# Patient Record
Sex: MALE | Race: WHITE | NOT HISPANIC OR LATINO | Employment: UNEMPLOYED | URBAN - METROPOLITAN AREA
[De-identification: names, ages, dates, MRNs, and addresses within clinical notes are randomized per-mention and may not be internally consistent; named-entity substitution may affect disease eponyms.]

---

## 2022-02-21 ENCOUNTER — APPOINTMENT (OUTPATIENT)
Dept: MRI IMAGING | Facility: HOSPITAL | Age: 47
End: 2022-02-21

## 2022-02-21 ENCOUNTER — HOSPITAL ENCOUNTER (INPATIENT)
Facility: HOSPITAL | Age: 47
LOS: 2 days | Discharge: HOME | End: 2022-02-23
Attending: EMERGENCY MEDICINE | Admitting: INTERNAL MEDICINE

## 2022-02-21 ENCOUNTER — APPOINTMENT (OUTPATIENT)
Dept: CT IMAGING | Facility: HOSPITAL | Age: 47
End: 2022-02-21

## 2022-02-21 DIAGNOSIS — R29.898 LEFT LEG WEAKNESS: Primary | ICD-10-CM

## 2022-02-21 DIAGNOSIS — N18.9 CHRONIC RENAL FAILURE, UNSPECIFIED CKD STAGE: ICD-10-CM

## 2022-02-21 DIAGNOSIS — I10 POORLY-CONTROLLED HYPERTENSION: ICD-10-CM

## 2022-02-21 DIAGNOSIS — R93.0 ABNORMAL HEAD CT: ICD-10-CM

## 2022-02-21 PROBLEM — N17.9 AKI (ACUTE KIDNEY INJURY): Status: ACTIVE | Noted: 2022-02-21

## 2022-02-21 LAB
ALBUMIN SERPL-MCNC: 4 G/DL (ref 3.5–5.2)
ALBUMIN/GLOB SERPL: 1.1 G/DL
ALP SERPL-CCNC: 118 U/L (ref 39–117)
ALT SERPL W P-5'-P-CCNC: 21 U/L (ref 1–41)
ANION GAP SERPL CALCULATED.3IONS-SCNC: 11.9 MMOL/L (ref 5–15)
AST SERPL-CCNC: 13 U/L (ref 1–40)
BASOPHILS # BLD AUTO: 0.02 10*3/MM3 (ref 0–0.2)
BASOPHILS NFR BLD AUTO: 0.3 % (ref 0–1.5)
BILIRUB SERPL-MCNC: 0.3 MG/DL (ref 0–1.2)
BUN SERPL-MCNC: 14 MG/DL (ref 6–20)
BUN/CREAT SERPL: 8 (ref 7–25)
CALCIUM SPEC-SCNC: 9.7 MG/DL (ref 8.6–10.5)
CHLORIDE SERPL-SCNC: 96 MMOL/L (ref 98–107)
CO2 SERPL-SCNC: 26.1 MMOL/L (ref 22–29)
CREAT SERPL-MCNC: 1.74 MG/DL (ref 0.76–1.27)
DEPRECATED RDW RBC AUTO: 42.1 FL (ref 37–54)
EOSINOPHIL # BLD AUTO: 0.2 10*3/MM3 (ref 0–0.4)
EOSINOPHIL NFR BLD AUTO: 3 % (ref 0.3–6.2)
ERYTHROCYTE [DISTWIDTH] IN BLOOD BY AUTOMATED COUNT: 12.4 % (ref 12.3–15.4)
GFR SERPL CREATININE-BSD FRML MDRD: 42 ML/MIN/1.73
GFR SERPL CREATININE-BSD FRML MDRD: 51 ML/MIN/1.73
GLOBULIN UR ELPH-MCNC: 3.6 GM/DL
GLUCOSE SERPL-MCNC: 100 MG/DL (ref 65–99)
HCT VFR BLD AUTO: 38.7 % (ref 37.5–51)
HGB BLD-MCNC: 12.9 G/DL (ref 13–17.7)
IMM GRANULOCYTES # BLD AUTO: 0.01 10*3/MM3 (ref 0–0.05)
IMM GRANULOCYTES NFR BLD AUTO: 0.1 % (ref 0–0.5)
INR PPP: 1.01 (ref 0.9–1.1)
LYMPHOCYTES # BLD AUTO: 0.92 10*3/MM3 (ref 0.7–3.1)
LYMPHOCYTES NFR BLD AUTO: 13.6 % (ref 19.6–45.3)
MCH RBC QN AUTO: 31.2 PG (ref 26.6–33)
MCHC RBC AUTO-ENTMCNC: 33.3 G/DL (ref 31.5–35.7)
MCV RBC AUTO: 93.5 FL (ref 79–97)
MONOCYTES # BLD AUTO: 0.34 10*3/MM3 (ref 0.1–0.9)
MONOCYTES NFR BLD AUTO: 5 % (ref 5–12)
NEUTROPHILS NFR BLD AUTO: 5.25 10*3/MM3 (ref 1.7–7)
NEUTROPHILS NFR BLD AUTO: 78 % (ref 42.7–76)
NRBC BLD AUTO-RTO: 0 /100 WBC (ref 0–0.2)
PLATELET # BLD AUTO: 284 10*3/MM3 (ref 140–450)
PMV BLD AUTO: 10.3 FL (ref 6–12)
POTASSIUM SERPL-SCNC: 4.5 MMOL/L (ref 3.5–5.2)
PROT SERPL-MCNC: 7.6 G/DL (ref 6–8.5)
PROTHROMBIN TIME: 13.2 SECONDS (ref 11.7–14.2)
RBC # BLD AUTO: 4.14 10*6/MM3 (ref 4.14–5.8)
SODIUM SERPL-SCNC: 134 MMOL/L (ref 136–145)
TROPONIN T SERPL-MCNC: 0.01 NG/ML (ref 0–0.03)
WBC NRBC COR # BLD: 6.74 10*3/MM3 (ref 3.4–10.8)

## 2022-02-21 PROCEDURE — 85025 COMPLETE CBC W/AUTO DIFF WBC: CPT | Performed by: EMERGENCY MEDICINE

## 2022-02-21 PROCEDURE — 85610 PROTHROMBIN TIME: CPT | Performed by: EMERGENCY MEDICINE

## 2022-02-21 PROCEDURE — U0004 COV-19 TEST NON-CDC HGH THRU: HCPCS | Performed by: EMERGENCY MEDICINE

## 2022-02-21 PROCEDURE — 70551 MRI BRAIN STEM W/O DYE: CPT

## 2022-02-21 PROCEDURE — 99285 EMERGENCY DEPT VISIT HI MDM: CPT

## 2022-02-21 PROCEDURE — 84484 ASSAY OF TROPONIN QUANT: CPT | Performed by: EMERGENCY MEDICINE

## 2022-02-21 PROCEDURE — 80053 COMPREHEN METABOLIC PANEL: CPT | Performed by: EMERGENCY MEDICINE

## 2022-02-21 PROCEDURE — 70450 CT HEAD/BRAIN W/O DYE: CPT

## 2022-02-21 RX ORDER — LISINOPRIL 10 MG/1
10 TABLET ORAL DAILY
COMMUNITY
End: 2022-02-23 | Stop reason: HOSPADM

## 2022-02-21 RX ORDER — HYDRALAZINE HYDROCHLORIDE 50 MG/1
50 TABLET, FILM COATED ORAL 3 TIMES DAILY
COMMUNITY
End: 2022-02-23 | Stop reason: HOSPADM

## 2022-02-21 RX ORDER — SODIUM CHLORIDE 9 MG/ML
75 INJECTION, SOLUTION INTRAVENOUS CONTINUOUS
Status: DISCONTINUED | OUTPATIENT
Start: 2022-02-21 | End: 2022-02-22

## 2022-02-21 RX ORDER — NICOTINE 21 MG/24HR
1 PATCH, TRANSDERMAL 24 HOURS TRANSDERMAL EVERY 24 HOURS
Status: DISCONTINUED | OUTPATIENT
Start: 2022-02-22 | End: 2022-02-23 | Stop reason: HOSPADM

## 2022-02-21 RX ORDER — CLOPIDOGREL BISULFATE 75 MG/1
75 TABLET ORAL ONCE
Status: COMPLETED | OUTPATIENT
Start: 2022-02-21 | End: 2022-02-21

## 2022-02-21 RX ORDER — ASPIRIN 81 MG/1
81 TABLET, CHEWABLE ORAL DAILY
Status: DISCONTINUED | OUTPATIENT
Start: 2022-02-21 | End: 2022-02-22

## 2022-02-21 RX ORDER — LISINOPRIL 10 MG/1
10 TABLET ORAL DAILY
Status: DISCONTINUED | OUTPATIENT
Start: 2022-02-22 | End: 2022-02-22

## 2022-02-21 RX ORDER — NICOTINE 21 MG/24HR
1 PATCH, TRANSDERMAL 24 HOURS TRANSDERMAL EVERY 24 HOURS
Status: ON HOLD | COMMUNITY
End: 2022-02-23 | Stop reason: SDUPTHER

## 2022-02-21 RX ORDER — CARVEDILOL 25 MG/1
25 TABLET ORAL 2 TIMES DAILY WITH MEALS
COMMUNITY
End: 2022-04-28 | Stop reason: SDUPTHER

## 2022-02-21 RX ORDER — ASPIRIN 300 MG/1
300 SUPPOSITORY RECTAL DAILY
Status: DISCONTINUED | OUTPATIENT
Start: 2022-02-21 | End: 2022-02-22

## 2022-02-21 RX ORDER — ATORVASTATIN CALCIUM 80 MG/1
80 TABLET, FILM COATED ORAL NIGHTLY
Status: DISCONTINUED | OUTPATIENT
Start: 2022-02-21 | End: 2022-02-23 | Stop reason: HOSPADM

## 2022-02-21 RX ORDER — HYDRALAZINE HYDROCHLORIDE 50 MG/1
50 TABLET, FILM COATED ORAL 3 TIMES DAILY
Status: DISCONTINUED | OUTPATIENT
Start: 2022-02-21 | End: 2022-02-22

## 2022-02-21 RX ORDER — SODIUM CHLORIDE 0.9 % (FLUSH) 0.9 %
10 SYRINGE (ML) INJECTION EVERY 12 HOURS SCHEDULED
Status: DISCONTINUED | OUTPATIENT
Start: 2022-02-21 | End: 2022-02-23 | Stop reason: HOSPADM

## 2022-02-21 RX ORDER — ONDANSETRON 2 MG/ML
4 INJECTION INTRAMUSCULAR; INTRAVENOUS EVERY 6 HOURS PRN
Status: DISCONTINUED | OUTPATIENT
Start: 2022-02-21 | End: 2022-02-23 | Stop reason: HOSPADM

## 2022-02-21 RX ORDER — SODIUM CHLORIDE 0.9 % (FLUSH) 0.9 %
10 SYRINGE (ML) INJECTION AS NEEDED
Status: DISCONTINUED | OUTPATIENT
Start: 2022-02-21 | End: 2022-02-23 | Stop reason: HOSPADM

## 2022-02-21 RX ORDER — CARVEDILOL 25 MG/1
25 TABLET ORAL 2 TIMES DAILY WITH MEALS
Status: DISCONTINUED | OUTPATIENT
Start: 2022-02-21 | End: 2022-02-23 | Stop reason: HOSPADM

## 2022-02-21 RX ADMIN — CLOPIDOGREL 75 MG: 75 TABLET, FILM COATED ORAL at 18:13

## 2022-02-21 RX ADMIN — HYDRALAZINE HYDROCHLORIDE 50 MG: 50 TABLET, FILM COATED ORAL at 22:07

## 2022-02-21 RX ADMIN — SODIUM CHLORIDE 500 ML: 9 INJECTION, SOLUTION INTRAVENOUS at 18:13

## 2022-02-21 RX ADMIN — ATORVASTATIN CALCIUM 80 MG: 80 TABLET, FILM COATED ORAL at 22:06

## 2022-02-21 RX ADMIN — SODIUM CHLORIDE 75 ML/HR: 9 INJECTION, SOLUTION INTRAVENOUS at 22:12

## 2022-02-21 RX ADMIN — ASPIRIN 81 MG: 81 TABLET, CHEWABLE ORAL at 22:06

## 2022-02-21 RX ADMIN — NICOTINE 1 PATCH: 21 PATCH, EXTENDED RELEASE TRANSDERMAL at 22:06

## 2022-02-21 RX ADMIN — CARVEDILOL 25 MG: 25 TABLET, FILM COATED ORAL at 22:07

## 2022-02-22 ENCOUNTER — APPOINTMENT (OUTPATIENT)
Dept: MRI IMAGING | Facility: HOSPITAL | Age: 47
End: 2022-02-22

## 2022-02-22 ENCOUNTER — APPOINTMENT (OUTPATIENT)
Dept: CARDIOLOGY | Facility: HOSPITAL | Age: 47
End: 2022-02-22

## 2022-02-22 PROBLEM — I61.2: Status: ACTIVE | Noted: 2022-02-22

## 2022-02-22 PROBLEM — I63.9 ACUTE ISCHEMIC STROKE: Status: ACTIVE | Noted: 2022-02-22

## 2022-02-22 PROBLEM — N18.31 STAGE 3A CHRONIC KIDNEY DISEASE: Status: ACTIVE | Noted: 2022-02-22

## 2022-02-22 PROBLEM — Z72.0 TOBACCO USE: Status: ACTIVE | Noted: 2022-02-22

## 2022-02-22 LAB
ALBUMIN SERPL-MCNC: 3.4 G/DL (ref 3.5–5.2)
ALBUMIN/GLOB SERPL: 1 G/DL
ALP SERPL-CCNC: 103 U/L (ref 39–117)
ALT SERPL W P-5'-P-CCNC: 19 U/L (ref 1–41)
AMPHET+METHAMPHET UR QL: NEGATIVE
ANION GAP SERPL CALCULATED.3IONS-SCNC: 9.7 MMOL/L (ref 5–15)
AORTIC ARCH: 3.4 CM
AORTIC DIMENSIONLESS INDEX: 0.8 (DI)
AST SERPL-CCNC: 12 U/L (ref 1–40)
BARBITURATES UR QL SCN: NEGATIVE
BENZODIAZ UR QL SCN: NEGATIVE
BH CV ECHO LEFT VENTRICLE GLOBAL LONGITUDINAL STRAIN: -12.2 %
BH CV ECHO MEAS - ACS: 2.48 CM
BH CV ECHO MEAS - AO MAX PG: 8.8 MMHG
BH CV ECHO MEAS - AO MEAN PG: 4.5 MMHG
BH CV ECHO MEAS - AO ROOT DIAM: 3.6 CM
BH CV ECHO MEAS - AO V2 MAX: 148 CM/SEC
BH CV ECHO MEAS - AO V2 VTI: 26.1 CM
BH CV ECHO MEAS - AVA(I,D): 3.3 CM2
BH CV ECHO MEAS - EDV(CUBED): 134.6 ML
BH CV ECHO MEAS - EDV(MOD-SP2): 125 ML
BH CV ECHO MEAS - EDV(MOD-SP4): 133 ML
BH CV ECHO MEAS - EF(MOD-BP): 59.5 %
BH CV ECHO MEAS - EF(MOD-SP2): 57.6 %
BH CV ECHO MEAS - EF(MOD-SP4): 61.7 %
BH CV ECHO MEAS - ESV(CUBED): 49.9 ML
BH CV ECHO MEAS - ESV(MOD-SP2): 53 ML
BH CV ECHO MEAS - ESV(MOD-SP4): 51 ML
BH CV ECHO MEAS - FS: 28.1 %
BH CV ECHO MEAS - IVS/LVPW: 0.86 CM
BH CV ECHO MEAS - IVSD: 1.72 CM
BH CV ECHO MEAS - LAT PEAK E' VEL: 8.9 CM/SEC
BH CV ECHO MEAS - LV DIASTOLIC VOL/BSA (35-75): 68.1 CM2
BH CV ECHO MEAS - LV MASS(C)D: 467.6 GRAMS
BH CV ECHO MEAS - LV MAX PG: 5.2 MMHG
BH CV ECHO MEAS - LV MEAN PG: 2.5 MMHG
BH CV ECHO MEAS - LV SYSTOLIC VOL/BSA (12-30): 26.1 CM2
BH CV ECHO MEAS - LV V1 MAX: 114.4 CM/SEC
BH CV ECHO MEAS - LV V1 VTI: 20.2 CM
BH CV ECHO MEAS - LVIDD: 5.1 CM
BH CV ECHO MEAS - LVIDS: 3.7 CM
BH CV ECHO MEAS - LVOT AREA: 4.2 CM2
BH CV ECHO MEAS - LVOT DIAM: 2.32 CM
BH CV ECHO MEAS - LVPWD: 2.01 CM
BH CV ECHO MEAS - MED PEAK E' VEL: 6.2 CM/SEC
BH CV ECHO MEAS - MV A DUR: 0.13 SEC
BH CV ECHO MEAS - MV A MAX VEL: 65.6 CM/SEC
BH CV ECHO MEAS - MV DEC SLOPE: 401.5 CM/SEC2
BH CV ECHO MEAS - MV DEC TIME: 0.23 MSEC
BH CV ECHO MEAS - MV E MAX VEL: 60.8 CM/SEC
BH CV ECHO MEAS - MV E/A: 0.93
BH CV ECHO MEAS - MV MAX PG: 3.3 MMHG
BH CV ECHO MEAS - MV MEAN PG: 1.25 MMHG
BH CV ECHO MEAS - MV V2 VTI: 25.2 CM
BH CV ECHO MEAS - MVA(VTI): 3.4 CM2
BH CV ECHO MEAS - PA ACC TIME: 0.12 SEC
BH CV ECHO MEAS - PA PR(ACCEL): 26.7 MMHG
BH CV ECHO MEAS - PA V2 MAX: 109.9 CM/SEC
BH CV ECHO MEAS - PI END-D VEL: 92.3 CM/SEC
BH CV ECHO MEAS - PULM A REVS DUR: 0.13 SEC
BH CV ECHO MEAS - PULM A REVS VEL: 31.5 CM/SEC
BH CV ECHO MEAS - PULM DIAS VEL: 34.6 CM/SEC
BH CV ECHO MEAS - PULM SYS VEL: 58.5 CM/SEC
BH CV ECHO MEAS - RV MAX PG: 3.6 MMHG
BH CV ECHO MEAS - RV V1 MAX: 95.1 CM/SEC
BH CV ECHO MEAS - RV V1 VTI: 21.2 CM
BH CV ECHO MEAS - RVOT DIAM: 1.48 CM
BH CV ECHO MEAS - SI(MOD-SP2): 36.9 ML/M2
BH CV ECHO MEAS - SI(MOD-SP4): 42 ML/M2
BH CV ECHO MEAS - SV(LVOT): 85.6 ML
BH CV ECHO MEAS - SV(MOD-SP2): 72 ML
BH CV ECHO MEAS - SV(MOD-SP4): 82 ML
BH CV ECHO MEAS - SV(RVOT): 36.5 ML
BH CV ECHO MEAS - TAPSE (>1.6): 2.7 CM
BH CV ECHO MEASUREMENTS AVERAGE E/E' RATIO: 8.05
BH CV XLRA - RV BASE: 3.3 CM
BH CV XLRA - RV LENGTH: 7.1 CM
BH CV XLRA - RV MID: 2.11 CM
BH CV XLRA - TDI S': 12.3 CM/SEC
BILIRUB SERPL-MCNC: 0.2 MG/DL (ref 0–1.2)
BUN SERPL-MCNC: 13 MG/DL (ref 6–20)
BUN/CREAT SERPL: 8.3 (ref 7–25)
CALCIUM SPEC-SCNC: 9.3 MG/DL (ref 8.6–10.5)
CANNABINOIDS SERPL QL: NEGATIVE
CHLORIDE SERPL-SCNC: 105 MMOL/L (ref 98–107)
CHOLEST SERPL-MCNC: 114 MG/DL (ref 0–200)
CO2 SERPL-SCNC: 23.3 MMOL/L (ref 22–29)
COCAINE UR QL: NEGATIVE
CREAT SERPL-MCNC: 1.56 MG/DL (ref 0.76–1.27)
DEPRECATED RDW RBC AUTO: 43 FL (ref 37–54)
ERYTHROCYTE [DISTWIDTH] IN BLOOD BY AUTOMATED COUNT: 12.6 % (ref 12.3–15.4)
GFR SERPL CREATININE-BSD FRML MDRD: 48 ML/MIN/1.73
GLOBULIN UR ELPH-MCNC: 3.3 GM/DL
GLUCOSE BLDC GLUCOMTR-MCNC: 132 MG/DL (ref 70–130)
GLUCOSE SERPL-MCNC: 108 MG/DL (ref 65–99)
HBA1C MFR BLD: 5 % (ref 4.8–5.6)
HCT VFR BLD AUTO: 34.4 % (ref 37.5–51)
HDLC SERPL-MCNC: 19 MG/DL (ref 40–60)
HGB BLD-MCNC: 11.9 G/DL (ref 13–17.7)
LDLC SERPL CALC-MCNC: 80 MG/DL (ref 0–100)
LDLC/HDLC SERPL: 4.25 {RATIO}
LEFT ATRIUM VOLUME INDEX: 23 ML/M2
MAXIMAL PREDICTED HEART RATE: 174 BPM
MCH RBC QN AUTO: 32.3 PG (ref 26.6–33)
MCHC RBC AUTO-ENTMCNC: 34.6 G/DL (ref 31.5–35.7)
MCV RBC AUTO: 93.5 FL (ref 79–97)
METHADONE UR QL SCN: NEGATIVE
OPIATES UR QL: NEGATIVE
OXYCODONE UR QL SCN: NEGATIVE
PLATELET # BLD AUTO: 234 10*3/MM3 (ref 140–450)
PMV BLD AUTO: 10.2 FL (ref 6–12)
POTASSIUM SERPL-SCNC: 4.3 MMOL/L (ref 3.5–5.2)
PROT SERPL-MCNC: 6.7 G/DL (ref 6–8.5)
RBC # BLD AUTO: 3.68 10*6/MM3 (ref 4.14–5.8)
SARS-COV-2 ORF1AB RESP QL NAA+PROBE: NOT DETECTED
SINUS: 3.6 CM
SODIUM SERPL-SCNC: 138 MMOL/L (ref 136–145)
STJ: 3.3 CM
STRESS TARGET HR: 148 BPM
TRIGL SERPL-MCNC: 71 MG/DL (ref 0–150)
VLDLC SERPL-MCNC: 15 MG/DL (ref 5–40)
WBC NRBC COR # BLD: 6.47 10*3/MM3 (ref 3.4–10.8)

## 2022-02-22 PROCEDURE — 97535 SELF CARE MNGMENT TRAINING: CPT

## 2022-02-22 PROCEDURE — 70547 MR ANGIOGRAPHY NECK W/O DYE: CPT

## 2022-02-22 PROCEDURE — 82962 GLUCOSE BLOOD TEST: CPT

## 2022-02-22 PROCEDURE — 80307 DRUG TEST PRSMV CHEM ANLYZR: CPT | Performed by: INTERNAL MEDICINE

## 2022-02-22 PROCEDURE — 83036 HEMOGLOBIN GLYCOSYLATED A1C: CPT | Performed by: INTERNAL MEDICINE

## 2022-02-22 PROCEDURE — 93356 MYOCRD STRAIN IMG SPCKL TRCK: CPT | Performed by: INTERNAL MEDICINE

## 2022-02-22 PROCEDURE — 85027 COMPLETE CBC AUTOMATED: CPT | Performed by: INTERNAL MEDICINE

## 2022-02-22 PROCEDURE — 70544 MR ANGIOGRAPHY HEAD W/O DYE: CPT

## 2022-02-22 PROCEDURE — 93306 TTE W/DOPPLER COMPLETE: CPT | Performed by: INTERNAL MEDICINE

## 2022-02-22 PROCEDURE — 97161 PT EVAL LOW COMPLEX 20 MIN: CPT

## 2022-02-22 PROCEDURE — 99223 1ST HOSP IP/OBS HIGH 75: CPT | Performed by: PSYCHIATRY & NEUROLOGY

## 2022-02-22 PROCEDURE — 80053 COMPREHEN METABOLIC PANEL: CPT | Performed by: INTERNAL MEDICINE

## 2022-02-22 PROCEDURE — 97165 OT EVAL LOW COMPLEX 30 MIN: CPT

## 2022-02-22 PROCEDURE — 93306 TTE W/DOPPLER COMPLETE: CPT

## 2022-02-22 PROCEDURE — 80061 LIPID PANEL: CPT | Performed by: INTERNAL MEDICINE

## 2022-02-22 PROCEDURE — 93356 MYOCRD STRAIN IMG SPCKL TRCK: CPT

## 2022-02-22 RX ORDER — LABETALOL HYDROCHLORIDE 5 MG/ML
10 INJECTION, SOLUTION INTRAVENOUS
Status: DISCONTINUED | OUTPATIENT
Start: 2022-02-22 | End: 2022-02-23 | Stop reason: HOSPADM

## 2022-02-22 RX ORDER — DOCUSATE SODIUM 100 MG/1
100 CAPSULE, LIQUID FILLED ORAL 2 TIMES DAILY PRN
Status: DISCONTINUED | OUTPATIENT
Start: 2022-02-22 | End: 2022-02-23 | Stop reason: HOSPADM

## 2022-02-22 RX ORDER — ASPIRIN 325 MG
325 TABLET, DELAYED RELEASE (ENTERIC COATED) ORAL DAILY
Status: DISCONTINUED | OUTPATIENT
Start: 2022-02-23 | End: 2022-02-23 | Stop reason: HOSPADM

## 2022-02-22 RX ORDER — CLOPIDOGREL BISULFATE 75 MG/1
75 TABLET ORAL DAILY
Status: DISCONTINUED | OUTPATIENT
Start: 2022-02-22 | End: 2022-02-23 | Stop reason: HOSPADM

## 2022-02-22 RX ORDER — LISINOPRIL 10 MG/1
10 TABLET ORAL
Status: DISCONTINUED | OUTPATIENT
Start: 2022-02-23 | End: 2022-02-22

## 2022-02-22 RX ORDER — LISINOPRIL 20 MG/1
20 TABLET ORAL
Status: DISCONTINUED | OUTPATIENT
Start: 2022-02-22 | End: 2022-02-22

## 2022-02-22 RX ORDER — LISINOPRIL 10 MG/1
10 TABLET ORAL
Status: DISCONTINUED | OUTPATIENT
Start: 2022-02-22 | End: 2022-02-23

## 2022-02-22 RX ORDER — ACETAMINOPHEN 325 MG/1
650 TABLET ORAL EVERY 6 HOURS PRN
Status: DISCONTINUED | OUTPATIENT
Start: 2022-02-22 | End: 2022-02-23 | Stop reason: HOSPADM

## 2022-02-22 RX ORDER — AMLODIPINE BESYLATE 5 MG/1
5 TABLET ORAL
Status: DISCONTINUED | OUTPATIENT
Start: 2022-02-22 | End: 2022-02-23 | Stop reason: HOSPADM

## 2022-02-22 RX ORDER — CHOLECALCIFEROL (VITAMIN D3) 125 MCG
5 CAPSULE ORAL NIGHTLY PRN
Status: DISCONTINUED | OUTPATIENT
Start: 2022-02-22 | End: 2022-02-23 | Stop reason: HOSPADM

## 2022-02-22 RX ADMIN — LISINOPRIL 10 MG: 10 TABLET ORAL at 16:09

## 2022-02-22 RX ADMIN — ATORVASTATIN CALCIUM 80 MG: 80 TABLET, FILM COATED ORAL at 20:19

## 2022-02-22 RX ADMIN — ASPIRIN 81 MG: 81 TABLET, CHEWABLE ORAL at 09:06

## 2022-02-22 RX ADMIN — Medication 10 ML: at 20:20

## 2022-02-22 RX ADMIN — CARVEDILOL 25 MG: 25 TABLET, FILM COATED ORAL at 09:06

## 2022-02-22 RX ADMIN — AMLODIPINE BESYLATE 5 MG: 5 TABLET ORAL at 09:06

## 2022-02-22 RX ADMIN — CARVEDILOL 25 MG: 25 TABLET, FILM COATED ORAL at 20:35

## 2022-02-22 RX ADMIN — CLOPIDOGREL 75 MG: 75 TABLET, FILM COATED ORAL at 14:30

## 2022-02-22 RX ADMIN — NICOTINE 1 PATCH: 21 PATCH, EXTENDED RELEASE TRANSDERMAL at 20:19

## 2022-02-23 VITALS
WEIGHT: 163 LBS | OXYGEN SATURATION: 94 % | TEMPERATURE: 98.5 F | BODY MASS INDEX: 22.08 KG/M2 | RESPIRATION RATE: 20 BRPM | HEART RATE: 60 BPM | DIASTOLIC BLOOD PRESSURE: 87 MMHG | HEIGHT: 72 IN | SYSTOLIC BLOOD PRESSURE: 156 MMHG

## 2022-02-23 LAB
ANION GAP SERPL CALCULATED.3IONS-SCNC: 8 MMOL/L (ref 5–15)
BUN SERPL-MCNC: 13 MG/DL (ref 6–20)
BUN/CREAT SERPL: 10.2 (ref 7–25)
CALCIUM SPEC-SCNC: 9.2 MG/DL (ref 8.6–10.5)
CHLORIDE SERPL-SCNC: 103 MMOL/L (ref 98–107)
CO2 SERPL-SCNC: 25 MMOL/L (ref 22–29)
CREAT SERPL-MCNC: 1.28 MG/DL (ref 0.76–1.27)
DEPRECATED RDW RBC AUTO: 40.7 FL (ref 37–54)
ERYTHROCYTE [DISTWIDTH] IN BLOOD BY AUTOMATED COUNT: 12.2 % (ref 12.3–15.4)
F5 GENE MUT ANL BLD/T: NORMAL
GFR SERPL CREATININE-BSD FRML MDRD: 61 ML/MIN/1.73
GLUCOSE SERPL-MCNC: 95 MG/DL (ref 65–99)
HCT VFR BLD AUTO: 35.5 % (ref 37.5–51)
HCYS SERPL-MCNC: 20.1 UMOL/L (ref 0–15)
HGB BLD-MCNC: 12.1 G/DL (ref 13–17.7)
MCH RBC QN AUTO: 31.4 PG (ref 26.6–33)
MCHC RBC AUTO-ENTMCNC: 34.1 G/DL (ref 31.5–35.7)
MCV RBC AUTO: 92.2 FL (ref 79–97)
PLATELET # BLD AUTO: 251 10*3/MM3 (ref 140–450)
PMV BLD AUTO: 10.3 FL (ref 6–12)
POTASSIUM SERPL-SCNC: 4.3 MMOL/L (ref 3.5–5.2)
RBC # BLD AUTO: 3.85 10*6/MM3 (ref 4.14–5.8)
SODIUM SERPL-SCNC: 136 MMOL/L (ref 136–145)
WBC NRBC COR # BLD: 6.09 10*3/MM3 (ref 3.4–10.8)

## 2022-02-23 PROCEDURE — 80048 BASIC METABOLIC PNL TOTAL CA: CPT | Performed by: INTERNAL MEDICINE

## 2022-02-23 PROCEDURE — 85670 THROMBIN TIME PLASMA: CPT | Performed by: NURSE PRACTITIONER

## 2022-02-23 PROCEDURE — 81241 F5 GENE: CPT | Performed by: NURSE PRACTITIONER

## 2022-02-23 PROCEDURE — 85598 HEXAGNAL PHOSPH PLTLT NEUTRL: CPT | Performed by: NURSE PRACTITIONER

## 2022-02-23 PROCEDURE — 85732 THROMBOPLASTIN TIME PARTIAL: CPT | Performed by: NURSE PRACTITIONER

## 2022-02-23 PROCEDURE — 85610 PROTHROMBIN TIME: CPT | Performed by: NURSE PRACTITIONER

## 2022-02-23 PROCEDURE — 85613 RUSSELL VIPER VENOM DILUTED: CPT | Performed by: NURSE PRACTITIONER

## 2022-02-23 PROCEDURE — 85810 BLOOD VISCOSITY EXAMINATION: CPT | Performed by: NURSE PRACTITIONER

## 2022-02-23 PROCEDURE — 85303 CLOT INHIBIT PROT C ACTIVITY: CPT | Performed by: NURSE PRACTITIONER

## 2022-02-23 PROCEDURE — 83090 ASSAY OF HOMOCYSTEINE: CPT | Performed by: NURSE PRACTITIONER

## 2022-02-23 PROCEDURE — 86146 BETA-2 GLYCOPROTEIN ANTIBODY: CPT | Performed by: NURSE PRACTITIONER

## 2022-02-23 PROCEDURE — 85300 ANTITHROMBIN III ACTIVITY: CPT | Performed by: NURSE PRACTITIONER

## 2022-02-23 PROCEDURE — 99233 SBSQ HOSP IP/OBS HIGH 50: CPT | Performed by: NURSE PRACTITIONER

## 2022-02-23 PROCEDURE — 85210 CLOT FACTOR II PROTHROM SPEC: CPT | Performed by: NURSE PRACTITIONER

## 2022-02-23 PROCEDURE — 86147 CARDIOLIPIN ANTIBODY EA IG: CPT | Performed by: NURSE PRACTITIONER

## 2022-02-23 PROCEDURE — 85027 COMPLETE CBC AUTOMATED: CPT | Performed by: INTERNAL MEDICINE

## 2022-02-23 PROCEDURE — 85730 THROMBOPLASTIN TIME PARTIAL: CPT | Performed by: NURSE PRACTITIONER

## 2022-02-23 PROCEDURE — 85306 CLOT INHIBIT PROT S FREE: CPT | Performed by: NURSE PRACTITIONER

## 2022-02-23 RX ORDER — LOSARTAN POTASSIUM 50 MG/1
50 TABLET ORAL
Status: DISCONTINUED | OUTPATIENT
Start: 2022-02-23 | End: 2022-02-23 | Stop reason: HOSPADM

## 2022-02-23 RX ORDER — AMLODIPINE BESYLATE 5 MG/1
5 TABLET ORAL
Qty: 30 TABLET | Refills: 0 | Status: SHIPPED | OUTPATIENT
Start: 2022-02-24 | End: 2022-04-28 | Stop reason: SDUPTHER

## 2022-02-23 RX ORDER — ACETAMINOPHEN 325 MG/1
650 TABLET ORAL EVERY 6 HOURS PRN
Start: 2022-02-23

## 2022-02-23 RX ORDER — CLOPIDOGREL BISULFATE 75 MG/1
75 TABLET ORAL DAILY
Qty: 30 TABLET | Refills: 0 | Status: SHIPPED | OUTPATIENT
Start: 2022-02-24 | End: 2022-02-28 | Stop reason: SDUPTHER

## 2022-02-23 RX ORDER — ATORVASTATIN CALCIUM 80 MG/1
80 TABLET, FILM COATED ORAL NIGHTLY
Qty: 30 TABLET | Refills: 0 | Status: SHIPPED | OUTPATIENT
Start: 2022-02-23

## 2022-02-23 RX ORDER — NICOTINE 21 MG/24HR
1 PATCH, TRANSDERMAL 24 HOURS TRANSDERMAL EVERY 24 HOURS
Qty: 28 PATCH | Refills: 0 | Status: SHIPPED | OUTPATIENT
Start: 2022-02-23 | End: 2022-03-25

## 2022-02-23 RX ORDER — ASPIRIN 325 MG
325 TABLET, DELAYED RELEASE (ENTERIC COATED) ORAL DAILY
Qty: 90 TABLET | Refills: 0
Start: 2022-02-24

## 2022-02-23 RX ORDER — LOSARTAN POTASSIUM 50 MG/1
50 TABLET ORAL
Qty: 30 TABLET | Refills: 0 | Status: SHIPPED | OUTPATIENT
Start: 2022-02-23

## 2022-02-23 RX ADMIN — ASPIRIN 325 MG: 325 TABLET, COATED ORAL at 08:48

## 2022-02-23 RX ADMIN — LOSARTAN POTASSIUM 50 MG: 50 TABLET, FILM COATED ORAL at 11:37

## 2022-02-23 RX ADMIN — AMLODIPINE BESYLATE 5 MG: 5 TABLET ORAL at 08:48

## 2022-02-23 RX ADMIN — CARVEDILOL 25 MG: 25 TABLET, FILM COATED ORAL at 08:48

## 2022-02-23 RX ADMIN — CLOPIDOGREL 75 MG: 75 TABLET, FILM COATED ORAL at 08:48

## 2022-02-24 ENCOUNTER — READMISSION MANAGEMENT (OUTPATIENT)
Dept: CALL CENTER | Facility: HOSPITAL | Age: 47
End: 2022-02-24

## 2022-02-24 LAB — PROTHROM ACT/NOR PPP: 113 % (ref 50–154)

## 2022-02-24 NOTE — OUTREACH NOTE
Prep Survey      Responses   [facility] patient discharged from? [place]   Is LACE score < 7 ? No   Emergency Room discharge w/ pulse ox? No   Eligibility Readm Mgmt   Discharge diagnosis Acute ischemic strokes    Does the patient have one of the following disease processes/diagnoses(primary or secondary)? Stroke (TIA)   Does the patient have Home health ordered? No   Is there a DME ordered? No   Medication alerts for this patient ASA, Plavix    Prep survey completed? Yes          Alanna Wild RN

## 2022-02-25 LAB
AT III PPP CHRO-ACNC: 100 % (ref 90–134)
PROT C ACT/NOR PPP: 110 % (ref 86–163)
PROT S FREE PPP-ACNC: 109 % (ref 49–138)

## 2022-02-28 ENCOUNTER — TELEPHONE (OUTPATIENT)
Dept: NEUROLOGY | Facility: CLINIC | Age: 47
End: 2022-02-28

## 2022-02-28 ENCOUNTER — READMISSION MANAGEMENT (OUTPATIENT)
Dept: CALL CENTER | Facility: HOSPITAL | Age: 47
End: 2022-02-28

## 2022-02-28 DIAGNOSIS — I10 HYPERTENSION, UNSPECIFIED TYPE: ICD-10-CM

## 2022-02-28 DIAGNOSIS — I63.9 ACUTE ISCHEMIC STROKE: Primary | ICD-10-CM

## 2022-02-28 LAB — VISC SER: 1.7 REL.SALINE (ref 1.4–2)

## 2022-02-28 RX ORDER — CLOPIDOGREL BISULFATE 75 MG/1
75 TABLET ORAL DAILY
Qty: 30 TABLET | Refills: 1 | Status: SHIPPED | OUTPATIENT
Start: 2022-02-28

## 2022-02-28 NOTE — TELEPHONE ENCOUNTER
Stroke Phone Call  Spoke with the patient's wifeRadha  · Admission Date: 2/21/2022  · Discharge Date:  2/23/2022  · Discharge Destination: Home  · Meds reviewed with patient/caregiver?    [x]Yes [] No   o Antiplatelet:  Plavix x 90 days, ASA 325mg daily  - Understands purpose     [x]  Yes     []  No     - Understands how to take      [x]  Yes     []  No    o Cholesterol Reducing: Lipitor   - Understands purpose     [x]  Yes     []  No    - Understands how to take      [x]  Yes     []  No   · Is the patient taking all medication as directed?   [x]  Yes  []  No  · Discussed personal risk factors   [x]  Yes []  No    o Physical Inactivity  - Engaged in physical activity [x]  Yes []  No   o Smoking or other tobacco use  - Has attempted to quit smoking [x]  Yes     []  No   - Has cut back from 2 PPD to about 8 cigarettes per day per wife by using Nicotine patches.  o High blood pressure   - Reviewed medications ordered for high blood pressure  - Has been monitoring BP [x]  Yes     []  No  - BP goal <130/80  - BP still elevated, appt w/ Cardiology on Thursday.   o High cholesterol   - Review desired LDL goal <70  o Atherosclerosis  - Plaque inside the arteries, “hardening of the arteries”  • Discussed signs and symptoms of stroke and when patient to call 911?   [x]  Yes []  No  o Sudden weakness or numbness of the face, arm, or leg especially on one side of the body  o Sudden confusion, trouble speaking or understanding  o Sudden trouble seeing in one or both eyes   o Sudden trouble walking, dizziness, loss of balance or coordination  o Sudden severe headaches with no known cause    Notified Patient that if any of these symptoms occur to call 911  · Does the patient have any new signs or symptoms of a stroke?   []  Yes     [x]  No  • Does the patient have increasing stiffness in arms, hands, or legs?    []  Yes     [x]  No   Is this interfering with activities of daily living?   []  Yes     [x]  No  · Does the patient  have an appointment with PCP?  [x]  Yes     []  No  · Does the patient have 3 month Stroke Clinic appointment?  [x]  Yes     []  No  · Is the patient currently in therapy, outpatient, or home health?  []  Yes     [x]  No     Needs a referral?       []  Yes     [x]  No    Patient Satisfaction   · How would you rate your satisfaction with the instructions provided about your specific risk factors for stroke?   []Poor  [] Fair    [] Good [] Very Good  [x] Excellent   · How would you rate your satisfaction with the instructions provided on the warnings signs and symptoms of stroke?   []Poor  [] Fair   [] Good [] Very Good  [x] Excellent   · How well did we explain the importance of calling 911 to activate the emergency medical system for new signs and symptoms of stroke?    []Poor  [] Fair   [] Good [] Very Good  [x] Excellent   · Would you recommend the stroke center to your friends and family?   []Definitely Would Not  [] Probably Would Not  [] Neutral   []  Probably Would [x] Definitely Would  • Did you understand who all of your providers were and what their roles were?      [x]  Yes     []  No

## 2022-02-28 NOTE — OUTREACH NOTE
Stroke Week 1 Survey      Responses   [facility] patient discharged from? [place]   Does the patient have one of the following disease processes/diagnoses(primary or secondary)? Stroke (TIA)   Week 1 attempt successful? No   Unsuccessful attempts Attempt 1          Mary Jane De León RN

## 2022-02-28 NOTE — TELEPHONE ENCOUNTER
Caller: Radha Garcia    Relationship to patient: Emergency Contact; SPOUSE    Best call back number: [phone]    New or established patient?  [x] New  [] Established    Date of admission: 2/21/22    Date of discharge: 2/23/22    Length of stay (If applicable): 2 DAYS    Facility discharged from: [facility]    Diagnosis/Symptoms: ACUTE ISCHEMIC STROKE    Suggested follow-up timeframe: Follow-up in [facility] neurolog[facility] clinic as instructed for the results of hypercoagulable labs and for stroke follow-up.  Call the clinic with questions    Specialty Only: Did you see a [facility] health provider?    [x] Yes  [] No    If so, who? DR. GORMAN & CHUN HAINES    DISCHARGE STATES TO F/U WITH NEURO IN 3 MONTHS BUT PT'S WIFE IS REQUESTING TO BE SEEN AS SOON AS POSSIBLE TO DISCUSS NON-URGENT CLINICAL QUESTIONS.    PLEASE REVIEW AND ADVISE.        
Nani,    I spoke with this patient's wife.  She wanted to let you know that the patient had a few episode of intermittent weakness of his left leg since discharge.  It happened 2-3 times yesterday when patient was fatigued after walking through the grocery store.      Discussed post stroke fatigue.  Reviewed stroke signs and symptoms, when to call 911 and seek immediate medical attention.    BP still elevated, this morning BP was 173/105 before taking his medications.  They are checking BP multiple times daily and they report compliance with all medications.  Patient is scheduled to see a cardiologist in [place] on 3/3/2022, but she would like for the patient to have all his providers within [facility].  She is requesting a referral, is this okay to place?  
Statement Selected

## 2022-03-02 ENCOUNTER — READMISSION MANAGEMENT (OUTPATIENT)
Dept: CALL CENTER | Facility: HOSPITAL | Age: 47
End: 2022-03-02

## 2022-03-02 NOTE — OUTREACH NOTE
Stroke Week 1 Survey      Responses   [facility] patient discharged from? [place]   Does the patient have one of the following disease processes/diagnoses(primary or secondary)? Stroke (TIA)   Week 1 attempt successful? No   Unsuccessful attempts Attempt 2          Beatriz Law RN

## 2022-03-04 ENCOUNTER — READMISSION MANAGEMENT (OUTPATIENT)
Dept: CALL CENTER | Facility: HOSPITAL | Age: 47
End: 2022-03-04

## 2022-03-04 NOTE — OUTREACH NOTE
Stroke Week 1 Survey      Responses   [facility] patient discharged from? [place]   Does the patient have one of the following disease processes/diagnoses(primary or secondary)? Stroke (TIA)   Week 1 attempt successful? No   Unsuccessful attempts Attempt 3   Wrap up additional comments UTR x3          Pippa Washington, RN

## 2022-03-15 ENCOUNTER — READMISSION MANAGEMENT (OUTPATIENT)
Dept: CALL CENTER | Facility: HOSPITAL | Age: 47
End: 2022-03-15

## 2022-03-15 NOTE — OUTREACH NOTE
Stroke Week 2 Survey    Flowsheet Row Responses   [facility] facility patient discharged from? [place]   Does the patient have one of the following disease processes/diagnoses(primary or secondary)? Stroke (TIA)   Week 2 attempt successful? No   Revoke Phone number issues  [Phone # for patient/spouse gives recording of not a working number. ]          DAMARIS REYES - Registered Nurse

## 2022-03-21 LAB
APTT HEX PL PPP: 0 SEC
APTT IMM NP PPP: NORMAL S
APTT PPP 1:1 SALINE: NORMAL S
APTT PPP: 25.4 SEC
B2 GLYCOPROT1 IGA SER-ACNC: <10 SAU
B2 GLYCOPROT1 IGG SER-ACNC: <10 SGU
B2 GLYCOPROT1 IGM SER-ACNC: <10 SMU
CARDIOLIPIN IGG SER IA-ACNC: <10 GPL
CARDIOLIPIN IGM SER IA-ACNC: 10 MPL
CONFIRM DRVVT: NORMAL S
DRVVT SCREEN TO CONFIRM RATIO: NORMAL {RATIO}
INR PPP: 1 RATIO
LABORATORY COMMENT REPORT: NORMAL
PROTHROMBIN TIME: 10.8 SEC
SCREEN DRVVT: 39.2 SEC
THROMBIN TIME: 15.3 SEC

## 2022-04-06 ENCOUNTER — OFFICE VISIT (OUTPATIENT)
Dept: CARDIOLOGY | Facility: CLINIC | Age: 47
End: 2022-04-06

## 2022-04-06 VITALS
BODY MASS INDEX: 23.46 KG/M2 | HEIGHT: 72 IN | SYSTOLIC BLOOD PRESSURE: 162 MMHG | WEIGHT: 173.2 LBS | DIASTOLIC BLOOD PRESSURE: 118 MMHG

## 2022-04-06 DIAGNOSIS — I63.9 ACUTE ISCHEMIC STROKE: ICD-10-CM

## 2022-04-06 DIAGNOSIS — Z72.0 TOBACCO USE: ICD-10-CM

## 2022-04-06 DIAGNOSIS — I10 PRIMARY HYPERTENSION: Primary | ICD-10-CM

## 2022-04-06 PROCEDURE — 93000 ELECTROCARDIOGRAM COMPLETE: CPT | Performed by: INTERNAL MEDICINE

## 2022-04-06 PROCEDURE — 99204 OFFICE O/P NEW MOD 45 MIN: CPT | Performed by: INTERNAL MEDICINE

## 2022-04-06 RX ORDER — CLONIDINE HYDROCHLORIDE 0.2 MG/1
0.2 TABLET ORAL AS NEEDED
COMMUNITY

## 2022-04-06 RX ORDER — GABAPENTIN 300 MG/1
300 CAPSULE ORAL DAILY
COMMUNITY

## 2022-04-06 RX ORDER — AMLODIPINE BESYLATE 10 MG/1
5 TABLET ORAL DAILY
COMMUNITY
End: 2022-10-21

## 2022-04-06 RX ORDER — CARVEDILOL 12.5 MG/1
12.5 TABLET ORAL 2 TIMES DAILY WITH MEALS
COMMUNITY

## 2022-04-06 RX ORDER — METHOCARBAMOL 500 MG/1
500 TABLET, FILM COATED ORAL 4 TIMES DAILY
COMMUNITY

## 2022-04-06 RX ORDER — LOSARTAN POTASSIUM AND HYDROCHLOROTHIAZIDE 12.5; 1 MG/1; MG/1
1 TABLET ORAL DAILY
COMMUNITY

## 2022-04-06 NOTE — PROGRESS NOTES
Date of Office Visit: 22  Encounter Provider: Brooks Veloz MD  Place of Service: [facility]  Patient Name: Nelson Garcia  :1975  [id number]    Chief Complaint   Patient presents with   • Stroke   :     HPI: Nelson Garcia is a 46 y.o. male is a history of significant hypertension and renal insufficiency and he had several lacunar strokes in 2022.  MRIs were negative for vascular disease.  He had a transthoracic echo which showed severe LVH.  Reviewing his scans his stroke is due to hypertension.  He has never had cardiac problems before but does have severe LVH on his echo.  He had about a 5 or 6-year period when of time when his blood pressure just was not treated all he has a little bit of mild renal insufficiency.  Since his stroke he has been better his wife gave me a list of medicines between and his systolic runs between 140s to 150s.  He has cut back his tobacco use to about 5 cigarettes a day.  He does not drink alcohol he does not eat a lot of salt and he does not snore.    History reviewed. No pertinent past medical history.    History reviewed. No pertinent surgical history.    Social History     Socioeconomic History   • Marital status:    Tobacco Use   • Smoking status: Current Every Day Smoker     Packs/day: 0.50     Years: 35.00     Pack years: 17.50     Types: Cigarettes   • Smokeless tobacco: Current User   • Tobacco comment: Quit NOW [place] info provided    Substance and Sexual Activity   • Alcohol use: Not Currently       History reviewed. No pertinent family history.    Review of Systems   Constitutional: Negative for decreased appetite, fever, malaise/fatigue and weight loss.   HENT: Negative for nosebleeds.    Eyes: Negative for double vision.   Cardiovascular: Negative for chest pain, claudication, cyanosis, dyspnea on exertion, irregular heartbeat, leg swelling, near-syncope, orthopnea, palpitations, paroxysmal nocturnal  "dyspnea and syncope.   Respiratory: Negative for cough, hemoptysis and shortness of breath.    Hematologic/Lymphatic: Negative for bleeding problem.   Skin: Negative for rash.   Musculoskeletal: Negative for falls and myalgias.   Gastrointestinal: Negative for hematochezia, jaundice, melena, nausea and vomiting.   Genitourinary: Negative for hematuria.   Neurological: Negative for dizziness and seizures.   Psychiatric/Behavioral: Negative for altered mental status and memory loss.       No Known Allergies      Current Outpatient Medications:   •  acetaminophen (TYLENOL) 325 MG tablet, Take 2 tablets by mouth Every 6 (Six) Hours As Needed for Mild Pain ., Disp: , Rfl:   •  amLODIPine (NORVASC) 5 MG tablet, Take 1 tablet by mouth Daily., Disp: 30 tablet, Rfl: 0  •  aspirin  MG tablet, Take 1 tablet by mouth Daily., Disp: 90 tablet, Rfl: 0  •  atorvastatin (LIPITOR) 80 MG tablet, Take 1 tablet by mouth Every Night., Disp: 30 tablet, Rfl: 0  •  carvedilol (COREG) 25 MG tablet, Take 25 mg by mouth 2 (Two) Times a Day With Meals., Disp: , Rfl:   •  clopidogrel (PLAVIX) 75 MG tablet, Take 1 tablet by mouth Daily., Disp: 30 tablet, Rfl: 1  •  clopidogrel (PLAVIX) 75 MG tablet, Take 1 tablet by mouth Daily., Disp: 90 tablet, Rfl: 0  •  losartan (COZAAR) 50 MG tablet, Take 1 tablet by mouth Daily., Disp: 30 tablet, Rfl: 0      Objective:     Vitals:    04/06/22 1425   Weight: 78.6 kg (173 lb 3.2 oz)   Height: 182.9 cm (72\")     Body mass index is 23.49 kg/m².    Constitutional:       Appearance: Well-developed.   Eyes:      General: No scleral icterus.  HENT:      Head: Normocephalic.   Neck:      Thyroid: No thyromegaly.      Vascular: No JVD.      Lymphadenopathy: No cervical adenopathy.   Pulmonary:      Effort: Pulmonary effort is normal.      Breath sounds: Normal breath sounds. No wheezing. No rales.   Cardiovascular:      Normal rate. Regular rhythm.      No gallop.   Edema:     Peripheral edema absent. "   Abdominal:      Palpations: Abdomen is soft.      Tenderness: There is no abdominal tenderness.   Musculoskeletal: Normal range of motion. Skin:     General: Skin is warm and dry.      Findings: No rash.   Neurological:      Mental Status: Alert and oriented to person, place, and time.           ECG 12 Lead    Date/Time: 4/6/2022 3:12 PM  Performed by: Brooks Veloz MD  Authorized by: Brooks Veloz MD   Comparison: compared with previous ECG   Similar to previous ECG  Rhythm: sinus rhythm  Other findings: left ventricular hypertrophy with strain    Clinical impression: abnormal EKG             Assessment:       Diagnosis Plan   1. Primary hypertension     2. Acute ischemic strokes (HCC)     3. Tobacco use            Plan:       He has a hypertensive cardiomyopathy.  His blood pressures are better I think they need to be a little bit lower than where they are now I recommended may be changing him off the hydrochlorothiazide and put him on chlorthalidone which I think is a little bit better blood pressure medicine.  But he and his wife are little frustrated they think that there are too many people change in the medicines and they want the primary care doctor to do that which is fine with me he is not sick otherwise from his heart and just needs to have his blood pressure managed.  I recommended that and gave it to a note on a note to give the regular doctor if they need help I will just have him come back and see us in a year sooner if he has trouble    No follow-ups on file.     As always, it has been a pleasure to participate in your patient's care.      Sincerely,       Brooks Veloz MD

## 2022-04-28 ENCOUNTER — OFFICE VISIT (OUTPATIENT)
Dept: NEUROLOGY | Facility: CLINIC | Age: 47
End: 2022-04-28

## 2022-04-28 VITALS
BODY MASS INDEX: 23.84 KG/M2 | DIASTOLIC BLOOD PRESSURE: 76 MMHG | WEIGHT: 176 LBS | OXYGEN SATURATION: 98 % | SYSTOLIC BLOOD PRESSURE: 140 MMHG | HEIGHT: 72 IN | HEART RATE: 82 BPM

## 2022-04-28 DIAGNOSIS — N18.9 CHRONIC KIDNEY DISEASE, UNSPECIFIED CKD STAGE: ICD-10-CM

## 2022-04-28 DIAGNOSIS — Z86.73 HISTORY OF STROKE: Primary | ICD-10-CM

## 2022-04-28 DIAGNOSIS — Z72.0 TOBACCO ABUSE: ICD-10-CM

## 2022-04-28 DIAGNOSIS — Z71.6 TOBACCO ABUSE COUNSELING: ICD-10-CM

## 2022-04-28 DIAGNOSIS — I95.9 HYPOTENSION, UNSPECIFIED HYPOTENSION TYPE: ICD-10-CM

## 2022-04-28 DIAGNOSIS — Z91.89 AT RISK FOR SLEEP APNEA: ICD-10-CM

## 2022-04-28 DIAGNOSIS — R45.89 DEPRESSED MOOD: ICD-10-CM

## 2022-04-28 PROCEDURE — 99214 OFFICE O/P EST MOD 30 MIN: CPT | Performed by: NURSE PRACTITIONER

## 2022-04-28 RX ORDER — ESCITALOPRAM OXALATE 10 MG/1
TABLET ORAL
Qty: 30 TABLET | Refills: 2 | Status: SHIPPED | OUTPATIENT
Start: 2022-04-28

## 2022-04-28 NOTE — PROGRESS NOTES
DOS: 2022  NAME: Nelson Garcia   : 1975  PCP: Chelita Scott APRN          Neurological Problem and Interval History:  47 y.o. right-handed male with pretension, CKD, heavy tobacco abuse who I am seeing today in follow-up for right anterior cerebral artery stroke; etiology felt to be secondary to uncontrolled hypertension and heavy tobacco use/abuse.    Patient originally presented to [facility]  due to intermittent left leg weakness/dragging of left leg for which our service was consulted.  Patient had no left upper extremity and left-sided facial symptoms.  Ischial CT of the head was negative for acute findings.  MRI of the brain showed acute to subacute right LETICIA stroke with several chronic lacunar infarcts along with subcortical microhemorrhages noted.  Vessel imaging was somewhat hindered by motion although concern for right LETICIA occlusion.  There is no evidence of dissection or aneurysm seen.  TTE showed EF 59.5%.  LV/LA normal.  No mention of PFO/negative saline test.  Left atrial volume index 23.  Patient was discharged to home on dual oral antiplatelets aspirin 325 mg daily/Plavix 75 mg daily and atorvastatin 80 mg daily.  Labs LDL 80, A1c 5.40.  Patient was recommended to decrease/quit smoking.  Patient reports that he remains on dual oral antiplatelets and high-dose statin; will plan to discontinue Plavix after 3 months.  Also he has decreased tobacco use smoking a half a pack to 1 pack/day-previously smoking 2 packs/day.    Since discharge, she reports recurrence of initial stroke symptoms in setting of hypotension.  Patient's blood pressure has been dropping to 84/65.  Patient and his wife report that symptoms seem to be most prevalent when systolic blood pressure is less than 120.  Advised closer monitoring of blood pressure; follow-up with nephrology and PCP to further discuss blood pressure parameters and goals.  Of note patient on  amlodipine/Coreg/Hyzaar/Cozaar and on clonidine as needed.  Hypercoagulable work-up was initiated in the hospital; essentially unremarkable except mildly elevated homocystine at 20.1.  Patient continues to report decreased stamina/fatigue and depressed mood.  Discussed SSRI which patient is agreeable to will begin Lexapro 5 mg daily x2 weeks and increase to 10 mg daily thereafter.  Wife reports that approximately 1 month ago patient had COVID and later developed sinus infection and was treated with Z-Ben x5 days which appears to have not resolved.  Since that time patient has had several hypotensive episodes discussed above.  Would recommend further follow-up with PCP.  Although wife states CP just decreased amlodipine from 10 mg daily to 5 mg daily.  Due to patient's chronic tobacco use will recommend outpatient FREDRICK evaluation which patient is agreeable to.  I will plan to see patient back in 6 months time; sooner if indicated.       Review of Systems:        Review of Systems   Constitutional: Positive for activity change and fatigue. Negative for appetite change and unexpected weight change.   HENT: Positive for sinus pressure. Negative for ear pain, facial swelling, hearing loss, nosebleeds, tinnitus and trouble swallowing.    Eyes: Positive for visual disturbance (blurred vision). Negative for photophobia and pain.   Respiratory: Negative for choking, chest tightness, shortness of breath, wheezing and stridor.    Cardiovascular: Negative for chest pain, palpitations and leg swelling.   Gastrointestinal: Negative for abdominal pain, constipation, diarrhea, nausea and vomiting.   Endocrine: Positive for cold intolerance. Negative for heat intolerance.   Genitourinary: Negative for decreased urine volume, difficulty urinating, dysuria, frequency and urgency.   Musculoskeletal: Negative for gait problem, joint swelling, neck pain and neck stiffness.   Skin: Negative for color change, pallor, rash and wound.  "  Allergic/Immunologic: Negative for food allergies.   Neurological: Positive for weakness (left leg when bp drops) and light-headedness. Negative for dizziness, facial asymmetry, speech difficulty, numbness and headaches.   Hematological: Does not bruise/bleed easily.   Psychiatric/Behavioral: Negative for confusion, decreased concentration and sleep disturbance. The patient is not nervous/anxious.          Current Outpatient Medications:   •  amLODIPine (NORVASC) 10 MG tablet, Take 5 mg by mouth Daily., Disp: , Rfl:   •  aspirin  MG tablet, Take 1 tablet by mouth Daily., Disp: 90 tablet, Rfl: 0  •  atorvastatin (LIPITOR) 80 MG tablet, Take 1 tablet by mouth Every Night., Disp: 30 tablet, Rfl: 0  •  carvedilol (COREG) 12.5 MG tablet, Take 12.5 mg by mouth 2 (Two) Times a Day With Meals., Disp: , Rfl:   •  cloNIDine (CATAPRES) 0.2 MG tablet, Take 0.2 mg by mouth As Needed for High Blood Pressure., Disp: , Rfl:   •  clopidogrel (PLAVIX) 75 MG tablet, Take 1 tablet by mouth Daily., Disp: 30 tablet, Rfl: 1  •  gabapentin (NEURONTIN) 300 MG capsule, Take 300 mg by mouth Daily., Disp: , Rfl:   •  losartan-hydrochlorothiazide (HYZAAR) 100-12.5 MG per tablet, Take 1 tablet by mouth Daily., Disp: , Rfl:   •  methocarbamol (ROBAXIN) 500 MG tablet, Take 500 mg by mouth 4 (Four) Times a Day., Disp: , Rfl:   •  acetaminophen (TYLENOL) 325 MG tablet, Take 2 tablets by mouth Every 6 (Six) Hours As Needed for Mild Pain ., Disp: , Rfl:   •  escitalopram (Lexapro) 10 MG tablet, 5 mg daily x 2 weeks then increase to 10 mg daily thereafter, Disp: 30 tablet, Rfl: 2  •  losartan (COZAAR) 50 MG tablet, Take 1 tablet by mouth Daily., Disp: 30 tablet, Rfl: 0    \"The following portions of the patient's history were reviewed and updated as appropriate: allergies, current medications, past family history, past medical history, past social history, past surgical history and problem list.\"  Review and Interpretation of Imaging:  Imaging " mentioned above reviewed    laboratory Results:             Lab Results   Component Value Date    HGBA1C 5.00 02/22/2022         Lab Results   Component Value Date    CHOL 114 02/22/2022         Lab Results   Component Value Date    HDL 19 (L) 02/22/2022         Lab Results   Component Value Date    LDL 80 02/22/2022         Lab Results   Component Value Date    TRIG 71 02/22/2022     No results found for: RPR  No results found for: TSH  No results found for: ALHJBNOC54    Physical Examination: NIHSS: 0 mRS: 0; pt. Has not returned to work yet d/t fatigue/hypotension   General Appearance:   Well developed, well nourished, well groomed, alert, and cooperative.  HEENT: Normocephalic.    Neck and Spine: Normal range of motion.  Normal alignment. No mass or tenderness. No bruits.  Cardiac: Regular rate and rhythm. No murmurs.  Peripheral Vasculature: Radial and pedal pulses are equal and symmetric.    Extremities:    No edema or deformities. Normal joint ROM.  Skin:    No rashes or birth marks.    Neurological examination:  Higher Integrative  Function: Oriented to time, place and person. Normal registration, recall, attention span and concentration. Normal language including comprehension, spontaneous speech, repetition, reading, writing, naming and vocabulary. No neglect with normal visual-spatial function and construction. Normal fund of knowledge and higher integrative function.  CN II: Pupils are equal, round, and reactive to light. Normal visual acuity and visual fields.    CN III IV VI: Extraocular movements are full without nystagmus.   CN V: Normal facial sensation and strength of muscles of mastication.  CN VII: Facial movements are symmetric. No weakness.  CN VIII:   Auditory acuity is normal.  CN IX & X:   Symmetric palatal movement.  CN XI: Sternocleidomastoid and trapezius are normal.  No weakness.  CN XII:   The tongue is midline.  No atrophy or fasciculations.  Motor: Normal muscle strength, bulk and  tone in upper and lower extremities.  No fasciculations, rigidity, spasticity, or abnormal movements.  Reflexes: Plantar responses are flexor.  Sensation: Normal to light touch in arms and legs.   Station and Gait: Normal gait and station.    Coordination:  Finger to nose test shows no dysmetria.      Diagnoses:    1.  History of right anterior cerebral artery stroke; etiology unclear although felt to be secondary to uncontrolled hypertension and chronic tobacco abuse/use  2.  Uncontrolled hypertension; most recently hypotensive on 4-5 blood pressure lowering medications-we will refer back to nephrology  3.  Dyslipidemia  4.  Tobacco abuse  5.  CKD  6.  At risk for obstructive sleep apnea  7.  Depressed mood since #1; began on Lexapro  8.  Recent COVID infection resulting in sinus infection    Plan:   Continue ASA 325mg daily/ Plavix x 3 months then discontinue an atorvastatin 80 mg daily    Lexapro 5 mg daily x 2 weeks then 10 mg daily thereafter    Blood pressure control; avoid hypotension - pt. On amlodipine/Coreg/Hyzaar/Coxaar and PRN clonidine    Recommend follow-up w/ PCP to further evaluate/treat presumed sinus infection    Nephrology referral    Sleep medicine referral    Goal LDL <70-recommend high dose statins-    Serum glucose < 140   Call 911 for stroke any stroke symptoms   Follow-up w/ me in 6 months     Call placed to PCP; left on hold for prolonged amount of time had to hang up we will ask nursing to call office back and leave my cell phone number for PCP to give me a call back regarding treatment plan/recommendations.  Diagnoses and all orders for this visit:    1. History of stroke (Primary)    2. Chronic kidney disease, unspecified CKD stage  -     Ambulatory Referral to Nephrology    3. At risk for sleep apnea  -     Ambulatory Referral to Sleep Medicine    4. Depressed mood  -     escitalopram (Lexapro) 10 MG tablet; 5 mg daily x 2 weeks then increase to 10 mg daily thereafter  Dispense: 30  tablet; Refill: 2    5. Tobacco abuse    6. Tobacco abuse counseling    7. Hypotension, unspecified hypotension type

## 2022-06-17 ENCOUNTER — TELEPHONE (OUTPATIENT)
Dept: NEUROLOGY | Facility: CLINIC | Age: 47
End: 2022-06-17

## 2022-06-17 NOTE — TELEPHONE ENCOUNTER
PATIENT WIFE TELEPHONED RE: STATUS OF STATEMENT.    PLEASE FAX STATEMENT TO:    [phone]    THIS IS [facility] [employer] [facility] [place], [place].    THANK YOU.

## 2022-06-17 NOTE — TELEPHONE ENCOUNTER
Provider: MABEL  Caller: TAMEKA PALOMO  Relationship to Patient: WIFE  Pharmacy: N/A  Phone Number: [phone]  Reason for Call: PATIENT WIFE TELEPHONED; SAID WILL NEED STATEMENT THAT PATIENT IS NOT ABLE TO WORK DUE TO STROKE.    WILL NEED QUICKLY AS THEY ARE TRYING TO GET ASSISTANCE (ELECT[employer] IS GETTING READY TO BE SHUT OFF).    PLEASE CALL & ADVISE.    THANK YOU.

## 2022-06-17 NOTE — TELEPHONE ENCOUNTER
Called wife and informed Nani is out of the office and will not return until Thursday. Wife asked if another provider would draft a letter. As I was explaining that the pt hasn't seen another provider in our office the phone call was abruptly terminated by the wife.

## 2022-06-30 ENCOUNTER — OFFICE VISIT (OUTPATIENT)
Dept: SLEEP MEDICINE | Facility: HOSPITAL | Age: 47
End: 2022-06-30

## 2022-06-30 VITALS
OXYGEN SATURATION: 98 % | HEIGHT: 72 IN | BODY MASS INDEX: 23.98 KG/M2 | SYSTOLIC BLOOD PRESSURE: 160 MMHG | HEART RATE: 59 BPM | DIASTOLIC BLOOD PRESSURE: 92 MMHG | WEIGHT: 177 LBS

## 2022-06-30 DIAGNOSIS — G47.8 NON-RESTORATIVE SLEEP: ICD-10-CM

## 2022-06-30 DIAGNOSIS — Z91.89 AT RISK FOR SLEEP APNEA: ICD-10-CM

## 2022-06-30 DIAGNOSIS — I10 PRIMARY HYPERTENSION: ICD-10-CM

## 2022-06-30 DIAGNOSIS — R06.83 SNORING: ICD-10-CM

## 2022-06-30 DIAGNOSIS — G47.30 OBSERVED SLEEP APNEA: Primary | ICD-10-CM

## 2022-06-30 PROCEDURE — 99244 OFF/OP CNSLTJ NEW/EST MOD 40: CPT | Performed by: INTERNAL MEDICINE

## 2022-06-30 PROCEDURE — G0463 HOSPITAL OUTPT CLINIC VISIT: HCPCS

## 2022-06-30 NOTE — PROGRESS NOTES
[facility]  [address]  [place] [address]  Phone   Fax       Nelson Garcia  [id number]   1975  47 y.o.  male      Referring physician/provider CHUN Bermudez neurology  PCP Chelita Scott APRN    Type of service: Initial Sleep Medicine Consult.  Date of service: 6/30/2022      Chief Complaint   Patient presents with   • Witnessed Apnea   • Snoring   • Non-restorative Sleep   • Fatigue   • Dry Mouth       History of present illness;  Thank you for asking to see Nelson Garcia, 47 y.o.. The patient was seen today on 6/30/2022 at [facility].  The patient presents today with symptoms of snoring, non-restorative sleep and witnessed apneas. The symptoms are present for 10 years and they are persistent in nature.  The snoring is present in all positions and it is loud.  Has no history of prior sleep evaluation and sleep studies. Patient has no prior surgery namely tonsillectomy, nasal surgery and UPPP.     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  However he has recovered.  Is related to uncontrolled hypertension.  Now he is on medications and is much better and also is trying to quit smoking.  His wife is accompanying him says that he snores.    Patient gives the following sleep history.  Sleep schedule:  Bedtime: Between 9 and 11 PM  Wake time: 6 and 8 AM  Normally takes about 10-15 minutes to fall asleep  Average hours of sleep 9  Number of naps per day none  Symptoms   In addition to snoring, nonrestorative sleep and witnessed apneas patient gives the following associated symptoms.  Have you ever awakened gasping for breath, coughing, choking: Yes   Change in weight,  No   Morning headaches  No   Awaken with a sore throat or dry mouth  Yes   Leg jerking at night:  No   Crawly feeling/urge sensation to move in the legs: No   Teeth grinding:Yes   Have you ever awakened at night  with a sour taste or burning sensation in your chest:  No   Do you have muscle weakness with laughing or anger or sleep paralysis:  No   Have you ever felt paralyzed while going to sleep or waking up:  No   Sleepwalking, nightmares, No   Nocturia (urination at night): 2 times per night  Memory Problem:No     MEDICAL CONDITIONS (PMH)  1. Hypertension  2. Stroke  3. Hyperlipidemia    Social history:  Do you drive a commercial vehicle:  No   Shift work:  No   Tobacco use:  Yes   Alcohol use: 0 per week  Caffeinated drinks: 2  Occupation:  and a     Family Hx (your parents and siblings)  1. No history of sleep apnea  2. Diabetes mellitus  3. Hypertension    Medications: reviewed    Review of systems:  Sleep: Positve for snoring,witnessed apnea and daytime excessive sleepiness with Randolph Sleepiness Scale of Total score: 1   Kidney/ Bladder  Difficulty In Urination: negative  Bed Wetting: negative  Frequent Urination: negative  HEENT  Recurrent Nose Bleeds: negative  Ear pain: negative  Sores In Mouth: negative  Persistent Hoarseness: negative  Nasal Congestion: negative  Post Nasal Drip: negative  Musculoskeletal:  Neck Pain: negative  Temporomandibular Joint Pain: negative  General:  Fever: negative  Fatigue: positive  Cardiovascular:  Irregular Heartbeat: negative  Swollen Ankles Or Legs: negative  Respiratory:  Shortness Of Breath: negative  Wheezing: negative  Neuro/Paych:  Fainting Spells: negative  Dizziness: negative  Anxiety: negative  Depression: negative  Gastrointestinal:  Problem Swallowing: negative  Frequent Heartburn: negative  Abdominal Bloating: negative  Skin:  Rash: negative  Change In Nails: negative  Endocrine:  Excessive Thirst: negative  Always Too Cold: negative  Always Too Warm: negative  Hem/Lymphatic:  Swollen Glands: negative  Burses/ Bleeds Easily: negative    Physical exam:  CONSTITUTINONAL:  Vitals:    06/30/22 0900   BP: 160/92   Pulse: 59   SpO2: 98%   Weight: 80.3 kg (177  "lb)   Height: 182.9 cm (72\")    Body mass index is 24.01 kg/m².   HEAD: atraumatic, normocephalic   EYES:pupils are round, equal and reacting to light and accommodation, conjunctiva normal  NOSE:no nasal septal defects, nasal passages are clear, no nasal polyps,   THROAT: tonsils are nonenlarged, tongue normal size, oral airway Mallampati class 3  NECK:Neck Circumference: 17 inches, trachea is in the midline, thyroid not enlarged  RESPIRATORY SYSTEM: Breath sounds are normal, there are no wheezes  CARDIOVASULAR SYSTEM: Heart sounds are regular rhythm and normal rate, there are no murmurs or thrills, no edema  GASTROINTESTINAL: Soft and non-tender,liver not enlarged, no evidence of ascites  MUSCULOSKELETAL SYSTEM: Full range of motion's in all the 4 extremities, neck movement not restricted, temporomandibular joint movement normal and no tenderness  SKIN: Warm and moist, no cyanosis, no clubbing,  NEUROLOGICAL SYSTEM: Oriented x 3, no gross neurological defects, No speech defect, gait is normal  PSYCHIATRIC SYSTEM: Mood is normal, thought content is normal    Office notes from care team reviewed. Office note dated April 28, 2022,reviewed  Labs reviewed.  TSH Results:     Most Recent A1C    HGBA1C Most Recent 2/22/22   Hemoglobin A1C 5.00              Assessment and plan:  · Witnessed apneas,(R06.81) patient's symptoms and examination is consistent with sleep apnea (G47.30). I have talked to the patient about the signs and symptoms of sleep apnea. In addition, I have also discussed pathophysiology of sleep apnea.  I also discussed the complications of untreated sleep apnea including effects on hypertension, diabetes mellitus and nonrestorative sleep with hypersomnia which can increase risk for motor vehicle accidents.  Untreated sleep apnea is also a risk factor for development of atrial fibrillation, pulmonary hypertension and stroke.  Discussed in detail of various testing methods including home-based and lab based " sleep studies.  Based on history and physical examination and other comorbidities the most appropriate study is home sleep test.  The order for the sleep study is placed in [facility].  The test will be scheduled after approval from insurance. Treatment and management will be discussed after the test is completed.  Patient was given opportunity to ask questions and all the questions were answered.   · Snoring (R06.83), snoring is the sound created by turbulent airflow vibrating upper airway soft tissue due to limitation of inspiratory airflow. I have also discussed factors affecting snoring including sleep deprivation, sleeping on the back and alcohol ingestion. To minimize snoring, patient is advised to have adequate sleep, sleep on the side and avoid alcohol and sedative medications before bedtime  · Hypertension,   Essential hypertension is highly correlated with sleep apnea. Treating sleep apnea will assist in good blood pressure control.  · Recent history of stroke    I have also discussed with the patient the following  • Sleep hygiene: Maintaining a regular bedtime and wake time, not to watch television or work in bed, limit caffeine-containing beverages before bed time and avoid naps during the day  • Adequate amount of sleep.  Generally most people needs about 7 to 8 hours of sleep.    Return for 31 to 90 days after PAP setup with down load..  Patient's questions were answered      I once again thank you for asking me to see this patient in consultation and I have forwarded my opinion and treatment plan.  Please do not hesitate to call me if you have any questions.     Kenji Izaguirre MD  Sleep Medicine  Medical Director, [facility]  6/30/2022 ,

## 2022-08-25 ENCOUNTER — APPOINTMENT (OUTPATIENT)
Dept: SLEEP MEDICINE | Facility: HOSPITAL | Age: 47
End: 2022-08-25

## 2022-09-30 ENCOUNTER — TELEPHONE (OUTPATIENT)
Dept: NEUROLOGY | Facility: CLINIC | Age: 47
End: 2022-09-30

## 2022-10-21 ENCOUNTER — OFFICE VISIT (OUTPATIENT)
Dept: CARDIOLOGY | Facility: CLINIC | Age: 47
End: 2022-10-21

## 2022-10-21 VITALS
BODY MASS INDEX: 24.68 KG/M2 | SYSTOLIC BLOOD PRESSURE: 132 MMHG | HEART RATE: 56 BPM | HEIGHT: 72 IN | DIASTOLIC BLOOD PRESSURE: 98 MMHG | WEIGHT: 182.2 LBS

## 2022-10-21 DIAGNOSIS — Z72.0 TOBACCO USE: ICD-10-CM

## 2022-10-21 DIAGNOSIS — I10 PRIMARY HYPERTENSION: Primary | ICD-10-CM

## 2022-10-21 DIAGNOSIS — I63.9 ACUTE ISCHEMIC STROKE: ICD-10-CM

## 2022-10-21 PROBLEM — N52.9 ERECTILE DYSFUNCTION: Status: ACTIVE | Noted: 2022-10-21

## 2022-10-21 PROCEDURE — 93000 ELECTROCARDIOGRAM COMPLETE: CPT | Performed by: INTERNAL MEDICINE

## 2022-10-21 PROCEDURE — 99214 OFFICE O/P EST MOD 30 MIN: CPT | Performed by: INTERNAL MEDICINE

## 2022-10-21 RX ORDER — VALSARTAN 160 MG/1
320 TABLET ORAL DAILY
COMMUNITY

## 2022-10-21 RX ORDER — NIFEDIPINE 30 MG/1
TABLET, EXTENDED RELEASE ORAL
COMMUNITY
Start: 2022-10-19 | End: 2022-10-21

## 2022-10-21 RX ORDER — NIFEDIPINE 30 MG/1
30 TABLET, EXTENDED RELEASE ORAL DAILY
COMMUNITY
End: 2022-10-21

## 2022-10-21 RX ORDER — SILDENAFIL 50 MG/1
50 TABLET, FILM COATED ORAL DAILY PRN
Qty: 30 TABLET | Refills: 3 | Status: SHIPPED | OUTPATIENT
Start: 2022-10-21

## 2022-10-21 RX ORDER — GABAPENTIN 600 MG/1
600 TABLET ORAL 4 TIMES DAILY
COMMUNITY

## 2022-10-21 NOTE — PROGRESS NOTES
Date of Office Visit: 10/21/22  Encounter Provider: Brooks Veloz MD  Place of Service: [facility] CARDIOLOGY  Patient Name: Nelson Garcia  :1975  [id number]    Chief Complaint   Patient presents with   • Hypertension   • Stroke   :     HPI: Nelson Garcia is a 47 y.o. male is a history of significant hypertension and renal insufficiency and he had several lacunar strokes in 2022.  MRIs were negative for vascular disease.  He had a transthoracic echo which showed severe LVH.  Reviewing his scans his stroke is due to hypertension.  He has never had cardiac problems before but does have severe LVH on his echo.  He is here for follow-up and specifically is having questions about erectile dysfunction.  He cannot get any kind of an erection is got a young wife he is a young man this is obviously concerning he is still smoking but he said he never had a problem before until he started taking all these medicines for his high blood pressure in addition he has picked up a little bit of weight he is not complaining of any other symptoms    Past Medical History:   Diagnosis Date   • Hypertension    • Kidney disease    • Stroke (cerebrum) (HCC)        Past Surgical History:   Procedure Laterality Date   • BACK SURGERY     • KNEE SURGERY     • TEETH EXTRACTION      all       Social History     Socioeconomic History   • Marital status:    Tobacco Use   • Smoking status: Every Day     Packs/day: 0.50     Years: 35.00     Pack years: 17.50     Types: Cigarettes   • Smokeless tobacco: Current   • Tobacco comments:     Quit NOW [place] info provided    Substance and Sexual Activity   • Alcohol use: Not Currently       Family History   Problem Relation Age of Onset   • Diabetes Mother    • Stroke Mother    • Diabetes Sister    • Heart failure Sister        Review of Systems   Constitutional: Negative for decreased appetite, fever, malaise/fatigue and weight loss.   HENT: Negative  for nosebleeds.    Eyes: Negative for double vision.   Cardiovascular: Negative for chest pain, claudication, cyanosis, dyspnea on exertion, irregular heartbeat, leg swelling, near-syncope, orthopnea, palpitations, paroxysmal nocturnal dyspnea and syncope.   Respiratory: Negative for cough, hemoptysis and shortness of breath.    Hematologic/Lymphatic: Negative for bleeding problem.   Skin: Negative for rash.   Musculoskeletal: Negative for falls and myalgias.   Gastrointestinal: Negative for hematochezia, jaundice, melena, nausea and vomiting.   Genitourinary: Negative for hematuria.   Neurological: Negative for dizziness and seizures.   Psychiatric/Behavioral: Negative for altered mental status and memory loss.       No Known Allergies      Current Outpatient Medications:   •  aspirin  MG tablet, Take 1 tablet by mouth Daily., Disp: 90 tablet, Rfl: 0  •  atorvastatin (LIPITOR) 80 MG tablet, Take 1 tablet by mouth Every Night., Disp: 30 tablet, Rfl: 0  •  carvedilol (COREG) 12.5 MG tablet, Take 12.5 mg by mouth 2 (Two) Times a Day With Meals., Disp: , Rfl:   •  cloNIDine (CATAPRES) 0.2 MG tablet, Take 0.2 mg by mouth As Needed for High Blood Pressure., Disp: , Rfl:   •  escitalopram (Lexapro) 10 MG tablet, 5 mg daily x 2 weeks then increase to 10 mg daily thereafter (Patient taking differently: 2 tablets. 5 mg daily x 2 weeks then increase to 10 mg daily thereafter), Disp: 30 tablet, Rfl: 2  •  gabapentin (NEURONTIN) 600 MG tablet, Take 1 tablet by mouth 4 (Four) Times a Day., Disp: , Rfl:   •  valsartan (DIOVAN) 160 MG tablet, Take 2 tablets by mouth Daily., Disp: , Rfl:   •  acetaminophen (TYLENOL) 325 MG tablet, Take 2 tablets by mouth Every 6 (Six) Hours As Needed for Mild Pain ., Disp: , Rfl:   •  clopidogrel (PLAVIX) 75 MG tablet, Take 1 tablet by mouth Daily., Disp: 30 tablet, Rfl: 1  •  gabapentin (NEURONTIN) 300 MG capsule, Take 300 mg by mouth Daily., Disp: , Rfl:   •  losartan (COZAAR) 50 MG tablet,  "Take 1 tablet by mouth Daily., Disp: 30 tablet, Rfl: 0  •  losartan-hydrochlorothiazide (HYZAAR) 100-12.5 MG per tablet, Take 1 tablet by mouth Daily., Disp: , Rfl:   •  methocarbamol (ROBAXIN) 500 MG tablet, Take 500 mg by mouth 4 (Four) Times a Day., Disp: , Rfl:       Objective:     Vitals:    10/21/22 1426   BP: 132/98   Pulse: 56   Weight: 82.6 kg (182 lb 3.2 oz)   Height: 182.9 cm (72\")     Body mass index is 24.71 kg/m².    Constitutional:       Appearance: Well-developed.   Eyes:      General: No scleral icterus.  HENT:      Head: Normocephalic.   Neck:      Thyroid: No thyromegaly.      Vascular: No JVD.      Lymphadenopathy: No cervical adenopathy.   Pulmonary:      Effort: Pulmonary effort is normal.      Breath sounds: Normal breath sounds. No wheezing. No rales.   Cardiovascular:      Normal rate. Regular rhythm.      No gallop.   Edema:     Peripheral edema absent.   Abdominal:      Palpations: Abdomen is soft.      Tenderness: There is no abdominal tenderness.   Musculoskeletal: Normal range of motion. Skin:     General: Skin is warm and dry.      Findings: No rash.   Neurological:      Mental Status: Alert and oriented to person, place, and time.           ECG 12 Lead    Date/Time: 10/21/2022 2:48 PM  Performed by: Brooks Veloz MD  Authorized by: Brooks Veloz MD   Comparison: compared with previous ECG   Similar to previous ECG  Rhythm: sinus rhythm  Other findings: left ventricular hypertrophy with strain    Clinical impression: abnormal EKG             Assessment:       Diagnosis Plan   1. Primary hypertension        2. Acute ischemic strokes (HCC)        3. Tobacco use               Plan:       His primary care physician manages his high blood pressure but she wanted us evidently to comment on his ED options I do not he is not on any nitrates or an alpha-blocker he should be fine to try Viagra and I will can send a prescription in for that and see if that helps we will start at 50 mg a day " but we can go up to 100 if we needed to his wife ask about injections into his penis I said if we needed to get to that stage we would have to involve a urologist.  I strongly encouraged him again to stop smoking that also contributes to ED and a high blood pressure I think if his pressure stays high I would switch him to chlorthalidone as a first step and increase his Procardia.  I will have him come back and see me in a year    Return in about 1 year (around 10/21/2023).     As always, it has been a pleasure to participate in your patient's care.      Sincerely,       Brooks Veloz MD

## 2023-11-10 RX ORDER — SILDENAFIL 50 MG/1
50 TABLET, FILM COATED ORAL DAILY PRN
Qty: 30 TABLET | Refills: 3 | Status: SHIPPED | OUTPATIENT
Start: 2023-11-10

## 2024-01-09 RX ORDER — SODIUM PICOSULFATE, MAGNESIUM OXIDE, AND ANHYDROUS CITRIC ACID 12; 3.5; 1 G/175ML; G/175ML; MG/175ML
350 LIQUID ORAL ONCE
Qty: 350 ML | Refills: 0 | Status: SHIPPED | OUTPATIENT
Start: 2024-01-09 | End: 2024-01-09

## 2024-01-18 ENCOUNTER — TELEPHONE (OUTPATIENT)
Dept: CARDIOLOGY | Facility: CLINIC | Age: 49
End: 2024-01-18
Payer: MEDICAID

## 2024-01-18 NOTE — TELEPHONE ENCOUNTER
01/18/2024: LVM //Patient on waitlist// Calling to see if they would like to reschedule to 01/19/2024 @ 11AM

## 2024-06-03 ENCOUNTER — HOSPITAL ENCOUNTER (OUTPATIENT)
Facility: HOSPITAL | Age: 49
Discharge: HOME | End: 2024-06-05
Attending: EMERGENCY MEDICINE | Admitting: INTERNAL MEDICINE
Payer: MEDICAID

## 2024-06-03 ENCOUNTER — PREP FOR SURGERY (OUTPATIENT)
Dept: OTHER | Facility: HOSPITAL | Age: 49
End: 2024-06-03
Payer: MEDICAID

## 2024-06-03 DIAGNOSIS — R13.10 DYSPHAGIA, UNSPECIFIED TYPE: Primary | ICD-10-CM

## 2024-06-03 DIAGNOSIS — R63.4 UNINTENTIONAL WEIGHT LOSS: ICD-10-CM

## 2024-06-03 DIAGNOSIS — K22.89 ESOPHAGEAL MASS: ICD-10-CM

## 2024-06-03 LAB
ALBUMIN SERPL-MCNC: 4.2 G/DL (ref 3.5–5.2)
ALBUMIN/GLOB SERPL: 1.6 G/DL
ALP SERPL-CCNC: 58 U/L (ref 39–117)
ALT SERPL W P-5'-P-CCNC: 10 U/L (ref 1–41)
ANION GAP SERPL CALCULATED.3IONS-SCNC: 5 MMOL/L (ref 5–15)
AST SERPL-CCNC: 11 U/L (ref 1–40)
BASOPHILS # BLD AUTO: 0.03 10*3/MM3 (ref 0–0.2)
BASOPHILS NFR BLD AUTO: 0.5 % (ref 0–1.5)
BILIRUB SERPL-MCNC: 0.2 MG/DL (ref 0–1.2)
BILIRUB UR QL STRIP: NEGATIVE
BUN SERPL-MCNC: 14 MG/DL (ref 6–20)
BUN/CREAT SERPL: 10.7 (ref 7–25)
CALCIUM SPEC-SCNC: 9.2 MG/DL (ref 8.6–10.5)
CHLORIDE SERPL-SCNC: 104 MMOL/L (ref 98–107)
CLARITY UR: CLEAR
CO2 SERPL-SCNC: 29 MMOL/L (ref 22–29)
COLOR UR: YELLOW
CREAT SERPL-MCNC: 1.31 MG/DL (ref 0.76–1.27)
DEPRECATED RDW RBC AUTO: 40.4 FL (ref 37–54)
EGFRCR SERPLBLD CKD-EPI 2021: 66.7 ML/MIN/1.73
EOSINOPHIL # BLD AUTO: 0.18 10*3/MM3 (ref 0–0.4)
EOSINOPHIL NFR BLD AUTO: 2.7 % (ref 0.3–6.2)
ERYTHROCYTE [DISTWIDTH] IN BLOOD BY AUTOMATED COUNT: 13 % (ref 12.3–15.4)
GLOBULIN UR ELPH-MCNC: 2.6 GM/DL
GLUCOSE SERPL-MCNC: 98 MG/DL (ref 65–99)
GLUCOSE UR STRIP-MCNC: NEGATIVE MG/DL
HCT VFR BLD AUTO: 32.1 % (ref 37.5–51)
HGB BLD-MCNC: 9.8 G/DL (ref 13–17.7)
HGB UR QL STRIP.AUTO: NEGATIVE
HOLD SPECIMEN: NORMAL
HOLD SPECIMEN: NORMAL
IMM GRANULOCYTES # BLD AUTO: 0.02 10*3/MM3 (ref 0–0.05)
IMM GRANULOCYTES NFR BLD AUTO: 0.3 % (ref 0–0.5)
KETONES UR QL STRIP: NEGATIVE
LEUKOCYTE ESTERASE UR QL STRIP.AUTO: NEGATIVE
LIPASE SERPL-CCNC: 38 U/L (ref 13–60)
LYMPHOCYTES # BLD AUTO: 1.31 10*3/MM3 (ref 0.7–3.1)
LYMPHOCYTES NFR BLD AUTO: 20 % (ref 19.6–45.3)
MCH RBC QN AUTO: 26.1 PG (ref 26.6–33)
MCHC RBC AUTO-ENTMCNC: 30.5 G/DL (ref 31.5–35.7)
MCV RBC AUTO: 85.4 FL (ref 79–97)
MONOCYTES # BLD AUTO: 0.33 10*3/MM3 (ref 0.1–0.9)
MONOCYTES NFR BLD AUTO: 5 % (ref 5–12)
NEUTROPHILS NFR BLD AUTO: 4.69 10*3/MM3 (ref 1.7–7)
NEUTROPHILS NFR BLD AUTO: 71.5 % (ref 42.7–76)
NITRITE UR QL STRIP: NEGATIVE
NRBC BLD AUTO-RTO: 0 /100 WBC (ref 0–0.2)
PH UR STRIP.AUTO: 6.5 [PH] (ref 5–8)
PLATELET # BLD AUTO: 223 10*3/MM3 (ref 140–450)
PMV BLD AUTO: 10.7 FL (ref 6–12)
POTASSIUM SERPL-SCNC: 4.2 MMOL/L (ref 3.5–5.2)
PROT SERPL-MCNC: 6.8 G/DL (ref 6–8.5)
PROT UR QL STRIP: NEGATIVE
RBC # BLD AUTO: 3.76 10*6/MM3 (ref 4.14–5.8)
SODIUM SERPL-SCNC: 138 MMOL/L (ref 136–145)
SP GR UR STRIP: 1.01 (ref 1–1.03)
UROBILINOGEN UR QL STRIP: NORMAL
WBC NRBC COR # BLD AUTO: 6.56 10*3/MM3 (ref 3.4–10.8)
WHOLE BLOOD HOLD COAG: NORMAL
WHOLE BLOOD HOLD SPECIMEN: NORMAL

## 2024-06-03 PROCEDURE — 82746 ASSAY OF FOLIC ACID SERUM: CPT | Performed by: INTERNAL MEDICINE

## 2024-06-03 PROCEDURE — 99204 OFFICE O/P NEW MOD 45 MIN: CPT | Performed by: INTERNAL MEDICINE

## 2024-06-03 PROCEDURE — 81003 URINALYSIS AUTO W/O SCOPE: CPT | Performed by: EMERGENCY MEDICINE

## 2024-06-03 PROCEDURE — G0378 HOSPITAL OBSERVATION PER HR: HCPCS

## 2024-06-03 PROCEDURE — 25810000003 LACTATED RINGERS SOLUTION: Performed by: EMERGENCY MEDICINE

## 2024-06-03 PROCEDURE — 25810000003 LACTATED RINGERS PER 1000 ML

## 2024-06-03 PROCEDURE — 96374 THER/PROPH/DIAG INJ IV PUSH: CPT

## 2024-06-03 PROCEDURE — 25010000002 KETOROLAC TROMETHAMINE PER 15 MG: Performed by: EMERGENCY MEDICINE

## 2024-06-03 PROCEDURE — 36415 COLL VENOUS BLD VENIPUNCTURE: CPT

## 2024-06-03 PROCEDURE — 99285 EMERGENCY DEPT VISIT HI MDM: CPT

## 2024-06-03 PROCEDURE — 82607 VITAMIN B-12: CPT | Performed by: INTERNAL MEDICINE

## 2024-06-03 PROCEDURE — 83690 ASSAY OF LIPASE: CPT

## 2024-06-03 PROCEDURE — 85025 COMPLETE CBC W/AUTO DIFF WBC: CPT

## 2024-06-03 PROCEDURE — 80053 COMPREHEN METABOLIC PANEL: CPT

## 2024-06-03 RX ORDER — GABAPENTIN 400 MG/1
800 CAPSULE ORAL EVERY 8 HOURS SCHEDULED
Status: DISCONTINUED | OUTPATIENT
Start: 2024-06-03 | End: 2024-06-05 | Stop reason: HOSPADM

## 2024-06-03 RX ORDER — SODIUM CHLORIDE 0.9 % (FLUSH) 0.9 %
10 SYRINGE (ML) INJECTION AS NEEDED
Status: DISCONTINUED | OUTPATIENT
Start: 2024-06-03 | End: 2024-06-05 | Stop reason: HOSPADM

## 2024-06-03 RX ORDER — ASPIRIN 325 MG
325 TABLET, DELAYED RELEASE (ENTERIC COATED) ORAL DAILY
Status: DISCONTINUED | OUTPATIENT
Start: 2024-06-04 | End: 2024-06-05 | Stop reason: HOSPADM

## 2024-06-03 RX ORDER — CLONIDINE HYDROCHLORIDE 0.1 MG/1
0.2 TABLET ORAL AS NEEDED
Status: DISCONTINUED | OUTPATIENT
Start: 2024-06-03 | End: 2024-06-05 | Stop reason: HOSPADM

## 2024-06-03 RX ORDER — ONDANSETRON 4 MG/1
4 TABLET, ORALLY DISINTEGRATING ORAL EVERY 6 HOURS PRN
Status: DISCONTINUED | OUTPATIENT
Start: 2024-06-03 | End: 2024-06-05 | Stop reason: HOSPADM

## 2024-06-03 RX ORDER — SODIUM CHLORIDE 9 MG/ML
40 INJECTION, SOLUTION INTRAVENOUS AS NEEDED
Status: DISCONTINUED | OUTPATIENT
Start: 2024-06-03 | End: 2024-06-05 | Stop reason: HOSPADM

## 2024-06-03 RX ORDER — KETOROLAC TROMETHAMINE 15 MG/ML
10 INJECTION, SOLUTION INTRAMUSCULAR; INTRAVENOUS ONCE
Status: COMPLETED | OUTPATIENT
Start: 2024-06-03 | End: 2024-06-03

## 2024-06-03 RX ORDER — SODIUM CHLORIDE 0.9 % (FLUSH) 0.9 %
10 SYRINGE (ML) INJECTION EVERY 12 HOURS SCHEDULED
Status: DISCONTINUED | OUTPATIENT
Start: 2024-06-03 | End: 2024-06-05 | Stop reason: HOSPADM

## 2024-06-03 RX ORDER — VALSARTAN 320 MG/1
320 TABLET ORAL DAILY
Status: DISCONTINUED | OUTPATIENT
Start: 2024-06-04 | End: 2024-06-05 | Stop reason: HOSPADM

## 2024-06-03 RX ORDER — SODIUM CHLORIDE, SODIUM LACTATE, POTASSIUM CHLORIDE, CALCIUM CHLORIDE 600; 310; 30; 20 MG/100ML; MG/100ML; MG/100ML; MG/100ML
100 INJECTION, SOLUTION INTRAVENOUS CONTINUOUS
Status: DISCONTINUED | OUTPATIENT
Start: 2024-06-03 | End: 2024-06-05 | Stop reason: HOSPADM

## 2024-06-03 RX ORDER — ATORVASTATIN CALCIUM 80 MG/1
80 TABLET, FILM COATED ORAL NIGHTLY
Status: DISCONTINUED | OUTPATIENT
Start: 2024-06-03 | End: 2024-06-05 | Stop reason: HOSPADM

## 2024-06-03 RX ORDER — ONDANSETRON 2 MG/ML
4 INJECTION INTRAMUSCULAR; INTRAVENOUS EVERY 6 HOURS PRN
Status: DISCONTINUED | OUTPATIENT
Start: 2024-06-03 | End: 2024-06-05 | Stop reason: HOSPADM

## 2024-06-03 RX ORDER — CARVEDILOL 12.5 MG/1
12.5 TABLET ORAL 2 TIMES DAILY WITH MEALS
Status: DISCONTINUED | OUTPATIENT
Start: 2024-06-03 | End: 2024-06-05 | Stop reason: HOSPADM

## 2024-06-03 RX ADMIN — SODIUM CHLORIDE, POTASSIUM CHLORIDE, SODIUM LACTATE AND CALCIUM CHLORIDE 1000 ML: 600; 310; 30; 20 INJECTION, SOLUTION INTRAVENOUS at 19:17

## 2024-06-03 RX ADMIN — KETOROLAC TROMETHAMINE 10 MG: 15 INJECTION, SOLUTION INTRAMUSCULAR; INTRAVENOUS at 21:52

## 2024-06-03 RX ADMIN — Medication 10 ML: at 20:58

## 2024-06-03 RX ADMIN — SODIUM CHLORIDE, POTASSIUM CHLORIDE, SODIUM LACTATE AND CALCIUM CHLORIDE 100 ML/HR: 600; 310; 30; 20 INJECTION, SOLUTION INTRAVENOUS at 20:57

## 2024-06-03 RX ADMIN — CARVEDILOL 12.5 MG: 12.5 TABLET, FILM COATED ORAL at 22:00

## 2024-06-03 RX ADMIN — GABAPENTIN 800 MG: 400 CAPSULE ORAL at 22:00

## 2024-06-03 RX ADMIN — ATORVASTATIN CALCIUM 80 MG: 80 TABLET, FILM COATED ORAL at 22:00

## 2024-06-03 NOTE — ED NOTES
Pt has been unable to eat solid food x 2-3 months.  He vomits when he eats.  He has scans scheduled June 27.

## 2024-06-03 NOTE — ED PROVIDER NOTES
EMERGENCY DEPARTMENT ENCOUNTER    Room Number:  28/28  PCP: Chelita Scott APRN  Historian: Patient, spouse    I initially evaluated the patient at 1853    HPI:  Chief Complaint: Trouble swallowing  A complete HPI/ROS/PMH/PSH/SH/FH are unobtainable due to: Nothing  Context: Nelson Garcia is a 49 y.o. male with a medical history of CVA and hypertension who presents to the ED c/o acute trouble swallowing.  Patient has been unable to eat solid foods for the past 3 months.  He feels like the food gets stuck.  When he tries to eat, he regurgitates his food.  He has been able to drink protein shakes.  Over the past few weeks, he started to have more trouble swallowing liquids.  He is scheduled for outpatient bidirectional endoscopy on June 27.  Denies history of swallowing problems, esophageal stricture, or prior endoscopy.  He complains of increased weakness recently.  States he has lost about 20 pounds in the past 3 months.  His PCP had him try taking a PPI but this did not help.  Patient complains of feeling weak.  Denies fever, chills, sore throat, chest pain, shortness of breath, or abdominal pain.  He had a stroke in 2022 but does not have any residual deficits.  He is on Plavix.            PAST MEDICAL HISTORY  Active Ambulatory Problems     Diagnosis Date Noted    Left leg weakness 02/21/2022    HTN (hypertension) 02/21/2022    Cerebral microhemorrhages 02/22/2022    Acute ischemic stroke 02/22/2022    Tobacco use 02/22/2022    Stage 3a chronic kidney disease 02/22/2022    Observed sleep apnea 06/30/2022    Snoring 06/30/2022    Non-restorative sleep 06/30/2022    Erectile dysfunction 10/21/2022     Resolved Ambulatory Problems     Diagnosis Date Noted    No Resolved Ambulatory Problems     Past Medical History:   Diagnosis Date    Hypertension     Kidney disease     Stroke (cerebrum)          PAST SURGICAL HISTORY  Past Surgical History:   Procedure Laterality Date    BACK SURGERY      KNEE SURGERY       TEETH EXTRACTION      all         FAMILY HISTORY  Family History   Problem Relation Age of Onset    Diabetes Mother     Stroke Mother     Diabetes Sister     Heart failure Sister          SOCIAL HISTORY  Social History     Socioeconomic History    Marital status:    Tobacco Use    Smoking status: Every Day     Current packs/day: 0.50     Average packs/day: 0.5 packs/day for 35.0 years (17.5 ttl pk-yrs)     Types: Cigarettes    Smokeless tobacco: Current    Tobacco comments:     Quit NOW [place] info provided    Substance and Sexual Activity    Alcohol use: Not Currently         ALLERGIES  Patient has no known allergies.    REVIEW OF SYSTEMS  Review of Systems  Included in HPI  All systems reviewed and negative except for those discussed in HPI.      PHYSICAL EXAM  ED Triage Vitals   Temp Heart Rate Resp BP SpO2   06/03/24 1710 06/03/24 1710 06/03/24 1710 06/03/24 1725 06/03/24 1710   99.3 °F (37.4 °C) 72 16 130/83 97 %      Temp src Heart Rate Source Patient Position BP Location FiO2 (%)   06/03/24 1710 06/03/24 1710 -- -- --   Tympanic Monitor          Physical Exam      GENERAL: Awake, alert, oriented x 3.  Well-developed, well-nourished male.  Resting comfortably in no acute distress  HENT: NCAT, nares patent, mildly dry mucous membranes, swallowing secretions without difficulty, no erythema of the posterior oropharynx  EYES: no scleral icterus  CV: regular rhythm, normal rate  RESPIRATORY: normal effort, clear to auscultation bilaterally  ABDOMEN: soft, nondistended, nontender  MUSCULOSKELETAL: Extremities are nontender with full range of motion  NEURO: Speech is normal.  No facial droop.  Follows commands  PSYCH:  calm, cooperative  SKIN: warm, dry    Vital signs and nursing notes reviewed.          LAB RESULTS  Recent Results (from the past 24 hour(s))   Comprehensive Metabolic Panel    Collection Time: 06/03/24  5:38 PM    Specimen: Arm, Left; Blood   Result Value Ref Range    Glucose 98 65 - 99 mg/dL     BUN 14 6 - 20 mg/dL    Creatinine 1.31 (H) 0.76 - 1.27 mg/dL    Sodium 138 136 - 145 mmol/L    Potassium 4.2 3.5 - 5.2 mmol/L    Chloride 104 98 - 107 mmol/L    CO2 29.0 22.0 - 29.0 mmol/L    Calcium 9.2 8.6 - 10.5 mg/dL    Total Protein 6.8 6.0 - 8.5 g/dL    Albumin 4.2 3.5 - 5.2 g/dL    ALT (SGPT) 10 1 - 41 U/L    AST (SGOT) 11 1 - 40 U/L    Alkaline Phosphatase 58 39 - 117 U/L    Total Bilirubin 0.2 0.0 - 1.2 mg/dL    Globulin 2.6 gm/dL    A/G Ratio 1.6 g/dL    BUN/Creatinine Ratio 10.7 7.0 - 25.0    Anion Gap 5.0 5.0 - 15.0 mmol/L    eGFR 66.7 >60.0 mL/min/1.73   Lipase    Collection Time: 06/03/24  5:38 PM    Specimen: Arm, Left; Blood   Result Value Ref Range    Lipase 38 13 - 60 U/L   Green Top (Gel)    Collection Time: 06/03/24  5:38 PM   Result Value Ref Range    Extra Tube Hold for add-ons.    Lavender Top    Collection Time: 06/03/24  5:38 PM   Result Value Ref Range    Extra Tube hold for add-on    Gold Top - SST    Collection Time: 06/03/24  5:38 PM   Result Value Ref Range    Extra Tube Hold for add-ons.    Light Blue Top    Collection Time: 06/03/24  5:38 PM   Result Value Ref Range    Extra Tube Hold for add-ons.    CBC Auto Differential    Collection Time: 06/03/24  5:38 PM    Specimen: Arm, Left; Blood   Result Value Ref Range    WBC 6.56 3.40 - 10.80 10*3/mm3    RBC 3.76 (L) 4.14 - 5.80 10*6/mm3    Hemoglobin 9.8 (L) 13.0 - 17.7 g/dL    Hematocrit 32.1 (L) 37.5 - 51.0 %    MCV 85.4 79.0 - 97.0 fL    MCH 26.1 (L) 26.6 - 33.0 pg    MCHC 30.5 (L) 31.5 - 35.7 g/dL    RDW 13.0 12.3 - 15.4 %    RDW-SD 40.4 37.0 - 54.0 fl    MPV 10.7 6.0 - 12.0 fL    Platelets 223 140 - 450 10*3/mm3    Neutrophil % 71.5 42.7 - 76.0 %    Lymphocyte % 20.0 19.6 - 45.3 %    Monocyte % 5.0 5.0 - 12.0 %    Eosinophil % 2.7 0.3 - 6.2 %    Basophil % 0.5 0.0 - 1.5 %    Immature Grans % 0.3 0.0 - 0.5 %    Neutrophils, Absolute 4.69 1.70 - 7.00 10*3/mm3    Lymphocytes, Absolute 1.31 0.70 - 3.10 10*3/mm3    Monocytes, Absolute  0.33 0.10 - 0.90 10*3/mm3    Eosinophils, Absolute 0.18 0.00 - 0.40 10*3/mm3    Basophils, Absolute 0.03 0.00 - 0.20 10*3/mm3    Immature Grans, Absolute 0.02 0.00 - 0.05 10*3/mm3    nRBC 0.0 0.0 - 0.2 /100 WBC       Ordered the above labs and reviewed the results.        RADIOLOGY  No Radiology Exams Resulted Within Past 24 Hours    Ordered the above noted radiological studies. Reviewed by me in PACS.            PROCEDURES  Procedures        OUTPATIENT MEDICATION MANAGEMENT:  Current Facility-Administered Medications Ordered in Epic   Medication Dose Route Frequency Provider Last Rate Last Admin    Calcium Replacement - Follow Nurse / BPA Driven Protocol   Does not apply PRN Yandel, Sirena S, APRN        lactated ringers infusion  100 mL/hr Intravenous Continuous Yandel, Sirena S, APRN        Magnesium Standard Dose Replacement - Follow Nurse / BPA Driven Protocol   Does not apply PRN Yandel, Sirena S, APRN        ondansetron ODT (ZOFRAN-ODT) disintegrating tablet 4 mg  4 mg Oral Q6H PRN Yandel Sirena S, APRN        Or    ondansetron (ZOFRAN) injection 4 mg  4 mg Intravenous Q6H PRN Yandel, Sirena S, APRN        Phosphorus Replacement - Follow Nurse / BPA Driven Protocol   Does not apply PRN Yandel, Sirena S, APRN        Potassium Replacement - Follow Nurse / BPA Driven Protocol   Does not apply PRN Yandel, Sirena S, APRN        sodium chloride 0.9 % flush 10 mL  10 mL Intravenous PRN Brooks Cabral MD        sodium chloride 0.9 % flush 10 mL  10 mL Intravenous PRN Brooks Cabral MD        sodium chloride 0.9 % flush 10 mL  10 mL Intravenous Q12H Yandel, Sirena S, APRN        sodium chloride 0.9 % flush 10 mL  10 mL Intravenous PRN Yandel Sirena S, APRN        sodium chloride 0.9 % infusion 40 mL  40 mL Intravenous PRN Yandel Sirena S, APRN         Current Outpatient Medications Ordered in Epic   Medication Sig Dispense Refill    acetaminophen (TYLENOL) 325 MG tablet Take 2 tablets by mouth Every 6 (Six) Hours As Needed  for Mild Pain .      aspirin  MG tablet Take 1 tablet by mouth Daily. 90 tablet 0    atorvastatin (LIPITOR) 80 MG tablet Take 1 tablet by mouth Every Night. 30 tablet 0    carvedilol (COREG) 12.5 MG tablet Take 12.5 mg by mouth 2 (Two) Times a Day With Meals.      cloNIDine (CATAPRES) 0.2 MG tablet Take 0.2 mg by mouth As Needed for High Blood Pressure.      clopidogrel (PLAVIX) 75 MG tablet Take 1 tablet by mouth Daily. 30 tablet 1    escitalopram (Lexapro) 10 MG tablet 5 mg daily x 2 weeks then increase to 10 mg daily thereafter (Patient taking differently: 2 tablets. 5 mg daily x 2 weeks then increase to 10 mg daily thereafter) 30 tablet 2    gabapentin (NEURONTIN) 300 MG capsule Take 300 mg by mouth Daily.      gabapentin (NEURONTIN) 600 MG tablet Take 1 tablet by mouth 4 (Four) Times a Day.      losartan (COZAAR) 50 MG tablet Take 1 tablet by mouth Daily. 30 tablet 0    losartan-hydrochlorothiazide (HYZAAR) 100-12.5 MG per tablet Take 1 tablet by mouth Daily.      methocarbamol (ROBAXIN) 500 MG tablet Take 500 mg by mouth 4 (Four) Times a Day.      sildenafil (VIAGRA) 50 MG tablet TAKE ONE TABLET BY MOUTH DAILY AS NEEDED FOR ERECTILE DYSFUNCTION 30 tablet 3    valsartan (DIOVAN) 160 MG tablet Take 2 tablets by mouth Daily.             MEDICATIONS GIVEN IN ER  Medications   sodium chloride 0.9 % flush 10 mL (has no administration in time range)   sodium chloride 0.9 % flush 10 mL (has no administration in time range)   sodium chloride 0.9 % flush 10 mL (has no administration in time range)   sodium chloride 0.9 % flush 10 mL (has no administration in time range)   sodium chloride 0.9 % infusion 40 mL (has no administration in time range)   ondansetron ODT (ZOFRAN-ODT) disintegrating tablet 4 mg (has no administration in time range)     Or   ondansetron (ZOFRAN) injection 4 mg (has no administration in time range)   Potassium Replacement - Follow Nurse / BPA Driven Protocol (has no administration in time  range)   Magnesium Standard Dose Replacement - Follow Nurse / BPA Driven Protocol (has no administration in time range)   Phosphorus Replacement - Follow Nurse / BPA Driven Protocol (has no administration in time range)   Calcium Replacement - Follow Nurse / BPA Driven Protocol (has no administration in time range)   lactated ringers infusion (has no administration in time range)   lactated ringers bolus 1,000 mL (1,000 mL Intravenous New Bag 6/3/24 1917)                   MEDICAL DECISION MAKING, PROGRESS, and CONSULTS    All labs have been independently reviewed by me.  All radiology studies have been reviewed by me and I have also reviewed the radiology report.   EKG's independently viewed and interpreted by me.  Discussion below represents my analysis of pertinent findings related to patient's condition, differential diagnosis, treatment plan and final disposition.      Additional sources:    - Discussed/ obtained information from independent historians: Spouse at bedside    - External (non-ED) record review: Patient was seen at the GI clinic at [facility] on 5/6/2024 for dysphagia and unintended weight loss.  He was scheduled for endoscopy.  He was last admitted here in February 2022 for an acute ischemic stroke.    -Prescription drug monitoring program review:     N/A    - Chronic or social conditions impacting patient care (Social Determinants of Health): None          Orders placed during this visit:  Orders Placed This Encounter   Procedures    [place] Draw    Comprehensive Metabolic Panel    Lipase    Urinalysis With Microscopic If Indicated (No Culture) - Urine, Clean Catch    CBC Auto Differential    CBC (No Diff)    Basic Metabolic Panel    NPO Diet NPO Type: Strict NPO    Undress & Gown    Intake & Output    Weigh Patient    Oral Care    Saline Lock & Maintain IV Access    Place Sequential Compression Device    Maintain Sequential Compression Device    Vital Signs    Continuous Pulse Oximetry     Activity - Ad Allyson    Code Status and Medical Interventions:    Inpatient Gastroenterology Consult    Insert Peripheral IV    Insert Peripheral IV    Insert Peripheral IV    Initiate ED Observation Status    CBC & Differential    Green Top (Gel)    Lavender Top    Gold Top - SST    Light Blue Top         Additional orders considered but not ordered:          Differential diagnosis includes, but is not limited to:    Esophageal stricture, esophagitis, GERD, dehydration, malnutrition      Independent interpretation of labs, radiology studies, and discussions with consultants:  ED Course as of 06/03/24 2037 Mon Jun 03, 2024 1856 Lipase: 38 [WH]   1856 WBC: 6.56 [WH]   1856 Hemoglobin(!): 9.8  12.1 two years ago [WH]   1856 Glucose: 98 [WH]   1856 BUN: 14 [WH]   1856 Creatinine(!): 1.31  Stable [WH]   1856 Albumin: 4.2 [WH]   1856 ALT (SGPT): 10 [WH]   1856 AST (SGOT): 11 [WH]   1856 Alkaline Phosphatase: 58 [WH]   1856 Total Bilirubin: 0.2 [WH]   1857 Total Protein: 6.8 []   1955 Case discussed with JUAN Pack, and she agrees to admit the patient to Dr. Mcdaniel.  Pertinent history, exam findings, test results, ED course, and diagnoses were discussed with her. [WH]   2011 MDM: Patient presented to ED complaining of progressive dysphagia for the past 3 months.  He has not been able to swallow solids but more recently is having trouble swallowing liquids.  He is scheduled for outpatient endoscopy in 3 weeks.  He has lost about 20 pounds.  He is mildly anemic.  Creatinine is mildly elevated but stable.  He was given IV fluids.  Symptoms may be due to esophageal stricture.  Patient will be admitted for further evaluation. [WH]   2034 Dr. Ramírez, GI, has now at bedside. [WH]      ED Course User Index  [WH] Brooks Cabral MD         COMPLEXITY OF CARE  The patient requires admission.      DIAGNOSIS  Final diagnoses:   Dysphagia, unspecified type   Unintentional weight loss         DISPOSITION  ADMISSION    Discussed  treatment plan and reason for admission with pt/family and admitting physician.  Pt/family voiced understanding of the plan for admission for further testing/treatment as needed.               Latest Documented Vital Signs:  AS OF 20:37 EDT VITALS:    BP - 159/100  HR - 50  TEMP - 99.3 °F (37.4 °C) (Tympanic)  O2 SATS - 92%            --    Please note that portions of this were completed with a voice recognition program.       Note Disclaimer: At [facility], we believe that sharing information builds trust and better relationships. You are receiving this note because you are receiving care at [facility] or recently visited. It is possible you will see health information before a provider has talked with you about it. This kind of information can be easy to misunderstand. To help you fully understand what it means for your health, we urge you to discuss this note with your provider.             Brooks Cabral MD  06/03/24 2030

## 2024-06-03 NOTE — H&P
[facility]   HISTORY AND PHYSICAL    Patient Name: Nelson Garcia  : 1975  MRN: [id number]  Primary Care Physician:  Chelita Scott APRN  Date of admission: 6/3/2024    Subjective   Subjective     Chief Complaint:   Chief Complaint   Patient presents with    Vomiting    Nausea         HPI:    Nelson Garcia is a 49 y.o. male, with a past medical history including, but not limited to, chronic kidney disease, CVA, hypertension, and FREDRICK, presented to the emergency department with 3-month history of difficulty with swallowing and vomiting.  He states that he has lost approximately 30 pounds in the last few months due to his inability to eat and drink.  He states that he is able to drink protein shakes, and water without any difficulty and without any vomiting but he is unable to eat any solid food.  At the time of my interview patient is eating [employer] peanut butter cups and cool Ranch Doritos as he states these are the only 2 foods he can eat without vomiting.  He states he has seen his primary care physician who has set up an appointment at [facility] but that appointment is later in the month.  He states that today he has felt weak due to his inability to eat.  GIs been consulted and they have seen the patient.  Plans for EGD in the a.m.      Review of Systems   All systems were reviewed and negative except for: What was mentioned above in the HPI.    Personal History     Past Medical History:   Diagnosis Date    Hypertension     Kidney disease     Stroke (cerebrum)        Past Surgical History:   Procedure Laterality Date    BACK SURGERY      KNEE SURGERY      TEETH EXTRACTION      all       Family History: family history includes Diabetes in his mother and sister; Heart failure in his sister; Stroke in his mother. Otherwise pertinent FHx was reviewed and not pertinent to current issue.    Social History:  reports that he has been smoking cigarettes. He has a 17.5 pack-year smoking  history. He uses smokeless tobacco. He reports that he does not currently use alcohol.    Home Medications:  acetaminophen, aspirin, atorvastatin, carvedilol, cloNIDine, clopidogrel, escitalopram, gabapentin, losartan, losartan-hydrochlorothiazide, methocarbamol, sildenafil, and valsartan    Allergies:  No Known Allergies    Objective   Objective     Vitals:   Temp:  [99.3 °F (37.4 °C)] 99.3 °F (37.4 °C)  Heart Rate:  [47-72] 47  Resp:  [16] 16  BP: (130-152)/(83-94) 145/84  Physical Exam    Constitutional: Awake, alert   Eyes: PERRLA   HENT: NCAT, mucous membranes moist   Neck: Supple,   Respiratory: Clear to auscultation bilaterally, nonlabored respirations    Cardiovascular: Regular rhythm, palpable pedal pulses bilaterally   Gastrointestinal: Positive bowel sounds, soft, nontender, nondistended   Musculoskeletal: No bilateral ankle edema   Psychiatric: Appropriate affect, cooperative   Neurologic: Oriented x 3, speech clear   Skin: No rashes     Result Review    Result Review:  I have personally reviewed the results from the time of this admission to 6/3/2024 19:57 EDT and agree with these findings:  [x]  Laboratory list / accordion  []  Microbiology  []  Radiology  []  EKG/Telemetry   []  Cardiology/Vascular   []  Pathology  [x]  Old records  []  Other:    Initial lab workup in the emergency department shows a creatinine 1.31, RBCs 3.76, hemoglobin 9.8, hematocrit 32.1.  Urinalysis shows no signs of infection.      Assessment & Plan   Assessment / Plan     Brief Patient Summary:  Nelson Garcia is a 49 y.o. male who was admitted to the observation unit for further evaluation and treatment of his difficulty with swallowing and vomiting.    Active Hospital Problems:  Active Hospital Problems    Diagnosis     **Dysphagia      Plan:   Dysphagia/vomiting  -Cardiac monitoring  -Continuous pulse ox  -Vital signs every 4 hours  -Soft diet  -GI consult  -N.p.o. after midnight    Chronic kidney disease  -Creatinine  1.31, last creatinine on file was 1.28 on 2/23/2022  -Avoid nephrotoxic agents    Hypertension  -Monitor blood pressure  -Continue home blood pressure agents      DVT prophylaxis:  Mechanical DVT prophylaxis orders are present.        CODE STATUS:    Code Status (Patient has no pulse and is not breathing): CPR (Attempt to Resuscitate)  Medical Interventions (Patient has pulse or is breathing): Full Support    Admission Status:  I believe this patient meets observation status.    78 minutes have been spent by [facility] providers in the care of this patient while under observation status.      Appropriate PPE worn during patient encounter.  Hand hygeine performed before and after seeing the patient.      Electronically signed by CHUN Howard, 06/03/24, 7:57 PM EDT.

## 2024-06-03 NOTE — ED NOTES
Pt to ED with concern of vomiting immediately after oral intake. Pt stated he is able to hold down water, fluids and boost nutrition drinks but not able to keep solid foods down. Pt stated if he eats, the food will come right back up in its solid form, pt denies stomach acid of stomach bile in the vomit. Pt stated he still has the desire to eat.     Pt is scheduled to get a scope in June but due to increase in weight loss and fatigue.

## 2024-06-03 NOTE — ED NOTES
Nursing report ED to floor  Nelson Garcia  49 y.o.  male    HPI :  HPI (Adult)  Stated Reason for Visit: unable to eat  History Obtained From: patient    Chief Complaint  Chief Complaint   Patient presents with    Vomiting    Nausea       Admitting doctor:   Kobe Mcdaniel MD    Admitting diagnosis:   The primary encounter diagnosis was Dysphagia, unspecified type. A diagnosis of Unintentional weight loss was also pertinent to this visit.    Code status:   Current Code Status       Date Active Code Status Order ID Comments User Context       6/3/2024 1957 CPR (Attempt to Resuscitate) [id number]  Sirena Humphries APRN ED        Question Answer    Code Status (Patient has no pulse and is not breathing) CPR (Attempt to Resuscitate)    Medical Interventions (Patient has pulse or is breathing) Full Support                    Allergies:   Patient has no known allergies.    Isolation:   No active isolations    Intake and Output  No intake or output data in the 24 hours ending 06/03/24 1959    Weight:   There were no vitals filed for this visit.    Most recent vitals:   Vitals:    06/03/24 1856 06/03/24 1901 06/03/24 1931 06/03/24 1954   BP: 141/87 152/92 145/84    Pulse:   (!) 47 51   Resp:       Temp:       TempSrc:       SpO2:   97% 98%       Active LDAs/IV Access:   Lines, Drains & Airways       Active LDAs       Name Placement date Placement time Site Days    Peripheral IV 06/03/24 1914 Anterior;Distal;Right;Upper Arm 06/03/24 1914  Arm  less than 1                    Labs (abnormal labs have a star):   Labs Reviewed   COMPREHENSIVE METABOLIC PANEL - Abnormal; Notable for the following components:       Result Value    Creatinine 1.31 (*)     All other components within normal limits    Narrative:     GFR Normal >60  Chronic Kidney Disease <60  Kidney Failure <15     CBC WITH AUTO DIFFERENTIAL - Abnormal; Notable for the following components:    RBC 3.76 (*)     Hemoglobin 9.8 (*)     Hematocrit 32.1 (*)     MCH 26.1  (*)     MCHC 30.5 (*)     All other components within normal limits   LIPASE - Normal   RAINBOW DRAW    Narrative:     The following orders were created for panel order [place] Draw.  Procedure                               Abnormality         Status                     ---------                               -----------         ------                     Green Top (Gel)[[id number]]                                  Final result               Lavender Top[[id number]]                                     Final result               Gold Top - SST[[id number]]                                   Final result               Light Blue Top[[id number]]                                   Final result                 Please view results for these tests on the individual orders.   URINALYSIS W/ MICROSCOPIC IF INDICATED (NO CULTURE)   CBC AND DIFFERENTIAL    Narrative:     The following orders were created for panel order CBC & Differential.  Procedure                               Abnormality         Status                     ---------                               -----------         ------                     CBC Auto Differential[[id number]]        Abnormal            Final result                 Please view results for these tests on the individual orders.   GREEN TOP   LAVENDER TOP   GOLD TOP - SST   LIGHT BLUE TOP       EKG:   No orders to display       Meds given in ED:   Medications   sodium chloride 0.9 % flush 10 mL (has no administration in time range)   sodium chloride 0.9 % flush 10 mL (has no administration in time range)   sodium chloride 0.9 % flush 10 mL (has no administration in time range)   sodium chloride 0.9 % flush 10 mL (has no administration in time range)   sodium chloride 0.9 % infusion 40 mL (has no administration in time range)   ondansetron ODT (ZOFRAN-ODT) disintegrating tablet 4 mg (has no administration in time range)     Or   ondansetron (ZOFRAN) injection 4 mg (has no administration in time range)    Potassium Replacement - Follow Nurse / BPA Driven Protocol (has no administration in time range)   Magnesium Standard Dose Replacement - Follow Nurse / BPA Driven Protocol (has no administration in time range)   Phosphorus Replacement - Follow Nurse / BPA Driven Protocol (has no administration in time range)   Calcium Replacement - Follow Nurse / BPA Driven Protocol (has no administration in time range)   lactated ringers infusion (has no administration in time range)   lactated ringers bolus 1,000 mL (1,000 mL Intravenous New Bag 6/3/24 1917)       Imaging results:  No radiology results for the last day    Ambulatory status:   - as tolerated    Social issues:   Social History     Socioeconomic History    Marital status:    Tobacco Use    Smoking status: Every Day     Current packs/day: 0.50     Average packs/day: 0.5 packs/day for 35.0 years (17.5 ttl pk-yrs)     Types: Cigarettes    Smokeless tobacco: Current    Tobacco comments:     Quit NOW [place] info provided    Substance and Sexual Activity    Alcohol use: Not Currently       Peripheral Neurovascular  Peripheral Neurovascular (Adult)  Peripheral Neurovascular WDL: WDL    Neuro Cognitive  Neuro Cognitive (Adult)  Cognitive/Neuro/Behavioral WDL: WDL, arousability, level of consciousness, motor response, all, speech, orientation  Level of Consciousness: Alert  Arousal Level: opens eyes spontaneously  Orientation: person, place, time, situation, oriented x 4  Speech: logical, spontaneous, clear  Mood/Behavior: calm, cooperative, behavior appropriate to situation  Motor Response  Motor Response: general motor response  General Motor Response: purposeful motor response    Learning  Learning Assessment (Adult)  Learning Readiness and Ability: no barriers identified  Education Provided  Person Taught: patient, spouse  Teaching Method: verbal instruction  Teaching Focus: symptom/problem overview, diagnostic test    Respiratory  Respiratory (Adult)  Airway WDL:  WDL  Respiratory WDL  Respiratory WDL: WDL, all  Rhythm/Pattern, Respiratory: pattern regular, depth regular, unlabored  Expansion/Accessory Muscles/Retractions: no retractions, no use of accessory muscles, expansion symmetric    Abdominal Pain       Pain Assessments  Pain (Adult)  (0-10) Pain Rating: Rest: 0    NIH Stroke Scale       Alicia Bennett RN  06/03/24 19:59 EDT

## 2024-06-04 ENCOUNTER — ANESTHESIA (OUTPATIENT)
Dept: GASTROENTEROLOGY | Facility: HOSPITAL | Age: 49
End: 2024-06-04
Payer: MEDICAID

## 2024-06-04 ENCOUNTER — APPOINTMENT (OUTPATIENT)
Dept: CT IMAGING | Facility: HOSPITAL | Age: 49
End: 2024-06-04
Payer: MEDICAID

## 2024-06-04 ENCOUNTER — ANESTHESIA EVENT (OUTPATIENT)
Dept: GASTROENTEROLOGY | Facility: HOSPITAL | Age: 49
End: 2024-06-04
Payer: MEDICAID

## 2024-06-04 LAB
ANION GAP SERPL CALCULATED.3IONS-SCNC: 5 MMOL/L (ref 5–15)
BUN SERPL-MCNC: 13 MG/DL (ref 6–20)
BUN/CREAT SERPL: 10.6 (ref 7–25)
CALCIUM SPEC-SCNC: 8.4 MG/DL (ref 8.6–10.5)
CHLORIDE SERPL-SCNC: 108 MMOL/L (ref 98–107)
CO2 SERPL-SCNC: 27 MMOL/L (ref 22–29)
CREAT SERPL-MCNC: 1.23 MG/DL (ref 0.76–1.27)
DEPRECATED RDW RBC AUTO: 38.6 FL (ref 37–54)
EGFRCR SERPLBLD CKD-EPI 2021: 72 ML/MIN/1.73
ERYTHROCYTE [DISTWIDTH] IN BLOOD BY AUTOMATED COUNT: 12.9 % (ref 12.3–15.4)
GLUCOSE SERPL-MCNC: 110 MG/DL (ref 65–99)
HCT VFR BLD AUTO: 31.2 % (ref 37.5–51)
HGB BLD-MCNC: 9.8 G/DL (ref 13–17.7)
MCH RBC QN AUTO: 26 PG (ref 26.6–33)
MCHC RBC AUTO-ENTMCNC: 31.4 G/DL (ref 31.5–35.7)
MCV RBC AUTO: 82.8 FL (ref 79–97)
PLATELET # BLD AUTO: 212 10*3/MM3 (ref 140–450)
PMV BLD AUTO: 11.4 FL (ref 6–12)
POTASSIUM SERPL-SCNC: 3.4 MMOL/L (ref 3.5–5.2)
POTASSIUM SERPL-SCNC: 4.2 MMOL/L (ref 3.5–5.2)
RBC # BLD AUTO: 3.77 10*6/MM3 (ref 4.14–5.8)
SODIUM SERPL-SCNC: 140 MMOL/L (ref 136–145)
WBC NRBC COR # BLD AUTO: 5.85 10*3/MM3 (ref 3.4–10.8)

## 2024-06-04 PROCEDURE — G0378 HOSPITAL OBSERVATION PER HR: HCPCS

## 2024-06-04 PROCEDURE — 99417 PROLNG OP E/M EACH 15 MIN: CPT

## 2024-06-04 PROCEDURE — 88305 TISSUE EXAM BY PATHOLOGIST: CPT | Performed by: INTERNAL MEDICINE

## 2024-06-04 PROCEDURE — 43239 EGD BIOPSY SINGLE/MULTIPLE: CPT | Performed by: INTERNAL MEDICINE

## 2024-06-04 PROCEDURE — 84132 ASSAY OF SERUM POTASSIUM: CPT | Performed by: INTERNAL MEDICINE

## 2024-06-04 PROCEDURE — 25810000003 LACTATED RINGERS PER 1000 ML

## 2024-06-04 PROCEDURE — 85027 COMPLETE CBC AUTOMATED: CPT

## 2024-06-04 PROCEDURE — 25010000002 MORPHINE PER 10 MG: Performed by: EMERGENCY MEDICINE

## 2024-06-04 PROCEDURE — 74176 CT ABD & PELVIS W/O CONTRAST: CPT

## 2024-06-04 PROCEDURE — 99215 OFFICE O/P EST HI 40 MIN: CPT

## 2024-06-04 PROCEDURE — 25810000003 SODIUM CHLORIDE 0.9 % SOLUTION: Performed by: INTERNAL MEDICINE

## 2024-06-04 PROCEDURE — 71250 CT THORAX DX C-: CPT

## 2024-06-04 PROCEDURE — 25010000002 PROPOFOL 10 MG/ML EMULSION: Performed by: NURSE ANESTHETIST, CERTIFIED REGISTERED

## 2024-06-04 PROCEDURE — 88360 TUMOR IMMUNOHISTOCHEM/MANUAL: CPT | Performed by: INTERNAL MEDICINE

## 2024-06-04 PROCEDURE — 80048 BASIC METABOLIC PNL TOTAL CA: CPT

## 2024-06-04 RX ORDER — POTASSIUM CHLORIDE 750 MG/1
40 TABLET, FILM COATED, EXTENDED RELEASE ORAL EVERY 4 HOURS
Status: COMPLETED | OUTPATIENT
Start: 2024-06-04 | End: 2024-06-04

## 2024-06-04 RX ORDER — FAMOTIDINE 10 MG/ML
20 INJECTION, SOLUTION INTRAVENOUS ONCE
Status: COMPLETED | OUTPATIENT
Start: 2024-06-04 | End: 2024-06-04

## 2024-06-04 RX ORDER — MORPHINE SULFATE 2 MG/ML
2 INJECTION, SOLUTION INTRAMUSCULAR; INTRAVENOUS EVERY 4 HOURS PRN
Status: DISCONTINUED | OUTPATIENT
Start: 2024-06-04 | End: 2024-06-05 | Stop reason: HOSPADM

## 2024-06-04 RX ORDER — NIFEDIPINE 30 MG/1
30 TABLET, EXTENDED RELEASE ORAL DAILY
Status: DISCONTINUED | OUTPATIENT
Start: 2024-06-04 | End: 2024-06-05 | Stop reason: HOSPADM

## 2024-06-04 RX ORDER — LIDOCAINE HYDROCHLORIDE 20 MG/ML
INJECTION, SOLUTION INFILTRATION; PERINEURAL AS NEEDED
Status: DISCONTINUED | OUTPATIENT
Start: 2024-06-04 | End: 2024-06-04 | Stop reason: SURG

## 2024-06-04 RX ORDER — SODIUM CHLORIDE 9 MG/ML
1000 INJECTION, SOLUTION INTRAVENOUS CONTINUOUS
Status: DISCONTINUED | OUTPATIENT
Start: 2024-06-04 | End: 2024-06-05 | Stop reason: HOSPADM

## 2024-06-04 RX ORDER — PROPOFOL 10 MG/ML
VIAL (ML) INTRAVENOUS CONTINUOUS PRN
Status: DISCONTINUED | OUTPATIENT
Start: 2024-06-04 | End: 2024-06-04 | Stop reason: SURG

## 2024-06-04 RX ORDER — NIFEDIPINE 30 MG/1
30 TABLET, EXTENDED RELEASE ORAL DAILY
Status: ON HOLD | COMMUNITY
End: 2024-06-14

## 2024-06-04 RX ADMIN — PROPOFOL 80 MG: 10 INJECTION, EMULSION INTRAVENOUS at 11:35

## 2024-06-04 RX ADMIN — LIDOCAINE HYDROCHLORIDE 80 MG: 20 INJECTION, SOLUTION INFILTRATION; PERINEURAL at 11:35

## 2024-06-04 RX ADMIN — Medication 10 ML: at 15:56

## 2024-06-04 RX ADMIN — SODIUM CHLORIDE, POTASSIUM CHLORIDE, SODIUM LACTATE AND CALCIUM CHLORIDE 100 ML/HR: 600; 310; 30; 20 INJECTION, SOLUTION INTRAVENOUS at 05:46

## 2024-06-04 RX ADMIN — GABAPENTIN 800 MG: 400 CAPSULE ORAL at 05:43

## 2024-06-04 RX ADMIN — MORPHINE SULFATE 2 MG: 2 INJECTION, SOLUTION INTRAMUSCULAR; INTRAVENOUS at 21:17

## 2024-06-04 RX ADMIN — SODIUM CHLORIDE 1000 ML: 9 INJECTION, SOLUTION INTRAVENOUS at 10:29

## 2024-06-04 RX ADMIN — POTASSIUM CHLORIDE 40 MEQ: 750 TABLET, EXTENDED RELEASE ORAL at 12:59

## 2024-06-04 RX ADMIN — POTASSIUM CHLORIDE 40 MEQ: 750 TABLET, EXTENDED RELEASE ORAL at 05:43

## 2024-06-04 RX ADMIN — VALSARTAN 320 MG: 320 TABLET, FILM COATED ORAL at 13:00

## 2024-06-04 RX ADMIN — CARVEDILOL 12.5 MG: 12.5 TABLET, FILM COATED ORAL at 18:46

## 2024-06-04 RX ADMIN — NIFEDIPINE 30 MG: 30 TABLET, FILM COATED, EXTENDED RELEASE ORAL at 13:00

## 2024-06-04 RX ADMIN — ASPIRIN 325 MG: 325 TABLET, COATED ORAL at 12:59

## 2024-06-04 RX ADMIN — FAMOTIDINE 20 MG: 10 INJECTION INTRAVENOUS at 15:53

## 2024-06-04 RX ADMIN — GABAPENTIN 800 MG: 400 CAPSULE ORAL at 13:00

## 2024-06-04 RX ADMIN — Medication 10 ML: at 17:01

## 2024-06-04 RX ADMIN — ATORVASTATIN CALCIUM 80 MG: 80 TABLET, FILM COATED ORAL at 21:14

## 2024-06-04 RX ADMIN — CARVEDILOL 12.5 MG: 12.5 TABLET, FILM COATED ORAL at 12:59

## 2024-06-04 RX ADMIN — GABAPENTIN 800 MG: 400 CAPSULE ORAL at 21:14

## 2024-06-04 RX ADMIN — MORPHINE SULFATE 2 MG: 2 INJECTION, SOLUTION INTRAMUSCULAR; INTRAVENOUS at 16:57

## 2024-06-04 RX ADMIN — Medication 10 ML: at 21:17

## 2024-06-04 RX ADMIN — PROPOFOL 20 MG: 10 INJECTION, EMULSION INTRAVENOUS at 11:43

## 2024-06-04 RX ADMIN — PROPOFOL 180 MCG/KG/MIN: 10 INJECTION, EMULSION INTRAVENOUS at 11:34

## 2024-06-04 NOTE — CONSULTS
"Nutrition Services    Patient Name:  Nelson Garcia  YOB: 1975  MRN: [id number]  Admit Date:  6/3/2024    Assessment Date:  06/04/24    Summary: Consult for RN admit screen  Pt is a 48 y/o male who presented with trouble swallowing . Pt has a hx of HTN, CKD IIIa, hx of stroke.   Pt laying in bed with wife at bedside who helped provide a hx. Wife reported pt has not been able to eat anything without throwing up it back up for about 3 months. Wife reported the only pt has been able to eat has been peanut butter. Wife reported pt has been drinking two boost per day and that has been his main source of nutrition for the past 3 months. Pt stated his normal weight was 185 lbs but he is now down to 165 lbs. RD unable to confirm weight loss due to limited weights in EMR. Pt denied any issues chewing/swallowing. 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 Thoracic surgery consulted. Pt currently on clear liquid diet. RD brought pt a boost breeze and will initiate Boost Breeze TID.    Pt likely meets criteria for acute malnutrition but RD but unable to confirm without documented evidence of weight loss.     RECS:  Boost Breeze TID    CLINICAL NUTRITION ASSESSMENT      Reason for Assessment Nurse Admission Screen, Physician Consult     Diagnosis/Problem   trouble swallowing . Pt has a hx of HTN, CKD IIIa, hx of stroke.    Medical/Surgical History Past Medical History:   Diagnosis Date    Hypertension     Kidney disease     Stroke (cerebrum)        Past Surgical History:   Procedure Laterality Date    BACK SURGERY      KNEE SURGERY      TEETH EXTRACTION      all        Anthropometrics        Current Height  Current Weight  BMI kg/m2 Height: 182.8 cm (71.97\")  Weight: 75.3 kg (166 lb) (06/03/24 2100)  Body mass index is 22.53 kg/m².   Adjusted BMI (if applicable)    BMI Category Normal/Healthy (18.4 - 24.9)   Ideal Body Weight (IBW) 170 lbs   Usual Body Weight (UBW) 185 lbs "   Weight Trend Loss   Weight History Wt Readings from Last 30 Encounters:   06/03/24 2100 75.3 kg (166 lb)   06/03/24 1956 75.3 kg (166 lb)   10/21/22 1426 82.6 kg (182 lb 3.2 oz)   06/30/22 0900 80.3 kg (177 lb)   04/28/22 1200 79.8 kg (176 lb)   04/06/22 1425 78.6 kg (173 lb 3.2 oz)   02/22/22 1338 73.9 kg (163 lb)   02/21/22 1509 73.9 kg (163 lb)        Estimated/Assessed Needs        Energy Requirements    Weight for Calculation 75 kg   Method for Estimation  25 kcal/kg   EST Needs (kcal/day) 1875       Protein Requirements    Weight for Calculation 75 kg   EST Protein Needs (g/kg) 1.2 - 1.5 gm/kg   EST Daily Needs (g/day)        Fluid Requirements     Method for Estimation 1 mL/kcal    Estimated Needs (mL/day)        Fluid Deficit    Current Na Level (mEq/L)    Desired Na Level (mEq/L)    Estimated Fluid Deficit (L)       Labs       Pertinent Labs    Results from last 7 days   Lab Units 06/04/24  0343 06/03/24  1738   SODIUM mmol/L 140 138   POTASSIUM mmol/L 3.4* 4.2   CHLORIDE mmol/L 108* 104   CO2 mmol/L 27.0 29.0   BUN mg/dL 13 14   CREATININE mg/dL 1.23 1.31*   CALCIUM mg/dL 8.4* 9.2   BILIRUBIN mg/dL  --  0.2   ALK PHOS U/L  --  58   ALT (SGPT) U/L  --  10   AST (SGOT) U/L  --  11   GLUCOSE mg/dL 110* 98     Results from last 7 days   Lab Units 06/04/24  0343 06/03/24  1738   HEMOGLOBIN g/dL 9.8* 9.8*   HEMATOCRIT % 31.2* 32.1*   WBC 10*3/mm3 5.85 6.56   ALBUMIN g/dL  --  4.2     Results from last 7 days   Lab Units 06/04/24  0343 06/03/24  1738   PLATELETS 10*3/mm3 212 223     COVID19   Date Value Ref Range Status   02/21/2022 Not Detected Not Detected - Ref. Range Final     Lab Results   Component Value Date    HGBA1C 5.00 02/22/2022          Medications           Scheduled Medications aspirin, 325 mg, Oral, Daily  atorvastatin, 80 mg, Oral, Nightly  carvedilol, 12.5 mg, Oral, BID With Meals  gabapentin, 800 mg, Oral, Q8H  NIFEdipine XL, 30 mg, Oral, Daily  sodium chloride, 10 mL, Intravenous,  Q12H  valsartan, 320 mg, Oral, Daily       Infusions lactated ringers, 100 mL/hr, Last Rate: 100 mL/hr (06/04/24 0546)  sodium chloride, 1,000 mL, Last Rate: Stopped (06/04/24 1148)       PRN Medications   Calcium Replacement - Follow Nurse / BPA Driven Protocol    cloNIDine    Magnesium Standard Dose Replacement - Follow Nurse / BPA Driven Protocol    ondansetron ODT **OR** ondansetron    Phosphorus Replacement - Follow Nurse / BPA Driven Protocol    Potassium Replacement - Follow Nurse / BPA Driven Protocol    sodium chloride    [COMPLETED] Insert Peripheral IV **AND** sodium chloride    sodium chloride    sodium chloride     Physical Findings          General Findings alert, oriented, room air   Oral/Mouth Cavity tooth or teeth missing   Edema  not assessed   Gastrointestinal vomiting, last bowel movement: 6/1   Skin  skin intact   Tubes/Drains/Lines none   NFPE No clinical signs of muscle wasting or fat loss   --  Current Nutrition Orders & Evaluation of Intake       Oral Nutrition     Food Allergies NKFA   Current PO Diet Diet: Liquid; Clear Liquid; Fluid Consistency: Thin (IDDSI 0)   Supplement n/a   PO Evaluation     % PO Intake Less than 1 meal per day x 3 months     Factors Affecting Intake: altered GI function, nausea, vomiting     PES STATEMENT / NUTRITION DIAGNOSIS      Nutrition Dx Problem  Problem: Unintentional Weight Loss, Inadequate Oral Intake, and Swallowing Difficulty  Etiology: Medical Diagnosis - trouble swallowing . Pt has a hx of HTN, CKD IIIa, hx of stroke.     Signs/Symptoms: Report of Minimal PO Intake, Unintended Weight Change, and Report/Observation   --  NUTRITION INTERVENTION / PLAN OF CARE      Intervention Goal(s) Maintain nutrition status, Establish goals of care, Reduce/improve symptoms, Meet estimated needs, Tolerate PO , Increase intake, Accepts oral nutrition supplement, Maintain weight, No significant weight loss, and PO intake goal %: 75%         RD Intervention/Action  Encourage intake, Continue to monitor, Care plan reviewed, and Recommend/order: Boost Breeze TID         Prescription/Orders:       PO Diet       Supplements Boost Breeze TID      Enteral Nutrition       Parenteral Nutrition    New Prescription Ordered? Yes   --      Monitor/Evaluation Per protocol, PO intake, Supplement intake, Weight, Skin status, GI status, Symptoms, POC/GOC, Swallow function   Discharge Plan/Needs No discharge needs identified at this time   --    RD to follow per protocol.      Electronically signed by:  Celso Blake  06/04/24 14:42 EDT

## 2024-06-04 NOTE — ANESTHESIA POSTPROCEDURE EVALUATION
Patient: Nelson Garcia    Procedure Summary       Date: 06/04/24 Room / Location:  [facility] /  [facility]    Anesthesia Start: 1129 Anesthesia Stop: 1149    Procedure: ESOPHAGOGASTRODUODENOSCOPY with biopsies (Esophagus) Diagnosis:       Dysphagia, unspecified type      (Dysphagia, unspecified type [R13.10])    Surgeons: Taras Hernandez MD Provider: Kyra Polanco MD    Anesthesia Type: MAC ASA Status: 3            Anesthesia Type: MAC    Vitals  Vitals Value Taken Time   /97 06/04/24 1212   Temp     Pulse 72 06/04/24 1215   Resp 16 06/04/24 1211   SpO2 95 % 06/04/24 1215   Vitals shown include unfiled device data.        Post Anesthesia Care and Evaluation    Anesthetic complications: No anesthetic complications

## 2024-06-04 NOTE — CONSULTS
Inpatient Thoracic Surgery Consult  Consult performed by: Shahram Figueroa APRN  Consult ordered by: Taras Hernandez MD          Patient Care Team:  Chelita Scott APRN as PCP - General (Family Medicine)    Chief Complaint   Patient presents with    Vomiting    Nausea       Subjective     History of Present Illness  Mr. Garcia is a 49-year-old male who presented to [facility] on 6/3/2024 with complaints of aggressive weight loss along with dysphagia and vomiting for the past 2 to 3 months.  Patient reports approximately 20 to 35 pound weight loss during this span.  He has been having difficulty tolerating solid foods and at most is only tolerated peanut butter.  Wife at bedside who also provided additional medical history.  Patient was evaluated by gastroenterology and had upper GI endoscopy performed today which revealed a large fungating mass in the lower third of the esophagus.  We have been consulted given this finding.    Patient is endorsing some chest tenderness.  He denies any dyspnea, fevers, chills.  No nausea or vomiting on clear liquid diet.    Review of Systems   Constitutional:  Positive for appetite change and unexpected weight change. Negative for activity change, chills, fatigue and fever.   HENT:  Negative for congestion and sore throat.    Respiratory:  Negative for shortness of breath and wheezing.    Cardiovascular:  Negative for chest pain.   Gastrointestinal:  Negative for abdominal pain, nausea and vomiting.        Esophageal dysphagia with solid foods.   Genitourinary:  Negative for difficulty urinating.   Musculoskeletal:  Negative for back pain.   Skin:  Negative for color change.   Neurological:  Negative for seizures and numbness.   Psychiatric/Behavioral:  Negative for agitation.         Past Medical History:   Diagnosis Date    Hypertension     Kidney disease     Stroke (cerebrum)      Past Surgical History:   Procedure Laterality Date    BACK SURGERY       KNEE SURGERY      TEETH EXTRACTION      all     Family History   Problem Relation Age of Onset    Diabetes Mother     Stroke Mother     Diabetes Sister     Heart failure Sister      Social History     Socioeconomic History    Marital status:    Tobacco Use    Smoking status: Former     Current packs/day: 0.50     Average packs/day: 0.5 packs/day for 35.0 years (17.5 ttl pk-yrs)     Types: Cigarettes    Smokeless tobacco: Former    Tobacco comments:     Quit smoking 3 years ago   Vaping Use    Vaping status: Never Used   Substance and Sexual Activity    Alcohol use: Not Currently    Drug use: Never    Sexual activity: Defer     Medications Prior to Admission   Medication Sig Dispense Refill Last Dose    aspirin  MG tablet Take 1 tablet by mouth Daily. 90 tablet 0 6/2/2024    atorvastatin (LIPITOR) 80 MG tablet Take 1 tablet by mouth Every Night. 30 tablet 0 6/2/2024    carvedilol (COREG) 12.5 MG tablet Take 1 tablet by mouth 2 (Two) Times a Day With Meals.   6/3/2024    gabapentin (NEURONTIN) 600 MG tablet Take 800 mg by mouth 4 (Four) Times a Day.   Patient Taking Differently    valsartan (DIOVAN) 160 MG tablet Take 2 tablets by mouth Daily.   6/3/2024    acetaminophen (TYLENOL) 325 MG tablet Take 2 tablets by mouth Every 6 (Six) Hours As Needed for Mild Pain .       cloNIDine (CATAPRES) 0.2 MG tablet Take 0.2 mg by mouth As Needed for High Blood Pressure.       clopidogrel (PLAVIX) 75 MG tablet Take 1 tablet by mouth Daily. 30 tablet 1     escitalopram (Lexapro) 10 MG tablet 5 mg daily x 2 weeks then increase to 10 mg daily thereafter (Patient taking differently: 2 tablets. 5 mg daily x 2 weeks then increase to 10 mg daily thereafter) 30 tablet 2     gabapentin (NEURONTIN) 300 MG capsule Take 1 capsule by mouth Daily.       losartan (COZAAR) 50 MG tablet Take 1 tablet by mouth Daily. 30 tablet 0     losartan-hydrochlorothiazide (HYZAAR) 100-12.5 MG per tablet Take 1 tablet by mouth Daily.        methocarbamol (ROBAXIN) 500 MG tablet Take 500 mg by mouth 4 (Four) Times a Day.       NIFEdipine XL (PROCARDIA XL) 30 MG 24 hr tablet Take 1 tablet by mouth Daily.       sildenafil (VIAGRA) 50 MG tablet TAKE ONE TABLET BY MOUTH DAILY AS NEEDED FOR ERECTILE DYSFUNCTION 30 tablet 3      No Known Allergies    Objective      Vital Signs  Temp:  [96.3 °F (35.7 °C)-99.3 °F (37.4 °C)] 97.7 °F (36.5 °C)  Heart Rate:  [47-82] 75  Resp:  [16-18] 18  BP: (115-179)/() 155/95    Intake & Output (last day)         06/03 0701  06/04 0700 06/04 0701  06/05 0700    P.O.  240    I.V. (mL/kg)  300 (4)    Total Intake(mL/kg)  540 (7.2)    Urine (mL/kg/hr) 400     Total Output 400     Net -400 +540                  Physical Exam  Constitutional:       General: He is not in acute distress.     Appearance: He is not ill-appearing or toxic-appearing.   HENT:      Head: Normocephalic.      Mouth/Throat:      Mouth: Mucous membranes are moist.      Pharynx: Oropharynx is clear.   Eyes:      Pupils: Pupils are equal, round, and reactive to light.   Cardiovascular:      Rate and Rhythm: Normal rate and regular rhythm.      Heart sounds: No murmur heard.  Pulmonary:      Effort: No respiratory distress.   Chest:      Chest wall: No tenderness.   Abdominal:      General: Abdomen is flat. There is no distension.      Palpations: Abdomen is soft.   Musculoskeletal:         General: No swelling or tenderness.      Cervical back: Normal range of motion. No tenderness.   Skin:     General: Skin is warm and dry.      Capillary Refill: Capillary refill takes less than 2 seconds.   Neurological:      General: No focal deficit present.      Mental Status: He is alert and oriented to person, place, and time.   Psychiatric:         Mood and Affect: Mood normal.         Results Review:    I reviewed the patient's new clinical results.  I reviewed the patient's new imaging results and agree with the interpretation.  Discussed with patient, nurse, and  spouse.    Imaging Results (Last 24 Hours)       Procedure Component Value Units Date/Time    CT Abdomen Pelvis Without Contrast [[id number]] Resulted: 06/04/24 1626     Updated: 06/04/24 1634    CT Chest Without Contrast Diagnostic [[id number]] Resulted: 06/04/24 1626     Updated: 06/04/24 1634            Lab Results:  Lab Results (last 24 hours)       Procedure Component Value Units Date/Time    Tissue Pathology Exam [[id number]] Collected: 06/04/24 1141    Specimen: Tissue from Gastric, Body; Tissue from Esophagus, Distal Updated: 06/04/24 1242    Basic Metabolic Panel [[id number]]  (Abnormal) Collected: 06/04/24 0343    Specimen: Blood from Arm, Left Updated: 06/04/24 0430     Glucose 110 mg/dL      BUN 13 mg/dL      Creatinine 1.23 mg/dL      Sodium 140 mmol/L      Potassium 3.4 mmol/L      Chloride 108 mmol/L      CO2 27.0 mmol/L      Calcium 8.4 mg/dL      BUN/Creatinine Ratio 10.6     Anion Gap 5.0 mmol/L      eGFR 72.0 mL/min/1.73     Narrative:      GFR Normal >60  Chronic Kidney Disease <60  Kidney Failure <15      CBC (No Diff) [[id number]]  (Abnormal) Collected: 06/04/24 0343    Specimen: Blood from Arm, Left Updated: 06/04/24 0403     WBC 5.85 10*3/mm3      RBC 3.77 10*6/mm3      Hemoglobin 9.8 g/dL      Hematocrit 31.2 %      MCV 82.8 fL      MCH 26.0 pg      MCHC 31.4 g/dL      RDW 12.9 %      RDW-SD 38.6 fl      MPV 11.4 fL      Platelets 212 10*3/mm3     Urinalysis With Microscopic If Indicated (No Culture) - Urine, Clean Catch [[id number]]  (Normal) Collected: 06/03/24 2048    Specimen: Urine, Clean Catch Updated: 06/03/24 2109     Color, UA Yellow     Appearance, UA Clear     pH, UA 6.5     Specific Gravity, UA 1.007     Glucose, UA Negative     Ketones, UA Negative     Bilirubin, UA Negative     Blood, UA Negative     Protein, UA Negative     Leuk Esterase, UA Negative     Nitrite, UA Negative     Urobilinogen, UA 1.0 E.U./dL    Narrative:      Urine microscopic not indicated.    Comprehensive  Metabolic Panel [[id number]]  (Abnormal) Collected: 06/03/24 1738    Specimen: Blood from Arm, Left Updated: 06/03/24 1807     Glucose 98 mg/dL      BUN 14 mg/dL      Creatinine 1.31 mg/dL      Sodium 138 mmol/L      Potassium 4.2 mmol/L      Chloride 104 mmol/L      CO2 29.0 mmol/L      Calcium 9.2 mg/dL      Total Protein 6.8 g/dL      Albumin 4.2 g/dL      ALT (SGPT) 10 U/L      AST (SGOT) 11 U/L      Alkaline Phosphatase 58 U/L      Total Bilirubin 0.2 mg/dL      Globulin 2.6 gm/dL      A/G Ratio 1.6 g/dL      BUN/Creatinine Ratio 10.7     Anion Gap 5.0 mmol/L      eGFR 66.7 mL/min/1.73     Narrative:      GFR Normal >60  Chronic Kidney Disease <60  Kidney Failure <15      Lipase [[id number]]  (Normal) Collected: 06/03/24 1738    Specimen: Blood from Arm, Left Updated: 06/03/24 1807     Lipase 38 U/L     CBC & Differential [[id number]]  (Abnormal) Collected: 06/03/24 1738    Specimen: Blood from Arm, Left Updated: 06/03/24 1753    Narrative:      The following orders were created for panel order CBC & Differential.  Procedure                               Abnormality         Status                     ---------                               -----------         ------                     CBC Auto Differential[[id number]]        Abnormal            Final result                 Please view results for these tests on the individual orders.    CBC Auto Differential [[id number]]  (Abnormal) Collected: 06/03/24 1738    Specimen: Blood from Arm, Left Updated: 06/03/24 1753     WBC 6.56 10*3/mm3      RBC 3.76 10*6/mm3      Hemoglobin 9.8 g/dL      Hematocrit 32.1 %      MCV 85.4 fL      MCH 26.1 pg      MCHC 30.5 g/dL      RDW 13.0 %      RDW-SD 40.4 fl      MPV 10.7 fL      Platelets 223 10*3/mm3      Neutrophil % 71.5 %      Lymphocyte % 20.0 %      Monocyte % 5.0 %      Eosinophil % 2.7 %      Basophil % 0.5 %      Immature Grans % 0.3 %      Neutrophils, Absolute 4.69 10*3/mm3      Lymphocytes, Absolute 1.31 10*3/mm3       Monocytes, Absolute 0.33 10*3/mm3      Eosinophils, Absolute 0.18 10*3/mm3      Basophils, Absolute 0.03 10*3/mm3      Immature Grans, Absolute 0.02 10*3/mm3      nRBC 0.0 /100 WBC     Byron Draw [[id number]] Collected: 06/03/24 1738    Specimen: Blood from Arm, Left Updated: 06/03/24 1745    Narrative:      The following orders were created for panel order Byron Draw.  Procedure                               Abnormality         Status                     ---------                               -----------         ------                     Green Top (Gel)[[id number]]                                  Final result               Lavender Top[[id number]]                                     Final result               Gold Top - SST[[id number]]                                   Final result               Light Blue Top[[id number]]                                   Final result                 Please view results for these tests on the individual orders.    Green Top (Gel) [[id number]] Collected: 06/03/24 1738    Specimen: Blood from Arm, Left Updated: 06/03/24 1745     Extra Tube Hold for add-ons.     Comment: Auto resulted.       Lavender Top [[id number]] Collected: 06/03/24 1738    Specimen: Blood from Arm, Left Updated: 06/03/24 1745     Extra Tube hold for add-on     Comment: Auto resulted       Gold Top - SST [[id number]] Collected: 06/03/24 1738    Specimen: Blood from Arm, Left Updated: 06/03/24 1745     Extra Tube Hold for add-ons.     Comment: Auto resulted.       Light Blue Top [[id number]] Collected: 06/03/24 1738    Specimen: Blood from Arm, Left Updated: 06/03/24 1745     Extra Tube Hold for add-ons.     Comment: Auto resulted                   Assessment & Plan       Dysphagia      Assessment & Plan  EGD results reviewed.  Large fungating mass was found in the lower 30 esophagus, 42 cm 346 cm from the incisors.  The mass and at the GE junction.  Biopsies obtained, await results.  Presentation certainly  concerning for esophageal malignancy.  Await biopsy results.  Patient will ultimately require outpatient PET/CT imaging and outpatient EUS to for further evaluation.     Will obtain CT chest, abdomen and pelvis to further characterize mass and evaluate for any distal lesions. Patient has been started on a clear liquid diet.  Monitor tolerance especially as he had difficulty tolerating small amounts of liquids yesterday per documentation.  If p.o. route unreliable may need alternate route for nutrition.  Aspiration precautions, head of bed at 90 degrees during and after meals.  Will discuss with surgeon, further recommendations to follow.    I discussed the patients findings and our recommendations with patient, family, and nursing staff    Thank you for this consult and allowing us to participate in the care of your patient.  We will follow along with you during this hospitalization.     CHUN Barillas  [facility]  06/04/24  16:58 EDT    I have spent greater than 75 minutes reviewing the patient's chart including medical history, notes, radiographic images, labs, and performing assessment and development of a plan and discussion with the patient/family at bedside.

## 2024-06-04 NOTE — ANESTHESIA PREPROCEDURE EVALUATION
Anesthesia Evaluation     no history of anesthetic complications:                Airway   TM distance: >3 FB  Neck ROM: full  Dental    (+) edentulous    Pulmonary    (+) a smoker,sleep apnea  Cardiovascular     (+) hypertension  (-) dysrhythmias, angina      Neuro/Psych  (+) TIA, CVA (no residual)  (-) dizziness/light headedness, syncope  GI/Hepatic/Renal/Endo    (+) renal disease- CRI    Musculoskeletal     Abdominal    Substance History      OB/GYN          Other                    Anesthesia Plan    ASA 3     MAC     intravenous induction     Anesthetic plan, risks, benefits, and alternatives have been provided, discussed and informed consent has been obtained with: patient.    CODE STATUS:    Code Status (Patient has no pulse and is not breathing): CPR (Attempt to Resuscitate)  Medical Interventions (Patient has pulse or is breathing): Full Support

## 2024-06-04 NOTE — PLAN OF CARE
Goal Outcome Evaluation:      Patient came in for dysphagia. On arrival, patients BP was elevated-provider made aware and medicated patient per order. Patient is alert and oriented x4, up ad alexa. Maintained on NPO after midnight with ongoing IV LR at 100ml/hr. GI following. Await EGD in the morning. Pending AM labs. Plan of care ongoing.

## 2024-06-04 NOTE — CONSULTS
Gastroenterology   Initial Inpatient Consult Note  Thank you for asking opinion on this patient.  Referring Provider: Kobe Mcdaniel MD    Reason for Consultation: Weight loss dysphagia    Subjective     History of present illness:    49 y.o. male that we are asked see for weight loss and dysphagia.  Patient states he is lost about 30 pounds over the last 3 months.  He has also been noted to be anemic but denies to me any overt bleeding.  Over the same timeframe he has had progressive dysphagia first to solids but now worsening dysphagia to liquids as well.  He was seen by a GI physician at the [facility] and was planned for an EGD and colonoscopy later this month.  He was a 2 pack/day smoker for about 30 years but quit about 5 years ago at the time of his stroke.  He has been on Plavix but is not on Plavix currently.  He does take aspirin.  No prior colonoscopy.  No family history of GI malignancies.    Past Medical History:  Past Medical History:   Diagnosis Date    Hypertension     Kidney disease     Stroke (cerebrum)      Past Surgical History:  Past Surgical History:   Procedure Laterality Date    BACK SURGERY      KNEE SURGERY      TEETH EXTRACTION      all      Social History:   Social History     Tobacco Use    Smoking status: Former     Current packs/day: 0.50     Average packs/day: 0.5 packs/day for 35.0 years (17.5 ttl pk-yrs)     Types: Cigarettes    Smokeless tobacco: Former    Tobacco comments:     Quit smoking 3 years ago   Substance Use Topics    Alcohol use: Not Currently      Family History:  Family History   Problem Relation Age of Onset    Diabetes Mother     Stroke Mother     Diabetes Sister     Heart failure Sister        Home Meds:  Medications Prior to Admission   Medication Sig Dispense Refill Last Dose    aspirin  MG tablet Take 1 tablet by mouth Daily. 90 tablet 0 6/2/2024    atorvastatin (LIPITOR) 80 MG tablet Take 1 tablet by mouth Every Night. 30 tablet 0 6/2/2024     carvedilol (COREG) 12.5 MG tablet Take 1 tablet by mouth 2 (Two) Times a Day With Meals.   6/3/2024    gabapentin (NEURONTIN) 600 MG tablet Take 800 mg by mouth 4 (Four) Times a Day.   Patient Taking Differently    valsartan (DIOVAN) 160 MG tablet Take 2 tablets by mouth Daily.   6/3/2024    acetaminophen (TYLENOL) 325 MG tablet Take 2 tablets by mouth Every 6 (Six) Hours As Needed for Mild Pain .       cloNIDine (CATAPRES) 0.2 MG tablet Take 0.2 mg by mouth As Needed for High Blood Pressure.       clopidogrel (PLAVIX) 75 MG tablet Take 1 tablet by mouth Daily. 30 tablet 1     escitalopram (Lexapro) 10 MG tablet 5 mg daily x 2 weeks then increase to 10 mg daily thereafter (Patient taking differently: 2 tablets. 5 mg daily x 2 weeks then increase to 10 mg daily thereafter) 30 tablet 2     gabapentin (NEURONTIN) 300 MG capsule Take 1 capsule by mouth Daily.       losartan (COZAAR) 50 MG tablet Take 1 tablet by mouth Daily. 30 tablet 0     losartan-hydrochlorothiazide (HYZAAR) 100-12.5 MG per tablet Take 1 tablet by mouth Daily.       methocarbamol (ROBAXIN) 500 MG tablet Take 500 mg by mouth 4 (Four) Times a Day.       sildenafil (VIAGRA) 50 MG tablet TAKE ONE TABLET BY MOUTH DAILY AS NEEDED FOR ERECTILE DYSFUNCTION 30 tablet 3      Current Meds:   [START ON 6/4/2024] aspirin, 325 mg, Oral, Daily  atorvastatin, 80 mg, Oral, Nightly  carvedilol, 12.5 mg, Oral, BID With Meals  gabapentin, 800 mg, Oral, Q8H  sodium chloride, 10 mL, Intravenous, Q12H  [START ON 6/4/2024] valsartan, 320 mg, Oral, Daily      Allergies:  No Known Allergies  Review of Systems  Pertinent items are noted in HPI, all other systems reviewed and negative    Objective     Vital Signs  Temp:  [98.2 °F (36.8 °C)-99.3 °F (37.4 °C)] 98.2 °F (36.8 °C)  Heart Rate:  [47-72] 69  Resp:  [16-17] 17  BP: (130-179)/() 155/77    Physical Exam:  CONSTITUTIONAL:  today's vital signs reviewed  EARS NOSE THROAT: trachea midline and no deformity of the  nares  EYES: no scleral icterus  GASTROINTESTINAL: abdomen is soft, nontender, nondistended with normal active bowel sounds, no masses are appreciated  PSYCHIATRIC: appropriate mood and affect  RESPIRATORY: normal inspiratory effort with no increased work of breathing  NEUROLOGIC: patient is awake and alert  DERMATOLOGIC: skin is warm with no cyanosis  LYMPHATIC: no periumbilical lymphadenopathy     Results Review:   I reviewed the patient's new clinical results.    Results from last 7 days   Lab Units 06/03/24  1738   WBC 10*3/mm3 6.56   HEMOGLOBIN g/dL 9.8*   HEMATOCRIT % 32.1*   PLATELETS 10*3/mm3 223     Results from last 7 days   Lab Units 06/03/24  1738   SODIUM mmol/L 138   POTASSIUM mmol/L 4.2   CHLORIDE mmol/L 104   CO2 mmol/L 29.0   BUN mg/dL 14   CREATININE mg/dL 1.31*   CALCIUM mg/dL 9.2   BILIRUBIN mg/dL 0.2   ALK PHOS U/L 58   ALT (SGPT) U/L 10   AST (SGOT) U/L 11   GLUCOSE mg/dL 98         Lab Results   Lab Value Date/Time    LIPASE 38 06/03/2024 1738       Radiology:  No orders to display       Assessment & Plan   Active Hospital Problems    Diagnosis     **Dysphagia        Assessment:  Dysphagia.  This is been progressive for solids being involved in liquids.  This has been associated with weight loss and smoking history which is all concerning for esophageal malignancy.  Anemia.  No overt bleeding reported.  Weight loss 30 pounds  2 pack-a-day smoker for about 30 years  Status post CVA  Hypertension  Sleep apnea  Chronic kidney disease    Plan:  We will plan EGD tomorrow to evaluate his progressive dysphagia and weight loss and anemia.  I would hold off on trying to prep him for colonoscopy right now due to the fact that he cannot hold down even small amounts of liquids.  Monitor H&H  N.p.o. after midnight  Empiric PPI to decrease acid secretion      I discussed the patients findings and my recommendations with patient, family, and nursing staff.    MD Jey Collins  KATYA Ramírez.  [facility] [place] [address]  Office: [phone]

## 2024-06-04 NOTE — PROGRESS NOTES
MD ATTESTATION NOTE    The JUAN and I have discussed this patient's history, physical exam, and treatment plan.  I have reviewed the documentation and personally had a face to face interaction with the patient. I affirm the documentation and agree with the treatment and plan.  The attached note describes my personal findings.       I personally performed the physical exam  and below are my findings.    During my history and exam both the patient and spouse was present.  Brief HPI: This is a 49-year-old male who was admitted to the observation unit for further evaluation of dysphagia.  He has had issues for several months at least 3 months where he had problems swallowing.  Initially the problem started with problems eating solids.  It then is gradually progressed and now was even come to liquids.  He is able to eat peanut butter.  He does eat [employer] peanut butter cups.  He is also had about a 30 pound weight loss over the past few months.  Denies any pain or fevers or chills during this episode.  He used to smoke heavily but has not smoked in the Plast 7 years.  Denies any fevers or chills.  Denies vomiting or diarrhea.  I am seeing him after this gentleman returned from EGD in the observation unit.  Since he returned from EGD he reports some just discomfort in the lower aspect of his chest.  This was where the EGD and biopsies were performed.    General : This is a gentleman that is thin looks older than his stated age.  He is in no acute cardiovascular respiratory distress.  Patient is awake alert and oriented.  Vitals have been reviewed and unremarkable.  HEENT: NCAT.  Oropharyngeal exam.  No trismus.  He is handling his oral secretions without any problems.  Does not have dry oral mucosa.  CV: Heart is regular rate and rhythm  Respiratory: CTA bilaterally  Abd: Soft and nontender  Ext: No acute abnormalities.  No edema.  Intact distal pulses.  Skin: No rash  Neuro: Cranial nerves II through XII grossly intact as  tested.  No acute lateralizing deficits.  Psych: Normal mood and affect    I have reviewed the patient's vital signs, lab work, EKG and imaging.    Plan:  1.  Dysphagia: Patient shows some anemia on his lab work, hemoglobin 9.8.  He does not have any acute GI bleed he has no melena or blood loss.  He had an EGD and was evaluated by gastroenterology.  His EGD showed a large fungating mass in the lower third of esophagus.  Biopsies were obtained.  We have placed a consult out to thoracic surgery to see what their further recommendations are.  Depending upon the recommendations patient will either need to be admitted or they will need to follow-up with further testing as an outpatient.  I am waiting for their consultation.  Discussed this plan with the patient as well as the spouse.  All questions answered.        Note Disclaimer: At [facility], we believe that sharing information builds trust and better relationships. You are receiving this note because you recently visited [facility]. It is possible you will see health information before a provider has talked with you about it. This kind of information can be easy to misunderstand. To help you fully understand what it means for your health, we urge you to discuss this note with your provider.

## 2024-06-04 NOTE — PROGRESS NOTES
ED OBSERVATION PROGRESS/DISCHARGE SUMMARY    Date of Admission: 6/3/2024   LOS: 0 days   PCP: Chelita Scott APRN    Final Diagnosis:       Subjective still endorses epigastric pain and difficulty swallowing.    Hospital Outcome:     49-year-old male with history of chronic kidney disease and stroke who presented to the emergency room for 3 months of difficulty with swallowing. He states he has lost approximately 30 pounds during that time due to his inability to eat and drink. The patient was seen in the emergency room where his labs showed a mild chronic kidney disease and anemia with a hemoglobin of 9.8. We were asked to admit him to the observation unit for GI consultation and possible EGD in the morning.   6/4/2024  EGD revealed a large fungating mass with no bleeding and stigmata of recent bleeding found in the lower third of the esophagus, the mass was partially obstructing and circumferential.  They have recommended thoracic surgery consultation.  Thoracic surgery has ordered imaging of the chest and pelvis.  Results are pending at this time.    ROS:  General: no fevers, chills  Respiratory: no cough, dyspnea  Cardiovascular: no chest pain, palpitations  Abdomen: No abdominal pain, nausea, vomiting, or diarrhea  Neurologic: No focal weakness    Objective   Physical Exam:  I have reviewed the vital signs.  Temp:  [96.3 °F (35.7 °C)-98.2 °F (36.8 °C)] 97.7 °F (36.5 °C)  Heart Rate:  [47-82] 75  Resp:  [16-18] 18  BP: (115-179)/() 155/95  General Appearance:    Alert, cooperative, no distress  Head:    Normocephalic, atraumatic  Eyes:    Sclerae anicteric  Neck:   Supple, no mass  Lungs: Clear to auscultation bilaterally, respirations unlabored  Heart: Regular rate and rhythm, S1 and S2 normal, no murmur, rub or gallop  Abdomen:  Soft, non-tender, bowel sounds active, nondistended  Extremities: No clubbing, cyanosis, or edema to lower extremities  Pulses:  2+ and symmetric in distal lower  extremities  Skin: No rashes   Neurologic: Oriented x3, Normal strength to extremities    Results Review:    I have reviewed the labs, radiology results and diagnostic studies.    Results from last 7 days   Lab Units 06/04/24  0343   WBC 10*3/mm3 5.85   HEMOGLOBIN g/dL 9.8*   HEMATOCRIT % 31.2*   PLATELETS 10*3/mm3 212     Results from last 7 days   Lab Units 06/04/24  1713 06/04/24  0343 06/03/24  1738   SODIUM mmol/L  --  140 138   POTASSIUM mmol/L 4.2 3.4* 4.2   CHLORIDE mmol/L  --  108* 104   CO2 mmol/L  --  27.0 29.0   BUN mg/dL  --  13 14   CREATININE mg/dL  --  1.23 1.31*   CALCIUM mg/dL  --  8.4* 9.2   BILIRUBIN mg/dL  --   --  0.2   ALK PHOS U/L  --   --  58   ALT (SGPT) U/L  --   --  10   AST (SGOT) U/L  --   --  11   GLUCOSE mg/dL  --  110* 98     Imaging Results (Last 24 Hours)       Procedure Component Value Units Date/Time    CT Abdomen Pelvis Without Contrast [[id number]] Collected: 06/04/24 1728     Updated: 06/04/24 1728    Narrative:      CT CHEST, ABDOMEN, AND PELVIS WITHOUT CONTRAST.     HISTORY: 49-year-old male with nausea and vomiting. Esophageal mass.     TECHNIQUE: Radiation dose reduction techniques were utilized, including  automated exposure control and exposure modulation based on body size.   3 mm images were obtained through the chest, abdomen, and pelvis without  the administration of IV contrast. No previous CTs for comparison.     FINDINGS:  CHEST: Coronary artery calcifications are noted.  1. Very difficult to characterize in the absence of IV contrast, but  there is circumferential thickening of an approximately 6 cm segment of  distal esophagus with moderate dilatation of the esophagus proximally.  There is fluid within the proximal esophagus. There are ill-defined  nodes adjacently which are suspicious. There are also shotty mediastinal  nodes which are indeterminate.     There are airspace opacities at the dependent aspect of both lower lobes  which may represent atelectasis,  but pneumonia can't be excluded. There  is a minimal right pleural effusion.     There is a sub-6 mm right middle lobe pulmonary nodule, series 3 image 3  and there is a sub-6 mm nodular density along the right minor fissure.  Conservative surveillance is recommended with a follow-up chest CT in 3  months.     ABDOMEN/PELVIS:     1. There are multiple enlarged nodes at the gastrohepatic ligament which  are suspicious for lavell metastases. Multiple shotty retroperitoneal  nodes are noted as well. Largest gastrohepatic ligament node measures  2.0 x 1.1 cm. Noncontrasted liver appears unremarkable. There is a  low-attenuation 1.6 cm left adrenal nodule with internal density  measurements of less than 10 Hounsfield units likely representing a  benign adrenal adenoma.     2. Noncontrasted liver, right adrenal, spleen, and kidneys appear  unremarkable. Urinary bladder wall appears diffusely thickened which may  be related to the bladder being incompletely distended, but please  correlate clinically for acute cystitis.     3. There is no acute bowel abnormality. Appendix appears within normal  limits. There is a trace amount of free fluid at the dependent aspect of  the pelvis.     4. There are moderately extensive abdominal aortic atherosclerotic  changes without aneurysmal dilatation.             CT Chest Without Contrast Diagnostic [[id number]] Collected: 06/04/24 1728     Updated: 06/04/24 1728    Narrative:      CT CHEST, ABDOMEN, AND PELVIS WITHOUT CONTRAST.     HISTORY: 49-year-old male with nausea and vomiting. Esophageal mass.     TECHNIQUE: Radiation dose reduction techniques were utilized, including  automated exposure control and exposure modulation based on body size.   3 mm images were obtained through the chest, abdomen, and pelvis without  the administration of IV contrast. No previous CTs for comparison.     FINDINGS:  CHEST: Coronary artery calcifications are noted.  1. Very difficult to characterize in  the absence of IV contrast, but  there is circumferential thickening of an approximately 6 cm segment of  distal esophagus with moderate dilatation of the esophagus proximally.  There is fluid within the proximal esophagus. There are ill-defined  nodes adjacently which are suspicious. There are also shotty mediastinal  nodes which are indeterminate.     There are airspace opacities at the dependent aspect of both lower lobes  which may represent atelectasis, but pneumonia can't be excluded. There  is a minimal right pleural effusion.     There is a sub-6 mm right middle lobe pulmonary nodule, series 3 image 3  and there is a sub-6 mm nodular density along the right minor fissure.  Conservative surveillance is recommended with a follow-up chest CT in 3  months.     ABDOMEN/PELVIS:     1. There are multiple enlarged nodes at the gastrohepatic ligament which  are suspicious for lavell metastases. Multiple shotty retroperitoneal  nodes are noted as well. Largest gastrohepatic ligament node measures  2.0 x 1.1 cm. Noncontrasted liver appears unremarkable. There is a  low-attenuation 1.6 cm left adrenal nodule with internal density  measurements of less than 10 Hounsfield units likely representing a  benign adrenal adenoma.     2. Noncontrasted liver, right adrenal, spleen, and kidneys appear  unremarkable. Urinary bladder wall appears diffusely thickened which may  be related to the bladder being incompletely distended, but please  correlate clinically for acute cystitis.     3. There is no acute bowel abnormality. Appendix appears within normal  limits. There is a trace amount of free fluid at the dependent aspect of  the pelvis.     4. There are moderately extensive abdominal aortic atherosclerotic  changes without aneurysmal dilatation.                     I have reviewed the medications.  ---------------------------------------------------------------------------------------------  Assessment & Plan    Assessment/Problem List    Dysphagia    Plan:    Dysphagia/vomiting  -Cardiac monitoring  -Continuous pulse ox  -Vital signs every 4 hours  -Soft diet  -GI consult  -EGD revealed a large fungating mass  -Thoracic surgery consulted  -CT imaging of chest, abdomen and pelvis pending at this time  -Clear liquid diet     Chronic kidney disease  -Creatinine improved to 1.23, this appears to be close to his baseline  -Avoid nephrotoxic agents  -BMP in the morning     Hypertension  -Monitor blood pressure  -Continue home blood pressure agents     DVT prophylaxis:  Mechanical DVT prophylaxis orders are present.    Disposition: Will remain in the observation unit for an additional night.    Follow-up after Discharge: Gastroenterology, thoracic surgery    This note will serve as a discharge summary    Taylor Linder, APRN 06/04/24 18:59 EDT    I have worn appropriate PPE during this patient encounter, sanitized my hands both with entering and exiting patient's room.    52 minutes has been spent by [facility] providers in the care of this patient while under observation status

## 2024-06-05 ENCOUNTER — APPOINTMENT (OUTPATIENT)
Dept: CT IMAGING | Facility: HOSPITAL | Age: 49
End: 2024-06-05
Payer: MEDICAID

## 2024-06-05 VITALS
SYSTOLIC BLOOD PRESSURE: 152 MMHG | BODY MASS INDEX: 22.48 KG/M2 | OXYGEN SATURATION: 97 % | HEART RATE: 60 BPM | RESPIRATION RATE: 16 BRPM | WEIGHT: 166 LBS | HEIGHT: 72 IN | DIASTOLIC BLOOD PRESSURE: 90 MMHG | TEMPERATURE: 98.6 F

## 2024-06-05 DIAGNOSIS — C15.9 MALIGNANT NEOPLASM OF ESOPHAGUS, UNSPECIFIED LOCATION: Primary | ICD-10-CM

## 2024-06-05 LAB
ANION GAP SERPL CALCULATED.3IONS-SCNC: 6 MMOL/L (ref 5–15)
BUN SERPL-MCNC: 10 MG/DL (ref 6–20)
BUN/CREAT SERPL: 8.9 (ref 7–25)
CALCIUM SPEC-SCNC: 8.7 MG/DL (ref 8.6–10.5)
CHLORIDE SERPL-SCNC: 111 MMOL/L (ref 98–107)
CO2 SERPL-SCNC: 26 MMOL/L (ref 22–29)
CREAT SERPL-MCNC: 1.12 MG/DL (ref 0.76–1.27)
DEPRECATED RDW RBC AUTO: 40.1 FL (ref 37–54)
EGFRCR SERPLBLD CKD-EPI 2021: 80.5 ML/MIN/1.73
ERYTHROCYTE [DISTWIDTH] IN BLOOD BY AUTOMATED COUNT: 13.2 % (ref 12.3–15.4)
FERRITIN SERPL-MCNC: 10.1 NG/ML (ref 30–400)
FOLATE SERPL-MCNC: 5.46 NG/ML (ref 4.78–24.2)
GLUCOSE SERPL-MCNC: 94 MG/DL (ref 65–99)
HCT VFR BLD AUTO: 31.8 % (ref 37.5–51)
HGB BLD-MCNC: 9.8 G/DL (ref 13–17.7)
HGB RETIC QN AUTO: 26.4 PG (ref 29.8–36.1)
IMM RETICS NFR: 20.7 % (ref 3–15.8)
IRON 24H UR-MRATE: 16 MCG/DL (ref 59–158)
IRON SATN MFR SERPL: 5 % (ref 20–50)
MCH RBC QN AUTO: 25.8 PG (ref 26.6–33)
MCHC RBC AUTO-ENTMCNC: 30.8 G/DL (ref 31.5–35.7)
MCV RBC AUTO: 83.7 FL (ref 79–97)
PLATELET # BLD AUTO: 201 10*3/MM3 (ref 140–450)
PMV BLD AUTO: 11.4 FL (ref 6–12)
POTASSIUM SERPL-SCNC: 4.1 MMOL/L (ref 3.5–5.2)
RBC # BLD AUTO: 3.8 10*6/MM3 (ref 4.14–5.8)
RETICS # AUTO: 0.04 10*6/MM3 (ref 0.02–0.13)
RETICS/RBC NFR AUTO: 1.06 % (ref 0.7–1.9)
SODIUM SERPL-SCNC: 143 MMOL/L (ref 136–145)
TIBC SERPL-MCNC: 349 MCG/DL (ref 298–536)
TRANSFERRIN SERPL-MCNC: 234 MG/DL (ref 200–360)
VIT B12 BLD-MCNC: 670 PG/ML (ref 211–946)
WBC NRBC COR # BLD AUTO: 5.89 10*3/MM3 (ref 3.4–10.8)

## 2024-06-05 PROCEDURE — 80048 BASIC METABOLIC PNL TOTAL CA: CPT | Performed by: NURSE PRACTITIONER

## 2024-06-05 PROCEDURE — 99214 OFFICE O/P EST MOD 30 MIN: CPT

## 2024-06-05 PROCEDURE — 25510000001 IOPAMIDOL 61 % SOLUTION: Performed by: EMERGENCY MEDICINE

## 2024-06-05 PROCEDURE — 99213 OFFICE O/P EST LOW 20 MIN: CPT | Performed by: INTERNAL MEDICINE

## 2024-06-05 PROCEDURE — 74177 CT ABD & PELVIS W/CONTRAST: CPT

## 2024-06-05 PROCEDURE — 85027 COMPLETE CBC AUTOMATED: CPT | Performed by: NURSE PRACTITIONER

## 2024-06-05 PROCEDURE — 82728 ASSAY OF FERRITIN: CPT | Performed by: INTERNAL MEDICINE

## 2024-06-05 PROCEDURE — 99203 OFFICE O/P NEW LOW 30 MIN: CPT | Performed by: INTERNAL MEDICINE

## 2024-06-05 PROCEDURE — 25010000002 NA FERRIC GLUC CPLX PER 12.5 MG: Performed by: INTERNAL MEDICINE

## 2024-06-05 PROCEDURE — 84466 ASSAY OF TRANSFERRIN: CPT | Performed by: INTERNAL MEDICINE

## 2024-06-05 PROCEDURE — G0378 HOSPITAL OBSERVATION PER HR: HCPCS

## 2024-06-05 PROCEDURE — 71260 CT THORAX DX C+: CPT

## 2024-06-05 PROCEDURE — 83540 ASSAY OF IRON: CPT | Performed by: INTERNAL MEDICINE

## 2024-06-05 PROCEDURE — 63710000001 DIPHENHYDRAMINE PER 50 MG: Performed by: INTERNAL MEDICINE

## 2024-06-05 PROCEDURE — 25810000003 SODIUM CHLORIDE 0.9 % SOLUTION: Performed by: INTERNAL MEDICINE

## 2024-06-05 PROCEDURE — 85046 RETICYTE/HGB CONCENTRATE: CPT | Performed by: INTERNAL MEDICINE

## 2024-06-05 RX ORDER — ACETAMINOPHEN 325 MG/1
325 TABLET ORAL DAILY
Status: DISCONTINUED | OUTPATIENT
Start: 2024-06-05 | End: 2024-06-05 | Stop reason: HOSPADM

## 2024-06-05 RX ORDER — DIPHENHYDRAMINE HCL 25 MG
25 CAPSULE ORAL DAILY
Status: DISCONTINUED | OUTPATIENT
Start: 2024-06-05 | End: 2024-06-05 | Stop reason: HOSPADM

## 2024-06-05 RX ADMIN — GABAPENTIN 800 MG: 400 CAPSULE ORAL at 06:08

## 2024-06-05 RX ADMIN — DIPHENHYDRAMINE HYDROCHLORIDE 25 MG: 25 CAPSULE ORAL at 13:32

## 2024-06-05 RX ADMIN — VALSARTAN 320 MG: 320 TABLET, FILM COATED ORAL at 08:31

## 2024-06-05 RX ADMIN — SODIUM CHLORIDE 250 MG: 9 INJECTION, SOLUTION INTRAVENOUS at 14:43

## 2024-06-05 RX ADMIN — ACETAMINOPHEN 325MG 325 MG: 325 TABLET ORAL at 13:32

## 2024-06-05 RX ADMIN — GABAPENTIN 800 MG: 400 CAPSULE ORAL at 13:28

## 2024-06-05 RX ADMIN — Medication 10 ML: at 08:31

## 2024-06-05 RX ADMIN — ASPIRIN 325 MG: 325 TABLET, COATED ORAL at 08:31

## 2024-06-05 RX ADMIN — IOPAMIDOL 100 ML: 612 INJECTION, SOLUTION INTRAVENOUS at 15:30

## 2024-06-05 NOTE — PLAN OF CARE
Goal Outcome Evaluation:  Plan of Care Reviewed With: patient, spouse        Progress: improving  Outcome Evaluation: continue outpt work up and follow up with Hem/Onco and Thoracic Surgery. DC home with a full liquid diet         Problem: Adult Inpatient Plan of Care  Goal: Plan of Care Review  Outcome: Met  Flowsheets (Taken 6/5/2024 1820)  Progress: improving  Plan of Care Reviewed With:   patient   spouse  Outcome Evaluation: continue outpt work up and follow up with Hem/Onco and Thoracic Surgery. DC home with a full liquid diet  Goal: Patient-Specific Goal (Individualized)  Outcome: Met  Goal: Absence of Hospital-Acquired Illness or Injury  Outcome: Met  Intervention: Identify and Manage Fall Risk  Recent Flowsheet Documentation  Taken 6/5/2024 1600 by Bob Mcwilliams RN  Safety Promotion/Fall Prevention:   safety round/check completed   room organization consistent   lighting adjusted   nonskid shoes/slippers when out of bed   activity supervised   clutter free environment maintained  Taken 6/5/2024 1447 by Bob Mcwilliams, RN  Safety Promotion/Fall Prevention:   safety round/check completed   room organization consistent   lighting adjusted   nonskid shoes/slippers when out of bed   activity supervised   clutter free environment maintained  Taken 6/5/2024 1219 by Bob Mcwilliams, RN  Safety Promotion/Fall Prevention:   safety round/check completed   room organization consistent   lighting adjusted   nonskid shoes/slippers when out of bed   activity supervised   clutter free environment maintained  Taken 6/5/2024 1000 by Bob Mcwilliams, RN  Safety Promotion/Fall Prevention:   safety round/check completed   room organization consistent   lighting adjusted   nonskid shoes/slippers when out of bed   activity supervised   clutter free environment maintained  Taken 6/5/2024 0806 by Bob Mcwilliams, RN  Safety Promotion/Fall Prevention:   safety round/check completed   room organization consistent   lighting adjusted   nonskid  shoes/slippers when out of bed   activity supervised   clutter free environment maintained  Intervention: Prevent Skin Injury  Recent Flowsheet Documentation  Taken 6/5/2024 1600 by Bob Mcwilliams RN  Body Position: position changed independently  Taken 6/5/2024 1447 by Bob Mcwilliams RN  Body Position: position changed independently  Taken 6/5/2024 1219 by Bbo Mcwilliams RN  Body Position: position changed independently  Taken 6/5/2024 1000 by Bob Mcwilliams RN  Body Position: position changed independently  Taken 6/5/2024 0806 by Bob Mcwilliams RN  Body Position: position changed independently  Intervention: Prevent and Manage VTE (Venous Thromboembolism) Risk  Recent Flowsheet Documentation  Taken 6/5/2024 1600 by Bob Mcwilliams RN  Activity Management:   up ad alexa   activity encouraged  Taken 6/5/2024 1447 by Bob Mcwilliams RN  Activity Management:   up ad alexa   activity encouraged  Taken 6/5/2024 1219 by Bob Mcwilliams RN  Activity Management:   up ad alexa   activity encouraged  Taken 6/5/2024 1000 by Bob Mcwilliams RN  Activity Management:   up ad alexa   activity encouraged  Taken 6/5/2024 0806 by Bob Mcwilliams RN  Activity Management:   up ad alexa   activity encouraged  Intervention: Prevent Infection  Recent Flowsheet Documentation  Taken 6/5/2024 1600 by Bob Mcwilliams RN  Infection Prevention:   rest/sleep promoted   single patient room provided  Taken 6/5/2024 1447 by Bob Mcwilliams RN  Infection Prevention:   rest/sleep promoted   single patient room provided  Taken 6/5/2024 1219 by Bob Mcwilliams RN  Infection Prevention:   rest/sleep promoted   single patient room provided  Taken 6/5/2024 1000 by Bob Mcwilliams RN  Infection Prevention:   rest/sleep promoted   single patient room provided  Taken 6/5/2024 0806 by Bob Mcwilliams RN  Infection Prevention:   rest/sleep promoted   single patient room provided  Goal: Optimal Comfort and Wellbeing  Outcome: Met  Intervention: Provide Person-Centered Care  Recent Flowsheet  Documentation  Taken 6/5/2024 0806 by Bob Mcwilliams RN  Trust Relationship/Rapport:   care explained   empathic listening provided   questions answered   thoughts/feelings acknowledged  Goal: Readiness for Transition of Care  Outcome: Met     Problem: Hypertension Comorbidity  Goal: Blood Pressure in Desired Range  Outcome: Met  Intervention: Maintain Blood Pressure Management  Recent Flowsheet Documentation  Taken 6/5/2024 1600 by Bob Mcwilliams RN  Medication Review/Management: medications reviewed  Taken 6/5/2024 1447 by Bob Mcwilliams RN  Medication Review/Management: medications reviewed  Taken 6/5/2024 1219 by Bob Mcwilliams RN  Medication Review/Management: medications reviewed  Taken 6/5/2024 1000 by Bob Mcwilliams RN  Medication Review/Management: medications reviewed  Taken 6/5/2024 0806 by Bob Mcwilliams RN  Medication Review/Management: medications reviewed     Problem: Obstructive Sleep Apnea Risk or Actual Comorbidity Management  Goal: Unobstructed Breathing During Sleep  Outcome: Met     Problem: Pain Acute  Goal: Acceptable Pain Control and Functional Ability  Outcome: Met  Intervention: Prevent or Manage Pain  Recent Flowsheet Documentation  Taken 6/5/2024 1600 by Bob Mcwilliams RN  Medication Review/Management: medications reviewed  Taken 6/5/2024 1447 by Bob Mcwilliams RN  Medication Review/Management: medications reviewed  Taken 6/5/2024 1219 by Bob Mcwilliams RN  Medication Review/Management: medications reviewed  Taken 6/5/2024 1000 by Bob Mcwilliams RN  Medication Review/Management: medications reviewed  Taken 6/5/2024 0806 by Bob Mcwilliams RN  Medication Review/Management: medications reviewed  Intervention: Optimize Psychosocial Wellbeing  Recent Flowsheet Documentation  Taken 6/5/2024 0806 by Bob Mcwilliams RN  Supportive Measures: active listening utilized

## 2024-06-05 NOTE — CONSULTS
.     REASON FOR CONSULTATION:       Large fungating mass in lower esophagus; CT chest/A/p concerning for metastatic disease     Provide an opinion on any further workup or treatment                             REQUESTING PHYSICIAN: No ref. provider found  RECORDS OBTAINED:  Records of the patient's history including those from the electronic medical record were reviewed and summarized in detail.    HISTORY OF PRESENT ILLNESS:  The patient is a 49 y.o. year old male  who is here for follow-up with the above-mentioned history.    Came to ER 6/30/2024 because could not eat any solid foods for 3 months.  Feels like food gets stuck.  When he tries to eat, he regurgitates his food.  He has been taking on protein shakes.  However, over the past few weeks was having trouble swallowing liquids as well.  He was scheduled for outpatient scopes on June 27.  Lost 20 pounds over the past 3 months.  He was seen by GI at  and planned EGD and colonoscopy later this month.  EGD 6/4/ 24: Large fungating mass lower third of esophagus.  Partially obstructing and circumferential, involving 100% of the lumen circumference.  Dr. Hernandez had trouble getting past the mass with the scope.  Partially but nearly obstructing esophageal mass.  Biopsy pending.  Thoracic surgery evaluated on 6/4/ 24.  20 to 35 pound weight loss over the past 2 to 3 months.      He lives in [place] and works in [employer].    He tells me he can still swallow liquids.  He traditionally has only eating 1 meal per day so he is only tried to drink protein shakes once per day.  States he cannot drink more than 1 shake at a time.  I recommended he drink protein shakes multiple times per day to try to improve his nutrition.    Denies neuropathy.    Past Medical History:   Diagnosis Date    Hypertension     Kidney disease     Stroke (cerebrum)      Past Surgical History:   Procedure Laterality Date    BACK SURGERY      ENDOSCOPY N/A 6/4/2024    Procedure:  ESOPHAGOGASTRODUODENOSCOPY with biopsies;  Surgeon: Taras Hernandez MD;  Location: [facility] ENDOSCOPY;  Service: Gastroenterology;  Laterality: N/A;  pre- nausea/vomiting   post- nearly obstructing distal esophageal mass, gastritis    KNEE SURGERY      TEETH EXTRACTION      all       MEDICATIONS    Current Facility-Administered Medications:     aspirin EC tablet 325 mg, 325 mg, Oral, Daily, Taras Hernandez MD, 325 mg at 06/05/24 0831    atorvastatin (LIPITOR) tablet 80 mg, 80 mg, Oral, Nightly, Taras Hernandez MD, 80 mg at 06/04/24 2114    Calcium Replacement - Follow Nurse / BPA Driven Protocol, , Does not apply, PRN, Taras Hernandez MD    carvedilol (COREG) tablet 12.5 mg, 12.5 mg, Oral, BID With Meals, Taras Hernandez MD, 12.5 mg at 06/04/24 1846    cloNIDine (CATAPRES) tablet 0.2 mg, 0.2 mg, Oral, PRN, Taras Hernandez MD    gabapentin (NEURONTIN) capsule 800 mg, 800 mg, Oral, Q8H, Taras Hernandez MD, 800 mg at 06/05/24 0608    lactated ringers infusion, 100 mL/hr, Intravenous, Continuous, Taras Hernandez MD, Last Rate: 100 mL/hr at 06/04/24 0546, 100 mL/hr at 06/04/24 0546    Magnesium Standard Dose Replacement - Follow Nurse / BPA Driven Protocol, , Does not apply, Mary ROMAN Kevin, MD    morphine injection 2 mg, 2 mg, Intravenous, Q4H PRN, Bennie Stout MD, 2 mg at 06/04/24 2117    NIFEdipine XL (PROCARDIA XL) 24 hr tablet 30 mg, 30 mg, Oral, Daily, Taras Hernandez MD, 30 mg at 06/04/24 1300    ondansetron ODT (ZOFRAN-ODT) disintegrating tablet 4 mg, 4 mg, Oral, Q6H PRN **OR** ondansetron (ZOFRAN) injection 4 mg, 4 mg, Intravenous, Q6H PRN, Taras Hernandez MD    Phosphorus Replacement - Follow Nurse / BPA Driven Protocol, , Does not apply, Mary ROMAN Kevin, MD    Potassium Replacement - Follow Nurse / BPA Driven Protocol, , Does not apply, Mary ROMAN Kevin, MD    sodium chloride 0.9 % flush 10 mL, 10 mL, Intravenous, Mary ROMAN Kevin, MD    [COMPLETED] Insert Peripheral IV, , , Once **AND** sodium chloride 0.9 % flush 10 mL,  10 mL, Intravenous, PRN, Taras Hernandez MD    sodium chloride 0.9 % flush 10 mL, 10 mL, Intravenous, Q12H, Taras Hernandez MD, 10 mL at 06/05/24 0831    sodium chloride 0.9 % flush 10 mL, 10 mL, Intravenous, PRN, Taras Hernandez MD, 10 mL at 06/04/24 1701    sodium chloride 0.9 % infusion 1,000 mL, 1,000 mL, Intravenous, Continuous, Taras Hernandez MD, Stopped at 06/04/24 1148    sodium chloride 0.9 % infusion 40 mL, 40 mL, Intravenous, PRN, Taras Hernandez MD    valsartan (DIOVAN) tablet 320 mg, 320 mg, Oral, Daily, Taras Hernandez MD, 320 mg at 06/05/24 0831    ALLERGIES:   No Known Allergies    SOCIAL HISTORY:       Social History     Socioeconomic History    Marital status:    Tobacco Use    Smoking status: Former     Current packs/day: 0.50     Average packs/day: 0.5 packs/day for 35.0 years (17.5 ttl pk-yrs)     Types: Cigarettes    Smokeless tobacco: Former    Tobacco comments:     Quit smoking 3 years ago   Vaping Use    Vaping status: Never Used   Substance and Sexual Activity    Alcohol use: Not Currently    Drug use: Never    Sexual activity: Defer         FAMILY HISTORY:  Family History   Problem Relation Age of Onset    Diabetes Mother     Stroke Mother     Diabetes Sister     Heart failure Sister        REVIEW OF SYSTEMS:  Review of Systems   Constitutional:  Negative for activity change.   HENT:  Positive for trouble swallowing. Negative for nosebleeds.    Respiratory:  Negative for shortness of breath and wheezing.    Cardiovascular:  Negative for chest pain and palpitations.   Gastrointestinal:  Negative for constipation, diarrhea and nausea.   Genitourinary:  Negative for dysuria and hematuria.   Musculoskeletal:  Negative for arthralgias and myalgias.   Neurological:  Negative for seizures and syncope.   Hematological:  Negative for adenopathy. Does not bruise/bleed easily.   Psychiatric/Behavioral:  Negative for confusion.               Vitals:    06/04/24 2348 06/05/24 0433 06/05/24 0700 06/05/24  0831   BP: 136/89 127/69 116/72    BP Location: Right arm Left arm Left arm    Patient Position: Lying Lying Lying    Pulse: 68 54 (!) 49  Comment: notified RN 52   Resp: 16 17 17    Temp: 97.7 °F (36.5 °C) 98.1 °F (36.7 °C) 98.2 °F (36.8 °C)    TempSrc: Oral Oral Oral    SpO2: 98% 97%     Weight:       Height:              No data to display               PHYSICAL EXAM:    CONSTITUTIONAL:  Vital signs reviewed.  No distress, looks comfortable.  EYES:  Conjunctivae and lids unremarkable.  PERRLA  EARS, NOSE, MOUTH, THROAT:  Ears and nose appear unremarkable.  Lips, teeth, gums appear unremarkable.  RESPIRATORY:  Normal respiratory effort.  Lungs clear to auscultation bilaterally.  CARDIOVASCULAR:  Normal S1, S2.  No murmurs, rubs or gallops.  No significant lower extremity edema.  GASTROINTESTINAL: Abdomen appears unremarkable.  Nontender.  No hepatomegaly.  No splenomegaly.  LYMPHATIC:  No cervical, supraclavicular, axillary lymphadenopathy.  NEURO: Cranial nerves 2-12 grossly intact.  No focal deficits.  Appears to have equal strength all 4 extremities.  MUSCULOSKELETAL:  Unremarkable digits/nails.  No cyanosis or clubbing.  No apparent joint deformities.  SKIN:  Warm.  No rashes.  PSYCHIATRIC:  Normal judgment and insight.  Normal mood and affect.     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  EGD 6/4/ 24: Large fungating mass lower third of esophagus.  Partially obstructing and circumferential, involving 100% of the lumen circumference.  Dr. Hernandez had trouble getting past the mass with the scope.  Partially but nearly obstructing esophageal mass.  Biopsy pending.    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  Multiple shotty retroperitoneal nodes.  Largest gastrohepatic ligament node 2 x 1.1 cm.  Unremarkable noncontrast liver.  6/5/2024: Initial inpatient consult with me.  No clear evidence of distant disease seen on CT without IV contrast.  I will order a CT with IV contrast.  Dr. Mills has already arranged a PET scan as an outpatient.  If the scans show no evidence of distant disease, the goal is cure.  Dr. Mills is setting up an EUS at [facility].  The tentative plan will be chemotherapy concurrent with radiation followed by surgery with a goal of cure.  Of course, this can change if distant disease is found.  He lives in [place] which he states he is just in between [place] and [place].  However, he works in [place].  Since medical oncology and radiation oncology will require multiple visits he prefers to establish care in [place] (he wants this through [facility]).  However, he would like to keep Dr. Mills as his surgeon removal and he would like the EUS done at [facility].    *Iron deficiency anemia  6/5/2024: Ferritin 10, 5% saturation.  B12 and serum folate unremarkable.  Ferrlecit 250 mg IV daily x 3 days.  Please note I am ordering the IV iron for 3 days in case he is here for 3 days.  If he goes home before all the IV iron is completed, he cannot receive more IV iron as an outpatient (for insurance coverage reasons) until he tries oral iron and either does not tolerate oral or does  not respond to it.  Therefore, he can follow-up with his medical oncologist in [place] to determine the need for further IV iron if he is not here for all 3 days of IV iron as an inpatient.    Plan  Ferrlecit 250 mg IV daily x 3 days (understand he may go home before he receives all 3 days).  PET scan at [facility] and EUS at [facility] as an outpatient (both being arranged through Dr. Mills, [facility] thoracic surgery).  I requested biopsy will be sent for MMR/MSI status  I asked our office to set up an appointment with [facility] medical oncology and radiation oncology  (No followup in our office)  He will also follow-up with Dr. Mills [facility] thoracic surgery    75 minutes.  Total time.  Same day.  More time was spent than what is reflected in chart time.  Also reviewed guidelines and discussed with Dr. Mills

## 2024-06-05 NOTE — DISCHARGE SUMMARY
ED OBSERVATION PROGRESS/DISCHARGE SUMMARY    Date of Admission: 6/3/2024   LOS: 0 days   PCP: Chelita Sctot APRN    Final Diagnosis esophageal mass      Subjective     Hospital Outcome:     49-year-old male with history of chronic kidney disease and stroke who presented to the emergency room for 3 months of difficulty with swallowing. He states he has lost approximately 30 pounds during that time due to his inability to eat and drink. The patient was seen in the emergency room where his labs showed a mild chronic kidney disease and anemia with a hemoglobin of 9.8. We were asked to admit him to the observation unit for GI consultation and possible EGD in the morning.     6/4/2024: EGD revealed a large fungating mass with no bleeding and stigmata of recent bleeding found in the lower third of the esophagus, the mass was partially obstructing and circumferential.  They have recommended thoracic surgery consultation.  Thoracic surgery has ordered imaging of the chest and pelvis.  Results are pending at this time.    6/5/2024: CT abdomen pelvis and chest with out contrast showed multiple enlarged nodes at the gastrohepatic ligament which are suspicious for lavell metastasis, multiple shotty retroperitoneal nodes are noted as well, largest gastrohepatic ligament node measures 2.0 x 1.1 cm, noncontrasted liver appears unremarkable, there is a low-attenuation 1.6 cm left adrenal nodule with internal density measurements of less than 10 Hounsfield units likely representing a benign adrenal adenoma, urinary bladder wall appears diffusely thickened which may be related to the bladder being incompletely distended, there is no acute bowel abnormality, there are moderately extensive abdominal aortic Amanuel sclerotic changes within aneurysmal dilation.  Thoracic surgery saw and evaluated the patient and set up outpatient follow-up with them for repeat imaging as well as further management.  GI saw the patient and  recommends further outpatient follow-up for this mass noted during EGD.  Both thoracic and GI have recommended that the patient continue full liquid diet after discharge.  Oncology was consulted and they have established outpatient follow-up with oncology closer to [place] for the patient which is closer further home, and a referral outpatient oncology nurse coordinator has been set up as well.  Oncology did order iron studies for his chronic anemia and ordered a infusion of iron inpatient which has been completed without any complications.  CT abdomen pelvis with contrast and CT chest with contrast were also ordered by oncology which showed a masslike appearance to the distal esophagus consistent with patient's known esophageal malignancy, some restrain fluid seen in the esophagus, prominent subcarinal and left paraesophageal periaortic lymph nodes that are indeterminate for lavell metastatic diseases, mildly enlarged superior gastrohepatic ligament lymph nodes also indeterminate, small left adrenal lavell is stable, couple small pulmonary nodules in the right middle lobe that can be followed; outpatient PET scan recommended.     All labs and imaging findings have been discussed with patient as well as specialist recommendations and patient is agreeable for discharge home    ROS:  General: no fevers, chills  Respiratory: no cough, dyspnea  Cardiovascular: no chest pain, palpitations  Abdomen: No abdominal pain, nausea, vomiting, or diarrhea  Neurologic: No focal weakness    Objective   Physical Exam:  I have reviewed the vital signs.  Temp:  [96.3 °F (35.7 °C)-98.1 °F (36.7 °C)] 98.1 °F (36.7 °C)  Heart Rate:  [53-75] 54  Resp:  [16-18] 17  BP: (125-174)/(69-98) 127/69  General Appearance:    Alert, cooperative, no distress, chronically ill-appearing male, frail  Head:    Normocephalic, atraumatic, normal hearing, poor dentition  Eyes:    Sclerae anicteric, EOMI  Neck:   Supple, nontender  Lungs: Clear to  auscultation bilaterally, respirations unlabored  Heart: Regular rate and rhythm, S1 and S2 normal, no murmur  Abdomen:  Soft, non-tender, bowel sounds active, nondistended  Extremities: No clubbing, cyanosis, or edema to lower extremities  Pulses:  2+ and symmetric in distal lower extremities  Skin: No rashes   Neurologic: Oriented x3, Normal strength to extremities    Results Review:    I have reviewed the labs, radiology results and diagnostic studies.    Results from last 7 days   Lab Units 06/05/24  0432   WBC 10*3/mm3 5.89   HEMOGLOBIN g/dL 9.8*   HEMATOCRIT % 31.8*   PLATELETS 10*3/mm3 201     Results from last 7 days   Lab Units 06/05/24  0432 06/04/24  1713 06/04/24  0343 06/03/24  1738   SODIUM mmol/L 143  --  140 138   POTASSIUM mmol/L 4.1 4.2 3.4* 4.2   CHLORIDE mmol/L 111*  --  108* 104   CO2 mmol/L 26.0  --  27.0 29.0   BUN mg/dL 10  --  13 14   CREATININE mg/dL 1.12  --  1.23 1.31*   CALCIUM mg/dL 8.7  --  8.4* 9.2   BILIRUBIN mg/dL  --   --   --  0.2   ALK PHOS U/L  --   --   --  58   ALT (SGPT) U/L  --   --   --  10   AST (SGOT) U/L  --   --   --  11   GLUCOSE mg/dL 94  --  110* 98     Imaging Results (Last 24 Hours)       Procedure Component Value Units Date/Time    CT Abdomen Pelvis Without Contrast [[id number]] Collected: 06/04/24 1728     Updated: 06/04/24 2106    Narrative:      CT CHEST, ABDOMEN, AND PELVIS WITHOUT CONTRAST.     HISTORY: 49-year-old male with nausea and vomiting. Esophageal mass.     TECHNIQUE: Radiation dose reduction techniques were utilized, including  automated exposure control and exposure modulation based on body size.   3 mm images were obtained through the chest, abdomen, and pelvis without  the administration of IV contrast. No previous CTs for comparison.     FINDINGS:  CHEST: Coronary artery calcifications are noted.  1. Very difficult to characterize in the absence of IV contrast, but  there is circumferential thickening of an approximately 6 cm segment of  distal  esophagus with moderate dilatation of the esophagus proximally.  There is fluid within the proximal esophagus. There are ill-defined  nodes adjacently which are suspicious. There are also shotty mediastinal  nodes which are indeterminate.     There are airspace opacities at the dependent aspect of both lower lobes  which may represent atelectasis, but pneumonia can't be excluded. There  is a minimal right pleural effusion.     There is a sub-6 mm right middle lobe pulmonary nodule, series 3 image 3  and there is a sub-6 mm nodular density along the right minor fissure.  Conservative surveillance is recommended with a follow-up chest CT in 3  months.     ABDOMEN/PELVIS:     1. There are multiple enlarged nodes at the gastrohepatic ligament which  are suspicious for lavell metastases. Multiple shotty retroperitoneal  nodes are noted as well. Largest gastrohepatic ligament node measures  2.0 x 1.1 cm. Noncontrasted liver appears unremarkable. There is a  low-attenuation 1.6 cm left adrenal nodule with internal density  measurements of less than 10 Hounsfield units likely representing a  benign adrenal adenoma.     2. Noncontrasted liver, right adrenal, spleen, and kidneys appear  unremarkable. Urinary bladder wall appears diffusely thickened which may  be related to the bladder being incompletely distended, but please  correlate clinically for acute cystitis.     3. There is no acute bowel abnormality. Appendix appears within normal  limits. There is a trace amount of free fluid at the dependent aspect of  the pelvis.     4. There are moderately extensive abdominal aortic atherosclerotic  changes without aneurysmal dilatation.           This report was finalized on 6/4/2024 9:03 PM by Dr. Anne Sharp M.D on  Workstation: [id number]       CT Chest Without Contrast Diagnostic [[id number]] Collected: 06/04/24 1728     Updated: 06/04/24 2106    Narrative:      CT CHEST, ABDOMEN, AND PELVIS WITHOUT CONTRAST.     HISTORY:  49-year-old male with nausea and vomiting. Esophageal mass.     TECHNIQUE: Radiation dose reduction techniques were utilized, including  automated exposure control and exposure modulation based on body size.   3 mm images were obtained through the chest, abdomen, and pelvis without  the administration of IV contrast. No previous CTs for comparison.     FINDINGS:  CHEST: Coronary artery calcifications are noted.  1. Very difficult to characterize in the absence of IV contrast, but  there is circumferential thickening of an approximately 6 cm segment of  distal esophagus with moderate dilatation of the esophagus proximally.  There is fluid within the proximal esophagus. There are ill-defined  nodes adjacently which are suspicious. There are also shotty mediastinal  nodes which are indeterminate.     There are airspace opacities at the dependent aspect of both lower lobes  which may represent atelectasis, but pneumonia can't be excluded. There  is a minimal right pleural effusion.     There is a sub-6 mm right middle lobe pulmonary nodule, series 3 image 3  and there is a sub-6 mm nodular density along the right minor fissure.  Conservative surveillance is recommended with a follow-up chest CT in 3  months.     ABDOMEN/PELVIS:     1. There are multiple enlarged nodes at the gastrohepatic ligament which  are suspicious for lavell metastases. Multiple shotty retroperitoneal  nodes are noted as well. Largest gastrohepatic ligament node measures  2.0 x 1.1 cm. Noncontrasted liver appears unremarkable. There is a  low-attenuation 1.6 cm left adrenal nodule with internal density  measurements of less than 10 Hounsfield units likely representing a  benign adrenal adenoma.     2. Noncontrasted liver, right adrenal, spleen, and kidneys appear  unremarkable. Urinary bladder wall appears diffusely thickened which may  be related to the bladder being incompletely distended, but please  correlate clinically for acute cystitis.      3. There is no acute bowel abnormality. Appendix appears within normal  limits. There is a trace amount of free fluid at the dependent aspect of  the pelvis.     4. There are moderately extensive abdominal aortic atherosclerotic  changes without aneurysmal dilatation.           This report was finalized on 6/4/2024 9:03 PM by Dr. Anne Sharp M.D on  Workstation: [id number]               I have reviewed the medications.  ---------------------------------------------------------------------------------------------  Assessment & Plan   Assessment/Problem List    Dysphagia      Plan:    Dysphagia/vomiting  -GI consult and performed EGD which revealed a large fungating mass  -CT abdomen pelvis and chest with out contrast showed multiple enlarged nodes at the gastrohepatic ligament which are suspicious for lavell metastasis, multiple shotty retroperitoneal nodes are noted as well, largest gastrohepatic ligament node measures 2.0 x 1.1 cm, noncontrasted liver appears unremarkable, there is a low-attenuation 1.6 cm left adrenal nodule with internal density measurements of less than 10 Hounsfield units likely representing a benign adrenal adenoma, urinary bladder wall appears diffusely thickened which may be related to the bladder being incompletely distended, there is no acute bowel abnormality, there are moderately extensive abdominal aortic Amanuel sclerotic changes within aneurysmal dilation.    -Thoracic surgery saw and evaluated the patient and set up outpatient follow-up with them for repeat imaging as well as further management.   - GI saw the patient and recommends further outpatient follow-up for this mass noted during EGD.   - Both thoracic and GI have recommended that the patient continue full liquid diet after discharge.   - Oncology was consulted and they have established outpatient follow-up with oncology closer to [place] for the patient which is closer further home, and a referral outpatient oncology  nurse coordinator has been set up as well.   - Oncology did order iron studies for his chronic anemia and ordered a infusion of iron inpatient which has been completed without any complications.   - CT abdomen pelvis with contrast and CT chest with contrast were also ordered by oncology which showed a masslike appearance to the distal esophagus consistent with patient's known esophageal malignancy, some restrain fluid seen in the esophagus, prominent subcarinal and left paraesophageal periaortic lymph nodes that are indeterminate for lavell metastatic diseases, mildly enlarged superior gastrohepatic ligament lymph nodes also indeterminate, small left adrenal lavell is stable, couple small pulmonary nodules in the right middle lobe that can be followed; outpatient PET scan recommended.   -Outpatient PET scan has been ordered per thoracic surgery and repeat imaging will be followed by them     Chronic kidney disease  -Creatinine improved to 1.23, this appears to be close to his baseline  -Avoid nephrotoxic agents  -BMP in the morning     Hypertension  -Monitor blood pressure  -Continue home blood pressure agents     DVT prophylaxis:  Mechanical DVT prophylaxis orders are present.    Disposition: Discharged to home    Follow-up after Discharge: PCP in 1 to 2 weeks, thoracic surgery as indicated, oncology in 2 to 3 weeks    This note will serve as a discharge summary    Mari Paz PA-C 06/05/24 06:08 EDT    I have worn appropriate PPE during this patient encounter, sanitized my hands both with entering and exiting patient's room.      39 minutes has been spent by [facility] providers in the care of this patient while under observation status

## 2024-06-05 NOTE — PROGRESS NOTES
MD Attestation Note    I supervised care provided by the midlevel provider.    The JUAN and I have discussed this patient's history, physical exam, and treatment plan.   I provided a substantive portion of the care of this patient.  I have reviewed the documentation and personally had a face to face interaction with the patient  I affirm the documentation and agree with the treatment and plan.     SHARED VISIT: This visit was performed by BOTH a physician and an APC. The substantive portion of the medical decision making was performed by this attesting physician who made or approved the management plan and takes responsibility for patient management. All studies in the APC note (if performed) were independently interpreted by me.     My personal findings are documented in a separate note.     I have reevaluated this patient today.  Overall this patient's symptoms have remained the same.  He is able to tolerate liquids and boost shakes.  He would like his diet advanced.  He would like to try some solids.  GI has seen him and per the family he they have recommended that he can be advanced to a full liquid diet from clear liquid diet.  He denies any pain or shortness of breath.  Overall his symptoms remain unchanged.  His vital signs remained stable.  His lab work has remained unchanged.  I reviewed with him and his spouse the results of the CT of the chest abdomen and pelvis.  He has multiple enlarged nodes at the gastrohepatic ligament which is suspicious for metastatic spread.  There is also multiple shotty retroperitoneal nodes noted as well.  Noncontrasted CT scan of the liver appears unremarkable noncontrasted CT scan of the liver, right adrenal, spleen and kidneys also appear unremarkable.  Patient has moderately extensive abdominal aortic atherosclerotic changes without aneurysm.  In reviewing the CT surgeons note they mention this patient is getting need an outpatient PET scan and EUS.  We will consult oncology  and try to figure out how that is going to be scheduled or set up as an outpatient.  Biopsy has been obtained yesterday by GI and that result is pending as well.  He is tolerating liquid diet.  Discussed the results again with the patient and spouse.  All questions answered

## 2024-06-05 NOTE — PROGRESS NOTES
"    Chief Complaint: Esophageal mass     Subjective:  Symptoms:  Improved.  No shortness of breath, cough, chest pain or weakness.    Diet:  Adequate intake (Tolerating clear liquid diet).  No nausea or vomiting.    Activity level: Normal.    Pain:  He reports no pain.    No complaints. Tolerating clear liquids. Eager to discharge.     Vital Signs:  Temp:  [97.7 °F (36.5 °C)-98.2 °F (36.8 °C)] 98.2 °F (36.8 °C)  Heart Rate:  [49-75] 59  Resp:  [16-18] 17  BP: (116-172)/() 152/101    Intake & Output (last day)         06/04 0701  06/05 0700 06/05 0701  06/06 0700    P.O. 480     I.V. (mL/kg) 300 (4)     Total Intake(mL/kg) 780 (10.4)     Urine (mL/kg/hr) 1925 (1.1) 900 (2)    Total Output 1925 900    Net -1143 -131                  Objective:  General Appearance:  Comfortable, in no acute distress and well-appearing.    Vital signs: (most recent): Blood pressure (!) 152/101, pulse 59, temperature 98.2 °F (36.8 °C), temperature source Oral, resp. rate 17, height 182.8 cm (71.97\"), weight 75.3 kg (166 lb), SpO2 97%.    Lungs:  Normal effort and normal respiratory rate.  He is not in respiratory distress.    Heart: Normal rate.  Regular rhythm.    Chest: Symmetric chest wall expansion. No chest wall tenderness.    Abdomen: Abdomen is soft and non-distended.    Neurological: Patient is alert and oriented to person, place and time.    Skin:  Warm and dry.              Results Review:     I reviewed the patient's new clinical results.  I reviewed the patient's new imaging results and agree with the interpretation.  Discussed with Patient, Nurse, & Dr. Mills     Imaging Results (Last 24 Hours)       Procedure Component Value Units Date/Time    CT Abdomen Pelvis Without Contrast [[id number]] Collected: 06/04/24 1728     Updated: 06/04/24 2106    Narrative:      CT CHEST, ABDOMEN, AND PELVIS WITHOUT CONTRAST.     HISTORY: 49-year-old male with nausea and vomiting. Esophageal mass.     TECHNIQUE: Radiation dose reduction " techniques were utilized, including  automated exposure control and exposure modulation based on body size.   3 mm images were obtained through the chest, abdomen, and pelvis without  the administration of IV contrast. No previous CTs for comparison.     FINDINGS:  CHEST: Coronary artery calcifications are noted.  1. Very difficult to characterize in the absence of IV contrast, but  there is circumferential thickening of an approximately 6 cm segment of  distal esophagus with moderate dilatation of the esophagus proximally.  There is fluid within the proximal esophagus. There are ill-defined  nodes adjacently which are suspicious. There are also shotty mediastinal  nodes which are indeterminate.     There are airspace opacities at the dependent aspect of both lower lobes  which may represent atelectasis, but pneumonia can't be excluded. There  is a minimal right pleural effusion.     There is a sub-6 mm right middle lobe pulmonary nodule, series 3 image 3  and there is a sub-6 mm nodular density along the right minor fissure.  Conservative surveillance is recommended with a follow-up chest CT in 3  months.     ABDOMEN/PELVIS:     1. There are multiple enlarged nodes at the gastrohepatic ligament which  are suspicious for lavell metastases. Multiple shotty retroperitoneal  nodes are noted as well. Largest gastrohepatic ligament node measures  2.0 x 1.1 cm. Noncontrasted liver appears unremarkable. There is a  low-attenuation 1.6 cm left adrenal nodule with internal density  measurements of less than 10 Hounsfield units likely representing a  benign adrenal adenoma.     2. Noncontrasted liver, right adrenal, spleen, and kidneys appear  unremarkable. Urinary bladder wall appears diffusely thickened which may  be related to the bladder being incompletely distended, but please  correlate clinically for acute cystitis.     3. There is no acute bowel abnormality. Appendix appears within normal  limits. There is a trace  amount of free fluid at the dependent aspect of  the pelvis.     4. There are moderately extensive abdominal aortic atherosclerotic  changes without aneurysmal dilatation.           This report was finalized on 6/4/2024 9:03 PM by Dr. Anne Sharp M.D on  Workstation: [id number]       CT Chest Without Contrast Diagnostic [[id number]] Collected: 06/04/24 1728     Updated: 06/04/24 2106    Narrative:      CT CHEST, ABDOMEN, AND PELVIS WITHOUT CONTRAST.     HISTORY: 49-year-old male with nausea and vomiting. Esophageal mass.     TECHNIQUE: Radiation dose reduction techniques were utilized, including  automated exposure control and exposure modulation based on body size.   3 mm images were obtained through the chest, abdomen, and pelvis without  the administration of IV contrast. No previous CTs for comparison.     FINDINGS:  CHEST: Coronary artery calcifications are noted.  1. Very difficult to characterize in the absence of IV contrast, but  there is circumferential thickening of an approximately 6 cm segment of  distal esophagus with moderate dilatation of the esophagus proximally.  There is fluid within the proximal esophagus. There are ill-defined  nodes adjacently which are suspicious. There are also shotty mediastinal  nodes which are indeterminate.     There are airspace opacities at the dependent aspect of both lower lobes  which may represent atelectasis, but pneumonia can't be excluded. There  is a minimal right pleural effusion.     There is a sub-6 mm right middle lobe pulmonary nodule, series 3 image 3  and there is a sub-6 mm nodular density along the right minor fissure.  Conservative surveillance is recommended with a follow-up chest CT in 3  months.     ABDOMEN/PELVIS:     1. There are multiple enlarged nodes at the gastrohepatic ligament which  are suspicious for lavell metastases. Multiple shotty retroperitoneal  nodes are noted as well. Largest gastrohepatic ligament node measures  2.0 x 1.1 cm.  Noncontrasted liver appears unremarkable. There is a  low-attenuation 1.6 cm left adrenal nodule with internal density  measurements of less than 10 Hounsfield units likely representing a  benign adrenal adenoma.     2. Noncontrasted liver, right adrenal, spleen, and kidneys appear  unremarkable. Urinary bladder wall appears diffusely thickened which may  be related to the bladder being incompletely distended, but please  correlate clinically for acute cystitis.     3. There is no acute bowel abnormality. Appendix appears within normal  limits. There is a trace amount of free fluid at the dependent aspect of  the pelvis.     4. There are moderately extensive abdominal aortic atherosclerotic  changes without aneurysmal dilatation.           This report was finalized on 6/4/2024 9:03 PM by Dr. Anne Sharp M.D on  Workstation: [id number]               Lab Results:     Lab Results (last 24 hours)       Procedure Component Value Units Date/Time    Folate [[id number]]  (Normal) Collected: 06/03/24 1738    Specimen: Blood from Arm, Left Updated: 06/05/24 1126     Folate 5.46 ng/mL     Narrative:      Results may be falsely increased if patient taking Biotin.      Vitamin B12 [[id number]]  (Normal) Collected: 06/03/24 1738    Specimen: Blood from Arm, Left Updated: 06/05/24 1126     Vitamin B-12 670 pg/mL     Narrative:      Results may be falsely increased if patient taking Biotin.      Ferritin [[id number]]  (Abnormal) Collected: 06/05/24 0432    Specimen: Blood Updated: 06/05/24 1106     Ferritin 10.10 ng/mL     Narrative:      Results may be falsely decreased if patient taking Biotin.      Iron Profile [[id number]]  (Abnormal) Collected: 06/05/24 0432    Specimen: Blood Updated: 06/05/24 1103     Iron 16 mcg/dL      Iron Saturation (TSAT) 5 %      Transferrin 234 mg/dL      TIBC 349 mcg/dL     Retic With IRF & RET-He [[id number]]  (Abnormal) Collected: 06/05/24 0432    Specimen: Blood Updated: 06/05/24 1045     Immature  Reticulocyte Fraction 20.7 %      Reticulocyte % 1.06 %      Reticulocyte Absolute 0.0410 10*6/mm3      Reticulocyte Hgb 26.4 pg     Basic Metabolic Panel [[id number]]  (Abnormal) Collected: 06/05/24 0432    Specimen: Blood Updated: 06/05/24 0503     Glucose 94 mg/dL      BUN 10 mg/dL      Creatinine 1.12 mg/dL      Sodium 143 mmol/L      Potassium 4.1 mmol/L      Chloride 111 mmol/L      CO2 26.0 mmol/L      Calcium 8.7 mg/dL      BUN/Creatinine Ratio 8.9     Anion Gap 6.0 mmol/L      eGFR 80.5 mL/min/1.73     Narrative:      GFR Normal >60  Chronic Kidney Disease <60  Kidney Failure <15      CBC (No Diff) [[id number]]  (Abnormal) Collected: 06/05/24 0432    Specimen: Blood Updated: 06/05/24 0445     WBC 5.89 10*3/mm3      RBC 3.80 10*6/mm3      Hemoglobin 9.8 g/dL      Hematocrit 31.8 %      MCV 83.7 fL      MCH 25.8 pg      MCHC 30.8 g/dL      RDW 13.2 %      RDW-SD 40.1 fl      MPV 11.4 fL      Platelets 201 10*3/mm3     Potassium [[id number]]  (Normal) Collected: 06/04/24 1713    Specimen: Blood from Arm, Left Updated: 06/04/24 1736     Potassium 4.2 mmol/L              Assessment & Plan       Dysphagia     Assessment & Plan  No new complaints. He is tolerating clear liquid diet, denies dysphagia.  I have independently reviewed the CT CAP which demonstrates multiple right sub-6 mm pulmonary nodules and multiple and large nodes of the gastrohepatic ligament which are suspicious for lavell metastasis.  Noted plans to repeat CT CAP with IV contrast to better characterize.    Will advance him to full liquid diet today.  Hopefully he can tolerate full liquid diet to maintain p.o. nutrition after discharge.  Recommend outpatient PET/CT imaging (scheduled for 6/10/2024) along with subsequent outpatient EUS to be performed at [facility], to be arranged.  He has been scheduled to see us in multidisciplinary clinic on 6/13/2024.      Patient resides in [facility] and requesting further follow-up with medical  oncologist in [place]. No objections for discharge from thoracic surgery standpoint once okay with all.     CHUN Barillas  [facility]  06/05/24  12:59 EDT

## 2024-06-05 NOTE — PLAN OF CARE
Goal Outcome Evaluation:            Patient admitted to ED observation unit for work up for difficulty in swallowing. Patient underwent EGD and mass was found, biopsy was taken, please see CT report for details. Patient reports he is frustrated with timing of medications and explain to RN how he is taking medications at home and dosages he is taking at home. Information relayed to SKYE Sharma.  Patient is otherwise agreeable to current plant of care.  VSS, lungs are clear, diet is advanced to CLD and patient is tolerating. Plan is for discharge on 06/05 with follow up outpatient for further scans.

## 2024-06-05 NOTE — PROGRESS NOTES
[facility]  Inpatient Progress Note    Reason for Follow Up: Dysphagia, weight loss, esophageal mass    Subjective     Interval History:   Thoracic surgery saw patient yesterday, workup in progress.  Biopsy results are pending.  Discussed with patient, discussed with RN.  Reference made to possible EUS which would have to be done at another facility.  CT scan shows multiple enlarged nodes at the gastrohepatic ligament suspicious for lavell metastases.  He appears to be tolerating liquids at present.    Current Facility-Administered Medications:     aspirin EC tablet 325 mg, 325 mg, Oral, Daily, Taras Hernandez MD, 325 mg at 06/04/24 1259    atorvastatin (LIPITOR) tablet 80 mg, 80 mg, Oral, Nightly, Taras Hernandez MD, 80 mg at 06/04/24 2114    Calcium Replacement - Follow Nurse / BPA Driven Protocol, , Does not apply, PRN, Taras Hernandez MD    carvedilol (COREG) tablet 12.5 mg, 12.5 mg, Oral, BID With Meals, Taras Hernandez MD, 12.5 mg at 06/04/24 1846    cloNIDine (CATAPRES) tablet 0.2 mg, 0.2 mg, Oral, PRN, Taras Hernandez MD    gabapentin (NEURONTIN) capsule 800 mg, 800 mg, Oral, Q8H, Taras Hernandez MD, 800 mg at 06/05/24 0608    lactated ringers infusion, 100 mL/hr, Intravenous, Continuous, Taras Hernandez MD, Last Rate: 100 mL/hr at 06/04/24 0546, 100 mL/hr at 06/04/24 0546    Magnesium Standard Dose Replacement - Follow Nurse / BPA Driven Protocol, , Does not apply, PRN, Taras Hernandez MD    morphine injection 2 mg, 2 mg, Intravenous, Q4H PRN, Bennie Stout MD, 2 mg at 06/04/24 2117    NIFEdipine XL (PROCARDIA XL) 24 hr tablet 30 mg, 30 mg, Oral, Daily, Taras Hernandez MD, 30 mg at 06/04/24 1300    ondansetron ODT (ZOFRAN-ODT) disintegrating tablet 4 mg, 4 mg, Oral, Q6H PRN **OR** ondansetron (ZOFRAN) injection 4 mg, 4 mg, Intravenous, Q6H PRN, Sid Hernandezin, MD    Phosphorus Replacement - Follow Nurse / BPA Driven Protocol, , Does not apply, Mary ROMAN Kevin, MD    Potassium Replacement - Follow  Nurse / BPA Driven Protocol, , Does not apply, Mary ROMAN Kevin, MD    sodium chloride 0.9 % flush 10 mL, 10 mL, Intravenous, Mary ROMAN Kevin, MD    [COMPLETED] Insert Peripheral IV, , , Once **AND** sodium chloride 0.9 % flush 10 mL, 10 mL, Intravenous, PRN, Taras Hernandez MD    sodium chloride 0.9 % flush 10 mL, 10 mL, Intravenous, Q12H, Taras Hernandez MD, 10 mL at 06/04/24 2117    sodium chloride 0.9 % flush 10 mL, 10 mL, Intravenous, PRN, Taras Hernandez MD, 10 mL at 06/04/24 1701    sodium chloride 0.9 % infusion 1,000 mL, 1,000 mL, Intravenous, Continuous, Tarsa Hernandez MD, Stopped at 06/04/24 1148    sodium chloride 0.9 % infusion 40 mL, 40 mL, Intravenous, PRN, Taras Hernandez MD    valsartan (DIOVAN) tablet 320 mg, 320 mg, Oral, Daily, Taras Hernandez MD, 320 mg at 06/04/24 1300  Review of Systems:    All systems were reviewed and negative except for:  Gastrointestinal: positive for  See HPI    Objective     Vital Signs  Temp:  [97.7 °F (36.5 °C)-98.2 °F (36.8 °C)] 98.2 °F (36.8 °C)  Heart Rate:  [49-75] 49  Resp:  [16-18] 17  BP: (116-174)/(69-98) 116/72  Body mass index is 22.53 kg/m².    Intake/Output Summary (Last 24 hours) at 6/5/2024 0812  Last data filed at 6/4/2024 2114  Gross per 24 hour   Intake 780 ml   Output 1925 ml   Net -1145 ml     No intake/output data recorded.     Physical Exam:   General: patient awake, alert and cooperative   Eyes: Normal lids and lashes, no scleral icterus   Neck: supple, normal ROM   Skin: warm and dry, not jaundiced   Cardiovascular: regular rhythm and rate, no murmurs auscultated   Pulm: clear to auscultation bilaterally, regular and unlabored   Abdomen: soft, nontender, nondistended; normal bowel sounds   Extremities: no rash or edema   Psychiatric: Normal mood and behavior; memory intact     Results Review:     I reviewed the patient's new clinical results.    Results from last 7 days   Lab Units 06/05/24  0432 06/04/24  0343 06/03/24  1738   WBC 10*3/mm3 5.89 5.85  6.56   HEMOGLOBIN g/dL 9.8* 9.8* 9.8*   HEMATOCRIT % 31.8* 31.2* 32.1*   PLATELETS 10*3/mm3 201 212 223     Results from last 7 days   Lab Units 06/05/24  0432 06/04/24  1713 06/04/24  0343 06/03/24  1738   SODIUM mmol/L 143  --  140 138   POTASSIUM mmol/L 4.1 4.2 3.4* 4.2   CHLORIDE mmol/L 111*  --  108* 104   CO2 mmol/L 26.0  --  27.0 29.0   BUN mg/dL 10  --  13 14   CREATININE mg/dL 1.12  --  1.23 1.31*   CALCIUM mg/dL 8.7  --  8.4* 9.2   BILIRUBIN mg/dL  --   --   --  0.2   ALK PHOS U/L  --   --   --  58   ALT (SGPT) U/L  --   --   --  10   AST (SGOT) U/L  --   --   --  11   GLUCOSE mg/dL 94  --  110* 98         Lab Results   Lab Value Date/Time    LIPASE 38 06/03/2024 1738       Radiology:  CT Abdomen Pelvis Without Contrast   Final Result      CT Chest Without Contrast Diagnostic   Final Result          Assessment & Plan     Active Hospital Problems    Diagnosis     **Dysphagia        Assessment:  Dysphagia, weight loss  Esophageal mass  Possible metastatic foci on CT findings        Plan:  Await thoracic input  Maintain liquid diet for the present  Await biopsy results  I discussed the patients findings and my recommendations with patient and nursing staff.    Junior Carnes MD

## 2024-06-07 ENCOUNTER — TELEPHONE (OUTPATIENT)
Dept: GASTROENTEROLOGY | Facility: CLINIC | Age: 49
End: 2024-06-07

## 2024-06-07 ENCOUNTER — PATIENT OUTREACH (OUTPATIENT)
Dept: OTHER | Facility: HOSPITAL | Age: 49
End: 2024-06-07
Payer: MEDICAID

## 2024-06-07 ENCOUNTER — PATIENT OUTREACH (OUTPATIENT)
Dept: ONCOLOGY | Facility: CLINIC | Age: 49
End: 2024-06-07
Payer: MEDICAID

## 2024-06-07 DIAGNOSIS — C15.9 MALIGNANT NEOPLASM OF ESOPHAGUS, UNSPECIFIED LOCATION: Primary | ICD-10-CM

## 2024-06-07 NOTE — PROGRESS NOTES
Referral received from Naheed, thoracic surgery APRN  I called patient, s/w his wife Radha, preferred contact on chart.  I introduced myself and explained navigational services.      The patient was admitted through the ER 6/3 for a three month history of dysphagia and weight loss.  An EGD on 6/4/24 revealed a large fungating mass which was partially obstructing and circumferential.  Gastroenterology, medical oncology and thoracic surgery were consulted.  A CT abdomen and pelvis from 6/5/24 was suspicious for lavell metastasis.  Dr. Lopez, medical oncologist, met with the patient on 6/5/24 although the patient elected to establish outpatient follow-up with oncology closer to [place] for the patient which is closer to his home in [place], [place].  The patient was discharged home on 6/5 with outpatient follow up.   He will be having a PET scan at [facility] on 6/10 and is scheduled with Dr. Mills in clinic on 6/13 to discuss the results and next steps.   The patient has a scheduled appointment with Dr. Reddy medical oncology [facility] on 6/18 and Dr. Ramirez radiation oncology at [facility] on 6/19.     Discussed his recent hospitalization and work up thus far with his wife.  They have not been informed of his pathology results although were told it was likely that the patient has esophageal cancer.  We discussed the date/time/location of hie PET scan on Monday as well as dietary restrictions. Patient's wife verbalized understanding.  We discussed his upcoming appts with Dr. Mills as well as his radiation and medical oncology appts in [place]. The patient's wife states they may also pursue a second opinion. They do not want to go to [facility].  His wife states they will discuss this with Dr. Mills on 6/13.  The patient's wife was able to teach back the next steps in his care. Contacted [facility] nurse navigator, Puja. She will meet with patient/wife at his new consult appt with Dr. Reddy  6/18.    The patient is alert and oriented. He ambulates without assistive devices, denies falls and is independent with ADLs. The patient returned to work; he is a  and .  He lives with his wife and children in [place], [place].     The patient is a former 1/2 ppd smoker x 35 years; he recently quit and is using nicotine gum.     The patient has [employer].  We discussed financial navigation, cost estimates and possible financial assistance if needed in the future.     The patient denies transportation although his wife voiced concern about paying bills when the patient starts treatment. She is a stay at home mom for their 11 and 7 year old children. They also have a 14 year old child who does not live with them.  The patient will likely transition to short-term disability soon.  Contacted [facility]  Laly.  The [facility] nurse navigator placed a referral for social work.  Laly plans to connect with the patient/wife.    The patient has not been able to eat solid food for 3 months.  He is on a full liquid diet.  The patient has experienced a 20 to 35 pound weight loss over the past 2 to 3 months. Contacted [facility] dietician Lakshmi; the [facility] nurse navigator placed a referral for nutrition.    The patient does not have an ACP on file.  Provided additional ACP information.     The patient and wife are processing through his new diagnosis. Provided active listening and emotional support, validating and normalizing patient's feelings. The patient's wife stated they have no family support although they have a few friends who could help if needed.  We discussed Behavioral oncology services if needed in the future. We also discussed [employer] and [facility]. The patient/wife will let me know if they would like referrals sent to either organization in the future.    We discussed integrative therapies and other [facility].  I will provide a  navigation folder with the following information on Monday at his PET appt: [facility] [employer] [facility] Support [facility], Cancer and Restorative Exercise - CARE, [employer] exercise program, NCCN Guidelines for Esophageal Cancer patients, [facility], [facility], Reiki Therapy, [facility] [facility], Cancer Nutrition, Smoking Cessation and Survivorship Clinic.    I will meet the patient and his wife at his PET appt on Monday 6/10.  Provided my name and number and encouraged patient/wife to call if any needs arise

## 2024-06-07 NOTE — TELEPHONE ENCOUNTER
[facility] staff attempted to follow warm transfer process and was unsuccessful     Caller: ALFONSO PALOMO    Relationship to patient: SELF    Best call back number: [phone]    Patient is needing: CALLING TO SEE IF RESULTS FROM EGD ARE IN

## 2024-06-09 NOTE — PROGRESS NOTES
06/09/24       I called the patient and his wife with the results of pathology.  They are following up with a thoracic surgeon and a medical oncologist in [place].  I told him that he should eventually have a colonoscopy because of his iron deficiency anemia.  The medical oncologist will decide when is the appropriate time for that.  Please send a copy of this report to his PCP.  Andrés clay

## 2024-06-10 ENCOUNTER — PATIENT OUTREACH (OUTPATIENT)
Dept: OTHER | Facility: HOSPITAL | Age: 49
End: 2024-06-10
Payer: MEDICAID

## 2024-06-10 ENCOUNTER — HOSPITAL ENCOUNTER (OUTPATIENT)
Dept: PET IMAGING | Facility: HOSPITAL | Age: 49
Discharge: HOME | End: 2024-06-10
Payer: MEDICAID

## 2024-06-10 DIAGNOSIS — R63.4 UNINTENTIONAL WEIGHT LOSS: ICD-10-CM

## 2024-06-10 DIAGNOSIS — K22.89 ESOPHAGEAL MASS: ICD-10-CM

## 2024-06-10 LAB — GLUCOSE BLDC GLUCOMTR-MCNC: 92 MG/DL (ref 70–130)

## 2024-06-10 PROCEDURE — A9552 F18 FDG: HCPCS

## 2024-06-10 PROCEDURE — 78815 PET IMAGE W/CT SKULL-THIGH: CPT

## 2024-06-10 PROCEDURE — 0 FLUDEOXYGLUCOSE F18 SOLUTION

## 2024-06-10 PROCEDURE — 82948 REAGENT STRIP/BLOOD GLUCOSE: CPT

## 2024-06-10 RX ADMIN — FLUDEOXYGLUCOSE F 18 1 DOSE: 200 INJECTION, SOLUTION INTRAVENOUS at 13:54

## 2024-06-10 NOTE — PROGRESS NOTES
Met patient and wife in PET Department.  Reintroduced myself.    Explained that I coordinated with [facility] staff and they should meet him when he is seen next week.    The patient's wife stated Dr. Hernandez called yesterday and reviewed pathology with them.    Provided navigation folder.      I will f/u in several weeks; encouraged patient/wife to call as needed.

## 2024-06-11 ENCOUNTER — DOCUMENTATION (OUTPATIENT)
Dept: NUTRITION | Facility: HOSPITAL | Age: 49
End: 2024-06-11
Payer: MEDICAID

## 2024-06-11 NOTE — PROGRESS NOTES
"Outpatient Oncology Nutrition     Reason for Visit:     Oncology Nutrition Screening, Nutritional Assessment, and Patient Education / Referral    Patient Name:  Nelson Garcia    :  1975    MRN:  [id number]    Date of Encounter: 2024    Nutrition Assessment     Diagnosis: Esophageal mass    CT 24 - There are multiple enlarged nodes at the gastrohepatic ligament which  are suspicious for lavell metastases. Multiple shotty retroperitoneal  nodes are noted as well. Largest gastrohepatic ligament node measures  2.0 x 1.1 cm.     Surgery:    Chemotherapy:  MED ONC appointment with Dr. Reddy 24    Radiation: RAD ONC appointment with Dr. Ramirez 24      Patient Active Problem List   Diagnosis    Left leg weakness    HTN (hypertension)    Cerebral microhemorrhages    Acute ischemic stroke    Tobacco use    Stage 3a chronic kidney disease    Observed sleep apnea    Snoring    Non-restorative sleep    Erectile dysfunction    Dysphagia     Iron Deficiency Anemia  Food / Nutrition Related History       Hydration Status     Goal:  80+ ounces    Enteral Feeding     NA  Anthropometric Measurements     Height:    Ht Readings from Last 1 Encounters:   24 182.8 cm (71.97\")       Weight:    Wt Readings from Last 1 Encounters:   24 75.3 kg (166 lb)       BMI: 22.53 / normal    Weight Change: Reported 25 lbs weight loss past 3 months related to dysphagia / 13% weight loss    Review of Lab Data (Time Frame - 1 month / 2 month)               Component  Ref Range & Units 6 d ago  (24) 7 d ago  (24) 7 d ago  (24) 8 d ago  (6/3/24) 2 yr ago  (22) 2 yr ago  (22) 2 yr ago  (22)   Glucose  65 - 99 mg/dL 94  110 High  98 95 108 High  132 High  R, CM   BUN  6 - 20 mg/dL 10  13 14 13 13    Creatinine  0.76 - 1.27 mg/dL 1.12  1.23 1.31 High  1.28 High  1.56 High     Sodium  136 - 145 mmol/L 143  140 138 136 138    Potassium  3.5 - 5.2 mmol/L 4.1 4.2 3.4 Low  4.2 4.3 4.3  "   Chloride  98 - 107 mmol/L 111 High   108 High  104 103 105    CO2  22.0 - 29.0 mmol/L 26.0  27.0 29.0 25.0 23.3    Calcium  8.6 - 10.5 mg/dL 8.7  8.4 Low  9.2 9.2 9.3    BUN/Creatinine Ratio  7.0 - 25.0 8.9  10.6 10.7 10.2 8.3    Anion Gap  5.0 - 15.0 mmol/L 6.0  5.0 5.0 8.0 9.7    eGFR  >60.0 mL/min/1.73 80.5  72.0 66.7      Resulting Agency  [facility]        Medication Review   Reviewed 6/11/24    Nutrition Focused Physical Findings       Nutrition Impact Symptoms   3 month history dysphagia and weight loss with reported 20-35 lbs weight loss past 3 months     Physical Activity      Not my normal self, but able to be up and about with fairly normal activities / patient continues to work as a  /     Current Nutritional Intake     Oral diet:  Modified to full liquid diet secondary to dysphagia    Oral nutritional supplements:    Intake:    Malnutrition Risk Assessment     Recent weight loss over the past 6 months:  Yes    How much weight loss:  2 = 14-23 lbs    Eating poorly because of a decreased appetite:  1 = Yes & dysphagia    Malnutrition Screening Score:     MST = 2 more Patient at risk for malnutrition     Nutrition Diagnosis     Problem Severe malnutrition in context of acute illness   Etiology Diagnosis related  Dysphagia   Signs / Symptoms 25 lbs weight loss past 3 months (13% weight loss)  Intake < estimated energy needs     Nutrition Intervention   Phone consultation with patient's wife.  Wife states that patient began with dysphagia approximately 3 months ago and it had finally gotten so bad that she took him to the ER 6/3/24.  25 lbs weight loss over the past 3 months. Oral food intake is limited to pureed consistency and liquids related to dysphagia.  He continues to lose weight with weight of 166 lbs yesterday at the PCP office.  Wife states that patient has never been a big eater and normal pattern of intake was one  "meal per day, generally eaten at lunch.  He has supplemented intake with Boost Plus, but is now only able to get original Boost; wife states that he is experiencing early satiety with the shakes.  Wife states that someone told him to stay away from all sugar, so he currently will not eat anything with sugar.    Patient and wife were informed of diagnosis by gastroenterologist over the weekend; treatment plan pending; appointments next week with MED ONC & RAD ONC.    Discussed the importance of nutrition with diagnosis with focus on healthy nutrient dense choices, high calorie to prevent additional weight loss, high protein for repletion / preservation of LBM and adequate hydration. Reviewed nutritional goals. Encouraged grazing every 1-2 hours throughout the day.  Discussed indication for placement of PEG in the event he is unable to sustain nutritional and hydration needs with oral intake.      Discussed varieties of ONS and the benefit of including these types of products when oral intake is insufficient to meet nutritional needs; tips for flavor and nutrient enhancement discussed. Also encouraged homemade shakes, smoothies, instant breakfast drinks, etc to boost intake.  Samples of Boost VHC will be provided to patient at his visit to Dr. Ramirez next week to achieve goal of maximizing intake in smaller volumes.    Addressed the topic of \"sugar feeds cancer\";  wife states that since patient was told by a friend that he should avoid all sugar, patient has furthered restricted his intake.    Will continue to follow closely.  Wife encouraged to contact for any questions / issues.  Goal   Nutritional Goals  Calories - 2640 (35 kcal / kg)  Protein - 94 -110+ grams (1.25 -1.5 grams per kg as tolerated with Stage III CKD)  Water - 80+ ounces    1. To prevent additional weight loss.  2. Achieve nutrient and hydration goals via oral intake and / or alternate source of nutritional support.  3. Provide patient education for " management of nutritionally related side effects of treatment.    Monitoring / Evaluation

## 2024-06-13 ENCOUNTER — APPOINTMENT (OUTPATIENT)
Dept: GENERAL RADIOLOGY | Facility: HOSPITAL | Age: 49
End: 2024-06-13
Payer: MEDICAID

## 2024-06-13 ENCOUNTER — HOSPITAL ENCOUNTER (OUTPATIENT)
Facility: HOSPITAL | Age: 49
LOS: 1 days | Discharge: HOME | End: 2024-06-18
Attending: EMERGENCY MEDICINE | Admitting: STUDENT IN AN ORGANIZED HEALTH CARE EDUCATION/TRAINING PROGRAM
Payer: MEDICAID

## 2024-06-13 ENCOUNTER — OFFICE VISIT (OUTPATIENT)
Dept: OTHER | Facility: HOSPITAL | Age: 49
End: 2024-06-13
Payer: MEDICAID

## 2024-06-13 VITALS
OXYGEN SATURATION: 98 % | HEART RATE: 96 BPM | SYSTOLIC BLOOD PRESSURE: 110 MMHG | BODY MASS INDEX: 21.17 KG/M2 | DIASTOLIC BLOOD PRESSURE: 59 MMHG | WEIGHT: 156.3 LBS | HEIGHT: 72 IN

## 2024-06-13 DIAGNOSIS — K22.89 ESOPHAGEAL MASS: ICD-10-CM

## 2024-06-13 DIAGNOSIS — C15.9 ESOPHAGEAL ADENOCARCINOMA: ICD-10-CM

## 2024-06-13 DIAGNOSIS — I61.2 NONTRAUMATIC HEMORRHAGE OF CEREBRAL HEMISPHERE, UNSPECIFIED LATERALITY: ICD-10-CM

## 2024-06-13 DIAGNOSIS — I63.9 ACUTE ISCHEMIC STROKE: ICD-10-CM

## 2024-06-13 DIAGNOSIS — D64.9 SYMPTOMATIC ANEMIA: Primary | ICD-10-CM

## 2024-06-13 DIAGNOSIS — E43 SEVERE MALNUTRITION: ICD-10-CM

## 2024-06-13 DIAGNOSIS — Z86.73 HISTORY OF CVA (CEREBROVASCULAR ACCIDENT): ICD-10-CM

## 2024-06-13 DIAGNOSIS — Z72.0 TOBACCO USE: Primary | ICD-10-CM

## 2024-06-13 DIAGNOSIS — N17.9 AKI (ACUTE KIDNEY INJURY): ICD-10-CM

## 2024-06-13 DIAGNOSIS — D50.0 ANEMIA DUE TO GASTROINTESTINAL BLOOD LOSS: ICD-10-CM

## 2024-06-13 PROBLEM — K92.2 GI BLEED: Status: ACTIVE | Noted: 2024-06-13

## 2024-06-13 LAB
ABO GROUP BLD: NORMAL
ALBUMIN SERPL-MCNC: 3.6 G/DL (ref 3.5–5.2)
ALBUMIN/GLOB SERPL: 1.6 G/DL
ALP SERPL-CCNC: 41 U/L (ref 39–117)
ALT SERPL W P-5'-P-CCNC: 9 U/L (ref 1–41)
ANION GAP SERPL CALCULATED.3IONS-SCNC: 8 MMOL/L (ref 5–15)
AST SERPL-CCNC: 12 U/L (ref 1–40)
BASOPHILS # BLD AUTO: 0.02 10*3/MM3 (ref 0–0.2)
BASOPHILS NFR BLD AUTO: 0.5 % (ref 0–1.5)
BILIRUB SERPL-MCNC: <0.2 MG/DL (ref 0–1.2)
BLD GP AB SCN SERPL QL: NEGATIVE
BUN SERPL-MCNC: 24 MG/DL (ref 6–20)
BUN/CREAT SERPL: 18 (ref 7–25)
CALCIUM SPEC-SCNC: 8.5 MG/DL (ref 8.6–10.5)
CHLORIDE SERPL-SCNC: 109 MMOL/L (ref 98–107)
CO2 SERPL-SCNC: 22 MMOL/L (ref 22–29)
CREAT SERPL-MCNC: 1.33 MG/DL (ref 0.76–1.27)
D-LACTATE SERPL-SCNC: 0.9 MMOL/L (ref 0.5–2)
DEPRECATED RDW RBC AUTO: 43.8 FL (ref 37–54)
EGFRCR SERPLBLD CKD-EPI 2021: 65.5 ML/MIN/1.73
EOSINOPHIL # BLD AUTO: 0.02 10*3/MM3 (ref 0–0.4)
EOSINOPHIL NFR BLD AUTO: 0.5 % (ref 0.3–6.2)
ERYTHROCYTE [DISTWIDTH] IN BLOOD BY AUTOMATED COUNT: 14.6 % (ref 12.3–15.4)
EXPIRATION DATE: ABNORMAL
FECAL OCCULT BLOOD SCREEN, POC: POSITIVE
GLOBULIN UR ELPH-MCNC: 2.2 GM/DL
GLUCOSE SERPL-MCNC: 96 MG/DL (ref 65–99)
HCT VFR BLD AUTO: 21.5 % (ref 37.5–51)
HGB BLD-MCNC: 6.6 G/DL (ref 13–17.7)
IMM GRANULOCYTES # BLD AUTO: 0.01 10*3/MM3 (ref 0–0.05)
IMM GRANULOCYTES NFR BLD AUTO: 0.2 % (ref 0–0.5)
LIPASE SERPL-CCNC: 44 U/L (ref 13–60)
LYMPHOCYTES # BLD AUTO: 1.07 10*3/MM3 (ref 0.7–3.1)
LYMPHOCYTES NFR BLD AUTO: 25.5 % (ref 19.6–45.3)
Lab: 271
MCH RBC QN AUTO: 26.2 PG (ref 26.6–33)
MCHC RBC AUTO-ENTMCNC: 30.7 G/DL (ref 31.5–35.7)
MCV RBC AUTO: 85.3 FL (ref 79–97)
MONOCYTES # BLD AUTO: 0.29 10*3/MM3 (ref 0.1–0.9)
MONOCYTES NFR BLD AUTO: 6.9 % (ref 5–12)
NEGATIVE CONTROL: NEGATIVE
NEUTROPHILS NFR BLD AUTO: 2.79 10*3/MM3 (ref 1.7–7)
NEUTROPHILS NFR BLD AUTO: 66.4 % (ref 42.7–76)
NRBC BLD AUTO-RTO: 0 /100 WBC (ref 0–0.2)
PLATELET # BLD AUTO: 186 10*3/MM3 (ref 140–450)
PMV BLD AUTO: 11 FL (ref 6–12)
POSITIVE CONTROL: POSITIVE
POTASSIUM SERPL-SCNC: 4.4 MMOL/L (ref 3.5–5.2)
PROT SERPL-MCNC: 5.8 G/DL (ref 6–8.5)
RBC # BLD AUTO: 2.52 10*6/MM3 (ref 4.14–5.8)
RH BLD: POSITIVE
SODIUM SERPL-SCNC: 139 MMOL/L (ref 136–145)
T&S EXPIRATION DATE: NORMAL
WBC NRBC COR # BLD AUTO: 4.2 10*3/MM3 (ref 3.4–10.8)

## 2024-06-13 PROCEDURE — 86900 BLOOD TYPING SEROLOGIC ABO: CPT | Performed by: PHYSICIAN ASSISTANT

## 2024-06-13 PROCEDURE — G0378 HOSPITAL OBSERVATION PER HR: HCPCS

## 2024-06-13 PROCEDURE — 36430 TRANSFUSION BLD/BLD COMPNT: CPT

## 2024-06-13 PROCEDURE — 86901 BLOOD TYPING SEROLOGIC RH(D): CPT

## 2024-06-13 PROCEDURE — 85025 COMPLETE CBC W/AUTO DIFF WBC: CPT | Performed by: PHYSICIAN ASSISTANT

## 2024-06-13 PROCEDURE — 96376 TX/PRO/DX INJ SAME DRUG ADON: CPT

## 2024-06-13 PROCEDURE — 86850 RBC ANTIBODY SCREEN: CPT | Performed by: PHYSICIAN ASSISTANT

## 2024-06-13 PROCEDURE — 86900 BLOOD TYPING SEROLOGIC ABO: CPT

## 2024-06-13 PROCEDURE — 25810000003 LACTATED RINGERS SOLUTION: Performed by: EMERGENCY MEDICINE

## 2024-06-13 PROCEDURE — 96375 TX/PRO/DX INJ NEW DRUG ADDON: CPT

## 2024-06-13 PROCEDURE — 83605 ASSAY OF LACTIC ACID: CPT | Performed by: PHYSICIAN ASSISTANT

## 2024-06-13 PROCEDURE — 25010000002 MORPHINE PER 10 MG: Performed by: EMERGENCY MEDICINE

## 2024-06-13 PROCEDURE — G0463 HOSPITAL OUTPT CLINIC VISIT: HCPCS

## 2024-06-13 PROCEDURE — 83690 ASSAY OF LIPASE: CPT | Performed by: PHYSICIAN ASSISTANT

## 2024-06-13 PROCEDURE — P9016 RBC LEUKOCYTES REDUCED: HCPCS

## 2024-06-13 PROCEDURE — 25010000002 ONDANSETRON PER 1 MG: Performed by: PHYSICIAN ASSISTANT

## 2024-06-13 PROCEDURE — 86923 COMPATIBILITY TEST ELECTRIC: CPT

## 2024-06-13 PROCEDURE — 86901 BLOOD TYPING SEROLOGIC RH(D): CPT | Performed by: PHYSICIAN ASSISTANT

## 2024-06-13 PROCEDURE — 82270 OCCULT BLOOD FECES: CPT | Performed by: PHYSICIAN ASSISTANT

## 2024-06-13 PROCEDURE — 3074F SYST BP LT 130 MM HG: CPT | Performed by: SURGERY

## 2024-06-13 PROCEDURE — 80053 COMPREHEN METABOLIC PANEL: CPT | Performed by: PHYSICIAN ASSISTANT

## 2024-06-13 PROCEDURE — 96365 THER/PROPH/DIAG IV INF INIT: CPT

## 2024-06-13 PROCEDURE — 96366 THER/PROPH/DIAG IV INF ADDON: CPT

## 2024-06-13 PROCEDURE — 71045 X-RAY EXAM CHEST 1 VIEW: CPT

## 2024-06-13 PROCEDURE — 3078F DIAST BP <80 MM HG: CPT | Performed by: SURGERY

## 2024-06-13 PROCEDURE — 99291 CRITICAL CARE FIRST HOUR: CPT

## 2024-06-13 RX ORDER — SODIUM CHLORIDE 0.9 % (FLUSH) 0.9 %
10 SYRINGE (ML) INJECTION AS NEEDED
Status: DISCONTINUED | OUTPATIENT
Start: 2024-06-13 | End: 2024-06-18 | Stop reason: HOSPADM

## 2024-06-13 RX ORDER — SODIUM CHLORIDE 0.9 % (FLUSH) 0.9 %
10 SYRINGE (ML) INJECTION EVERY 12 HOURS SCHEDULED
Status: DISCONTINUED | OUTPATIENT
Start: 2024-06-13 | End: 2024-06-18 | Stop reason: HOSPADM

## 2024-06-13 RX ORDER — AMOXICILLIN 250 MG
2 CAPSULE ORAL 2 TIMES DAILY PRN
Status: DISCONTINUED | OUTPATIENT
Start: 2024-06-13 | End: 2024-06-18 | Stop reason: HOSPADM

## 2024-06-13 RX ORDER — ONDANSETRON 2 MG/ML
4 INJECTION INTRAMUSCULAR; INTRAVENOUS ONCE
Status: COMPLETED | OUTPATIENT
Start: 2024-06-13 | End: 2024-06-13

## 2024-06-13 RX ORDER — BISACODYL 5 MG/1
5 TABLET, DELAYED RELEASE ORAL DAILY PRN
Status: DISCONTINUED | OUTPATIENT
Start: 2024-06-13 | End: 2024-06-18 | Stop reason: HOSPADM

## 2024-06-13 RX ORDER — MORPHINE SULFATE 2 MG/ML
4 INJECTION, SOLUTION INTRAMUSCULAR; INTRAVENOUS ONCE
Status: COMPLETED | OUTPATIENT
Start: 2024-06-13 | End: 2024-06-13

## 2024-06-13 RX ORDER — SODIUM CHLORIDE, SODIUM LACTATE, POTASSIUM CHLORIDE, CALCIUM CHLORIDE 600; 310; 30; 20 MG/100ML; MG/100ML; MG/100ML; MG/100ML
100 INJECTION, SOLUTION INTRAVENOUS CONTINUOUS
Status: DISCONTINUED | OUTPATIENT
Start: 2024-06-13 | End: 2024-06-18

## 2024-06-13 RX ORDER — PANTOPRAZOLE SODIUM 40 MG/10ML
40 INJECTION, POWDER, LYOPHILIZED, FOR SOLUTION INTRAVENOUS EVERY 12 HOURS SCHEDULED
Status: DISCONTINUED | OUTPATIENT
Start: 2024-06-13 | End: 2024-06-13

## 2024-06-13 RX ORDER — PANTOPRAZOLE SODIUM 40 MG/10ML
80 INJECTION, POWDER, LYOPHILIZED, FOR SOLUTION INTRAVENOUS ONCE
Status: COMPLETED | OUTPATIENT
Start: 2024-06-13 | End: 2024-06-13

## 2024-06-13 RX ORDER — POLYETHYLENE GLYCOL 3350 17 G/17G
17 POWDER, FOR SOLUTION ORAL DAILY PRN
Status: DISCONTINUED | OUTPATIENT
Start: 2024-06-13 | End: 2024-06-18 | Stop reason: HOSPADM

## 2024-06-13 RX ORDER — BISACODYL 10 MG
10 SUPPOSITORY, RECTAL RECTAL DAILY PRN
Status: DISCONTINUED | OUTPATIENT
Start: 2024-06-13 | End: 2024-06-18 | Stop reason: HOSPADM

## 2024-06-13 RX ORDER — SODIUM CHLORIDE 9 MG/ML
40 INJECTION, SOLUTION INTRAVENOUS AS NEEDED
Status: DISCONTINUED | OUTPATIENT
Start: 2024-06-13 | End: 2024-06-18 | Stop reason: HOSPADM

## 2024-06-13 RX ADMIN — Medication 10 ML: at 22:13

## 2024-06-13 RX ADMIN — PANTOPRAZOLE SODIUM 80 MG: 40 INJECTION, POWDER, FOR SOLUTION INTRAVENOUS at 22:01

## 2024-06-13 RX ADMIN — PANTOPRAZOLE SODIUM 8 MG/HR: 40 INJECTION, POWDER, FOR SOLUTION INTRAVENOUS at 22:05

## 2024-06-13 RX ADMIN — ONDANSETRON 4 MG: 2 INJECTION INTRAMUSCULAR; INTRAVENOUS at 21:10

## 2024-06-13 RX ADMIN — SODIUM CHLORIDE, POTASSIUM CHLORIDE, SODIUM LACTATE AND CALCIUM CHLORIDE 1000 ML: 600; 310; 30; 20 INJECTION, SOLUTION INTRAVENOUS at 21:15

## 2024-06-13 RX ADMIN — MORPHINE SULFATE 4 MG: 2 INJECTION, SOLUTION INTRAMUSCULAR; INTRAVENOUS at 21:20

## 2024-06-13 NOTE — Clinical Note
Level of Care: Telemetry [5]   Diagnosis: GI bleed [[id number]]   Certification: I Certify That Inpatient Hospital Services Are Medically Necessary For Greater Than 2 Midnights

## 2024-06-14 ENCOUNTER — APPOINTMENT (OUTPATIENT)
Dept: GENERAL RADIOLOGY | Facility: HOSPITAL | Age: 49
End: 2024-06-14
Payer: MEDICAID

## 2024-06-14 ENCOUNTER — ANESTHESIA (OUTPATIENT)
Dept: PERIOP | Facility: HOSPITAL | Age: 49
End: 2024-06-14
Payer: MEDICAID

## 2024-06-14 ENCOUNTER — ANESTHESIA EVENT (OUTPATIENT)
Dept: PERIOP | Facility: HOSPITAL | Age: 49
End: 2024-06-14
Payer: MEDICAID

## 2024-06-14 PROBLEM — C15.9 ESOPHAGEAL ADENOCARCINOMA: Status: ACTIVE | Noted: 2024-06-13

## 2024-06-14 LAB
ANION GAP SERPL CALCULATED.3IONS-SCNC: 6 MMOL/L (ref 5–15)
BH BB BLOOD EXPIRATION DATE: NORMAL
BH BB BLOOD TYPE BARCODE: 6200
BH BB DISPENSE STATUS: NORMAL
BH BB PRODUCT CODE: NORMAL
BH BB UNIT NUMBER: NORMAL
BILIRUB UR QL STRIP: NEGATIVE
BUN SERPL-MCNC: 17 MG/DL (ref 6–20)
BUN/CREAT SERPL: 14.2 (ref 7–25)
CALCIUM SPEC-SCNC: 8.4 MG/DL (ref 8.6–10.5)
CHLORIDE SERPL-SCNC: 110 MMOL/L (ref 98–107)
CLARITY UR: CLEAR
CO2 SERPL-SCNC: 23 MMOL/L (ref 22–29)
COLOR UR: YELLOW
CREAT SERPL-MCNC: 1.2 MG/DL (ref 0.76–1.27)
CROSSMATCH INTERPRETATION: NORMAL
DEPRECATED RDW RBC AUTO: 44.4 FL (ref 37–54)
EGFRCR SERPLBLD CKD-EPI 2021: 74.1 ML/MIN/1.73
ERYTHROCYTE [DISTWIDTH] IN BLOOD BY AUTOMATED COUNT: 14.7 % (ref 12.3–15.4)
FERRITIN SERPL-MCNC: 55 NG/ML (ref 30–400)
GLUCOSE SERPL-MCNC: 95 MG/DL (ref 65–99)
GLUCOSE UR STRIP-MCNC: NEGATIVE MG/DL
HCT VFR BLD AUTO: 21.5 % (ref 37.5–51)
HCT VFR BLD AUTO: 24.5 % (ref 37.5–51)
HGB BLD-MCNC: 6.7 G/DL (ref 13–17.7)
HGB BLD-MCNC: 7.8 G/DL (ref 13–17.7)
HGB UR QL STRIP.AUTO: NEGATIVE
IRON 24H UR-MRATE: 15 MCG/DL (ref 59–158)
IRON SATN MFR SERPL: 5 % (ref 20–50)
KETONES UR QL STRIP: NEGATIVE
LAB AP CASE REPORT: NORMAL
LAB AP DIAGNOSIS COMMENT: NORMAL
LAB AP INTRADEPARTMENTAL CONSULT: NORMAL
LAB AP SPECIAL STAINS: NORMAL
LEUKOCYTE ESTERASE UR QL STRIP.AUTO: NEGATIVE
Lab: NORMAL
Lab: NORMAL
MCH RBC QN AUTO: 26.6 PG (ref 26.6–33)
MCHC RBC AUTO-ENTMCNC: 31.2 G/DL (ref 31.5–35.7)
MCV RBC AUTO: 85.3 FL (ref 79–97)
NITRITE UR QL STRIP: NEGATIVE
PATH REPORT.ADDENDUM SPEC: NORMAL
PATH REPORT.FINAL DX SPEC: NORMAL
PATH REPORT.GROSS SPEC: NORMAL
PH UR STRIP.AUTO: 6 [PH] (ref 5–8)
PLATELET # BLD AUTO: 156 10*3/MM3 (ref 140–450)
PMV BLD AUTO: 11.3 FL (ref 6–12)
POTASSIUM SERPL-SCNC: 4 MMOL/L (ref 3.5–5.2)
PROT UR QL STRIP: NEGATIVE
RBC # BLD AUTO: 2.52 10*6/MM3 (ref 4.14–5.8)
SODIUM SERPL-SCNC: 139 MMOL/L (ref 136–145)
SP GR UR STRIP: 1.01 (ref 1–1.03)
TIBC SERPL-MCNC: 286 MCG/DL (ref 298–536)
TRANSFERRIN SERPL-MCNC: 192 MG/DL (ref 200–360)
UNIT  ABO: NORMAL
UNIT  RH: NORMAL
UROBILINOGEN UR QL STRIP: NORMAL
WBC NRBC COR # BLD AUTO: 4.36 10*3/MM3 (ref 3.4–10.8)

## 2024-06-14 PROCEDURE — 25010000002 FENTANYL CITRATE (PF) 50 MCG/ML SOLUTION: Performed by: ANESTHESIOLOGY

## 2024-06-14 PROCEDURE — 36415 COLL VENOUS BLD VENIPUNCTURE: CPT | Performed by: STUDENT IN AN ORGANIZED HEALTH CARE EDUCATION/TRAINING PROGRAM

## 2024-06-14 PROCEDURE — C1788 PORT, INDWELLING, IMP: HCPCS | Performed by: THORACIC SURGERY (CARDIOTHORACIC VASCULAR SURGERY)

## 2024-06-14 PROCEDURE — 96375 TX/PRO/DX INJ NEW DRUG ADDON: CPT

## 2024-06-14 PROCEDURE — 25010000002 ACETAMINOPHEN 10 MG/ML SOLUTION: Performed by: STUDENT IN AN ORGANIZED HEALTH CARE EDUCATION/TRAINING PROGRAM

## 2024-06-14 PROCEDURE — P9016 RBC LEUKOCYTES REDUCED: HCPCS

## 2024-06-14 PROCEDURE — 85014 HEMATOCRIT: CPT | Performed by: HOSPITALIST

## 2024-06-14 PROCEDURE — 25010000002 ONDANSETRON PER 1 MG: Performed by: ANESTHESIOLOGY

## 2024-06-14 PROCEDURE — 85018 HEMOGLOBIN: CPT | Performed by: HOSPITALIST

## 2024-06-14 PROCEDURE — G0378 HOSPITAL OBSERVATION PER HR: HCPCS

## 2024-06-14 PROCEDURE — 84466 ASSAY OF TRANSFERRIN: CPT | Performed by: INTERNAL MEDICINE

## 2024-06-14 PROCEDURE — 99213 OFFICE O/P EST LOW 20 MIN: CPT

## 2024-06-14 PROCEDURE — 25010000002 MORPHINE PER 10 MG: Performed by: NURSE PRACTITIONER

## 2024-06-14 PROCEDURE — 25810000003 LACTATED RINGERS PER 1000 ML: Performed by: STUDENT IN AN ORGANIZED HEALTH CARE EDUCATION/TRAINING PROGRAM

## 2024-06-14 PROCEDURE — 25810000003 LACTATED RINGERS PER 1000 ML: Performed by: ANESTHESIOLOGY

## 2024-06-14 PROCEDURE — 36430 TRANSFUSION BLD/BLD COMPNT: CPT

## 2024-06-14 PROCEDURE — 99214 OFFICE O/P EST MOD 30 MIN: CPT | Performed by: INTERNAL MEDICINE

## 2024-06-14 PROCEDURE — 82728 ASSAY OF FERRITIN: CPT | Performed by: INTERNAL MEDICINE

## 2024-06-14 PROCEDURE — 76000 FLUOROSCOPY <1 HR PHYS/QHP: CPT

## 2024-06-14 PROCEDURE — 86900 BLOOD TYPING SEROLOGIC ABO: CPT

## 2024-06-14 PROCEDURE — 96376 TX/PRO/DX INJ SAME DRUG ADON: CPT

## 2024-06-14 PROCEDURE — 83540 ASSAY OF IRON: CPT | Performed by: INTERNAL MEDICINE

## 2024-06-14 PROCEDURE — 25010000002 HYDRALAZINE PER 20 MG: Performed by: ANESTHESIOLOGY

## 2024-06-14 PROCEDURE — 25810000003 LACTATED RINGERS PER 1000 ML: Performed by: THORACIC SURGERY (CARDIOTHORACIC VASCULAR SURGERY)

## 2024-06-14 PROCEDURE — 25010000002 HYDROMORPHONE PER 4 MG: Performed by: ANESTHESIOLOGY

## 2024-06-14 PROCEDURE — 25010000002 CEFAZOLIN PER 500 MG: Performed by: NURSE PRACTITIONER

## 2024-06-14 PROCEDURE — 94640 AIRWAY INHALATION TREATMENT: CPT

## 2024-06-14 PROCEDURE — 25010000002 PROPOFOL 200 MG/20ML EMULSION: Performed by: ANESTHESIOLOGY

## 2024-06-14 PROCEDURE — 80048 BASIC METABOLIC PNL TOTAL CA: CPT | Performed by: STUDENT IN AN ORGANIZED HEALTH CARE EDUCATION/TRAINING PROGRAM

## 2024-06-14 PROCEDURE — 25810000003 SODIUM CHLORIDE PER 500 ML: Performed by: THORACIC SURGERY (CARDIOTHORACIC VASCULAR SURGERY)

## 2024-06-14 PROCEDURE — 25010000002 DEXAMETHASONE SODIUM PHOSPHATE 20 MG/5ML SOLUTION: Performed by: ANESTHESIOLOGY

## 2024-06-14 PROCEDURE — 88112 CYTOPATH CELL ENHANCE TECH: CPT | Performed by: THORACIC SURGERY (CARDIOTHORACIC VASCULAR SURGERY)

## 2024-06-14 PROCEDURE — 71045 X-RAY EXAM CHEST 1 VIEW: CPT

## 2024-06-14 PROCEDURE — 81003 URINALYSIS AUTO W/O SCOPE: CPT | Performed by: PHYSICIAN ASSISTANT

## 2024-06-14 PROCEDURE — 99215 OFFICE O/P EST HI 40 MIN: CPT | Performed by: NURSE PRACTITIONER

## 2024-06-14 PROCEDURE — 88305 TISSUE EXAM BY PATHOLOGIST: CPT | Performed by: THORACIC SURGERY (CARDIOTHORACIC VASCULAR SURGERY)

## 2024-06-14 PROCEDURE — 94799 UNLISTED PULMONARY SVC/PX: CPT

## 2024-06-14 PROCEDURE — 25010000002 SUGAMMADEX 200 MG/2ML SOLUTION: Performed by: ANESTHESIOLOGY

## 2024-06-14 PROCEDURE — 85027 COMPLETE CBC AUTOMATED: CPT | Performed by: STUDENT IN AN ORGANIZED HEALTH CARE EDUCATION/TRAINING PROGRAM

## 2024-06-14 PROCEDURE — 25010000002 HYDROMORPHONE PER 4 MG: Performed by: HOSPITALIST

## 2024-06-14 PROCEDURE — 25010000002 HEPARIN (PORCINE) PER 1000 UNITS: Performed by: THORACIC SURGERY (CARDIOTHORACIC VASCULAR SURGERY)

## 2024-06-14 PROCEDURE — 96366 THER/PROPH/DIAG IV INF ADDON: CPT

## 2024-06-14 DEVICE — POWERPORT CLEARVUE ISP IMPLANTABLE PORT WITH ATTACHABLE 8F POLYURETHANE OPEN-ENDED SINGLE-LUMEN VENOUS CATHETER PROCEDURAL KIT
Type: IMPLANTABLE DEVICE | Site: CHEST | Status: FUNCTIONAL
Brand: POWERPORT CLEARVUE

## 2024-06-14 RX ORDER — NITROGLYCERIN 0.4 MG/1
0.4 TABLET SUBLINGUAL
Status: DISCONTINUED | OUTPATIENT
Start: 2024-06-14 | End: 2024-06-15 | Stop reason: SDUPTHER

## 2024-06-14 RX ORDER — DEXAMETHASONE SODIUM PHOSPHATE 4 MG/ML
INJECTION, SOLUTION INTRA-ARTICULAR; INTRALESIONAL; INTRAMUSCULAR; INTRAVENOUS; SOFT TISSUE AS NEEDED
Status: DISCONTINUED | OUTPATIENT
Start: 2024-06-14 | End: 2024-06-14 | Stop reason: SURG

## 2024-06-14 RX ORDER — DIPHENHYDRAMINE HYDROCHLORIDE 50 MG/ML
12.5 INJECTION INTRAMUSCULAR; INTRAVENOUS
Status: DISCONTINUED | OUTPATIENT
Start: 2024-06-14 | End: 2024-06-14 | Stop reason: HOSPADM

## 2024-06-14 RX ORDER — SODIUM CHLORIDE, SODIUM LACTATE, POTASSIUM CHLORIDE, CALCIUM CHLORIDE 600; 310; 30; 20 MG/100ML; MG/100ML; MG/100ML; MG/100ML
9 INJECTION, SOLUTION INTRAVENOUS CONTINUOUS
Status: DISCONTINUED | OUTPATIENT
Start: 2024-06-14 | End: 2024-06-18

## 2024-06-14 RX ORDER — LIDOCAINE HYDROCHLORIDE 20 MG/ML
INJECTION, SOLUTION INFILTRATION; PERINEURAL AS NEEDED
Status: DISCONTINUED | OUTPATIENT
Start: 2024-06-14 | End: 2024-06-14 | Stop reason: SURG

## 2024-06-14 RX ORDER — MORPHINE SULFATE 2 MG/ML
4 INJECTION, SOLUTION INTRAMUSCULAR; INTRAVENOUS EVERY 4 HOURS PRN
Status: DISCONTINUED | OUTPATIENT
Start: 2024-06-14 | End: 2024-06-18 | Stop reason: HOSPADM

## 2024-06-14 RX ORDER — IPRATROPIUM BROMIDE AND ALBUTEROL SULFATE 2.5; .5 MG/3ML; MG/3ML
3 SOLUTION RESPIRATORY (INHALATION) ONCE AS NEEDED
Status: DISCONTINUED | OUTPATIENT
Start: 2024-06-14 | End: 2024-06-14 | Stop reason: HOSPADM

## 2024-06-14 RX ORDER — BISACODYL 10 MG
10 SUPPOSITORY, RECTAL RECTAL DAILY PRN
Status: DISCONTINUED | OUTPATIENT
Start: 2024-06-14 | End: 2024-06-18 | Stop reason: HOSPADM

## 2024-06-14 RX ORDER — FENTANYL CITRATE 50 UG/ML
INJECTION, SOLUTION INTRAMUSCULAR; INTRAVENOUS AS NEEDED
Status: DISCONTINUED | OUTPATIENT
Start: 2024-06-14 | End: 2024-06-14 | Stop reason: SURG

## 2024-06-14 RX ORDER — NITROGLYCERIN 0.4 MG/1
0.4 TABLET SUBLINGUAL
Status: DISCONTINUED | OUTPATIENT
Start: 2024-06-14 | End: 2024-06-18 | Stop reason: HOSPADM

## 2024-06-14 RX ORDER — FENTANYL CITRATE 50 UG/ML
50 INJECTION, SOLUTION INTRAMUSCULAR; INTRAVENOUS ONCE AS NEEDED
Status: DISCONTINUED | OUTPATIENT
Start: 2024-06-14 | End: 2024-06-14 | Stop reason: HOSPADM

## 2024-06-14 RX ORDER — NALOXONE HCL 0.4 MG/ML
0.2 VIAL (ML) INJECTION AS NEEDED
Status: DISCONTINUED | OUTPATIENT
Start: 2024-06-14 | End: 2024-06-14 | Stop reason: HOSPADM

## 2024-06-14 RX ORDER — FLUMAZENIL 0.1 MG/ML
0.2 INJECTION INTRAVENOUS AS NEEDED
Status: DISCONTINUED | OUTPATIENT
Start: 2024-06-14 | End: 2024-06-14 | Stop reason: HOSPADM

## 2024-06-14 RX ORDER — LIDOCAINE HYDROCHLORIDE 10 MG/ML
0.5 INJECTION, SOLUTION INFILTRATION; PERINEURAL ONCE AS NEEDED
Status: DISCONTINUED | OUTPATIENT
Start: 2024-06-14 | End: 2024-06-14 | Stop reason: HOSPADM

## 2024-06-14 RX ORDER — LABETALOL HYDROCHLORIDE 5 MG/ML
5 INJECTION, SOLUTION INTRAVENOUS
Status: DISCONTINUED | OUTPATIENT
Start: 2024-06-14 | End: 2024-06-14 | Stop reason: HOSPADM

## 2024-06-14 RX ORDER — IPRATROPIUM BROMIDE AND ALBUTEROL SULFATE 2.5; .5 MG/3ML; MG/3ML
3 SOLUTION RESPIRATORY (INHALATION)
Status: DISPENSED | OUTPATIENT
Start: 2024-06-15 | End: 2024-06-16

## 2024-06-14 RX ORDER — HYDROCODONE BITARTRATE AND ACETAMINOPHEN 5; 325 MG/1; MG/1
1 TABLET ORAL ONCE AS NEEDED
Status: DISCONTINUED | OUTPATIENT
Start: 2024-06-14 | End: 2024-06-14 | Stop reason: HOSPADM

## 2024-06-14 RX ORDER — ONDANSETRON 4 MG/1
4 TABLET, ORALLY DISINTEGRATING ORAL EVERY 6 HOURS PRN
Status: DISCONTINUED | OUTPATIENT
Start: 2024-06-14 | End: 2024-06-18 | Stop reason: HOSPADM

## 2024-06-14 RX ORDER — SODIUM CHLORIDE 0.9 % (FLUSH) 0.9 %
3-10 SYRINGE (ML) INJECTION AS NEEDED
Status: DISCONTINUED | OUTPATIENT
Start: 2024-06-14 | End: 2024-06-14 | Stop reason: HOSPADM

## 2024-06-14 RX ORDER — ONDANSETRON 2 MG/ML
4 INJECTION INTRAMUSCULAR; INTRAVENOUS EVERY 6 HOURS PRN
Status: DISCONTINUED | OUTPATIENT
Start: 2024-06-14 | End: 2024-06-18 | Stop reason: HOSPADM

## 2024-06-14 RX ORDER — ROCURONIUM BROMIDE 10 MG/ML
INJECTION, SOLUTION INTRAVENOUS AS NEEDED
Status: DISCONTINUED | OUTPATIENT
Start: 2024-06-14 | End: 2024-06-14 | Stop reason: SURG

## 2024-06-14 RX ORDER — PROPOFOL 10 MG/ML
INJECTION, EMULSION INTRAVENOUS AS NEEDED
Status: DISCONTINUED | OUTPATIENT
Start: 2024-06-14 | End: 2024-06-14 | Stop reason: SURG

## 2024-06-14 RX ORDER — FENTANYL CITRATE 50 UG/ML
50 INJECTION, SOLUTION INTRAMUSCULAR; INTRAVENOUS
Status: DISCONTINUED | OUTPATIENT
Start: 2024-06-14 | End: 2024-06-14 | Stop reason: HOSPADM

## 2024-06-14 RX ORDER — FAMOTIDINE 10 MG/ML
20 INJECTION, SOLUTION INTRAVENOUS ONCE
Status: COMPLETED | OUTPATIENT
Start: 2024-06-14 | End: 2024-06-14

## 2024-06-14 RX ORDER — MIDAZOLAM HYDROCHLORIDE 1 MG/ML
1 INJECTION INTRAMUSCULAR; INTRAVENOUS
Status: DISCONTINUED | OUTPATIENT
Start: 2024-06-14 | End: 2024-06-14 | Stop reason: HOSPADM

## 2024-06-14 RX ORDER — HYDROMORPHONE HYDROCHLORIDE 1 MG/ML
0.5 INJECTION, SOLUTION INTRAMUSCULAR; INTRAVENOUS; SUBCUTANEOUS
Status: DISCONTINUED | OUTPATIENT
Start: 2024-06-14 | End: 2024-06-14

## 2024-06-14 RX ORDER — ACETAMINOPHEN 10 MG/ML
1000 INJECTION, SOLUTION INTRAVENOUS ONCE
Status: COMPLETED | OUTPATIENT
Start: 2024-06-14 | End: 2024-06-14

## 2024-06-14 RX ORDER — DROPERIDOL 2.5 MG/ML
0.62 INJECTION, SOLUTION INTRAMUSCULAR; INTRAVENOUS
Status: DISCONTINUED | OUTPATIENT
Start: 2024-06-14 | End: 2024-06-14 | Stop reason: HOSPADM

## 2024-06-14 RX ORDER — DEXTROSE MONOHYDRATE, SODIUM CHLORIDE, AND POTASSIUM CHLORIDE 50; 1.49; 4.5 G/1000ML; G/1000ML; G/1000ML
125 INJECTION, SOLUTION INTRAVENOUS CONTINUOUS
Status: DISCONTINUED | OUTPATIENT
Start: 2024-06-15 | End: 2024-06-18

## 2024-06-14 RX ORDER — HEPARIN SODIUM 5000 [USP'U]/ML
5000 INJECTION, SOLUTION INTRAVENOUS; SUBCUTANEOUS EVERY 8 HOURS SCHEDULED
Status: DISCONTINUED | OUTPATIENT
Start: 2024-06-15 | End: 2024-06-18 | Stop reason: HOSPADM

## 2024-06-14 RX ORDER — ONDANSETRON 2 MG/ML
INJECTION INTRAMUSCULAR; INTRAVENOUS AS NEEDED
Status: DISCONTINUED | OUTPATIENT
Start: 2024-06-14 | End: 2024-06-14 | Stop reason: SURG

## 2024-06-14 RX ORDER — HYDROMORPHONE HYDROCHLORIDE 1 MG/ML
0.5 INJECTION, SOLUTION INTRAMUSCULAR; INTRAVENOUS; SUBCUTANEOUS
Status: DISCONTINUED | OUTPATIENT
Start: 2024-06-14 | End: 2024-06-18 | Stop reason: HOSPADM

## 2024-06-14 RX ORDER — HYDRALAZINE HYDROCHLORIDE 20 MG/ML
INJECTION INTRAMUSCULAR; INTRAVENOUS AS NEEDED
Status: DISCONTINUED | OUTPATIENT
Start: 2024-06-14 | End: 2024-06-14 | Stop reason: SURG

## 2024-06-14 RX ORDER — EPHEDRINE SULFATE 50 MG/ML
5 INJECTION, SOLUTION INTRAVENOUS ONCE AS NEEDED
Status: DISCONTINUED | OUTPATIENT
Start: 2024-06-14 | End: 2024-06-14 | Stop reason: HOSPADM

## 2024-06-14 RX ORDER — SODIUM CHLORIDE 0.9 % (FLUSH) 0.9 %
3 SYRINGE (ML) INJECTION EVERY 12 HOURS SCHEDULED
Status: DISCONTINUED | OUTPATIENT
Start: 2024-06-14 | End: 2024-06-14 | Stop reason: HOSPADM

## 2024-06-14 RX ORDER — HYDRALAZINE HYDROCHLORIDE 20 MG/ML
5 INJECTION INTRAMUSCULAR; INTRAVENOUS
Status: DISCONTINUED | OUTPATIENT
Start: 2024-06-14 | End: 2024-06-14 | Stop reason: HOSPADM

## 2024-06-14 RX ORDER — ONDANSETRON 2 MG/ML
4 INJECTION INTRAMUSCULAR; INTRAVENOUS ONCE AS NEEDED
Status: DISCONTINUED | OUTPATIENT
Start: 2024-06-14 | End: 2024-06-14 | Stop reason: HOSPADM

## 2024-06-14 RX ORDER — OXYCODONE AND ACETAMINOPHEN 7.5; 325 MG/1; MG/1
1 TABLET ORAL EVERY 4 HOURS PRN
Status: DISCONTINUED | OUTPATIENT
Start: 2024-06-14 | End: 2024-06-14 | Stop reason: HOSPADM

## 2024-06-14 RX ORDER — PROMETHAZINE HYDROCHLORIDE 25 MG/1
25 TABLET ORAL ONCE AS NEEDED
Status: DISCONTINUED | OUTPATIENT
Start: 2024-06-14 | End: 2024-06-14 | Stop reason: HOSPADM

## 2024-06-14 RX ORDER — SODIUM CHLORIDE, SODIUM LACTATE, POTASSIUM CHLORIDE, CALCIUM CHLORIDE 600; 310; 30; 20 MG/100ML; MG/100ML; MG/100ML; MG/100ML
INJECTION, SOLUTION INTRAVENOUS CONTINUOUS PRN
Status: DISCONTINUED | OUTPATIENT
Start: 2024-06-14 | End: 2024-06-14 | Stop reason: SURG

## 2024-06-14 RX ORDER — HYDROMORPHONE HYDROCHLORIDE 1 MG/ML
0.5 INJECTION, SOLUTION INTRAMUSCULAR; INTRAVENOUS; SUBCUTANEOUS
Status: DISCONTINUED | OUTPATIENT
Start: 2024-06-14 | End: 2024-06-14 | Stop reason: HOSPADM

## 2024-06-14 RX ORDER — SODIUM CHLORIDE 9 MG/ML
INJECTION, SOLUTION INTRAVENOUS AS NEEDED
Status: DISCONTINUED | OUTPATIENT
Start: 2024-06-14 | End: 2024-06-14 | Stop reason: HOSPADM

## 2024-06-14 RX ORDER — PROMETHAZINE HYDROCHLORIDE 25 MG/1
25 SUPPOSITORY RECTAL ONCE AS NEEDED
Status: DISCONTINUED | OUTPATIENT
Start: 2024-06-14 | End: 2024-06-14 | Stop reason: HOSPADM

## 2024-06-14 RX ORDER — MAGNESIUM HYDROXIDE 1200 MG/15ML
LIQUID ORAL AS NEEDED
Status: DISCONTINUED | OUTPATIENT
Start: 2024-06-14 | End: 2024-06-14 | Stop reason: HOSPADM

## 2024-06-14 RX ADMIN — DEXAMETHASONE SODIUM PHOSPHATE 4 MG: 4 INJECTION, SOLUTION INTRAMUSCULAR; INTRAVENOUS at 20:04

## 2024-06-14 RX ADMIN — MORPHINE SULFATE 4 MG: 2 INJECTION, SOLUTION INTRAMUSCULAR; INTRAVENOUS at 14:32

## 2024-06-14 RX ADMIN — PANTOPRAZOLE SODIUM 8 MG/HR: 40 INJECTION, POWDER, FOR SOLUTION INTRAVENOUS at 23:51

## 2024-06-14 RX ADMIN — HYDROMORPHONE HYDROCHLORIDE 0.5 MG: 1 INJECTION, SOLUTION INTRAMUSCULAR; INTRAVENOUS; SUBCUTANEOUS at 11:44

## 2024-06-14 RX ADMIN — SODIUM CHLORIDE 2000 MG: 900 INJECTION INTRAVENOUS at 19:27

## 2024-06-14 RX ADMIN — LIDOCAINE HYDROCHLORIDE 60 MG: 20 INJECTION, SOLUTION INFILTRATION; PERINEURAL at 19:43

## 2024-06-14 RX ADMIN — HYDROMORPHONE HYDROCHLORIDE 0.5 MG: 1 INJECTION, SOLUTION INTRAMUSCULAR; INTRAVENOUS; SUBCUTANEOUS at 23:01

## 2024-06-14 RX ADMIN — HYDROMORPHONE HYDROCHLORIDE 0.5 MG: 1 INJECTION, SOLUTION INTRAMUSCULAR; INTRAVENOUS; SUBCUTANEOUS at 22:39

## 2024-06-14 RX ADMIN — PANTOPRAZOLE SODIUM 8 MG/HR: 40 INJECTION, POWDER, FOR SOLUTION INTRAVENOUS at 03:33

## 2024-06-14 RX ADMIN — PANTOPRAZOLE SODIUM 8 MG/HR: 40 INJECTION, POWDER, FOR SOLUTION INTRAVENOUS at 10:05

## 2024-06-14 RX ADMIN — FAMOTIDINE 20 MG: 10 INJECTION INTRAVENOUS at 17:15

## 2024-06-14 RX ADMIN — ROCURONIUM BROMIDE 50 MG: 10 INJECTION, SOLUTION INTRAVENOUS at 19:44

## 2024-06-14 RX ADMIN — SODIUM CHLORIDE, POTASSIUM CHLORIDE, SODIUM LACTATE AND CALCIUM CHLORIDE 100 ML/HR: 600; 310; 30; 20 INJECTION, SOLUTION INTRAVENOUS at 01:48

## 2024-06-14 RX ADMIN — ACETAMINOPHEN 1000 MG: 10 INJECTION, SOLUTION INTRAVENOUS at 16:44

## 2024-06-14 RX ADMIN — PROPOFOL 200 MG: 10 INJECTION, EMULSION INTRAVENOUS at 19:43

## 2024-06-14 RX ADMIN — ONDANSETRON 4 MG: 2 INJECTION INTRAMUSCULAR; INTRAVENOUS at 21:05

## 2024-06-14 RX ADMIN — HYDRALAZINE HYDROCHLORIDE 10 MG: 20 INJECTION, SOLUTION INTRAMUSCULAR; INTRAVENOUS at 20:54

## 2024-06-14 RX ADMIN — PANTOPRAZOLE SODIUM 8 MG/HR: 40 INJECTION, POWDER, FOR SOLUTION INTRAVENOUS at 14:34

## 2024-06-14 RX ADMIN — IPRATROPIUM BROMIDE AND ALBUTEROL SULFATE 3 ML: .5; 3 SOLUTION RESPIRATORY (INHALATION) at 23:57

## 2024-06-14 RX ADMIN — FENTANYL CITRATE 50 MCG: 50 INJECTION, SOLUTION INTRAMUSCULAR; INTRAVENOUS at 20:45

## 2024-06-14 RX ADMIN — SODIUM CHLORIDE, POTASSIUM CHLORIDE, SODIUM LACTATE AND CALCIUM CHLORIDE: 600; 310; 30; 20 INJECTION, SOLUTION INTRAVENOUS at 19:35

## 2024-06-14 RX ADMIN — SUGAMMADEX 200 MG: 100 INJECTION, SOLUTION INTRAVENOUS at 21:14

## 2024-06-14 RX ADMIN — Medication 10 ML: at 08:08

## 2024-06-14 RX ADMIN — Medication 3 ML: at 23:52

## 2024-06-14 RX ADMIN — SODIUM CHLORIDE, POTASSIUM CHLORIDE, SODIUM LACTATE AND CALCIUM CHLORIDE 9 ML/HR: 600; 310; 30; 20 INJECTION, SOLUTION INTRAVENOUS at 17:16

## 2024-06-14 RX ADMIN — FENTANYL CITRATE 50 MCG: 50 INJECTION, SOLUTION INTRAMUSCULAR; INTRAVENOUS at 22:46

## 2024-06-14 NOTE — CONSULTS
HISTORY OF PRESENT ILLNESS:     History of Present Illness    Patient is a 49-year-old male who we had seen in consultation 6/5/2024 after having developed significant dysphagia.  He was to be seen at  but presented with further dysphagia and underwent evaluation here with EGD 6/4/2024.  This was a large fungating mass lower third of the esophagus partially obstructing circumferential involving 100% of the lumen circumference and was upon biopsy found to have adenocarcinoma.  The patient lives in [place] and works in [place] and had hoped to be seen in [place] for treatment while keeping his Meghan here at [facility]- [facility] Mills.    Patient presented, unfortunately, with further weakness and fatigue and was found to be significantly anemic 6/13 with H&H of 6.6 and 21.5 associated with blackish stool.  He has been seen by GI and not felt a candidate for endoscopy but has been supportively transfused.  The case has been discussed with Dr. Mills who hopes to proceed with port placement, laparoscopy and feeding tube placement particularly after recent PET/CT/10 demonstrates hypermetabolic circumferential distal esophageal malignancy as well as gastrohepatic lymphadenopathy with least 1 lymph node clearly hypermetabolic and multiple subcentimeter periaortic posterior mediastinal lymph nodes prominent but too small to characterize and 1 cm left adrenal nodule is not hypermetabolic likely adrenal adenoma.    Interval history:  T90.6, pulse 60, respiration 16, /75  Patient indicates that he has been exceedingly weak and fatigued particular over the last several days leading to his representation with anemia.  He has not yet been seen by either radiation therapy or medical oncology at [facility].  H&H 6.7 and 21.5 with white count of 4360 and platelet count of 1 56,000 with being creatinine of 17 and 1.20      Past Medical History, Past Surgical History, Social History, Family History  have been reviewed and are without significant changes except as mentioned.    Review of Systems   A comprehensive 14 point review of systems was performed and was negative except as mentioned.    Medications:  The current medication list was reviewed in the EMR    ALLERGIES:  No Known Allergies    Objective      Vitals:    06/14/24 0719 06/14/24 0905 06/14/24 0937 06/14/24 1149   BP: 116/64 115/69 128/81 128/75  Comment: Simultaneous filing. User may be unaware of other data.   BP Location: Right arm   Right arm   Patient Position: Lying   Lying   Pulse: 71 59 61 57  Comment: Simultaneous filing. User may be unaware of other data.   Resp: 18 18 18 18  Comment: Simultaneous filing. User may be unaware of other data.   Temp: 98.2 °F (36.8 °C) 98.6 °F (37 °C) 98.4 °F (36.9 °C) 98.6 °F (37 °C)  Comment: Simultaneous filing. User may be unaware of other data.   TempSrc: Oral Oral Oral Oral  Comment: Simultaneous filing. User may be unaware of other data.   SpO2: 92%   95%   Weight:       Height:              No data to display                Physical Exam  Constitutional:       Appearance: Normal appearance. He is normal weight.   HENT:      Nose: Nose normal.      Mouth/Throat:      Mouth: Mucous membranes are moist.      Pharynx: Oropharynx is clear.   Eyes:      Conjunctiva/sclera: Conjunctivae normal.      Pupils: Pupils are equal, round, and reactive to light.   Cardiovascular:      Rate and Rhythm: Normal rate and regular rhythm.      Pulses: Normal pulses.      Heart sounds: Normal heart sounds.   Pulmonary:      Effort: Pulmonary effort is normal.      Breath sounds: Normal breath sounds.   Abdominal:      General: Bowel sounds are normal.      Palpations: Abdomen is soft.   Musculoskeletal:         General: Normal range of motion.      Cervical back: Normal range of motion and neck supple.   Skin:     General: Skin is warm and dry.   Neurological:      General: No focal deficit present.      Mental Status: He  is oriented to person, place, and time.   Psychiatric:         Mood and Affect: Mood normal.           RECENT LABS:  Hematology WBC   Date Value Ref Range Status   06/14/2024 4.36 3.40 - 10.80 10*3/mm3 Final     RBC   Date Value Ref Range Status   06/14/2024 2.52 (L) 4.14 - 5.80 10*6/mm3 Final     Hemoglobin   Date Value Ref Range Status   06/14/2024 6.7 (C) 13.0 - 17.7 g/dL Final     Hematocrit   Date Value Ref Range Status   06/14/2024 21.5 (L) 37.5 - 51.0 % Final     Platelets   Date Value Ref Range Status   06/14/2024 156 140 - 450 10*3/mm3 Final              Assessment & Plan   Confirmed distal esophageal cancer-adenocarcinoma  Presented with 20 to 35 pound weight loss over 2 to 3 months because initially could not get solids down and then for the past couple of weeks trouble getting liquids down as well.  EGD 6/4/ 24: Large fungating mass lower third of esophagus.  Partially obstructing and circumferential, involving 100% of the lumen circumference.  Dr. Hernandez had trouble getting past the mass with the scope.  Partially but nearly obstructing esophageal mass.  Biopsy pending.    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  Multiple shotty retroperitoneal nodes.  Largest gastrohepatic ligament node 2 x 1.1 cm.  Unremarkable noncontrast liver.  6/5/2024: Initial inpatient consult with me.  No clear evidence of distant disease seen on CT without IV contrast.  I will order a CT with IV contrast.  Dr. Mills has already arranged a PET scan as an outpatient.  If the scans show no evidence of distant disease, the goal is cure.  Dr. Mills is setting up an EUS at [facility].  The tentative plan will be chemotherapy concurrent with radiation followed by surgery with a goal of cure.  Of course, this can change if distant disease is found.  He lives in [place] which he states he is just in between [place] and [place].  However, he works in  [place].  Since medical oncology and radiation oncology will require multiple visits he prefers to establish care in [place] (he wants this through [facility]).  However, he would like to keep Dr. Mills as his surgeon removal and he would like the EUS done at [facility].  Findings discussed with his surgeon, PET/CT discussed and plans to proceed, likely, with port placement, PEG placement and diagnostic laparoscopy.  We have discussed the recent data concerning the FLOT regimen versus chemo RT as to the potential treatment options for this patient.     *Iron deficiency anemia  6/5/2024: Ferritin 10, 5% saturation.  B12 and serum folate unremarkable.  Ferrlecit 250 mg IV daily x 3 days.  Please note I am ordering the IV iron for 3 days in case he is here for 3 days.  If he goes home before all the IV iron is completed, he cannot receive more IV iron as an outpatient (for insurance coverage reasons) until he tries oral iron and either does not tolerate oral or does not respond to it.  Therefore, he can follow-up with his medical oncologist in [place] to determine the need for further IV iron if he is not here for all 3 days of IV iron as an inpatient.  Patient admitted 6/14/2024 with further apparent blood loss anemia with black or stool over the last several days to week.  Plan to reassess per degree of iron deficiency as well as further transfusion support.     6/14/2024

## 2024-06-14 NOTE — CASE MANAGEMENT/SOCIAL WORK
Discharge Planning Assessment  [facility]     Patient Name: Nelson Garcia  MRN: [id number]  Today's Date: 6/14/2024    Admit Date: 6/13/2024    Plan: Home with family   Discharge Needs Assessment       Row Name 06/14/24 1017       Living Environment    People in Home spouse;child(haresh), dependent    Current Living Arrangements home    Potentially Unsafe Housing Conditions none    Primary Care Provided by self    Provides Primary Care For child(haresh)    Family Caregiver if Needed spouse    Quality of Family Relationships helpful;involved;supportive    Able to Return to Prior Arrangements yes       Resource/Environmental Concerns    Resource/Environmental Concerns none    Transportation Concerns none       Transition Planning    Patient/Family Anticipates Transition to home with family    Patient/Family Anticipated Services at Transition none    Transportation Anticipated family or friend will provide       Discharge Needs Assessment    Readmission Within the Last 30 Days no previous admission in last 30 days    Equipment Currently Used at Home none    Concerns to be Addressed no discharge needs identified;denies needs/concerns at this time    Anticipated Changes Related to Illness none    Equipment Needed After Discharge none    Provided Post Acute Provider List? N/A    Provided Post Acute Provider Quality & Resource List? N/A                   Discharge Plan       Row Name 06/14/24 1020       Plan    Plan Home with family    Plan Comments CCP met with the patient at bedside and confirmed the information on his face sheet was accurate. He said he lives in a 1 story home with his wife and 2 children; ages 7 and 11. He confirmed his PCP is Chelita Scott. He denied any history of HH/SNF/Acute Rehab. He said he is normally IADL’s and doesn’t use any DME. He said there is 1 step at the backdoor to enter the home without any handrails. He denied any steps inside the home. He is enrolled in the [facility]  program. He said his wife can transport him at discharge. CCP to follow. CD, CSW.                  Continued Care and Services - Admitted Since 6/13/2024    No active coordination exists for this encounter.          Demographic Summary       Row Name 06/14/24 1013       General Information    Admission Type observation    Arrived From emergency department    Referral Source admission list    Reason for Consult discharge planning    Preferred Language English                   Functional Status       Row Name 06/14/24 1017       Functional Status    Usual Activity Tolerance good    Current Activity Tolerance moderate       Functional Status, IADL    Medications independent    Meal Preparation independent    Housekeeping independent    Laundry independent    Shopping independent       Mental Status    General Appearance WDL WDL       Mental Status Summary    Recent Changes in Mental Status/Cognitive Functioning no changes       Employment/    Employment Status unemployed                   Psychosocial    No documentation.                  Abuse/Neglect    No documentation.                  Legal    No documentation.                  Substance Abuse    No documentation.                  Patient Forms    No documentation.

## 2024-06-14 NOTE — PLAN OF CARE
Problem: Malnutrition  Goal: Improved Nutritional Intake  Outcome: Ongoing, Not Progressing   Goal Outcome Evaluation:   Pt in OR getting J-tube. See nutrition note for full assessment and TF recommendations.

## 2024-06-14 NOTE — CONSULTS
REASON FOR CONSULTATION: Esophageal cancer      History of Present Illness   The patient is a 49-year-old male that we have seen in consultation 6/5/2024 as result of dysphagia.  He had lost approximately 20 pounds over the previous 3 months and been seen at  with planned EGD and colonoscopy later in the month.  EGD was performed 6/4/2024 revealed large fungating mass lower third esophagus, partially obstructing circumferential involving 100% of the lumen circumference.  This was a partially but nearly obstructing esophageal mass.  Thoracic surgery saw the patient as well.    Patient lives in [place], works in [place] and had been planned to be treated in [place].  He went to keep surgical options here.    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    Patient presented back to this hospital 6/13/2024 with generalized weakness.  He been tolerating liquid diet and had been followed up with thoracic surgery with plans for feeding tube the next week.  He became febrile, unfortunately, and was admitted.    Interval history:  6/14/2024  T98.6, pulse 60, respirations 18, /75  Patient's status discussed with him in detail  CBC with H&H of 6.7 and 21.5, platelet count of 56,000, white count of 4360, being creatinine of 70 and 1.20, calcium 8.4    6/15/2024  T97.7, pulse 52, respirations 18, /75, SpO2 100%  Now status post EGD, right port placement and jejunostomy tube insertion-tumor distal esophagus from 39 cm to GE junction 46 cm, no tumor GE junction, diseased GE nodes on examination of GE junction, no obvious metastatic disease to liver, peritoneal lavage performed.  Patient now in vascular lab  H&H 8.7 and 28.8, white count is 6660, platelet count 186,000, being creatinine of 15 and 1.24, patient status post transfusion    Past Medical History:   Diagnosis Date    Hypertension     Kidney disease      Stroke (cerebrum)         Past Surgical History:   Procedure Laterality Date    BACK SURGERY      ENDOSCOPY N/A 6/4/2024    Procedure: ESOPHAGOGASTRODUODENOSCOPY with biopsies;  Surgeon: Taras Hernandez MD;  Location: [facility];  Service: Gastroenterology;  Laterality: N/A;  pre- nausea/vomiting   post- nearly obstructing distal esophageal mass, gastritis    KNEE SURGERY      TEETH EXTRACTION      all        No current facility-administered medications on file prior to encounter.     Current Outpatient Medications on File Prior to Encounter   Medication Sig Dispense Refill    acetaminophen (TYLENOL) 325 MG tablet Take 2 tablets by mouth Every 6 (Six) Hours As Needed for Mild Pain .      aspirin  MG tablet Take 1 tablet by mouth Daily. 90 tablet 0    atorvastatin (LIPITOR) 80 MG tablet Take 1 tablet by mouth Every Night. 30 tablet 0    carvedilol (COREG) 12.5 MG tablet Take 1 tablet by mouth 2 (Two) Times a Day With Meals.      gabapentin (NEURONTIN) 600 MG tablet Take 800 mg by mouth 4 (Four) Times a Day.      vitamin D3 125 MCG (5000 UT) capsule capsule Take 1 capsule by mouth Daily.      [DISCONTINUED] gabapentin (NEURONTIN) 300 MG capsule Take 1 capsule by mouth Daily.      cloNIDine (CATAPRES) 0.2 MG tablet Take 1 tablet by mouth As Needed for High Blood Pressure.      clopidogrel (PLAVIX) 75 MG tablet Take 1 tablet by mouth Daily. 30 tablet 1    methocarbamol (ROBAXIN) 500 MG tablet Take 1 tablet by mouth 4 (Four) Times a Day.      sildenafil (VIAGRA) 50 MG tablet TAKE ONE TABLET BY MOUTH DAILY AS NEEDED FOR ERECTILE DYSFUNCTION 30 tablet 3    valsartan (DIOVAN) 160 MG tablet Take 2 tablets by mouth Daily. (Patient not taking: Reported on 6/13/2024)      [DISCONTINUED] escitalopram (Lexapro) 10 MG tablet 5 mg daily x 2 weeks then increase to 10 mg daily thereafter (Patient taking differently: 2 tablets. 5 mg daily x 2 weeks then increase to 10 mg daily thereafter) 30 tablet 2    [DISCONTINUED]  losartan (COZAAR) 50 MG tablet Take 1 tablet by mouth Daily. 30 tablet 0    [DISCONTINUED] losartan-hydrochlorothiazide (HYZAAR) 100-12.5 MG per tablet Take 1 tablet by mouth Daily.      [DISCONTINUED] NIFEdipine XL (PROCARDIA XL) 30 MG 24 hr tablet Take 1 tablet by mouth Daily.          ALLERGIES:  No Known Allergies     Social History     Socioeconomic History    Marital status:    Tobacco Use    Smoking status: Former     Current packs/day: 0.50     Average packs/day: 0.5 packs/day for 35.0 years (17.5 ttl pk-yrs)     Types: Cigarettes    Smokeless tobacco: Former    Tobacco comments:     Quit smoking 3 years ago   Vaping Use    Vaping status: Never Used   Substance and Sexual Activity    Alcohol use: Not Currently    Drug use: Never    Sexual activity: Defer        Family History   Problem Relation Age of Onset    Diabetes Mother     Stroke Mother     Diabetes Sister     Heart failure Sister         Review of Systems see history of present illness  Objective     Vitals:    06/14/24 0719 06/14/24 0905 06/14/24 0937 06/14/24 1149   BP: 116/64 115/69 128/81 128/75  Comment: Simultaneous filing. User may be unaware of other data.   BP Location: Right arm   Right arm   Patient Position: Lying   Lying   Pulse: 71 59 61 57  Comment: Simultaneous filing. User may be unaware of other data.   Resp: 18 18 18 18  Comment: Simultaneous filing. User may be unaware of other data.   Temp: 98.2 °F (36.8 °C) 98.6 °F (37 °C) 98.4 °F (36.9 °C) 98.6 °F (37 °C)  Comment: Simultaneous filing. User may be unaware of other data.   TempSrc: Oral Oral Oral Oral  Comment: Simultaneous filing. User may be unaware of other data.   SpO2: 92%   95%   Weight:       Height:              No data to display                Physical Exam      RECENT LABS:  Hematology WBC   Date Value Ref Range Status   06/14/2024 4.36 3.40 - 10.80 10*3/mm3 Final     RBC   Date Value Ref Range Status   06/14/2024 2.52 (L) 4.14 - 5.80 10*6/mm3 Final      Hemoglobin   Date Value Ref Range Status   06/14/2024 6.7 (C) 13.0 - 17.7 g/dL Final     Hematocrit   Date Value Ref Range Status   06/14/2024 21.5 (L) 37.5 - 51.0 % Final     Platelets   Date Value Ref Range Status   06/14/2024 156 140 - 450 10*3/mm3 Final          Assessment & Plan     Distal esophageal cancer  Presented with 20 to 35 pound weight loss over 2 to 3 months because initially could not get solids down and then for the past couple of weeks trouble getting liquids down as well.  EGD 6/4/ 24: Large fungating mass lower third of esophagus.  Partially obstructing and circumferential, involving 100% of the lumen circumference.  Dr. Hernandez had trouble getting past the mass with the scope.  Partially but nearly obstructing esophageal mass.  Biopsy pending.    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  Multiple shotty retroperitoneal nodes.  Largest gastrohepatic ligament node 2 x 1.1 cm.  Unremarkable noncontrast liver.  6/5/2024: Initial inpatient consult with me.  No clear evidence of distant disease seen on CT without IV contrast.  I will order a CT with IV contrast.  Dr. Mills has already arranged a PET scan as an outpatient.  If the scans show no evidence of distant disease, the goal is cure.  Dr. Mills is setting up an EUS at [facility].  The tentative plan will be chemotherapy concurrent with radiation followed by surgery with a goal of cure.  Of course, this can change if distant disease is found.  He lives in [place] which he states he is just in between [place].  However, he works in [place].  Since medical oncology and radiation oncology will require multiple visits he prefers to establish care in [place] (he wants this through [facility]).  However, he would like to keep Dr. Mills as his surgeon removal and he would like the EUS done at [facility].  PET/CT 6/10/2024 revealed hypermetabolic circumferential  distal esophageal thickening, gastropathic lymphadenopathy with at least 1 site clearly hypermetabolic, multiple subcentimeter posterior mediastinal lymph nodes too small to characterize, 1 center left adrenal nodule not hypermetabolic.  Case discussed with  6/14/2024 with plans to proceed, potentially this admission, with port, feeding tube, laparoscopy to document whether there might be more distant metastatic disease.  This brings up the possibility of the FLOT regimen versus neoadjuvant chemo RT.  6/14/2024 status post EGD, right port placement and jejunostomy tube insertion-tumor distal esophagus from 39 cm to GE junction 46 cm, no tumor GE junction, diseased GE nodes on examination of GE junction, no obvious metastatic disease to liver, peritoneal lavage performed-path pending     *Multifactorial anemia-iron deficiency anemia, anemia chronic disease  6/5/2024: Ferritin 10, 5% saturation.  B12 and serum folate unremarkable.  Ferrlecit 250 mg IV daily x 3 days.    Upon readmission with further anemia, status posttransfusion.  Repeat iron studies with ferritin of 55, iron of 15, saturation 5%, transferrin 192 and TIBC 286-anemia chronic disease

## 2024-06-14 NOTE — PLAN OF CARE
Goal Outcome Evaluation:  Pt A&Ox4. On RA. VSS. SR/SB on monitor. Medicated for pain. 1 unit RBCs given. On continuous protonix drip. Pt currently in OR for j tube placement, mediport placement, and EGD. Plan of care ongoing.

## 2024-06-14 NOTE — ANESTHESIA PREPROCEDURE EVALUATION
Anesthesia Evaluation     Patient summary reviewed and Nursing notes reviewed                Airway   Mallampati: II  TM distance: >3 FB  Neck ROM: full  Dental - normal exam     Pulmonary    (+) ,sleep apnea  Cardiovascular     ECG reviewed    (+) hypertension    ROS comment: · Saline test results are negative.  · Left ventricular wall thickness is consistent with severe concentric hypertrophy.  · Calculated left ventricular EF = 59.5% Estimated left ventricular EF was in agreement with the calculated left ventricular EF. Left ventricular systolic function is normal.  · Left ventricular diastolic function is consistent with (grade I) impaired relaxation.  · No aortic valve regurgitation or stenosis is present.  · Mild mitral valve regurgitation is present.      Neuro/Psych  (+) CVA  GI/Hepatic/Renal/Endo    (+) GI bleeding , renal disease- CRI and ARF    Musculoskeletal     Abdominal    Substance History      OB/GYN          Other   blood dyscrasia anemia,   history of cancer active    ROS/Med Hx Other: 49-year-old gentleman with a 10 cm mass in the lower esophagus with biopsy from EGD on 6/4/2024 positive for adenocarcinoma.  PET scan performed on 6/10/2024 demonstrated gastrohepatic lymphadenopathy concerning for lavell disease                    Anesthesia Plan    ASA 3     general     (I have reviewed the patient's history with the patient and the chart, including all pertinent laboratory results and imaging. I have explained the risks of anesthesia including but not limited to dental damage, corneal abrasion, nerve injury, MI, stroke, and death. Questions asked and answered. Anesthetic plan discussed with patient and team as indicated. Patient expressed understanding of the above.  )  intravenous induction     Anesthetic plan, risks, benefits, and alternatives have been provided, discussed and informed consent has been obtained with: patient.        CODE STATUS:    Code Status (Patient has no pulse and is not  breathing): CPR (Attempt to Resuscitate)  Medical Interventions (Patient has pulse or is breathing): Full Support

## 2024-06-14 NOTE — CONSULTS
[facility]  Initial Inpatient Consult Note    Referring Provider:     Zena Brooks MD        Reason for Consultation: GI Bleed     Subjective     History of present illness:    49 y.o. male with history of CKD and stroke who presented to the hospital with complaints of generalized weakness.  He was just recently discharged 10 days ago after hospitalization stay for progressive dysphagia and weight loss.  He was seen during that time by GI team.  Underwent EGD 6/4 which showed partially but nearly obstructing likely malignant esophageal tumor in the lower third of the esophagus.  Erythematous mucosa in the stomach which was biopsied and no lesions in the entire duodenum.  Pathology showed invasive moderately differentiated adenocarcinoma with ulceration and inflammation.  He was also followed by cardiothoracic surgery as well as oncology and was planning outpatient follow-up with them.    Patient seen this morning- reports he has been having continued black, tarry stools. States he began feeling weak over the last several days and felt dizzy for a bit yesterday. Denies dizziness, shortness of breath today - hemodynamically stable on exam. No current complaints of abdominal pain. Reports he followed up with surgery outpatient and was planning for a feeding tube in the coming weeks. Denies nausea, vomiting.     Past Medical History:  Past Medical History:   Diagnosis Date    Hypertension     Kidney disease     Stroke (cerebrum)      Past Surgical History:  Past Surgical History:   Procedure Laterality Date    BACK SURGERY      ENDOSCOPY N/A 6/4/2024    Procedure: ESOPHAGOGASTRODUODENOSCOPY with biopsies;  Surgeon: Taras Hernandez MD;  Location: [facility] ENDOSCOPY;  Service: Gastroenterology;  Laterality: N/A;  pre- nausea/vomiting   post- nearly obstructing distal esophageal mass, gastritis    KNEE SURGERY      TEETH EXTRACTION      all      Social History:   Social History     Tobacco Use     Smoking status: Former     Current packs/day: 0.50     Average packs/day: 0.5 packs/day for 35.0 years (17.5 ttl pk-yrs)     Types: Cigarettes    Smokeless tobacco: Former    Tobacco comments:     Quit smoking 3 years ago   Substance Use Topics    Alcohol use: Not Currently      Family History:  Family History   Problem Relation Age of Onset    Diabetes Mother     Stroke Mother     Diabetes Sister     Heart failure Sister        Home Meds:  Medications Prior to Admission   Medication Sig Dispense Refill Last Dose    acetaminophen (TYLENOL) 325 MG tablet Take 2 tablets by mouth Every 6 (Six) Hours As Needed for Mild Pain .   Past Week    aspirin  MG tablet Take 1 tablet by mouth Daily. 90 tablet 0 6/13/2024    atorvastatin (LIPITOR) 80 MG tablet Take 1 tablet by mouth Every Night. 30 tablet 0 6/13/2024    carvedilol (COREG) 12.5 MG tablet Take 1 tablet by mouth 2 (Two) Times a Day With Meals.   Past Week    gabapentin (NEURONTIN) 600 MG tablet Take 800 mg by mouth 4 (Four) Times a Day.   6/13/2024    vitamin D3 125 MCG (5000 UT) capsule capsule Take 1 capsule by mouth Daily.   6/13/2024    cloNIDine (CATAPRES) 0.2 MG tablet Take 1 tablet by mouth As Needed for High Blood Pressure.       clopidogrel (PLAVIX) 75 MG tablet Take 1 tablet by mouth Daily. 30 tablet 1     methocarbamol (ROBAXIN) 500 MG tablet Take 1 tablet by mouth 4 (Four) Times a Day.       sildenafil (VIAGRA) 50 MG tablet TAKE ONE TABLET BY MOUTH DAILY AS NEEDED FOR ERECTILE DYSFUNCTION 30 tablet 3     valsartan (DIOVAN) 160 MG tablet Take 2 tablets by mouth Daily. (Patient not taking: Reported on 6/13/2024)        Current Meds:   sodium chloride, 10 mL, Intravenous, Q12H      Allergies:  No Known Allergies    Objective     Vital Signs  Temp:  [98.2 °F (36.8 °C)-101.2 °F (38.4 °C)] 98.2 °F (36.8 °C)  Heart Rate:  [68-96] 71  Resp:  [16-18] 18  BP: (110-136)/(59-71) 116/64    Physical Exam:   General: patient awake, alert and cooperative   Eyes:  Normal lids and lashes, no scleral icterus   Cardiovascular: regular rhythm and rate   Pulm: clear to auscultation bilaterally, regular and unlabored   Abdomen: soft, nontender, nondistended; normal bowel sounds    Results Review:   I reviewed the patient's new clinical results.    Results from last 7 days   Lab Units 06/13/24  2109   WBC 10*3/mm3 4.20   HEMOGLOBIN g/dL 6.6*   HEMATOCRIT % 21.5*   PLATELETS 10*3/mm3 186     Results from last 7 days   Lab Units 06/13/24 2109   SODIUM mmol/L 139   POTASSIUM mmol/L 4.4   CHLORIDE mmol/L 109*   CO2 mmol/L 22.0   BUN mg/dL 24*   CREATININE mg/dL 1.33*   CALCIUM mg/dL 8.5*   BILIRUBIN mg/dL <0.2   ALK PHOS U/L 41   ALT (SGPT) U/L 9   AST (SGOT) U/L 12   GLUCOSE mg/dL 96         Lab Results   Lab Value Date/Time    LIPASE 44 06/13/2024 2109    LIPASE 38 06/03/2024 1738       Radiology:  XR Chest 1 View   Final Result   Perhaps minimal atelectasis at the left lung base.       This report was finalized on 6/13/2024 11:39 PM by Dr. Dori Ortiz M.D on Workstation: [id number]              Assessment & Plan   Active Hospital Problems    Diagnosis     **Anemia due to gastrointestinal blood loss     GI bleed     Esophageal mass     History of CVA (cerebrovascular accident)     Stage 3a chronic kidney disease     HTN (hypertension)        Assessment:  Anemia - Hgb at 6.6 from 9.8 on 6/5  Esophageal mass - path shows moderately differentiated adenocarcinoma   Following w/ surgery and oncology   Positive fecal occult   CKD    Plan:  Discussed w/ Dr. Ramirez - no plans for EGD at this time as bleeding likely related to tumor seen on last EGD - recommend consulting thoracic surgery and oncology for further recs  Monitor Hgb and transfuse as needed per primary team  Continue supportive care   Can continue PPI      Patient and plan of care discussed with attending, Dr. Ashley Nichols PA-C

## 2024-06-14 NOTE — ED PROVIDER NOTES
MD ATTESTATION NOTE    SHARED VISIT: This visit was performed by BOTH a physician and an APC. The substantive portion of the medical decision making was performed by this attesting physician who made or approved the management plan and takes responsibility for patient management. All studies documented in the APC note (if performed) were independently interpreted by me.    The JUAN and I have discussed this patient's history, physical exam, MDM, and treatment plan.  I have reviewed the documentation and personally had a face to face interaction with the patient. The attached note describes my personal findings.      Nelson Garcia is a 49 y.o. male who presents to the ED c/o acute generalized weakness and dehydration.  Also complains of exertional dyspnea.  Decreased ability to swallow foods over the past several weeks.    On exam:  GENERAL: not distressed, chronically ill-appearing  HENT: nares patent  EYES: no scleral icterus  CV: regular rhythm, regular rate  RESPIRATORY: normal effort, clear to auscultation bilaterally  ABDOMEN: soft, nontender  MUSCULOSKELETAL: no deformity  NEURO: alert, moves all extremities, follows commands  SKIN: warm, dry    Labs  Recent Results (from the past 24 hour(s))   Comprehensive Metabolic Panel    Collection Time: 06/13/24  9:09 PM    Specimen: Blood   Result Value Ref Range    Glucose 96 65 - 99 mg/dL    BUN 24 (H) 6 - 20 mg/dL    Creatinine 1.33 (H) 0.76 - 1.27 mg/dL    Sodium 139 136 - 145 mmol/L    Potassium 4.4 3.5 - 5.2 mmol/L    Chloride 109 (H) 98 - 107 mmol/L    CO2 22.0 22.0 - 29.0 mmol/L    Calcium 8.5 (L) 8.6 - 10.5 mg/dL    Total Protein 5.8 (L) 6.0 - 8.5 g/dL    Albumin 3.6 3.5 - 5.2 g/dL    ALT (SGPT) 9 1 - 41 U/L    AST (SGOT) 12 1 - 40 U/L    Alkaline Phosphatase 41 39 - 117 U/L    Total Bilirubin <0.2 0.0 - 1.2 mg/dL    Globulin 2.2 gm/dL    A/G Ratio 1.6 g/dL    BUN/Creatinine Ratio 18.0 7.0 - 25.0    Anion Gap 8.0 5.0 - 15.0 mmol/L    eGFR 65.5 >60.0  mL/min/1.73   Lactic Acid, Plasma    Collection Time: 06/13/24  9:09 PM    Specimen: Blood   Result Value Ref Range    Lactate 0.9 0.5 - 2.0 mmol/L   CBC Auto Differential    Collection Time: 06/13/24  9:09 PM    Specimen: Blood   Result Value Ref Range    WBC 4.20 3.40 - 10.80 10*3/mm3    RBC 2.52 (L) 4.14 - 5.80 10*6/mm3    Hemoglobin 6.6 (C) 13.0 - 17.7 g/dL    Hematocrit 21.5 (L) 37.5 - 51.0 %    MCV 85.3 79.0 - 97.0 fL    MCH 26.2 (L) 26.6 - 33.0 pg    MCHC 30.7 (L) 31.5 - 35.7 g/dL    RDW 14.6 12.3 - 15.4 %    RDW-SD 43.8 37.0 - 54.0 fl    MPV 11.0 6.0 - 12.0 fL    Platelets 186 140 - 450 10*3/mm3    Neutrophil % 66.4 42.7 - 76.0 %    Lymphocyte % 25.5 19.6 - 45.3 %    Monocyte % 6.9 5.0 - 12.0 %    Eosinophil % 0.5 0.3 - 6.2 %    Basophil % 0.5 0.0 - 1.5 %    Immature Grans % 0.2 0.0 - 0.5 %    Neutrophils, Absolute 2.79 1.70 - 7.00 10*3/mm3    Lymphocytes, Absolute 1.07 0.70 - 3.10 10*3/mm3    Monocytes, Absolute 0.29 0.10 - 0.90 10*3/mm3    Eosinophils, Absolute 0.02 0.00 - 0.40 10*3/mm3    Basophils, Absolute 0.02 0.00 - 0.20 10*3/mm3    Immature Grans, Absolute 0.01 0.00 - 0.05 10*3/mm3    nRBC 0.0 0.0 - 0.2 /100 WBC   Lipase    Collection Time: 06/13/24  9:09 PM    Specimen: Blood   Result Value Ref Range    Lipase 44 13 - 60 U/L   POCT Occult Blood Stool    Collection Time: 06/13/24  9:52 PM    Specimen: Per Rectum; Stool   Result Value Ref Range    Fecal Occult Blood Positive (A) Negative    Lot Number [id number]     Expiration Date 02/28/2025     Positive Control Positive Positive    Negative Control Negative Negative       Radiology  No Radiology Exams Resulted Within Past 24 Hours    Medications given in the ED:  Medications   pantoprazole (PROTONIX) injection 80 mg (80 mg Intravenous Given 6/13/24 2201)     And   pantoprazole (PROTONIX) 40 mg in sodium chloride 0.9 % 100 mL (0.4 mg/mL) MBP (8 mg/hr Intravenous New Bag 6/13/24 2205)   sodium chloride 0.9 % flush 10 mL (10 mL Intravenous Given 6/13/24 2213)    sodium chloride 0.9 % flush 10 mL (has no administration in time range)   sodium chloride 0.9 % infusion 40 mL (has no administration in time range)   sennosides-docusate (PERICOLACE) 8.6-50 MG per tablet 2 tablet (has no administration in time range)     And   polyethylene glycol (MIRALAX) packet 17 g (has no administration in time range)     And   bisacodyl (DULCOLAX) EC tablet 5 mg (has no administration in time range)     And   bisacodyl (DULCOLAX) suppository 10 mg (has no administration in time range)   ondansetron (ZOFRAN) injection 4 mg (4 mg Intravenous Given 6/13/24 2110)   lactated ringers bolus 1,000 mL (1,000 mL Intravenous New Bag 6/13/24 2115)   morphine injection 4 mg (4 mg Intravenous Given 6/13/24 2120)       Orders placed during this visit:  Orders Placed This Encounter   Procedures    XR Chest 1 View    Comprehensive Metabolic Panel    Lactic Acid, Plasma    CBC Auto Differential    Lipase    Basic Metabolic Panel    CBC (No Diff)    Urinalysis With Microscopic If Indicated (No Culture) - Urine, Clean Catch    NPO Diet NPO Type: Strict NPO    Verify Informed Consent    Vital Signs    Intake & Output    Weigh Patient    Oral Care    Saline Lock & Maintain IV Access    Place Sequential Compression Device    Maintain Sequential Compression Device    [facility] (on-call MD unless specified) Details    POCT Occult Blood Stool    Type & Screen    Prepare RBC, 1 Units    Insert Peripheral IV    Inpatient Admission    Initiate Observation Status    CBC & Differential       Medical Decision Making:  ED Course as of 06/14/24 0234   Thu Jun 13, 2024 2049 Patient presents with concerns for weakness, dehydration.  Patient recently diagnosed with esophageal cancer.  Patient has been able to drink ensures however no solid food.  He denies any overt pain, vomiting.  Denies any fever.  Patient has been established with thoracic surgery, oncology.     [EE]   2054 Temp(!): 101.2 °F (38.4 °C) [TD]   2129  Hemoglobin(!!): 6.6  This was 9.8, 9 days ago  Patient states that he has had at least a weeks worth of dark stools.   [EE]   2151 Creatinine(!): 1.33 [EE]   2152 Fecal Occult Blood(!): Positive  Blood ordered. [EE]   2200 I discussed case with ROBERT Gautam.  She agrees to admit. [EE]   2206 No clear etiology to patient's elevated temperature earlier.  Patient denies any fever.  Normal white count, normal lactic acid. [EE]   2240 Recheck of patient.  Vitals stable. [EE]   2251 Chest x-ray independently interpreted myself shows no acute infiltrate. [EE]      ED Course User Index  [EE] Jerry Ahn PA  [TD] Ceasar Montgomery II, MD         Diagnosis  Final diagnoses:   Symptomatic anemia   Anemia due to gastrointestinal blood loss   TY (acute kidney injury)   Esophageal adenocarcinoma          Ceasar Montgomery II, MD  06/16/24 7113

## 2024-06-14 NOTE — PLAN OF CARE
Goal Outcome Evaluation:      Pt admitted to the unit from ER. AOx4. Fatigue. VSS. On RA. NSR on tele. 1 unit PRBCs given for hbg of 6.6; pt tolerated well. On Strict NPO. GI consulted. NSR on tele.

## 2024-06-14 NOTE — H&P
Patient Name:  Nelson Garcia  YOB: 1975  MRN:  [id number]  Admit Date:  6/13/2024  Patient Care Team:  Chelita Scott APRN as PCP - General (Family Medicine)  Miranda Jenkins RN as Nurse Navigator  Viet Ramirez MD as Consulting Physician (Radiation Oncology)  Que Lopez II, MD as Referring Physician (Hematology and Oncology)      Subjective   History Present Illness     Chief Complaint   Patient presents with    Vomiting    Weakness - Generalized       Mr. Garcia is a 49 y.o. male with a history of CKD and stroke that presents to [facility] complaining of generalized weakness.  The patient was discharged 10 days ago after being admitted for 3 months of progressive dysphagia and weight loss.  During that admission, EGD revealed a large fungating mass with no bleeding. The mass was partially obstructing and circumferential, and thoracic surgery was consulted.  CT abdomen/pelvis/chest revealed findings concerning for metastatic disease.  Thoracic surgery evaluated the patient and planned for outpatient follow-up.  GI also planned to evaluate the patient on an outpatient basis.  Oncology was consulted during this admission as well, and the patient plan to follow-up with oncology in [place] given this was closer to home.  Since discharge home, patient has been tolerating a liquid diet with no issue.  Has not had any choking episodes or regurgitation.  He followed up with thoracic surgery today, and wife reports that plan is for him to get a feeding tube in the next week.  Other than feeling weak, the patient has no other complaints.  No cough, chest pain, shortness of breath, sore throat, abdominal pain, vomiting, or diarrhea.  In the ER, patient was febrile to 101.2, however it is possible this was an error.  Upon going to the room his temperature was normal.  Labs revealed creatinine 1.33 and significant anemia of 6.6.  Given all of the above, the  patient was admitted to [facility] for further management.    Personal History     Past Medical History:   Diagnosis Date    Hypertension     Kidney disease     Stroke (cerebrum)      Past Surgical History:   Procedure Laterality Date    BACK SURGERY      ENDOSCOPY N/A 6/4/2024    Procedure: ESOPHAGOGASTRODUODENOSCOPY with biopsies;  Surgeon: Taras Hernandez MD;  Location: [facility] ENDOSCOPY;  Service: Gastroenterology;  Laterality: N/A;  pre- nausea/vomiting   post- nearly obstructing distal esophageal mass, gastritis    KNEE SURGERY      TEETH EXTRACTION      all     Family History   Problem Relation Age of Onset    Diabetes Mother     Stroke Mother     Diabetes Sister     Heart failure Sister      Social History     Tobacco Use    Smoking status: Former     Current packs/day: 0.50     Average packs/day: 0.5 packs/day for 35.0 years (17.5 ttl pk-yrs)     Types: Cigarettes    Smokeless tobacco: Former    Tobacco comments:     Quit smoking 3 years ago   Vaping Use    Vaping status: Never Used   Substance Use Topics    Alcohol use: Not Currently    Drug use: Never     No current facility-administered medications on file prior to encounter.     Current Outpatient Medications on File Prior to Encounter   Medication Sig Dispense Refill    acetaminophen (TYLENOL) 325 MG tablet Take 2 tablets by mouth Every 6 (Six) Hours As Needed for Mild Pain .      aspirin  MG tablet Take 1 tablet by mouth Daily. 90 tablet 0    atorvastatin (LIPITOR) 80 MG tablet Take 1 tablet by mouth Every Night. 30 tablet 0    carvedilol (COREG) 12.5 MG tablet Take 1 tablet by mouth 2 (Two) Times a Day With Meals.      cloNIDine (CATAPRES) 0.2 MG tablet Take 1 tablet by mouth As Needed for High Blood Pressure.      clopidogrel (PLAVIX) 75 MG tablet Take 1 tablet by mouth Daily. 30 tablet 1    escitalopram (Lexapro) 10 MG tablet 5 mg daily x 2 weeks then increase to 10 mg daily thereafter (Patient taking differently: 2 tablets. 5 mg daily x 2 weeks then  increase to 10 mg daily thereafter) 30 tablet 2    gabapentin (NEURONTIN) 300 MG capsule Take 1 capsule by mouth Daily.      gabapentin (NEURONTIN) 600 MG tablet Take 800 mg by mouth 4 (Four) Times a Day.      losartan (COZAAR) 50 MG tablet Take 1 tablet by mouth Daily. 30 tablet 0    losartan-hydrochlorothiazide (HYZAAR) 100-12.5 MG per tablet Take 1 tablet by mouth Daily.      methocarbamol (ROBAXIN) 500 MG tablet Take 1 tablet by mouth 4 (Four) Times a Day.      NIFEdipine XL (PROCARDIA XL) 30 MG 24 hr tablet Take 1 tablet by mouth Daily.      sildenafil (VIAGRA) 50 MG tablet TAKE ONE TABLET BY MOUTH DAILY AS NEEDED FOR ERECTILE DYSFUNCTION 30 tablet 3    valsartan (DIOVAN) 160 MG tablet Take 2 tablets by mouth Daily. (Patient not taking: Reported on 6/13/2024)       No Known Allergies    Objective    Objective     Vital Signs  Temp:  [98.7 °F (37.1 °C)-101.2 °F (38.4 °C)] 98.8 °F (37.1 °C)  Heart Rate:  [71-96] 71  Resp:  [16-18] 16  BP: (110-136)/(59-71) 123/71  SpO2:  [92 %-98 %] 93 %  on   ;   Device (Oxygen Therapy): room air  Body mass index is 22.22 kg/m².    Physical Exam  General: Alert, no acute distress.  Lying in bed.  Chronically ill-appearing.  ENT: No conjunctival injection or scleral icterus. Moist mucous membranes.   Neuro: Eyes open and moving in all directions, generalized weakness, no focal deficits.   Lungs: Clear to auscultation bilaterally. No wheeze or crackles. No distress.   Heart: RRR, no murmurs. No edema.  Abdomen: Soft, non-tender, non-distended. Normal bowel sounds.   Ext: Warm and well-perfused. No edema.   Skin: No rashes or lesions. IV site without swelling or erythema.     Lab Results (last 24 hours)       Procedure Component Value Units Date/Time    CBC & Differential [[id number]]  (Abnormal) Collected: 06/13/24 2109    Specimen: Blood Updated: 06/13/24 2126    Narrative:      The following orders were created for panel order CBC & Differential.  Procedure                                Abnormality         Status                     ---------                               -----------         ------                     CBC Auto Differential[[id number]]        Abnormal            Final result                 Please view results for these tests on the individual orders.    Comprehensive Metabolic Panel [[id number]]  (Abnormal) Collected: 06/13/24 2109    Specimen: Blood Updated: 06/13/24 2140     Glucose 96 mg/dL      BUN 24 mg/dL      Creatinine 1.33 mg/dL      Sodium 139 mmol/L      Potassium 4.4 mmol/L      Chloride 109 mmol/L      CO2 22.0 mmol/L      Calcium 8.5 mg/dL      Total Protein 5.8 g/dL      Albumin 3.6 g/dL      ALT (SGPT) 9 U/L      AST (SGOT) 12 U/L      Alkaline Phosphatase 41 U/L      Total Bilirubin <0.2 mg/dL      Globulin 2.2 gm/dL      A/G Ratio 1.6 g/dL      BUN/Creatinine Ratio 18.0     Anion Gap 8.0 mmol/L      eGFR 65.5 mL/min/1.73     Narrative:      GFR Normal >60  Chronic Kidney Disease <60  Kidney Failure <15      Lactic Acid, Plasma [[id number]]  (Normal) Collected: 06/13/24 2109    Specimen: Blood Updated: 06/13/24 2135     Lactate 0.9 mmol/L     CBC Auto Differential [[id number]]  (Abnormal) Collected: 06/13/24 2109    Specimen: Blood Updated: 06/13/24 2126     WBC 4.20 10*3/mm3      RBC 2.52 10*6/mm3      Hemoglobin 6.6 g/dL      Hematocrit 21.5 %      MCV 85.3 fL      MCH 26.2 pg      MCHC 30.7 g/dL      RDW 14.6 %      RDW-SD 43.8 fl      MPV 11.0 fL      Platelets 186 10*3/mm3      Neutrophil % 66.4 %      Lymphocyte % 25.5 %      Monocyte % 6.9 %      Eosinophil % 0.5 %      Basophil % 0.5 %      Immature Grans % 0.2 %      Neutrophils, Absolute 2.79 10*3/mm3      Lymphocytes, Absolute 1.07 10*3/mm3      Monocytes, Absolute 0.29 10*3/mm3      Eosinophils, Absolute 0.02 10*3/mm3      Basophils, Absolute 0.02 10*3/mm3      Immature Grans, Absolute 0.01 10*3/mm3      nRBC 0.0 /100 WBC     Lipase [[id number]]  (Normal) Collected: 06/13/24 2667    Specimen: Blood  Updated: 06/13/24 2140     Lipase 44 U/L     POCT Occult Blood Stool [[id number]]  (Abnormal) Collected: 06/13/24 2152    Specimen: Stool from Per Rectum Updated: 06/13/24 2152     Fecal Occult Blood Positive     Lot Number [id number]     Expiration Date 02/28/2025     Positive Control Positive     Negative Control Negative            Imaging Results (Last 24 Hours)       Procedure Component Value Units Date/Time    XR Chest 1 View [[id number]] Resulted: 06/13/24 2236     Updated: 06/13/24 2236            Results for orders placed during the hospital encounter of 02/21/22    Adult transthoracic echo complete    Interpretation Summary  · Saline test results are negative.  · Left ventricular wall thickness is consistent with severe concentric hypertrophy.  · Calculated left ventricular EF = 59.5% Estimated left ventricular EF was in agreement with the calculated left ventricular EF. Left ventricular systolic function is normal.  · Left ventricular diastolic function is consistent with (grade I) impaired relaxation.  · No aortic valve regurgitation or stenosis is present.  · Mild mitral valve regurgitation is present.    No orders to display     Assessment/Plan   Assessment & Plan   Active Hospital Problems    Diagnosis  POA    **Anemia due to gastrointestinal blood loss [D50.0]  Yes    GI bleed [K92.2]  Yes    Esophageal mass [K22.89]  Unknown    History of CVA (cerebrovascular accident) [Z86.73]  Not Applicable    Stage 3a chronic kidney disease [N18.31]  Yes    HTN (hypertension) [I10]  Yes      Resolved Hospital Problems   No resolved problems to display.       49 y.o. male admitted with Anemia due to gastrointestinal blood loss.    Anemia with concern for GI bleed  Esophageal mass  Dysphagia  -Hgb 6.6, fecal occult positive  -Status post EGD last week with esophageal mass.  Report reviewed, no signs of bleeding noted.  -Pathology report revealed invasive moderately differentiated adenocarcinoma with ulceration and  inflammation   -NPO at this time, continue IV fluids  -Transfusing 1 unit PRBC  -Trend H&H every 8 hours  -Continue PPI drip  -Consult GI for further evaluation    Fever  -Patient febrile on arrival in triage. Repeat temperature normal and patient denied any infectious symptoms.  High probability that this was equipment error from triage thermometer.  -WBC is normal, lactic acid normal  -CXR and urinalysis has been ordered  -If patient has additional fever, will check a viral respiratory panel, blood cultures, and procalcitonin at that time.  -Hold off on abx for now pending above work-up. Should patient change clinically in any way, can start empirically.     CKD  -Creatinine 1.33, was most recently 1.12 on discharge but baseline historically has been around 1.3  -Urinalysis as above  -Continue IV fluids  -Repeat BMP with morning labs    History of stroke  -Hold ASA given ongoing GI bleed  -Patient states he is no longer on Plavix  -Continue statin    Hypertension  -Patient normotensive at this time  -Monitor blood pressure and add back medications as needed.  Awaiting completion of home medication reconciliation at this time.    SCDs for DVT prophylaxis.  Full code.  Discussed with patient, spouse, and ED provider.      Zena Brooks MD  [facility]  06/13/24  22:58 EDT

## 2024-06-14 NOTE — PROGRESS NOTES
"DAILY PROGRESS NOTE  [facility]    Patient Identification:  Name: Nelson Garcia  Age: 49 y.o.  Sex: male  :  1975  MRN: [id number]         Primary Care Physician: Chelita Scott APRN    Subjective:  Interval History: He complains of pain and hurting all over.  He is weak.    Objective:    Scheduled Meds:sodium chloride, 10 mL, Intravenous, Q12H      Continuous Infusions:lactated ringers, 100 mL/hr, Last Rate: 100 mL/hr (24 0148)  pantoprazole, 8 mg/hr, Last Rate: 8 mg/hr (24 1005)        Vital signs in last 24 hours:  Temp:  [98.2 °F (36.8 °C)-101.2 °F (38.4 °C)] 98.6 °F (37 °C)  Heart Rate:  [57-94] 57  Resp:  [16-18] 18  BP: (110-136)/(64-81) 128/75    Intake/Output:    Intake/Output Summary (Last 24 hours) at 2024 1429  Last data filed at 2024 1312  Gross per 24 hour   Intake 648.75 ml   Output 2420 ml   Net -1771.25 ml       Exam:  /75 (BP Location: Right arm, Patient Position: Lying) Comment: Simultaneous filing. User may be unaware of other data.  Pulse 57 Comment: Simultaneous filing. User may be unaware of other data.  Temp 98.6 °F (37 °C) (Oral) Comment: Simultaneous filing. User may be unaware of other data. Comment (Src): Simultaneous filing. User may be unaware of other data.  Resp 18 Comment: Simultaneous filing. User may be unaware of other data.  Ht 182.9 cm (72\")   Wt 74.3 kg (163 lb 12.8 oz)   SpO2 95%   BMI 22.22 kg/m²     General Appearance:    Alert, cooperative, no distress   Head:    Normocephalic, without obvious abnormality, atraumatic   Eyes:       Throat:   Lips, tongue, gums normal   Neck:   Supple, symmetrical, trachea midline, no JVD   Lungs:     Clear to auscultation bilaterally, respirations unlabored   Chest Wall:    No tenderness or deformity    Heart:    Regular rate and rhythm, S1 and S2 normal, no murmur,no  Rub or gallop   Abdomen:     Soft, nontender, bowel sounds active, no masses, no organomegaly  "   Extremities:   Extremities normal, atraumatic, no cyanosis or edema   Pulses:      Skin:   Skin is warm and dry,  no rashes or palpable lesions   Neurologic:   no focal deficits noted      Lab Results (last 72 hours)       Procedure Component Value Units Date/Time    Ferritin [[id number]]  (Normal) Collected: 06/14/24 0713    Specimen: Blood Updated: 06/14/24 1349     Ferritin 55.00 ng/mL     Narrative:      Results may be falsely decreased if patient taking Biotin.      Iron Profile [[id number]]  (Abnormal) Collected: 06/14/24 0713    Specimen: Blood Updated: 06/14/24 1343     Iron 15 mcg/dL      Iron Saturation (TSAT) 5 %      Transferrin 192 mg/dL      TIBC 286 mcg/dL     CBC (No Diff) [[id number]]  (Abnormal) Collected: 06/14/24 0713    Specimen: Blood Updated: 06/14/24 0831     WBC 4.36 10*3/mm3      RBC 2.52 10*6/mm3      Hemoglobin 6.7 g/dL      Hematocrit 21.5 %      MCV 85.3 fL      MCH 26.6 pg      MCHC 31.2 g/dL      RDW 14.7 %      RDW-SD 44.4 fl      MPV 11.3 fL      Platelets 156 10*3/mm3     Basic Metabolic Panel [[id number]]  (Abnormal) Collected: 06/14/24 0713    Specimen: Blood Updated: 06/14/24 0815     Glucose 95 mg/dL      BUN 17 mg/dL      Creatinine 1.20 mg/dL      Sodium 139 mmol/L      Potassium 4.0 mmol/L      Chloride 110 mmol/L      CO2 23.0 mmol/L      Calcium 8.4 mg/dL      BUN/Creatinine Ratio 14.2     Anion Gap 6.0 mmol/L      eGFR 74.1 mL/min/1.73     Narrative:      GFR Normal >60  Chronic Kidney Disease <60  Kidney Failure <15      Urinalysis With Microscopic If Indicated (No Culture) - Urine, Clean Catch [[id number]]  (Normal) Collected: 06/14/24 0118    Specimen: Urine, Clean Catch Updated: 06/14/24 0209     Color, UA Yellow     Appearance, UA Clear     pH, UA 6.0     Specific Gravity, UA 1.013     Glucose, UA Negative     Ketones, UA Negative     Bilirubin, UA Negative     Blood, UA Negative     Protein, UA Negative     Leuk Esterase, UA Negative     Nitrite, UA Negative      Urobilinogen, UA 0.2 E.U./dL    Narrative:      Urine microscopic not indicated.    POCT Occult Blood Stool [[id number]]  (Abnormal) Collected: 06/13/24 2152    Specimen: Stool from Per Rectum Updated: 06/13/24 2152     Fecal Occult Blood Positive     Lot Number [id number]     Expiration Date 02/28/2025     Positive Control Positive     Negative Control Negative    Comprehensive Metabolic Panel [[id number]]  (Abnormal) Collected: 06/13/24 2109    Specimen: Blood Updated: 06/13/24 2140     Glucose 96 mg/dL      BUN 24 mg/dL      Creatinine 1.33 mg/dL      Sodium 139 mmol/L      Potassium 4.4 mmol/L      Chloride 109 mmol/L      CO2 22.0 mmol/L      Calcium 8.5 mg/dL      Total Protein 5.8 g/dL      Albumin 3.6 g/dL      ALT (SGPT) 9 U/L      AST (SGOT) 12 U/L      Alkaline Phosphatase 41 U/L      Total Bilirubin <0.2 mg/dL      Globulin 2.2 gm/dL      A/G Ratio 1.6 g/dL      BUN/Creatinine Ratio 18.0     Anion Gap 8.0 mmol/L      eGFR 65.5 mL/min/1.73     Narrative:      GFR Normal >60  Chronic Kidney Disease <60  Kidney Failure <15      Lipase [[id number]]  (Normal) Collected: 06/13/24 2109    Specimen: Blood Updated: 06/13/24 2140     Lipase 44 U/L     Lactic Acid, Plasma [[id number]]  (Normal) Collected: 06/13/24 2109    Specimen: Blood Updated: 06/13/24 2135     Lactate 0.9 mmol/L     CBC & Differential [[id number]]  (Abnormal) Collected: 06/13/24 2109    Specimen: Blood Updated: 06/13/24 2126    Narrative:      The following orders were created for panel order CBC & Differential.  Procedure                               Abnormality         Status                     ---------                               -----------         ------                     CBC Auto Differential[[id number]]        Abnormal            Final result                 Please view results for these tests on the individual orders.    CBC Auto Differential [[id number]]  (Abnormal) Collected: 06/13/24 2109    Specimen: Blood Updated: 06/13/24 2126     WBC  "4.20 10*3/mm3      RBC 2.52 10*6/mm3      Hemoglobin 6.6 g/dL      Hematocrit 21.5 %      MCV 85.3 fL      MCH 26.2 pg      MCHC 30.7 g/dL      RDW 14.6 %      RDW-SD 43.8 fl      MPV 11.0 fL      Platelets 186 10*3/mm3      Neutrophil % 66.4 %      Lymphocyte % 25.5 %      Monocyte % 6.9 %      Eosinophil % 0.5 %      Basophil % 0.5 %      Immature Grans % 0.2 %      Neutrophils, Absolute 2.79 10*3/mm3      Lymphocytes, Absolute 1.07 10*3/mm3      Monocytes, Absolute 0.29 10*3/mm3      Eosinophils, Absolute 0.02 10*3/mm3      Basophils, Absolute 0.02 10*3/mm3      Immature Grans, Absolute 0.01 10*3/mm3      nRBC 0.0 /100 WBC           Data Review:  Results from last 7 days   Lab Units 06/14/24  0713 06/13/24  2109   SODIUM mmol/L 139 139   POTASSIUM mmol/L 4.0 4.4   CHLORIDE mmol/L 110* 109*   CO2 mmol/L 23.0 22.0   BUN mg/dL 17 24*   CREATININE mg/dL 1.20 1.33*   GLUCOSE mg/dL 95 96   CALCIUM mg/dL 8.4* 8.5*     Results from last 7 days   Lab Units 06/14/24  0713 06/13/24 2109   WBC 10*3/mm3 4.36 4.20   HEMOGLOBIN g/dL 6.7* 6.6*   HEMATOCRIT % 21.5* 21.5*   PLATELETS 10*3/mm3 156 186             Lab Results   Lab Value Date/Time    TROPONINT 0.014 02/21/2022 1514         Results from last 7 days   Lab Units 06/13/24 2109   ALK PHOS U/L 41   BILIRUBIN mg/dL <0.2   ALT (SGPT) U/L 9   AST (SGOT) U/L 12             No results found for: \"POCGLU\"        Past Medical History:   Diagnosis Date    Hypertension     Kidney disease     Stroke (cerebrum)        Assessment:  Active Hospital Problems    Diagnosis  POA    **Anemia due to gastrointestinal blood loss [D50.0]  Yes    GI bleed [K92.2]  Yes    Esophageal mass [K22.89]  Unknown    History of CVA (cerebrovascular accident) [Z86.73]  Not Applicable    Esophageal adenocarcinoma [C15.9]  Unknown    Stage 3a chronic kidney disease [N18.31]  Yes    HTN (hypertension) [I10]  Yes      Resolved Hospital Problems   No resolved problems to display.       Plan:  Follow " hemoglobin and labs and transfuse as needed.  GI consult noted.  Will ask for oncology and thoracic surgery to consult again.    Corey Yo MD  6/14/2024  14:29 EDT

## 2024-06-14 NOTE — ED NOTES
"Pt fm upset with nurse because nurse \"cut me off\" when the nurse was asking the pt questions and she was answering for him. This nurse apologized, gave the fm the reason to get information directly from pt, but she states \"I know what's going with him.\"  This nurse again apologized and got the CN to speak with pt and FM.  "

## 2024-06-14 NOTE — CONSULTS
Inpatient Thoracic Surgery Consult  Consult performed by: Naheed Rader, ALTON, APRN  Consult ordered by: Corey Yo MD          Patient Care Team:  Chelita Scott APRN as PCP - General (Family Medicine)  Miranda Jenkins, RN as Nurse Navigator  Viet Ramirez MD as Consulting Physician (Radiation Oncology)  Que Lopez II, MD as Referring Physician (Hematology and Oncology)    Chief Complaint   Patient presents with    Vomiting    Weakness - Generalized       Subjective     Vomiting   Associated symptoms include chest pain and headaches.   Weakness - Generalized  Associated symptoms include chest pain, headaches, vomiting and weakness.     Mr. Garcia is a 49-year-old gentleman known to the thoracic surgery service currently undergoing workup for an esophageal mass seen on EGD from 6/4/2024.  He was seen in the office yesterday by Dr. Mills and plan was for outpatient port placement, EGD with biopsies and feeding tube placement.  Patient presented to [facility] ER yesterday evening with weakness and dehydration.  He was found to have a hemoglobin of 6.6 and was positive occult stool.  He was transfused and seen by GI earlier today.  Thoracic surgery has been consulted given his known malignancy.      Review of Systems   Eyes: Negative.    Respiratory: Negative.     Cardiovascular:  Positive for chest pain.   Gastrointestinal:  Positive for blood in stool and vomiting.   Endocrine: Negative.    Allergic/Immunologic: Negative.    Neurological:  Positive for weakness and headaches.   Hematological: Negative.    Psychiatric/Behavioral: Negative.                Past Medical History:   Diagnosis Date    Hypertension     Kidney disease     Stroke (cerebrum)      Past Surgical History:   Procedure Laterality Date    BACK SURGERY      ENDOSCOPY N/A 6/4/2024    Procedure: ESOPHAGOGASTRODUODENOSCOPY with biopsies;  Surgeon: Taras Hernandez MD;  Location: [facility] ENDOSCOPY;  Service:  Gastroenterology;  Laterality: N/A;  pre- nausea/vomiting   post- nearly obstructing distal esophageal mass, gastritis    KNEE SURGERY      TEETH EXTRACTION      all     Family History   Problem Relation Age of Onset    Diabetes Mother     Stroke Mother     Diabetes Sister     Heart failure Sister      Social History     Socioeconomic History    Marital status:    Tobacco Use    Smoking status: Former     Current packs/day: 0.50     Average packs/day: 0.5 packs/day for 35.0 years (17.5 ttl pk-yrs)     Types: Cigarettes    Smokeless tobacco: Former    Tobacco comments:     Quit smoking 3 years ago   Vaping Use    Vaping status: Never Used   Substance and Sexual Activity    Alcohol use: Not Currently    Drug use: Never    Sexual activity: Defer     Medications Prior to Admission   Medication Sig Dispense Refill Last Dose    acetaminophen (TYLENOL) 325 MG tablet Take 2 tablets by mouth Every 6 (Six) Hours As Needed for Mild Pain .   Past Week    aspirin  MG tablet Take 1 tablet by mouth Daily. 90 tablet 0 6/13/2024    atorvastatin (LIPITOR) 80 MG tablet Take 1 tablet by mouth Every Night. 30 tablet 0 6/13/2024    carvedilol (COREG) 12.5 MG tablet Take 1 tablet by mouth 2 (Two) Times a Day With Meals.   Past Week    gabapentin (NEURONTIN) 600 MG tablet Take 800 mg by mouth 4 (Four) Times a Day.   6/13/2024    vitamin D3 125 MCG (5000 UT) capsule capsule Take 1 capsule by mouth Daily.   6/13/2024    cloNIDine (CATAPRES) 0.2 MG tablet Take 1 tablet by mouth As Needed for High Blood Pressure.       clopidogrel (PLAVIX) 75 MG tablet Take 1 tablet by mouth Daily. 30 tablet 1     methocarbamol (ROBAXIN) 500 MG tablet Take 1 tablet by mouth 4 (Four) Times a Day.       sildenafil (VIAGRA) 50 MG tablet TAKE ONE TABLET BY MOUTH DAILY AS NEEDED FOR ERECTILE DYSFUNCTION 30 tablet 3     valsartan (DIOVAN) 160 MG tablet Take 2 tablets by mouth Daily. (Patient not taking: Reported on 6/13/2024)        No Known  Allergies    Objective      Vital Signs  Temp:  [98.2 °F (36.8 °C)-101.2 °F (38.4 °C)] 98.6 °F (37 °C)  Heart Rate:  [57-94] 57  Resp:  [16-18] 18  BP: (110-136)/(64-81) 128/75    Intake & Output (last day)         06/13 0701  06/14 0700 06/14 0701  06/15 0700    Blood 300 348.8    Total Intake(mL/kg) 300 (4) 348.8 (4.7)    Urine (mL/kg/hr) 620 1800 (3.7)    Total Output 620 1800    Net -320 -1451.3                  Physical Exam  Constitutional:       General: He is not in acute distress.     Appearance: Normal appearance. He is ill-appearing.   HENT:      Head: Normocephalic and atraumatic.   Pulmonary:      Effort: Pulmonary effort is normal. No respiratory distress.   Abdominal:      Palpations: Abdomen is soft.   Musculoskeletal:         General: Normal range of motion.      Cervical back: Normal range of motion and neck supple.   Skin:     General: Skin is warm.   Neurological:      General: No focal deficit present.      Mental Status: He is alert.      Motor: Weakness present.   Psychiatric:         Mood and Affect: Mood normal.         Thought Content: Thought content normal.         Results Review:    I reviewed the patient's new clinical results.  I reviewed the patient's new imaging results and agree with the interpretation.  Discussed with patient, RN, family at bedside and thoracic surgeon    Imaging Results (Last 24 Hours)       Procedure Component Value Units Date/Time    XR Chest 1 View [[id number]] Collected: 06/13/24 2338     Updated: 06/13/24 2342    Narrative:      SINGLE VIEW OF THE CHEST     HISTORY: Generalized weakness     COMPARISON: None available.     FINDINGS:  There is cardiomegaly. There is no vascular congestion. No pneumothorax  is seen. No acute infiltrates are seen. There may be some minimal  atelectasis at the left lung base.       Impression:      Perhaps minimal atelectasis at the left lung base.     This report was finalized on 6/13/2024 11:39 PM by Dr. Dori Ortiz M.D on  Workstation: [id number]               Lab Results:  Lab Results (last 24 hours)       Procedure Component Value Units Date/Time    Ferritin [[id number]] Collected: 06/14/24 0713    Specimen: Blood Updated: 06/14/24 1314    Iron Profile [[id number]] Collected: 06/14/24 0713    Specimen: Blood Updated: 06/14/24 1314    CBC (No Diff) [[id number]]  (Abnormal) Collected: 06/14/24 0713    Specimen: Blood Updated: 06/14/24 0831     WBC 4.36 10*3/mm3      RBC 2.52 10*6/mm3      Hemoglobin 6.7 g/dL      Hematocrit 21.5 %      MCV 85.3 fL      MCH 26.6 pg      MCHC 31.2 g/dL      RDW 14.7 %      RDW-SD 44.4 fl      MPV 11.3 fL      Platelets 156 10*3/mm3     Basic Metabolic Panel [[id number]]  (Abnormal) Collected: 06/14/24 0713    Specimen: Blood Updated: 06/14/24 0815     Glucose 95 mg/dL      BUN 17 mg/dL      Creatinine 1.20 mg/dL      Sodium 139 mmol/L      Potassium 4.0 mmol/L      Chloride 110 mmol/L      CO2 23.0 mmol/L      Calcium 8.4 mg/dL      BUN/Creatinine Ratio 14.2     Anion Gap 6.0 mmol/L      eGFR 74.1 mL/min/1.73     Narrative:      GFR Normal >60  Chronic Kidney Disease <60  Kidney Failure <15      Urinalysis With Microscopic If Indicated (No Culture) - Urine, Clean Catch [[id number]]  (Normal) Collected: 06/14/24 0118    Specimen: Urine, Clean Catch Updated: 06/14/24 0209     Color, UA Yellow     Appearance, UA Clear     pH, UA 6.0     Specific Gravity, UA 1.013     Glucose, UA Negative     Ketones, UA Negative     Bilirubin, UA Negative     Blood, UA Negative     Protein, UA Negative     Leuk Esterase, UA Negative     Nitrite, UA Negative     Urobilinogen, UA 0.2 E.U./dL    Narrative:      Urine microscopic not indicated.    POCT Occult Blood Stool [[id number]]  (Abnormal) Collected: 06/13/24 2152    Specimen: Stool from Per Rectum Updated: 06/13/24 2152     Fecal Occult Blood Positive     Lot Number [id number]     Expiration Date 02/28/2025     Positive Control Positive     Negative Control Negative     Comprehensive Metabolic Panel [[id number]]  (Abnormal) Collected: 06/13/24 2109    Specimen: Blood Updated: 06/13/24 2140     Glucose 96 mg/dL      BUN 24 mg/dL      Creatinine 1.33 mg/dL      Sodium 139 mmol/L      Potassium 4.4 mmol/L      Chloride 109 mmol/L      CO2 22.0 mmol/L      Calcium 8.5 mg/dL      Total Protein 5.8 g/dL      Albumin 3.6 g/dL      ALT (SGPT) 9 U/L      AST (SGOT) 12 U/L      Alkaline Phosphatase 41 U/L      Total Bilirubin <0.2 mg/dL      Globulin 2.2 gm/dL      A/G Ratio 1.6 g/dL      BUN/Creatinine Ratio 18.0     Anion Gap 8.0 mmol/L      eGFR 65.5 mL/min/1.73     Narrative:      GFR Normal >60  Chronic Kidney Disease <60  Kidney Failure <15      Lipase [[id number]]  (Normal) Collected: 06/13/24 2109    Specimen: Blood Updated: 06/13/24 2140     Lipase 44 U/L     Lactic Acid, Plasma [[id number]]  (Normal) Collected: 06/13/24 2109    Specimen: Blood Updated: 06/13/24 2135     Lactate 0.9 mmol/L     CBC & Differential [[id number]]  (Abnormal) Collected: 06/13/24 2109    Specimen: Blood Updated: 06/13/24 2126    Narrative:      The following orders were created for panel order CBC & Differential.  Procedure                               Abnormality         Status                     ---------                               -----------         ------                     CBC Auto Differential[[id number]]        Abnormal            Final result                 Please view results for these tests on the individual orders.    CBC Auto Differential [[id number]]  (Abnormal) Collected: 06/13/24 2109    Specimen: Blood Updated: 06/13/24 2126     WBC 4.20 10*3/mm3      RBC 2.52 10*6/mm3      Hemoglobin 6.6 g/dL      Hematocrit 21.5 %      MCV 85.3 fL      MCH 26.2 pg      MCHC 30.7 g/dL      RDW 14.6 %      RDW-SD 43.8 fl      MPV 11.0 fL      Platelets 186 10*3/mm3      Neutrophil % 66.4 %      Lymphocyte % 25.5 %      Monocyte % 6.9 %      Eosinophil % 0.5 %      Basophil % 0.5 %      Immature Grans %  0.2 %      Neutrophils, Absolute 2.79 10*3/mm3      Lymphocytes, Absolute 1.07 10*3/mm3      Monocytes, Absolute 0.29 10*3/mm3      Eosinophils, Absolute 0.02 10*3/mm3      Basophils, Absolute 0.02 10*3/mm3      Immature Grans, Absolute 0.01 10*3/mm3      nRBC 0.0 /100 WBC                 Assessment & Plan       Anemia due to gastrointestinal blood loss    HTN (hypertension)    Stage 3a chronic kidney disease    GI bleed    Esophageal mass    History of CVA (cerebrovascular accident)    Esophageal adenocarcinoma      Assessment & Plan    Mr. Lorenzana is a 49-year-old gentleman with a 10 cm mass in the lower esophagus with biopsy from EGD on 6/4/2024 positive for adenocarcinoma.  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  Plan is for EGD with biopsies, port placement and J-tube placement in the OR this afternoon.  This was discussed at length with the patient and his wife today.  They agreed to proceed.  Please keep n.p.o. for now.    Anemia: Likely secondary to GI bleed.  Patient has received 2 units PRBCs and recheck lab is pending.  I will place some blood on hold to have for the OR.    I discussed the patients findings and our recommendations with patient, family, nursing staff, and Dr. Mills, Dr. Valente to Dr. Bhardwaj    Thank you for this consult and allowing us to participate in the care of your patient.  We will follow along with you during this hospitalization.       Naheed Rader DNP, APRN  [facility]  06/14/24  13:29 EDT    I spent >62 minutes reviewing the patient's chart including medical history, notes, radiographic imaging, labs and performing an assessment and development of a plan and discussion with the patient/family at bedside.

## 2024-06-14 NOTE — ED PROVIDER NOTES
EMERGENCY DEPARTMENT ENCOUNTER    Room Number:  MARIAA/MARIAA  Date of encounter:  6/13/2024  PCP: Chelita Scott APRN  Historian: Patient, spouse  Chronic or social conditions impacting care (social determinants of health): None    HPI:  Chief Complaint: Weakness, dehydration  A complete HPI/ROS/PMH/PSH/SH/FH are unobtainable due to: Nothing    Context: Nelson Garcia is a 49 y.o. male with a history of hypertension, stroke, chronic kidney disease, new onset esophageal cancer.  He presents to the ED c/o acute generalized weakness, dehydration.  Patient states that his blood pressures have been in the 80s systolically the past several days.  He complains of exertional weakness and dyspnea.  Patient states for the past several weeks he has not been able to swallow any solid foods.  He has been getting by on Ensure milkshakes.  He is able to drink fluids without much difficulty.  He was recently admitted and diagnosed with esophageal cancer.  He states he felt much better after getting IV fluids.  He has followed up with thoracic surgery and has an appointment next week with oncology.  He is due for further studies, feeding tube in the next week or 2.   He denies any overt vomiting or pain.    Review of prior external notes (non-ED):   Reviewed admission from 6/3/2024.  Patient admitted for dysphagia and esophageal mass.    Review of prior external test results outside of this encounter:  I reviewed an EGD from 6/4/2024.  This showed an esophageal mass.  I reviewed an iron profile from 6/5/2024.  Iron and iron saturation are both low.    PAST MEDICAL HISTORY  Active Ambulatory Problems     Diagnosis Date Noted   • Left leg weakness 02/21/2022   • HTN (hypertension) 02/21/2022   • Cerebral microhemorrhages 02/22/2022   • Acute ischemic stroke 02/22/2022   • Tobacco use 02/22/2022   • Stage 3a chronic kidney disease 02/22/2022   • Observed sleep apnea 06/30/2022   • Snoring 06/30/2022   • Non-restorative sleep  06/30/2022   • Erectile dysfunction 10/21/2022   • Dysphagia 06/03/2024     Resolved Ambulatory Problems     Diagnosis Date Noted   • No Resolved Ambulatory Problems     Past Medical History:   Diagnosis Date   • Hypertension    • Kidney disease    • Stroke (cerebrum)          PAST SURGICAL HISTORY  Past Surgical History:   Procedure Laterality Date   • BACK SURGERY     • ENDOSCOPY N/A 6/4/2024    Procedure: ESOPHAGOGASTRODUODENOSCOPY with biopsies;  Surgeon: Taras Hernandez MD;  Location: [facility] ENDOSCOPY;  Service: Gastroenterology;  Laterality: N/A;  pre- nausea/vomiting   post- nearly obstructing distal esophageal mass, gastritis   • KNEE SURGERY     • TEETH EXTRACTION      all         FAMILY HISTORY  Family History   Problem Relation Age of Onset   • Diabetes Mother    • Stroke Mother    • Diabetes Sister    • Heart failure Sister          SOCIAL HISTORY  Social History     Socioeconomic History   • Marital status:    Tobacco Use   • Smoking status: Former     Current packs/day: 0.50     Average packs/day: 0.5 packs/day for 35.0 years (17.5 ttl pk-yrs)     Types: Cigarettes   • Smokeless tobacco: Former   • Tobacco comments:     Quit smoking 3 years ago   Vaping Use   • Vaping status: Never Used   Substance and Sexual Activity   • Alcohol use: Not Currently   • Drug use: Never   • Sexual activity: Defer         ALLERGIES  Patient has no known allergies.        REVIEW OF SYSTEMS  All systems reviewed and negative except for those discussed in HPI.       PHYSICAL EXAM    I have reviewed the triage vital signs and nursing notes.    ED Triage Vitals   Temp Heart Rate Resp BP SpO2   06/13/24 2032 06/13/24 2032 06/13/24 2032 06/13/24 2040 06/13/24 2032   (!) 101.2 °F (38.4 °C) 94 18 136/68 97 %      Temp src Heart Rate Source Patient Position BP Location FiO2 (%)   -- 06/13/24 2040 06/13/24 2040 06/13/24 2040 --    Monitor Lying Right arm        Physical Exam  GENERAL: Alert, oriented, chronically ill-appearing,  not distressed  HENT: head atraumatic, no nuchal rigidity  EYES: no scleral icterus, EOMI  CV: regular rhythm, regular rate, no murmur  RESPIRATORY: normal effort, CTA  ABDOMEN: soft, nontender  : Melena on exam  MUSCULOSKELETAL: no deformity, FROM, no calf swelling or tenderness  NEURO: alert, moves all extremities, follows commands  SKIN: warm, dry        LAB RESULTS  Recent Results (from the past 24 hour(s))   Comprehensive Metabolic Panel    Collection Time: 06/13/24  9:09 PM    Specimen: Blood   Result Value Ref Range    Glucose 96 65 - 99 mg/dL    BUN 24 (H) 6 - 20 mg/dL    Creatinine 1.33 (H) 0.76 - 1.27 mg/dL    Sodium 139 136 - 145 mmol/L    Potassium 4.4 3.5 - 5.2 mmol/L    Chloride 109 (H) 98 - 107 mmol/L    CO2 22.0 22.0 - 29.0 mmol/L    Calcium 8.5 (L) 8.6 - 10.5 mg/dL    Total Protein 5.8 (L) 6.0 - 8.5 g/dL    Albumin 3.6 3.5 - 5.2 g/dL    ALT (SGPT) 9 1 - 41 U/L    AST (SGOT) 12 1 - 40 U/L    Alkaline Phosphatase 41 39 - 117 U/L    Total Bilirubin <0.2 0.0 - 1.2 mg/dL    Globulin 2.2 gm/dL    A/G Ratio 1.6 g/dL    BUN/Creatinine Ratio 18.0 7.0 - 25.0    Anion Gap 8.0 5.0 - 15.0 mmol/L    eGFR 65.5 >60.0 mL/min/1.73   Lactic Acid, Plasma    Collection Time: 06/13/24  9:09 PM    Specimen: Blood   Result Value Ref Range    Lactate 0.9 0.5 - 2.0 mmol/L   CBC Auto Differential    Collection Time: 06/13/24  9:09 PM    Specimen: Blood   Result Value Ref Range    WBC 4.20 3.40 - 10.80 10*3/mm3    RBC 2.52 (L) 4.14 - 5.80 10*6/mm3    Hemoglobin 6.6 (C) 13.0 - 17.7 g/dL    Hematocrit 21.5 (L) 37.5 - 51.0 %    MCV 85.3 79.0 - 97.0 fL    MCH 26.2 (L) 26.6 - 33.0 pg    MCHC 30.7 (L) 31.5 - 35.7 g/dL    RDW 14.6 12.3 - 15.4 %    RDW-SD 43.8 37.0 - 54.0 fl    MPV 11.0 6.0 - 12.0 fL    Platelets 186 140 - 450 10*3/mm3    Neutrophil % 66.4 42.7 - 76.0 %    Lymphocyte % 25.5 19.6 - 45.3 %    Monocyte % 6.9 5.0 - 12.0 %    Eosinophil % 0.5 0.3 - 6.2 %    Basophil % 0.5 0.0 - 1.5 %    Immature Grans % 0.2 0.0 - 0.5 %     Neutrophils, Absolute 2.79 1.70 - 7.00 10*3/mm3    Lymphocytes, Absolute 1.07 0.70 - 3.10 10*3/mm3    Monocytes, Absolute 0.29 0.10 - 0.90 10*3/mm3    Eosinophils, Absolute 0.02 0.00 - 0.40 10*3/mm3    Basophils, Absolute 0.02 0.00 - 0.20 10*3/mm3    Immature Grans, Absolute 0.01 0.00 - 0.05 10*3/mm3    nRBC 0.0 0.0 - 0.2 /100 WBC   Lipase    Collection Time: 06/13/24  9:09 PM    Specimen: Blood   Result Value Ref Range    Lipase 44 13 - 60 U/L   POCT Occult Blood Stool    Collection Time: 06/13/24  9:52 PM    Specimen: Per Rectum; Stool   Result Value Ref Range    Fecal Occult Blood Positive (A) Negative    Lot Number [id number]     Expiration Date 02/28/2025     Positive Control Positive Positive    Negative Control Negative Negative   Type & Screen    Collection Time: 06/13/24 10:19 PM    Specimen: Blood   Result Value Ref Range    ABO Type A     RH type Positive        Ordered the above labs and independently reviewed the results.        RADIOLOGY  No Radiology Exams Resulted Within Past 24 Hours    I ordered the above noted radiological studies. Reviewed by me and discussed with radiologist.  See dictation for official radiology interpretation.      MEDICATIONS GIVEN IN ER    Medications   pantoprazole (PROTONIX) injection 80 mg (80 mg Intravenous Given 6/13/24 2201)     And   pantoprazole (PROTONIX) 40 mg in sodium chloride 0.9 % 100 mL (0.4 mg/mL) MBP (8 mg/hr Intravenous New Bag 6/13/24 2205)   sodium chloride 0.9 % flush 10 mL (10 mL Intravenous Given 6/13/24 2213)   sodium chloride 0.9 % flush 10 mL (has no administration in time range)   sodium chloride 0.9 % infusion 40 mL (has no administration in time range)   sennosides-docusate (PERICOLACE) 8.6-50 MG per tablet 2 tablet (has no administration in time range)     And   polyethylene glycol (MIRALAX) packet 17 g (has no administration in time range)     And   bisacodyl (DULCOLAX) EC tablet 5 mg (has no administration in time range)     And   bisacodyl  (DULCOLAX) suppository 10 mg (has no administration in time range)   ondansetron (ZOFRAN) injection 4 mg (4 mg Intravenous Given 6/13/24 2110)   lactated ringers bolus 1,000 mL (1,000 mL Intravenous New Bag 6/13/24 2115)   morphine injection 4 mg (4 mg Intravenous Given 6/13/24 2120)     Critical Care    Performed by: Jerry Ahn PA  Authorized by: Ceasar Montgomery II, MD    Critical care provider statement:     Critical care time (minutes):  35    Critical care time was exclusive of:  Separately billable procedures and treating other patients    Critical care was necessary to treat or prevent imminent or life-threatening deterioration of the following conditions: anemia.    Critical care was time spent personally by me on the following activities:  Blood draw for specimens, development of treatment plan with patient or surrogate, evaluation of patient's response to treatment, examination of patient, interpretation of cardiac output measurements, obtaining history from patient or surrogate, ordering and review of laboratory studies, ordering and review of radiographic studies, ordering and performing treatments and interventions, pulse oximetry, re-evaluation of patient's condition and discussions with primary provider        ADDITIONAL ORDERS CONSIDERED BUT NOT ORDERED:  Nothing      PROGRESS, DATA ANALYSIS, CONSULTS, AND MEDICAL DECISION MAKING    All labs have been independently interpreted by myself.  All radiology studies have been independently interpreted by myself and discussed with radiologist dictating the report.   EKG's independently interpreted by myself.  Discussion below represents my analysis of pertinent findings related to patient's condition, differential diagnosis, treatment plan and final disposition.    I have discussed case with Dr. Montgomery, emergency room physician.  He has performed his own bedside examination and agrees with treatment plan.    ED Course as of 06/13/24 2253   Th Didier  13, 2024 2049 Patient presents with concerns for weakness, dehydration.  Patient recently diagnosed with esophageal cancer.  Patient has been able to drink ensures however no solid food.  He denies any overt pain, vomiting.  Denies any fever.  Patient has been established with thoracic surgery, oncology.     [EE]   2054 Temp(!): 101.2 °F (38.4 °C) [TD]   2129 Hemoglobin(!!): 6.6  This was 9.8, 9 days ago  Patient states that he has had at least a weeks worth of dark stools.   [EE]   2151 Creatinine(!): 1.33 [EE]   2152 Fecal Occult Blood(!): Positive  Blood ordered. [EE]   2200 I discussed case with Dr. Brooks YARIEL.  She agrees to admit. [EE]   2206 No clear etiology to patient's elevated temperature earlier.  Patient denies any fever.  Normal white count, normal lactic acid. [EE]   2240 Recheck of patient.  Vitals stable. [EE]   2251 Chest x-ray independently interpreted myself shows no acute infiltrate. [EE]      ED Course User Index  [EE] Jerry Ahn PA  [TD] Ceasar Montgomery II, MD       AS OF 22:52 EDT VITALS:    BP - 136/68  HR - 77  TEMP - 98.7 °F (37.1 °C)  O2 SATS - 92%        DIAGNOSIS  Final diagnoses:   Symptomatic anemia   Anemia due to gastrointestinal blood loss   TY (acute kidney injury)   Esophageal adenocarcinoma         DISPOSITION  Admitted      Dictated utilizing Dragon dictation     Jerry Ahn PA  06/13/24 4651

## 2024-06-14 NOTE — CONSULTS
Nutrition Services    Patient Name:  Nelson Garcia  YOB: 1975  MRN: [id number]  Admit Date:  6/13/2024    Assessment Date:  06/14/24    Summary: Consulted per RN Admit Screen:  Consulted per RN Admit Screen for weight loss and difficulty chewing/swallowing. Pt with h/o CKD, stroke here with weakness and vomiting. Pt with recent admit for 3 month progressive dysphagia and weight loss. EGD- lg fungating mass partially obstructing and concerning for metastatic disease. Esophageal mass pathology-moderately differentiated adenocarcinoma.  Pt tolerating liquids at home and plans for feeding tube placement. Pt in OR now for EGD with bx, port placement and J-tube placement. Will perform NFPE at later date since pt in OR.  NPO  Meds: IVF@100 ml/hr, protonix  Labs: Hgb 6.7, Cl 110    Weight 6/13 156 lb (at MD appointment)   lb (3 months ago)  29 lb (16%) wt loss x 3 months    Severe Malnutrition (Based on ASPEN/AND criteria, pt meets nutrition diagnosis of Severe Malnutrition of Acute Disease due to inadequate po intake and 29 lb (16%) wt loss x 3 months.)    Recommend:  Once ok to use J-tube, start TF's of Isosource 1.5 @ 20 ml/hr and increase 20 ml q 8 hrs as tolerates to goal rate of 60 ml/hr with 30 ml/hr free water flushes. If pt able to take water po, can decrease free water flushes.   PO diet per MD recommendations.     Will follow per protocol.    CLINICAL NUTRITION ASSESSMENT      Reason for Assessment Malnutrition Severity Assessment, Nurse Admission Screen     Diagnosis/Problem   Generalized weakness, vomiting, esophageal mass, anemia concerning for GI bleed, dysphagia   Medical/Surgical History Past Medical History:   Diagnosis Date    Hypertension     Kidney disease     Stroke (cerebrum)        Past Surgical History:   Procedure Laterality Date    BACK SURGERY      ENDOSCOPY N/A 6/4/2024    Procedure: ESOPHAGOGASTRODUODENOSCOPY with biopsies;  Surgeon: Taras Hernandez MD;  Location:   "[facility];  Service: Gastroenterology;  Laterality: N/A;  pre- nausea/vomiting   post- nearly obstructing distal esophageal mass, gastritis    KNEE SURGERY      TEETH EXTRACTION      all        Anthropometrics        Current Height  Current Weight  BMI kg/m2 Height: 182.9 cm (72\")  Weight: 74.3 kg (163 lb 12.8 oz) (06/13/24 2256)  Body mass index is 22.22 kg/m².   Adjusted BMI (if applicable)    BMI Category Normal/Healthy (18.4 - 24.9)   Ideal Body Weight (IBW) 171 lb   Usual Body Weight (UBW) 185 lb (3 months ago) per pt   Weight Trend Loss, Amount/Timeframe: 29 lb (16%) loss x 3m   Weight History Wt Readings from Last 30 Encounters:   06/13/24 2256 74.3 kg (163 lb 12.8 oz)   06/13/24 2040 70.3 kg (155 lb)   06/13/24 2032 70.9 kg (156 lb 4.9 oz)   06/13/24 1325 70.9 kg (156 lb 4.8 oz)   06/03/24 2100 75.3 kg (166 lb)   06/03/24 1956 75.3 kg (166 lb)   10/21/22 1426 82.6 kg (182 lb 3.2 oz)   06/30/22 0900 80.3 kg (177 lb)   04/28/22 1200 79.8 kg (176 lb)   04/06/22 1425 78.6 kg (173 lb 3.2 oz)   02/22/22 1338 73.9 kg (163 lb)   02/21/22 1509 73.9 kg (163 lb)      --  Estimated/Assessed Needs        Current Weight  Weight: 74.3 kg (163 lb 12.8 oz) (06/13/24 2256)       Energy Requirements    Weight for Calculation 71 kg   Method for Estimation  30-35 kcal/kg   EST Needs (kcal/day) 8220-0949 kcals       Protein Requirements    Weight for Calculation 71 kg   EST Protein Needs (g/kg) 1.2 - 1.5 gm/kg   EST Daily Needs (g/day)  g       Fluid Requirements     Method for Estimation 30 mL/kg    EST Needs (mL/day) 2130 ml     Labs       Pertinent Labs    Results from last 7 days   Lab Units 06/14/24  0713 06/13/24  2109   SODIUM mmol/L 139 139   POTASSIUM mmol/L 4.0 4.4   CHLORIDE mmol/L 110* 109*   CO2 mmol/L 23.0 22.0   BUN mg/dL 17 24*   CREATININE mg/dL 1.20 1.33*   CALCIUM mg/dL 8.4* 8.5*   BILIRUBIN mg/dL  --  <0.2   ALK PHOS U/L  --  41   ALT (SGPT) U/L  --  9   AST (SGOT) U/L  --  12   GLUCOSE mg/dL 95 96 "     Results from last 7 days   Lab Units 06/14/24  1439 06/14/24  0713 06/13/24  2109   HEMOGLOBIN g/dL 7.8* 6.7* 6.6*   HEMATOCRIT % 24.5* 21.5* 21.5*   WBC 10*3/mm3  --  4.36 4.20   ALBUMIN g/dL  --   --  3.6     Results from last 7 days   Lab Units 06/14/24  0713 06/13/24  2109   PLATELETS 10*3/mm3 156 186     COVID19   Date Value Ref Range Status   02/21/2022 Not Detected Not Detected - Ref. Range Final     Lab Results   Component Value Date    HGBA1C 5.00 02/22/2022          Medications           Scheduled Medications [Transfer Hold] sodium chloride, 10 mL, Intravenous, Q12H       Infusions lactated ringers, 100 mL/hr, Last Rate: 100 mL/hr (06/14/24 0148)  pantoprazole, 8 mg/hr, Last Rate: 8 mg/hr (06/14/24 1434)       PRN Medications   [Transfer Hold] senna-docusate sodium **AND** [Transfer Hold] polyethylene glycol **AND** [Transfer Hold] bisacodyl **AND** [Transfer Hold] bisacodyl    [Transfer Hold] HYDROmorphone    [Transfer Hold] Morphine    [Transfer Hold] sodium chloride    [Transfer Hold] sodium chloride     Physical Findings          General Findings alert, oriented, room air   Oral/Mouth Cavity tooth or teeth missing   Edema  not assessed   Gastrointestinal nausea, vomiting   Skin  skin intact   Tubes/Drains/Lines none   NFPE See Malnutrition Severity Assessment, Unable to perform due to: pt off of floor in OR, Date Completed: 6/14   --  Malnutrition Severity Assessment      Patient meets criteria for : Severe Malnutrition (Based on ASPEN/AND criteria, pt meets nutrition diagnosis of Severe Malnutrition of Acute Disease due to inadequate po intake and 29 lb (16%) wt loss x 3 months.)  Malnutrition Type (Last 8 Hours)       Malnutrition Severity Assessment       Row Name 06/14/24 1606       Malnutrition Severity Assessment    Malnutrition Type Acute Disease or Injury - Related Malnutrition      Row Name 06/14/24 1606       Insufficient Energy Intake     Insufficient Energy Intake Findings Severe     Insufficient Energy Intake  <50% of est. energy requirement for >or equal to 1 month      Row Name 06/14/24 1606       Unintentional Weight Loss     Unintentional Weight Loss Findings Severe    Unintentional Weight Loss  Weight loss greater than 7.5% in three months  29 lb (16%) wt loss x 3 months      Row Name 06/14/24 1606       Criteria Met (Must meet criteria for severity in at least 2 of these categories: M Wasting, Fat Loss, Fluid, Secondary Signs, Wt. Status, Intake)    Patient meets criteria for  Severe Malnutrition  Based on ASPEN/AND criteria, pt meets nutrition diagnosis of Severe Malnutrition of Acute Disease due to inadequate po intake and 29 lb (16%) wt loss x 3 months.                       Current Nutrition Orders & Evaluation of Intake       Oral Nutrition     Food Allergies NKFA   Current PO Diet NPO Diet NPO Type: Sips with Meds   Supplement n/a   PO Evaluation     % PO Intake N/a    Factors Affecting Intake: swallow impairment, vomiting   --  PES STATEMENT / NUTRITION DIAGNOSIS      Nutrition Dx Problem  Problem: Malnutrition (severe)  Etiology: Medical Diagnosis - esophageal mass and Factors Affecting Nutrition - N/V, dysphagia    Signs/Symptoms: Report of Minimal PO Intake and Unintended Weight Change     NUTRITION INTERVENTION / PLAN OF CARE      Intervention Goal(s) Maintain nutrition status, Reduce/improve symptoms, Meet estimated needs, Disease management/therapy, Initiate TF/PN, Tolerate TF/PN at goal, and Appropriate weight gain         RD Intervention/Action Await initiation of EN/PN, Continue to monitor, Care plan reviewed, and Recommend/order: EN   --      Prescription/Orders:       PO Diet       Supplements       Enteral Nutrition    Enteral Prescription:     Enteral Route Jejunostomy    TF Delivery Method Continuous    Enteral Product Isosource 1.5    Modular None    Propofol Rate/Kcal     TF Start Rate  20 mL/hr    TF Goal Rate  60 mL/hr    Free Water Flush 30 mL Q 1 hr    Provision  at Goal:          Calories 2160 kcal, meets 100% needs         Protein  97 gm protein, meets 100% needs         Fluid (mL) 1100 mL free water + 720 mL in flushes         Parenteral Nutrition    New Prescription Ordered? No, Recommended   --      Monitor/Evaluation Per protocol   Discharge Plan/Needs Pending clinical course   --    RD to follow per protocol.      Electronically signed by:  Esme Bermudez RD  06/14/24 15:42 EDT

## 2024-06-14 NOTE — ED NOTES
"Nursing report ED to floor  Nelson Garcia  49 y.o.  male    HPI :  HPI (Adult)  Stated Reason for Visit: n/v/weak  History Obtained From: patient    Chief Complaint  Chief Complaint   Patient presents with    Vomiting    Weakness - Generalized       Admitting doctor:   Zena Brooks MD    Admitting diagnosis:   The primary encounter diagnosis was Symptomatic anemia. Diagnoses of Anemia due to gastrointestinal blood loss, TY (acute kidney injury), and Esophageal adenocarcinoma were also pertinent to this visit.    Code status:   Current Code Status       Date Active Code Status Order ID Comments User Context       Prior            Allergies:   Patient has no known allergies.    Isolation:   No active isolations    Intake and Output  No intake or output data in the 24 hours ending 06/13/24 2225    Weight:       06/13/24 2040   Weight: 70.3 kg (155 lb)       Most recent vitals:   Vitals:    06/13/24 2032 06/13/24 2040 06/13/24 2141   BP:  136/68    BP Location:  Right arm    Patient Position:  Lying    Pulse: 94 77    Resp: 18 16    Temp: (!) 101.2 °F (38.4 °C) 98.7 °F (37.1 °C)    SpO2: 97% 96% 92%   Weight: 70.9 kg (156 lb 4.9 oz) 70.3 kg (155 lb)    Height: 182.9 cm (72\") 182.9 cm (72\")        Active LDAs/IV Access:   Lines, Drains & Airways       Active LDAs       Name Placement date Placement time Site Days    Peripheral IV 06/13/24 2109 Left Antecubital 06/13/24 2109  Antecubital  less than 1    Peripheral IV 06/13/24 2223 Posterior;Right Hand 06/13/24 2223  Hand  less than 1                    Labs (abnormal labs have a star):   Labs Reviewed   COMPREHENSIVE METABOLIC PANEL - Abnormal; Notable for the following components:       Result Value    BUN 24 (*)     Creatinine 1.33 (*)     Chloride 109 (*)     Calcium 8.5 (*)     Total Protein 5.8 (*)     All other components within normal limits    Narrative:     GFR Normal >60  Chronic Kidney Disease <60  Kidney Failure <15     CBC WITH AUTO DIFFERENTIAL " - Abnormal; Notable for the following components:    RBC 2.52 (*)     Hemoglobin 6.6 (*)     Hematocrit 21.5 (*)     MCH 26.2 (*)     MCHC 30.7 (*)     All other components within normal limits   POCT OCCULT BLOOD STOOL (ED ONLY) - Abnormal; Notable for the following components:    Fecal Occult Blood Positive (*)     All other components within normal limits   LACTIC ACID, PLASMA - Normal   LIPASE - Normal   BASIC METABOLIC PANEL   CBC (NO DIFF)   URINALYSIS W/ MICROSCOPIC IF INDICATED (NO CULTURE)   TYPE AND SCREEN   PREPARE RBC   CBC AND DIFFERENTIAL    Narrative:     The following orders were created for panel order CBC & Differential.  Procedure                               Abnormality         Status                     ---------                               -----------         ------                     CBC Auto Differential[[id number]]        Abnormal            Final result                 Please view results for these tests on the individual orders.       EKG:   No orders to display       Meds given in ED:   Medications   pantoprazole (PROTONIX) injection 80 mg (80 mg Intravenous Given 6/13/24 2201)     And   pantoprazole (PROTONIX) 40 mg in sodium chloride 0.9 % 100 mL (0.4 mg/mL) MBP (8 mg/hr Intravenous New Bag 6/13/24 2205)   sodium chloride 0.9 % flush 10 mL (10 mL Intravenous Given 6/13/24 2213)   sodium chloride 0.9 % flush 10 mL (has no administration in time range)   sodium chloride 0.9 % infusion 40 mL (has no administration in time range)   sennosides-docusate (PERICOLACE) 8.6-50 MG per tablet 2 tablet (has no administration in time range)     And   polyethylene glycol (MIRALAX) packet 17 g (has no administration in time range)     And   bisacodyl (DULCOLAX) EC tablet 5 mg (has no administration in time range)     And   bisacodyl (DULCOLAX) suppository 10 mg (has no administration in time range)   ondansetron (ZOFRAN) injection 4 mg (4 mg Intravenous Given 6/13/24 2110)   lactated ringers bolus  1,000 mL (1,000 mL Intravenous New Bag 6/13/24 2115)   morphine injection 4 mg (4 mg Intravenous Given 6/13/24 2120)       Imaging results:  No radiology results for the last day    Ambulatory status:   - ad alexa    Social issues:   Social History     Socioeconomic History    Marital status:    Tobacco Use    Smoking status: Former     Current packs/day: 0.50     Average packs/day: 0.5 packs/day for 35.0 years (17.5 ttl pk-yrs)     Types: Cigarettes    Smokeless tobacco: Former    Tobacco comments:     Quit smoking 3 years ago   Vaping Use    Vaping status: Never Used   Substance and Sexual Activity    Alcohol use: Not Currently    Drug use: Never    Sexual activity: Defer       Peripheral Neurovascular  Peripheral Neurovascular (Adult)  Peripheral Neurovascular WDL: WDL    Neuro Cognitive  Neuro Cognitive (Adult)  Cognitive/Neuro/Behavioral WDL: WDL    Learning  Learning Assessment (Adult)  Learning Readiness and Ability: no barriers identified  Education Provided  Person Taught: patient  Teaching Method: verbal instruction  Teaching Focus: symptom/problem overview  Education Outcome Evaluation: eager to learn    Respiratory  Respiratory (Adult)  Airway WDL: WDL  Respiratory WDL  Respiratory WDL: cough  Cough Frequency: frequent  Cough Type: dry    Abdominal Pain       Pain Assessments  Pain (Adult)  (0-10) Pain Rating: Rest: 6  (0-10) Pain Rating: Activity: 6    NIH Stroke Scale       Domenica Rosa RN  06/13/24 22:25 EDT

## 2024-06-14 NOTE — ED NOTES
"Nursing report ED to floor  Nelson Garcia  49 y.o.  male    HPI :  HPI (Adult)  Stated Reason for Visit: n/v/weak  History Obtained From: patient    Chief Complaint  Chief Complaint   Patient presents with    Vomiting    Weakness - Generalized       Admitting doctor:   Zena Brooks MD    Admitting diagnosis:   The primary encounter diagnosis was Symptomatic anemia. Diagnoses of Anemia due to gastrointestinal blood loss, TY (acute kidney injury), and Esophageal adenocarcinoma were also pertinent to this visit.    Code status:   Current Code Status       Date Active Code Status Order ID Comments User Context       Prior            Allergies:   Patient has no known allergies.    Isolation:   No active isolations    Intake and Output  No intake or output data in the 24 hours ending 06/13/24 2224    Weight:       06/13/24 2040   Weight: 70.3 kg (155 lb)       Most recent vitals:   Vitals:    06/13/24 2032 06/13/24 2040 06/13/24 2141   BP:  136/68    BP Location:  Right arm    Patient Position:  Lying    Pulse: 94 77    Resp: 18 16    Temp: (!) 101.2 °F (38.4 °C) 98.7 °F (37.1 °C)    SpO2: 97% 96% 92%   Weight: 70.9 kg (156 lb 4.9 oz) 70.3 kg (155 lb)    Height: 182.9 cm (72\") 182.9 cm (72\")        Active LDAs/IV Access:   Lines, Drains & Airways       Active LDAs       Name Placement date Placement time Site Days    Peripheral IV 06/13/24 2109 Left Antecubital 06/13/24 2109  Antecubital  less than 1    Peripheral IV 06/13/24 2223 Posterior;Right Hand 06/13/24 2223  Hand  less than 1                    Labs (abnormal labs have a star):   Labs Reviewed   COMPREHENSIVE METABOLIC PANEL - Abnormal; Notable for the following components:       Result Value    BUN 24 (*)     Creatinine 1.33 (*)     Chloride 109 (*)     Calcium 8.5 (*)     Total Protein 5.8 (*)     All other components within normal limits    Narrative:     GFR Normal >60  Chronic Kidney Disease <60  Kidney Failure <15     CBC WITH AUTO DIFFERENTIAL " - Abnormal; Notable for the following components:    RBC 2.52 (*)     Hemoglobin 6.6 (*)     Hematocrit 21.5 (*)     MCH 26.2 (*)     MCHC 30.7 (*)     All other components within normal limits   POCT OCCULT BLOOD STOOL (ED ONLY) - Abnormal; Notable for the following components:    Fecal Occult Blood Positive (*)     All other components within normal limits   LACTIC ACID, PLASMA - Normal   LIPASE - Normal   BASIC METABOLIC PANEL   CBC (NO DIFF)   URINALYSIS W/ MICROSCOPIC IF INDICATED (NO CULTURE)   TYPE AND SCREEN   PREPARE RBC   CBC AND DIFFERENTIAL    Narrative:     The following orders were created for panel order CBC & Differential.  Procedure                               Abnormality         Status                     ---------                               -----------         ------                     CBC Auto Differential[[id number]]        Abnormal            Final result                 Please view results for these tests on the individual orders.       EKG:   No orders to display       Meds given in ED:   Medications   pantoprazole (PROTONIX) injection 80 mg (80 mg Intravenous Given 6/13/24 2201)     And   pantoprazole (PROTONIX) 40 mg in sodium chloride 0.9 % 100 mL (0.4 mg/mL) MBP (8 mg/hr Intravenous New Bag 6/13/24 2205)   sodium chloride 0.9 % flush 10 mL (10 mL Intravenous Given 6/13/24 2213)   sodium chloride 0.9 % flush 10 mL (has no administration in time range)   sodium chloride 0.9 % infusion 40 mL (has no administration in time range)   sennosides-docusate (PERICOLACE) 8.6-50 MG per tablet 2 tablet (has no administration in time range)     And   polyethylene glycol (MIRALAX) packet 17 g (has no administration in time range)     And   bisacodyl (DULCOLAX) EC tablet 5 mg (has no administration in time range)     And   bisacodyl (DULCOLAX) suppository 10 mg (has no administration in time range)   ondansetron (ZOFRAN) injection 4 mg (4 mg Intravenous Given 6/13/24 2110)   lactated ringers bolus  1,000 mL (1,000 mL Intravenous New Bag 6/13/24 2115)   morphine injection 4 mg (4 mg Intravenous Given 6/13/24 2120)       Imaging results:  No radiology results for the last day    Ambulatory status:   -     Social issues:   Social History     Socioeconomic History    Marital status:    Tobacco Use    Smoking status: Former     Current packs/day: 0.50     Average packs/day: 0.5 packs/day for 35.0 years (17.5 ttl pk-yrs)     Types: Cigarettes    Smokeless tobacco: Former    Tobacco comments:     Quit smoking 3 years ago   Vaping Use    Vaping status: Never Used   Substance and Sexual Activity    Alcohol use: Not Currently    Drug use: Never    Sexual activity: Defer       Peripheral Neurovascular  Peripheral Neurovascular (Adult)  Peripheral Neurovascular WDL: WDL    Neuro Cognitive  Neuro Cognitive (Adult)  Cognitive/Neuro/Behavioral WDL: WDL    Learning  Learning Assessment (Adult)  Learning Readiness and Ability: no barriers identified  Education Provided  Person Taught: patient  Teaching Method: verbal instruction  Teaching Focus: symptom/problem overview  Education Outcome Evaluation: eager to learn    Respiratory  Respiratory (Adult)  Airway WDL: WDL  Respiratory WDL  Respiratory WDL: cough  Cough Frequency: frequent  Cough Type: dry    Abdominal Pain       Pain Assessments  Pain (Adult)  (0-10) Pain Rating: Rest: 6  (0-10) Pain Rating: Activity: 6    NIH Stroke Scale       Celso Sanchez RN  06/13/24 22:24 EDT

## 2024-06-15 ENCOUNTER — APPOINTMENT (OUTPATIENT)
Dept: GENERAL RADIOLOGY | Facility: HOSPITAL | Age: 49
End: 2024-06-15
Payer: MEDICAID

## 2024-06-15 ENCOUNTER — APPOINTMENT (OUTPATIENT)
Dept: CARDIOLOGY | Facility: HOSPITAL | Age: 49
End: 2024-06-15
Payer: MEDICAID

## 2024-06-15 PROBLEM — E43 SEVERE MALNUTRITION: Status: ACTIVE | Noted: 2024-06-15

## 2024-06-15 LAB
ALBUMIN SERPL-MCNC: 3.3 G/DL (ref 3.5–5.2)
ANION GAP SERPL CALCULATED.3IONS-SCNC: 7.8 MMOL/L (ref 5–15)
BASOPHILS # BLD AUTO: 0.02 10*3/MM3 (ref 0–0.2)
BASOPHILS NFR BLD AUTO: 0.3 % (ref 0–1.5)
BH BB BLOOD EXPIRATION DATE: NORMAL
BH BB BLOOD TYPE BARCODE: 6200
BH BB DISPENSE STATUS: NORMAL
BH BB PRODUCT CODE: NORMAL
BH BB UNIT NUMBER: NORMAL
BH CV XLRA MEAS LEFT DIST CCA EDV: 29.1 CM/SEC
BH CV XLRA MEAS LEFT DIST CCA PSV: 112.3 CM/SEC
BH CV XLRA MEAS LEFT DIST ICA EDV: -38.4 CM/SEC
BH CV XLRA MEAS LEFT DIST ICA PSV: -95.6 CM/SEC
BH CV XLRA MEAS LEFT ICA/CCA RATIO: 0.97
BH CV XLRA MEAS LEFT MID ICA EDV: -40.7 CM/SEC
BH CV XLRA MEAS LEFT MID ICA PSV: -99.7 CM/SEC
BH CV XLRA MEAS LEFT PROX CCA EDV: 34 CM/SEC
BH CV XLRA MEAS LEFT PROX CCA PSV: 136.1 CM/SEC
BH CV XLRA MEAS LEFT PROX ECA EDV: -15.5 CM/SEC
BH CV XLRA MEAS LEFT PROX ECA PSV: -97.8 CM/SEC
BH CV XLRA MEAS LEFT PROX ICA EDV: -42.6 CM/SEC
BH CV XLRA MEAS LEFT PROX ICA PSV: -109.4 CM/SEC
BH CV XLRA MEAS LEFT PROX SCLA PSV: 263.4 CM/SEC
BH CV XLRA MEAS LEFT VERTEBRAL A EDV: 22.5 CM/SEC
BH CV XLRA MEAS LEFT VERTEBRAL A PSV: 56 CM/SEC
BH CV XLRA MEAS RIGHT DIST CCA EDV: -43.3 CM/SEC
BH CV XLRA MEAS RIGHT DIST CCA PSV: -159.4 CM/SEC
BH CV XLRA MEAS RIGHT DIST ICA EDV: -42.8 CM/SEC
BH CV XLRA MEAS RIGHT DIST ICA PSV: -117.4 CM/SEC
BH CV XLRA MEAS RIGHT ICA/CCA RATIO: 0.86
BH CV XLRA MEAS RIGHT MID ICA EDV: -42.8 CM/SEC
BH CV XLRA MEAS RIGHT MID ICA PSV: -120.7 CM/SEC
BH CV XLRA MEAS RIGHT PROX CCA EDV: 19.3 CM/SEC
BH CV XLRA MEAS RIGHT PROX CCA PSV: 77.7 CM/SEC
BH CV XLRA MEAS RIGHT PROX ECA EDV: -39.8 CM/SEC
BH CV XLRA MEAS RIGHT PROX ECA PSV: -259.3 CM/SEC
BH CV XLRA MEAS RIGHT PROX ICA EDV: -39.5 CM/SEC
BH CV XLRA MEAS RIGHT PROX ICA PSV: -137.2 CM/SEC
BH CV XLRA MEAS RIGHT PROX SCLA PSV: 174.4 CM/SEC
BH CV XLRA MEAS RIGHT VERTEBRAL A EDV: 22.4 CM/SEC
BH CV XLRA MEAS RIGHT VERTEBRAL A PSV: 69.4 CM/SEC
BUN SERPL-MCNC: 15 MG/DL (ref 6–20)
BUN/CREAT SERPL: 12.1 (ref 7–25)
CALCIUM SPEC-SCNC: 8.4 MG/DL (ref 8.6–10.5)
CHLORIDE SERPL-SCNC: 106 MMOL/L (ref 98–107)
CO2 SERPL-SCNC: 23.2 MMOL/L (ref 22–29)
CREAT SERPL-MCNC: 1.24 MG/DL (ref 0.76–1.27)
CROSSMATCH INTERPRETATION: NORMAL
DEPRECATED RDW RBC AUTO: 44.9 FL (ref 37–54)
DEPRECATED RDW RBC AUTO: 45.3 FL (ref 37–54)
EGFRCR SERPLBLD CKD-EPI 2021: 71.3 ML/MIN/1.73
EOSINOPHIL # BLD AUTO: 0 10*3/MM3 (ref 0–0.4)
EOSINOPHIL NFR BLD AUTO: 0 % (ref 0.3–6.2)
ERYTHROCYTE [DISTWIDTH] IN BLOOD BY AUTOMATED COUNT: 14.9 % (ref 12.3–15.4)
ERYTHROCYTE [DISTWIDTH] IN BLOOD BY AUTOMATED COUNT: 15 % (ref 12.3–15.4)
GLUCOSE BLDC GLUCOMTR-MCNC: 124 MG/DL (ref 70–130)
GLUCOSE SERPL-MCNC: 153 MG/DL (ref 65–99)
HCT VFR BLD AUTO: 28.8 % (ref 37.5–51)
HCT VFR BLD AUTO: 29.9 % (ref 37.5–51)
HGB BLD-MCNC: 8.7 G/DL (ref 13–17.7)
HGB BLD-MCNC: 9.4 G/DL (ref 13–17.7)
IMM GRANULOCYTES # BLD AUTO: 0.03 10*3/MM3 (ref 0–0.05)
IMM GRANULOCYTES NFR BLD AUTO: 0.5 % (ref 0–0.5)
LEFT ARM BP: NORMAL MMHG
LYMPHOCYTES # BLD AUTO: 0.48 10*3/MM3 (ref 0.7–3.1)
LYMPHOCYTES NFR BLD AUTO: 7.2 % (ref 19.6–45.3)
MCH RBC QN AUTO: 26 PG (ref 26.6–33)
MCH RBC QN AUTO: 26.5 PG (ref 26.6–33)
MCHC RBC AUTO-ENTMCNC: 30.2 G/DL (ref 31.5–35.7)
MCHC RBC AUTO-ENTMCNC: 31.4 G/DL (ref 31.5–35.7)
MCV RBC AUTO: 84.2 FL (ref 79–97)
MCV RBC AUTO: 86 FL (ref 79–97)
MONOCYTES # BLD AUTO: 0.14 10*3/MM3 (ref 0.1–0.9)
MONOCYTES NFR BLD AUTO: 2.1 % (ref 5–12)
NEUTROPHILS NFR BLD AUTO: 5.99 10*3/MM3 (ref 1.7–7)
NEUTROPHILS NFR BLD AUTO: 89.9 % (ref 42.7–76)
NRBC BLD AUTO-RTO: 0 /100 WBC (ref 0–0.2)
PHOSPHATE SERPL-MCNC: 3.3 MG/DL (ref 2.5–4.5)
PLATELET # BLD AUTO: 186 10*3/MM3 (ref 140–450)
PLATELET # BLD AUTO: 195 10*3/MM3 (ref 140–450)
PMV BLD AUTO: 11.3 FL (ref 6–12)
PMV BLD AUTO: 11.7 FL (ref 6–12)
POTASSIUM SERPL-SCNC: 4.7 MMOL/L (ref 3.5–5.2)
RBC # BLD AUTO: 3.35 10*6/MM3 (ref 4.14–5.8)
RBC # BLD AUTO: 3.55 10*6/MM3 (ref 4.14–5.8)
RIGHT ARM BP: NORMAL MMHG
SODIUM SERPL-SCNC: 137 MMOL/L (ref 136–145)
UNIT  ABO: NORMAL
UNIT  RH: NORMAL
WBC NRBC COR # BLD AUTO: 6.66 10*3/MM3 (ref 3.4–10.8)
WBC NRBC COR # BLD AUTO: 8.84 10*3/MM3 (ref 3.4–10.8)

## 2024-06-15 PROCEDURE — 82948 REAGENT STRIP/BLOOD GLUCOSE: CPT

## 2024-06-15 PROCEDURE — 99232 SBSQ HOSP IP/OBS MODERATE 35: CPT | Performed by: NURSE PRACTITIONER

## 2024-06-15 PROCEDURE — 94799 UNLISTED PULMONARY SVC/PX: CPT

## 2024-06-15 PROCEDURE — 86900 BLOOD TYPING SEROLOGIC ABO: CPT

## 2024-06-15 PROCEDURE — P9016 RBC LEUKOCYTES REDUCED: HCPCS

## 2024-06-15 PROCEDURE — 94664 DEMO&/EVAL PT USE INHALER: CPT

## 2024-06-15 PROCEDURE — 71045 X-RAY EXAM CHEST 1 VIEW: CPT

## 2024-06-15 PROCEDURE — G0378 HOSPITAL OBSERVATION PER HR: HCPCS

## 2024-06-15 PROCEDURE — 94761 N-INVAS EAR/PLS OXIMETRY MLT: CPT

## 2024-06-15 PROCEDURE — 36430 TRANSFUSION BLD/BLD COMPNT: CPT

## 2024-06-15 PROCEDURE — 36415 COLL VENOUS BLD VENIPUNCTURE: CPT | Performed by: THORACIC SURGERY (CARDIOTHORACIC VASCULAR SURGERY)

## 2024-06-15 PROCEDURE — 80069 RENAL FUNCTION PANEL: CPT | Performed by: THORACIC SURGERY (CARDIOTHORACIC VASCULAR SURGERY)

## 2024-06-15 PROCEDURE — 85025 COMPLETE CBC W/AUTO DIFF WBC: CPT | Performed by: THORACIC SURGERY (CARDIOTHORACIC VASCULAR SURGERY)

## 2024-06-15 PROCEDURE — 25010000002 HYDROMORPHONE PER 4 MG: Performed by: THORACIC SURGERY (CARDIOTHORACIC VASCULAR SURGERY)

## 2024-06-15 PROCEDURE — 85027 COMPLETE CBC AUTOMATED: CPT | Performed by: THORACIC SURGERY (CARDIOTHORACIC VASCULAR SURGERY)

## 2024-06-15 PROCEDURE — 99024 POSTOP FOLLOW-UP VISIT: CPT | Performed by: THORACIC SURGERY (CARDIOTHORACIC VASCULAR SURGERY)

## 2024-06-15 PROCEDURE — 99215 OFFICE O/P EST HI 40 MIN: CPT | Performed by: INTERNAL MEDICINE

## 2024-06-15 PROCEDURE — 93880 EXTRACRANIAL BILAT STUDY: CPT | Performed by: SURGERY

## 2024-06-15 PROCEDURE — 93880 EXTRACRANIAL BILAT STUDY: CPT

## 2024-06-15 PROCEDURE — 25010000002 ONDANSETRON PER 1 MG: Performed by: THORACIC SURGERY (CARDIOTHORACIC VASCULAR SURGERY)

## 2024-06-15 RX ORDER — OXYCODONE HYDROCHLORIDE 5 MG/1
5 TABLET ORAL EVERY 4 HOURS PRN
Status: DISCONTINUED | OUTPATIENT
Start: 2024-06-15 | End: 2024-06-16

## 2024-06-15 RX ADMIN — HYDROMORPHONE HYDROCHLORIDE 0.5 MG: 1 INJECTION, SOLUTION INTRAMUSCULAR; INTRAVENOUS; SUBCUTANEOUS at 06:50

## 2024-06-15 RX ADMIN — OXYCODONE HYDROCHLORIDE 5 MG: 5 TABLET ORAL at 22:51

## 2024-06-15 RX ADMIN — IPRATROPIUM BROMIDE AND ALBUTEROL SULFATE 3 ML: .5; 3 SOLUTION RESPIRATORY (INHALATION) at 07:12

## 2024-06-15 RX ADMIN — POTASSIUM CHLORIDE, DEXTROSE MONOHYDRATE AND SODIUM CHLORIDE 125 ML/HR: 150; 5; 450 INJECTION, SOLUTION INTRAVENOUS at 08:45

## 2024-06-15 RX ADMIN — HYDROMORPHONE HYDROCHLORIDE 0.5 MG: 1 INJECTION, SOLUTION INTRAMUSCULAR; INTRAVENOUS; SUBCUTANEOUS at 01:47

## 2024-06-15 RX ADMIN — PANTOPRAZOLE SODIUM 8 MG/HR: 40 INJECTION, POWDER, FOR SOLUTION INTRAVENOUS at 08:46

## 2024-06-15 RX ADMIN — OXYCODONE HYDROCHLORIDE 5 MG: 5 TABLET ORAL at 18:10

## 2024-06-15 RX ADMIN — ONDANSETRON 4 MG: 2 INJECTION INTRAMUSCULAR; INTRAVENOUS at 20:21

## 2024-06-15 RX ADMIN — HYDROMORPHONE HYDROCHLORIDE 0.5 MG: 1 INJECTION, SOLUTION INTRAMUSCULAR; INTRAVENOUS; SUBCUTANEOUS at 11:40

## 2024-06-15 RX ADMIN — Medication 10 ML: at 20:12

## 2024-06-15 RX ADMIN — POTASSIUM CHLORIDE, DEXTROSE MONOHYDRATE AND SODIUM CHLORIDE 125 ML/HR: 150; 5; 450 INJECTION, SOLUTION INTRAVENOUS at 18:10

## 2024-06-15 RX ADMIN — PANTOPRAZOLE SODIUM 8 MG/HR: 40 INJECTION, POWDER, FOR SOLUTION INTRAVENOUS at 03:32

## 2024-06-15 RX ADMIN — POTASSIUM CHLORIDE, DEXTROSE MONOHYDRATE AND SODIUM CHLORIDE 125 ML/HR: 150; 5; 450 INJECTION, SOLUTION INTRAVENOUS at 00:25

## 2024-06-15 RX ADMIN — HYDROMORPHONE HYDROCHLORIDE 0.5 MG: 1 INJECTION, SOLUTION INTRAMUSCULAR; INTRAVENOUS; SUBCUTANEOUS at 08:41

## 2024-06-15 RX ADMIN — PANTOPRAZOLE SODIUM 8 MG/HR: 40 INJECTION, POWDER, FOR SOLUTION INTRAVENOUS at 22:51

## 2024-06-15 RX ADMIN — IPRATROPIUM BROMIDE AND ALBUTEROL SULFATE 3 ML: .5; 3 SOLUTION RESPIRATORY (INHALATION) at 14:59

## 2024-06-15 RX ADMIN — HYDROMORPHONE HYDROCHLORIDE 0.5 MG: 1 INJECTION, SOLUTION INTRAMUSCULAR; INTRAVENOUS; SUBCUTANEOUS at 13:48

## 2024-06-15 RX ADMIN — HYDROMORPHONE HYDROCHLORIDE 0.5 MG: 1 INJECTION, SOLUTION INTRAMUSCULAR; INTRAVENOUS; SUBCUTANEOUS at 20:11

## 2024-06-15 RX ADMIN — HYDROMORPHONE HYDROCHLORIDE 0.5 MG: 1 INJECTION, SOLUTION INTRAMUSCULAR; INTRAVENOUS; SUBCUTANEOUS at 16:09

## 2024-06-15 RX ADMIN — Medication 10 ML: at 08:46

## 2024-06-15 RX ADMIN — HYDROMORPHONE HYDROCHLORIDE 0.5 MG: 1 INJECTION, SOLUTION INTRAMUSCULAR; INTRAVENOUS; SUBCUTANEOUS at 00:26

## 2024-06-15 RX ADMIN — PANTOPRAZOLE SODIUM 8 MG/HR: 40 INJECTION, POWDER, FOR SOLUTION INTRAVENOUS at 13:23

## 2024-06-15 RX ADMIN — PANTOPRAZOLE SODIUM 8 MG/HR: 40 INJECTION, POWDER, FOR SOLUTION INTRAVENOUS at 18:10

## 2024-06-15 NOTE — CONSULTS
"Nutrition Services    Patient Name:  Nelson Garcia  YOB: 1975  MRN: [id number]  Admit Date:  6/13/2024    Assessment Date:  06/15/24    Summary: Consulted for TF assessment  Pt is now s/p Esophagogastroduodenoscopy, right port placement, and Jejunostomy tube insertion.  Per pt and wife, they are interested in Nocturnal Tube feeds and will also be taking po diet in liquid/pureed form at home.  He has been given some Boost VHP drinks per Oncology RD for him to try. Suggested wife to keep track of what he eats in efforts to adjust tube feeds as needed to meet nutritional needs. NFPE completed for severe malnutrition due to wt loss and inadequate po intake.    Recommend:   J-tube, start TF's of Isosource 1.5 @ 20 ml/hr to run 8pm to 8am (12hrs)  Increase 20 ml q 24 hrs as tolerates to goal rate of 80 ml/hr   Water flushes 20 ml/hr   PO diet per MD recommendations.     Note: Tf's will meet about 60% of needs and can be adjusted pending po intake.    Will follow per protocol.      CLINICAL NUTRITION ASSESSMENT      Reason for Assessment Malnutrition Severity Assessment, Physician Consult, TF Assessment     Diagnosis/Problem   Generalized weakness, vomiting, esophageal mass, anemia concerning for GI bleed, dysphagia   Medical/Surgical History Past Medical History:   Diagnosis Date    Hypertension     Kidney disease     Stroke (cerebrum)        Past Surgical History:   Procedure Laterality Date    BACK SURGERY      ENDOSCOPY N/A 6/4/2024    Procedure: ESOPHAGOGASTRODUODENOSCOPY with biopsies;  Surgeon: Taras Hernandez MD;  Location: [facility] ENDOSCOPY;  Service: Gastroenterology;  Laterality: N/A;  pre- nausea/vomiting   post- nearly obstructing distal esophageal mass, gastritis    KNEE SURGERY      TEETH EXTRACTION      all        Anthropometrics        Current Height  Current Weight  BMI kg/m2 Height: 182.9 cm (72\")  Weight: 74.3 kg (163 lb 12.8 oz) (06/13/24 5466)  Body mass index is 22.22 kg/m². "   Adjusted BMI (if applicable)    BMI Category Normal/Healthy (18.4 - 24.9)   Ideal Body Weight (IBW) 171 lb   Usual Body Weight (UBW) 185 lb (3 months ago) per pt   Weight Trend Loss, Amount/Timeframe: 29 lb (16%) loss x 3m   Weight History Wt Readings from Last 30 Encounters:   06/13/24 2256 74.3 kg (163 lb 12.8 oz)   06/13/24 2040 70.3 kg (155 lb)   06/13/24 2032 70.9 kg (156 lb 4.9 oz)   06/13/24 1325 70.9 kg (156 lb 4.8 oz)   06/03/24 2100 75.3 kg (166 lb)   06/03/24 1956 75.3 kg (166 lb)   10/21/22 1426 82.6 kg (182 lb 3.2 oz)   06/30/22 0900 80.3 kg (177 lb)   04/28/22 1200 79.8 kg (176 lb)   04/06/22 1425 78.6 kg (173 lb 3.2 oz)   02/22/22 1338 73.9 kg (163 lb)   02/21/22 1509 73.9 kg (163 lb)      --  Estimated/Assessed Needs        Current Weight  Weight: 74.3 kg (163 lb 12.8 oz) (06/13/24 2256)       Energy Requirements    Weight for Calculation 71 kg   Method for Estimation  30-35 kcal/kg   EST Needs (kcal/day) 1115-7793 kcals       Protein Requirements    Weight for Calculation 71 kg   EST Protein Needs (g/kg) 1.2 - 1.5 gm/kg   EST Daily Needs (g/day)  g       Fluid Requirements     Method for Estimation 30 mL/kg    EST Needs (mL/day) 2130 ml     Labs       Pertinent Labs    Results from last 7 days   Lab Units 06/15/24  0549 06/14/24  0713 06/13/24  2109   SODIUM mmol/L 137 139 139   POTASSIUM mmol/L 4.7 4.0 4.4   CHLORIDE mmol/L 106 110* 109*   CO2 mmol/L 23.2 23.0 22.0   BUN mg/dL 15 17 24*   CREATININE mg/dL 1.24 1.20 1.33*   CALCIUM mg/dL 8.4* 8.4* 8.5*   BILIRUBIN mg/dL  --   --  <0.2   ALK PHOS U/L  --   --  41   ALT (SGPT) U/L  --   --  9   AST (SGOT) U/L  --   --  12   GLUCOSE mg/dL 153* 95 96     Results from last 7 days   Lab Units 06/15/24  0549   PHOSPHORUS mg/dL 3.3   HEMOGLOBIN g/dL 8.7*   HEMATOCRIT % 28.8*   WBC 10*3/mm3 6.66   ALBUMIN g/dL 3.3*     Results from last 7 days   Lab Units 06/15/24  0549 06/14/24  0713 06/13/24  2109   PLATELETS 10*3/mm3 186 156 186     COVID19   Date  Value Ref Range Status   02/21/2022 Not Detected Not Detected - Ref. Range Final     Lab Results   Component Value Date    HGBA1C 5.00 02/22/2022          Medications           Scheduled Medications heparin (porcine), 5,000 Units, Subcutaneous, Q8H  ipratropium-albuterol, 3 mL, Nebulization, Q8H - RT  sodium chloride, 10 mL, Intravenous, Q12H       Infusions dextrose 5 % and sodium chloride 0.45 % with KCl 20 mEq/L, 125 mL/hr, Last Rate: 125 mL/hr (06/15/24 0846)  lactated ringers, 100 mL/hr, Last Rate: 100 mL/hr (06/14/24 0148)  lactated ringers, 9 mL/hr, Last Rate: Stopped (06/15/24 0036)  pantoprazole, 8 mg/hr, Last Rate: 8 mg/hr (06/15/24 0846)       PRN Medications   senna-docusate sodium **AND** polyethylene glycol **AND** bisacodyl **AND** bisacodyl    bisacodyl    HYDROmorphone    HYDROmorphone    Morphine    nitroglycerin    nitroglycerin    ondansetron ODT **OR** ondansetron    sodium chloride    sodium chloride     Physical Findings          General Findings alert, oriented, room air   Oral/Mouth Cavity tooth or teeth missing   Edema  not assessed   Gastrointestinal nausea, vomiting   Skin  skin intact   Tubes/Drains/Lines jejunostomy    NFPE See Malnutrition Severity Assessment, Date Completed: 6/15   --  Malnutrition Severity Assessment      Patient meets criteria for : Severe Malnutrition  Malnutrition Type (Last 8 Hours)       Malnutrition Severity Assessment       Row Name 06/15/24 1001       Malnutrition Severity Assessment    Malnutrition Type Acute Disease or Injury - Related Malnutrition      Row Name 06/15/24 1001       Insufficient Energy Intake     Insufficient Energy Intake Findings Severe    Insufficient Energy Intake  < or equal to 50% of est. energy requirement for > or equal to 5d)      Row Name 06/15/24 1001       Unintentional Weight Loss     Unintentional Weight Loss Findings Severe    Unintentional Weight Loss  Weight loss greater than 7.5% in three months      Row Name 06/15/24 1001        Muscle Loss    Temple Region Moderate - slight depression      Row Name 06/15/24 1001       Fat Loss    Orbital Region  Moderate -  somewhat hollowness, slightly dark circles      Row Name 06/15/24 1001       Criteria Met (Must meet criteria for severity in at least 2 of these categories: M Wasting, Fat Loss, Fluid, Secondary Signs, Wt. Status, Intake)    Patient meets criteria for  Severe Malnutrition                       Current Nutrition Orders & Evaluation of Intake       Oral Nutrition     Food Allergies NKFA   Current PO Diet Diet: Liquid; Clear Liquid; Fluid Consistency: Thin (IDDSI 0)   Supplement n/a   PO Evaluation     % PO Intake N/a    Factors Affecting Intake: swallow impairment, vomiting   --   Enteral Nutrition     Enteral Route Jejunostomy    TF Delivery Method Cyclic      Propofol Rate/Kcal     Current TF Order/Rate  Isosource 1.5 To begin at 20ml/hr x 12hr.    TF Goal Rate     Current Water Flush     Modular                  PES STATEMENT / NUTRITION DIAGNOSIS      Nutrition Dx Problem  Problem: Malnutrition (severe)  Etiology: Medical Diagnosis - esophageal mass and Factors Affecting Nutrition - N/V, dysphagia    Signs/Symptoms: Report of Minimal PO Intake and Unintended Weight Change     NUTRITION INTERVENTION / PLAN OF CARE      Intervention Goal(s) Maintain nutrition status, Reduce/improve symptoms, Meet estimated needs, Disease management/therapy, Initiate TF/PN, Tolerate TF/PN at goal, and Appropriate weight gain         RD Intervention/Action Await initiation of EN/PN, Continue to monitor, Care plan reviewed, and Recommend/order: EN   --      Prescription/Orders:       PO Diet       Supplements       Enteral Nutrition    Enteral Prescription:     Enteral Route Jejunostomy    TF Delivery Method Cyclic    Enteral Product Isosource 1.5    Modular None    Propofol Rate/Kcal     TF Start Rate  20 mL/hr x 12 hr    TF Goal Rate  80 mL/hr x 12 hr    Free Water Flush 20 mL Q 1 hr x 12hr     Provision at Goal:          Calories 1440kcal, meets 57-67% needs         Protein  65 gm protein, meets 61-76% needs         Fluid (mL) 729 mL free water + 240 mL in flushes, 45%         Parenteral Nutrition    New Prescription Ordered? Yes   --      Monitor/Evaluation Per protocol, I&O, PO intake, Pertinent labs, EN delivery/tolerance, Weight, GI status, Symptoms, Swallow function   Discharge Plan/Needs Pending clinical course   --    RD to follow per protocol.      Electronically signed by:  Rachna Briceno RD  06/15/24 10:09 EDT

## 2024-06-15 NOTE — PLAN OF CARE
Goal Outcome Evaluation:  Plan of Care Reviewed With: patient, spouse           Outcome Evaluation: Cont with Protonix Drip and IVF as ordered.  Onc wanted to cont the PRotonix for now.  Onc also ordered one unit of PRBC.  Pt tolerating well.  HGB currently at 8.7.  Dilaudid has been given about every 2 hours.  IS given to pt at bedside and pt demonstrated well but did not like using because of the abd pain.  Pt attempted to get out of bed but could only sit on the side of the bed for about 5 mins.  Pt and wife will try again later.  HR has typically been in the 40-60's throughout shift.  Pt is currently on RA sating 94% - 96%.   Nocturnal TF to begin tonight as per the ordered.  Pt will cont on clears until Surgery advances diet. Chemotherapy and radiation therapy as outpatient and possible DC on Monday.

## 2024-06-15 NOTE — PLAN OF CARE
Problem: Malnutrition  Goal: Improved Nutritional Intake  Outcome: Ongoing, Progressing     Problem: Aspiration (Enteral Nutrition)  Goal: Absence of Aspiration Signs and Symptoms  Outcome: Ongoing, Progressing     Problem: Device-Related Complication Risk (Enteral Nutrition)  Goal: Safe, Effective Therapy Delivery  Outcome: Ongoing, Progressing     Problem: Feeding Intolerance (Enteral Nutrition)  Goal: Feeding Tolerance  Outcome: Ongoing, Progressing   Goal Outcome Evaluation:      Nocturnal TF's per MD order  Will monitor po intake  RD to follow

## 2024-06-15 NOTE — PROGRESS NOTES
"  POST-OPERATIVE NOTE     Chief Complaint: Esophageal cancer  S/P: Port, feeding jejunostomy, diagnostic laparotomy, EGD  POD # 1    Subjective  No complaints.  Objective:  General Appearance:  Comfortable and in no acute distress.    Vital signs: (most recent): Blood pressure 120/65, pulse 53, temperature 98.3 °F (36.8 °C), temperature source Oral, resp. rate 18, height 182.9 cm (72\"), weight 74.3 kg (163 lb 12.8 oz), SpO2 100%.  Vital signs are normal.    Lungs:  Normal effort.    Heart: Normal rate.    Abdomen: Abdomen is soft and distended.    Neurological: Patient is alert and oriented to person, place and time.    Skin:  Warm and dry.                Results Review:     I reviewed the patient's new clinical results.  I reviewed the patient's new imaging results and agree with the interpretation.  I reviewed the patient's other test results and agree with the interpretation    Assessment & Plan   Mr. 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  He underwent feeding tube placement yesterday and I would plan to start his feeds at 10 cc/h now and advance by 10 cc every 8 hours.  It is okay to give meds via J-tube at this point and I will add some oxycodone to help with pain.    His hemoglobin is stable today and as long as it continues to be stable, will not plan a radiation therapy consult at this time.  If he continues to ooze from his tumor, then radiation is the best answer for this.  He is scheduled to see medical and radiation oncology next week in [place].    Jany Valente MD  [facility]  06/15/24  17:20 EDT    Patient was seen and assessed while wearing personal protective equipment including facemask, protective eyewear and gloves.  Hand hygiene performed prior to entering the room and upon exiting with doffing of gloves.       "

## 2024-06-15 NOTE — PROGRESS NOTES
"DAILY PROGRESS NOTE  [facility]    Patient Identification:  Name: Nelson Garcia  Age: 49 y.o.  Sex: male  :  1975  MRN: [id number]         Primary Care Physician: Chelita Scott APRN    Subjective:  Interval History: He complains of pain and hurting all over.  He is weak.    Objective:    Scheduled Meds:heparin (porcine), 5,000 Units, Subcutaneous, Q8H  ipratropium-albuterol, 3 mL, Nebulization, Q8H - RT  sodium chloride, 10 mL, Intravenous, Q12H      Continuous Infusions:dextrose 5 % and sodium chloride 0.45 % with KCl 20 mEq/L, 125 mL/hr, Last Rate: 125 mL/hr (06/15/24 1436)  lactated ringers, 100 mL/hr, Last Rate: 100 mL/hr (24 0148)  lactated ringers, 9 mL/hr, Last Rate: Stopped (06/15/24 0036)  pantoprazole, 8 mg/hr, Last Rate: 8 mg/hr (06/15/24 1323)        Vital signs in last 24 hours:  Temp:  [97.7 °F (36.5 °C)-98.5 °F (36.9 °C)] 97.9 °F (36.6 °C)  Heart Rate:  [49-68] 49  Resp:  [14-18] 18  BP: (120-163)/(64-91) 126/73    Intake/Output:    Intake/Output Summary (Last 24 hours) at 6/15/2024 1441  Last data filed at 6/15/2024 0703  Gross per 24 hour   Intake 1210.92 ml   Output 1800 ml   Net -589.08 ml       Exam:  /73   Pulse (!) 49   Temp 97.9 °F (36.6 °C) (Oral)   Resp 18   Ht 182.9 cm (72\")   Wt 74.3 kg (163 lb 12.8 oz)   SpO2 94%   BMI 22.22 kg/m²     General Appearance:    Alert, cooperative, no distress   Head:    Normocephalic, without obvious abnormality, atraumatic   Eyes:       Throat:   Lips, tongue, gums normal   Neck:   Supple, symmetrical, trachea midline, no JVD   Lungs:     Clear to auscultation bilaterally, respirations unlabored   Chest Wall:    No tenderness or deformity    Heart:    Regular rate and rhythm, S1 and S2 normal, no murmur,no  Rub or gallop   Abdomen:     Soft, nontender, bowel sounds active, no masses, no organomegaly    Extremities:   Extremities normal, atraumatic, no cyanosis or edema   Pulses:      Skin:   Skin " is warm and dry,  no rashes or palpable lesions   Neurologic:   no focal deficits noted      Lab Results (last 72 hours)       Procedure Component Value Units Date/Time    Ferritin [[id number]]  (Normal) Collected: 06/14/24 0713    Specimen: Blood Updated: 06/14/24 1349     Ferritin 55.00 ng/mL     Narrative:      Results may be falsely decreased if patient taking Biotin.      Iron Profile [[id number]]  (Abnormal) Collected: 06/14/24 0713    Specimen: Blood Updated: 06/14/24 1343     Iron 15 mcg/dL      Iron Saturation (TSAT) 5 %      Transferrin 192 mg/dL      TIBC 286 mcg/dL     CBC (No Diff) [[id number]]  (Abnormal) Collected: 06/14/24 0713    Specimen: Blood Updated: 06/14/24 0831     WBC 4.36 10*3/mm3      RBC 2.52 10*6/mm3      Hemoglobin 6.7 g/dL      Hematocrit 21.5 %      MCV 85.3 fL      MCH 26.6 pg      MCHC 31.2 g/dL      RDW 14.7 %      RDW-SD 44.4 fl      MPV 11.3 fL      Platelets 156 10*3/mm3     Basic Metabolic Panel [[id number]]  (Abnormal) Collected: 06/14/24 0713    Specimen: Blood Updated: 06/14/24 0815     Glucose 95 mg/dL      BUN 17 mg/dL      Creatinine 1.20 mg/dL      Sodium 139 mmol/L      Potassium 4.0 mmol/L      Chloride 110 mmol/L      CO2 23.0 mmol/L      Calcium 8.4 mg/dL      BUN/Creatinine Ratio 14.2     Anion Gap 6.0 mmol/L      eGFR 74.1 mL/min/1.73     Narrative:      GFR Normal >60  Chronic Kidney Disease <60  Kidney Failure <15      Urinalysis With Microscopic If Indicated (No Culture) - Urine, Clean Catch [[id number]]  (Normal) Collected: 06/14/24 0118    Specimen: Urine, Clean Catch Updated: 06/14/24 0209     Color, UA Yellow     Appearance, UA Clear     pH, UA 6.0     Specific Gravity, UA 1.013     Glucose, UA Negative     Ketones, UA Negative     Bilirubin, UA Negative     Blood, UA Negative     Protein, UA Negative     Leuk Esterase, UA Negative     Nitrite, UA Negative     Urobilinogen, UA 0.2 E.U./dL    Narrative:      Urine microscopic not indicated.    POCT Occult Blood  Stool [[id number]]  (Abnormal) Collected: 06/13/24 2152    Specimen: Stool from Per Rectum Updated: 06/13/24 2152     Fecal Occult Blood Positive     Lot Number [id number]     Expiration Date 02/28/2025     Positive Control Positive     Negative Control Negative    Comprehensive Metabolic Panel [[id number]]  (Abnormal) Collected: 06/13/24 2109    Specimen: Blood Updated: 06/13/24 2140     Glucose 96 mg/dL      BUN 24 mg/dL      Creatinine 1.33 mg/dL      Sodium 139 mmol/L      Potassium 4.4 mmol/L      Chloride 109 mmol/L      CO2 22.0 mmol/L      Calcium 8.5 mg/dL      Total Protein 5.8 g/dL      Albumin 3.6 g/dL      ALT (SGPT) 9 U/L      AST (SGOT) 12 U/L      Alkaline Phosphatase 41 U/L      Total Bilirubin <0.2 mg/dL      Globulin 2.2 gm/dL      A/G Ratio 1.6 g/dL      BUN/Creatinine Ratio 18.0     Anion Gap 8.0 mmol/L      eGFR 65.5 mL/min/1.73     Narrative:      GFR Normal >60  Chronic Kidney Disease <60  Kidney Failure <15      Lipase [[id number]]  (Normal) Collected: 06/13/24 2109    Specimen: Blood Updated: 06/13/24 2140     Lipase 44 U/L     Lactic Acid, Plasma [[id number]]  (Normal) Collected: 06/13/24 2109    Specimen: Blood Updated: 06/13/24 2135     Lactate 0.9 mmol/L     CBC & Differential [[id number]]  (Abnormal) Collected: 06/13/24 2109    Specimen: Blood Updated: 06/13/24 2126    Narrative:      The following orders were created for panel order CBC & Differential.  Procedure                               Abnormality         Status                     ---------                               -----------         ------                     CBC Auto Differential[[id number]]        Abnormal            Final result                 Please view results for these tests on the individual orders.    CBC Auto Differential [[id number]]  (Abnormal) Collected: 06/13/24 2109    Specimen: Blood Updated: 06/13/24 2126     WBC 4.20 10*3/mm3      RBC 2.52 10*6/mm3      Hemoglobin 6.6 g/dL      Hematocrit 21.5 %      MCV 85.3 fL   "    MCH 26.2 pg      MCHC 30.7 g/dL      RDW 14.6 %      RDW-SD 43.8 fl      MPV 11.0 fL      Platelets 186 10*3/mm3      Neutrophil % 66.4 %      Lymphocyte % 25.5 %      Monocyte % 6.9 %      Eosinophil % 0.5 %      Basophil % 0.5 %      Immature Grans % 0.2 %      Neutrophils, Absolute 2.79 10*3/mm3      Lymphocytes, Absolute 1.07 10*3/mm3      Monocytes, Absolute 0.29 10*3/mm3      Eosinophils, Absolute 0.02 10*3/mm3      Basophils, Absolute 0.02 10*3/mm3      Immature Grans, Absolute 0.01 10*3/mm3      nRBC 0.0 /100 WBC           Data Review:  Results from last 7 days   Lab Units 06/15/24  0549 06/14/24  0713 06/13/24  2109   SODIUM mmol/L 137 139 139   POTASSIUM mmol/L 4.7 4.0 4.4   CHLORIDE mmol/L 106 110* 109*   CO2 mmol/L 23.2 23.0 22.0   BUN mg/dL 15 17 24*   CREATININE mg/dL 1.24 1.20 1.33*   GLUCOSE mg/dL 153* 95 96   CALCIUM mg/dL 8.4* 8.4* 8.5*     Results from last 7 days   Lab Units 06/15/24  0549 06/14/24  1439 06/14/24  0713 06/13/24  2109   WBC 10*3/mm3 6.66  --  4.36 4.20   HEMOGLOBIN g/dL 8.7* 7.8* 6.7* 6.6*   HEMATOCRIT % 28.8* 24.5* 21.5* 21.5*   PLATELETS 10*3/mm3 186  --  156 186             Lab Results   Lab Value Date/Time    TROPONINT 0.014 02/21/2022 1514         Results from last 7 days   Lab Units 06/13/24  2109   ALK PHOS U/L 41   BILIRUBIN mg/dL <0.2   ALT (SGPT) U/L 9   AST (SGOT) U/L 12             No results found for: \"POCGLU\"        Past Medical History:   Diagnosis Date    Hypertension     Kidney disease     Stroke (cerebrum)        Assessment:  Active Hospital Problems    Diagnosis  POA    **Anemia due to gastrointestinal blood loss [D50.0]  Yes    Severe malnutrition [E43]  Yes    GI bleed [K92.2]  Yes    Esophageal mass [K22.89]  Unknown    History of CVA (cerebrovascular accident) [Z86.73]  Not Applicable    Esophageal adenocarcinoma [C15.9]  Unknown    Stage 3a chronic kidney disease [N18.31]  Yes    HTN (hypertension) [I10]  Yes      Resolved Hospital Problems   No " resolved problems to display.       Plan:  Follow hemoglobin and labs and transfuse as needed.  GI consult noted.  Oncology and thoracic surgery consults noted.  PEG tube was placed and IV port in place.  Plans for chemotherapy and radiation therapy as outpatient and tube feedings.  DC planning.  Possibly home on Monday if everything is arranged.    Corey Yo MD  6/15/2024  14:41 EDT

## 2024-06-15 NOTE — PLAN OF CARE
Goal Outcome Evaluation:  Plan of Care Reviewed With: patient        Progress: no change  Outcome Evaluation: med for pain  x 2 with some  relief stated, VSS, sinus elham on monitor, eyes closed at short intervals, resting quietly in room

## 2024-06-15 NOTE — BRIEF OP NOTE
ESOPHAGOGASTRODUODENOSCOPY  Progress Note    Nelson Garcia  6/14/2024    Pre-op Diagnosis:   Esophageal adenocarcinoma [C15.9]       Post-Op Diagnosis Codes:     * Esophageal adenocarcinoma [C15.9]    Procedure/CPT® Codes:        Procedure(s):  ESOPHAGOGASTRODUODENOSCOPY, RIGHT PORT PLACEMENT, AND JEJUNOSTOMY TUBE INSERTION              Surgeon(s):  Jany Valente MD    Anesthesia: General    Staff:   Circulator: Valeri Guevara RN  Radiology Technologist: Beatriz Chapa  Scrub Person: Jany Hay; Ben Ponce  Assistant: Lety Robert RNFA  Assistant: Lety Robert RNFA      Estimated Blood Loss: minimal    Urine Voided: * No values recorded between 6/14/2024  7:35 PM and 6/14/2024  9:22 PM *    Specimens:                Specimens       ID Source Type Tests Collected By Collected At Frozen?    A Peritoneum Peritoneal Fluid NON-GYNECOLOGIC CYTOLOGY   Jany Valente MD 6/14/24 2036 No    Description: PEITONEAL LAVAGE FOR CYTOLOGY ONLY    This specimen was not marked as sent.                  Drains: * No LDAs found *    Findings: Tumor in the distal esophagus from approximately 39 cm from the lip to the GE junction at 46 cm.  No significant tumor at the GE junction.  Diseased gastropathic nodes on examination of the GE junction.  No obvious metastatic disease in the liver.  Peritoneal lavage performed.        Complications: None apparent    Assistant: Lety Robert RNFA  was responsible for performing the following activities: Retraction, Suction, Irrigation, Closing, and Placing Dressing and their skilled assistance was necessary for the success of this case.    Jany Valente MD     Date: 6/14/2024  Time: 21:25 EDT

## 2024-06-15 NOTE — PROGRESS NOTES
History of Present Illness    Patient is a 49-year-old male who we had seen in consultation 6/5/2024 after having developed significant dysphagia.  He was to be seen at  but presented with further dysphagia and underwent evaluation here with EGD 6/4/2024.  This was a large fungating mass lower third of the esophagus partially obstructing circumferential involving 100% of the lumen circumference and was upon biopsy found to have adenocarcinoma.  The patient lives in [place] and works in [place] and had hoped to be seen in [place] for treatment while keeping his Meghan here at [facility]- Dr Mills.     Patient presented, unfortunately, with further weakness and fatigue and was found to be significantly anemic 6/13 with H&H of 6.6 and 21.5 associated with blackish stool.  He has been seen by GI and not felt a candidate for endoscopy but has been supportively transfused.  The case has been discussed with Dr. Mills who hopes to proceed with port placement, laparoscopy and feeding tube placement particularly after recent PET/CT/10 demonstrates hypermetabolic circumferential distal esophageal malignancy as well as gastrohepatic lymphadenopathy with least 1 lymph node clearly hypermetabolic and multiple subcentimeter periaortic posterior mediastinal lymph nodes prominent but too small to characterize and 1 cm left adrenal nodule is not hypermetabolic likely adrenal adenoma.     Interval history:  6/14/2024  T98.6, pulse 60, respiration 16, /75  Patient indicates that he has been exceedingly weak and fatigued particular over the last several days leading to his representation with anemia.  He has not yet been seen by either radiation therapy or medical oncology at [facility].  H&H 6.7 and 21.5 with white count of 4360 and platelet count of 1 56,000 with being creatinine of 17 and 1.20     6/15/2024  T97.7, pulse 52, respirations 18, /75, SpO2 in 100%  Patient now on vascular  lab  Record reviewed from 6/14 with the patient proceeding to EGD, right port placement and jejunostomy tube insertion  Findings including tumor in the distal esophagus from 39 to 46 cm without tumor at the GE junction though lavell abnormalities are present, no obvious metastatic disease to the liver, peritoneal lavage performed  Patient status post transfusion now with H&H of 8.7 and 28.8, white count is 6660, platelet count 186,000, BUN/creatinine 15 and 1.24      Past Medical History, Past Surgical History, Social History, Family History have been reviewed and are without significant changes except as mentioned.    Review of Systems   A comprehensive 14 point review of systems was performed and was negative except as mentioned.    Medications:  The current medication list was reviewed in the EMR    ALLERGIES:  No Known Allergies    Objective      Vitals:    06/15/24 0703 06/15/24 0712 06/15/24 0715 06/15/24 0721   BP: 131/75      BP Location: Right arm      Patient Position: Lying      Pulse: 50 50 57 52   Resp: 18 18     Temp: 97.7 °F (36.5 °C)      TempSrc: Oral      SpO2: 97% 96% 96% 100%   Weight:       Height:              No data to display                Physical Exam    Constitutional:       Appearance: Normal appearance. He is normal weight.   HENT:      Nose: Nose normal.      Mouth/Throat:      Mouth: Mucous membranes are moist.      Pharynx: Oropharynx is clear.   Eyes:      Conjunctiva/sclera: Conjunctivae normal.      Pupils: Pupils are equal, round, and reactive to light.   Cardiovascular:      Rate and Rhythm: Normal rate and regular rhythm.      Pulses: Normal pulses.      Heart sounds: Normal heart sounds.   Pulmonary:      Effort: Pulmonary effort is normal.      Breath sounds: Normal breath sounds.   Abdominal:      General: Bowel sounds are normal.      Palpations: Abdomen is soft, jejunostomy tube  Musculoskeletal:         General: Normal range of motion.      Cervical back: Normal range of  motion and neck supple.   Skin:     General: Skin is warm and dry.   Neurological:      General: No focal deficit present.      Mental Status: He is oriented to person, place, and time.   Psychiatric:         Mood and Affect: Mood normal.          RECENT LABS:  Hematology WBC   Date Value Ref Range Status   06/15/2024 6.66 3.40 - 10.80 10*3/mm3 Final     RBC   Date Value Ref Range Status   06/15/2024 3.35 (L) 4.14 - 5.80 10*6/mm3 Final     Hemoglobin   Date Value Ref Range Status   06/15/2024 8.7 (L) 13.0 - 17.7 g/dL Final     Hematocrit   Date Value Ref Range Status   06/15/2024 28.8 (L) 37.5 - 51.0 % Final     Platelets   Date Value Ref Range Status   06/15/2024 186 140 - 450 10*3/mm3 Final              Assessment & Plan     Confirmed distal esophageal cancer-adenocarcinoma  Presented with 20 to 35 pound weight loss over 2 to 3 months because initially could not get solids down and then for the past couple of weeks trouble getting liquids down as well.  EGD 6/4/ 24: Large fungating mass lower third of esophagus.  Partially obstructing and circumferential, involving 100% of the lumen circumference.  Dr. Hernandez had trouble getting past the mass with the scope.  Partially but nearly obstructing esophageal mass.  Biopsy pending.    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  Multiple shotty retroperitoneal nodes.  Largest gastrohepatic ligament node 2 x 1.1 cm.  Unremarkable noncontrast liver.  6/5/2024: Initial inpatient consult with me.  No clear evidence of distant disease seen on CT without IV contrast.  I will order a CT with IV contrast.  Dr. Mills has already arranged a PET scan as an outpatient.  If the scans show no evidence of distant disease, the goal is cure.  Dr. Mills is setting up an EUS at [facility].  The tentative plan will be chemotherapy concurrent with radiation followed by surgery with a goal of cure.  Of course, this can change  if distant disease is found.  He lives in [place] which he states he is just in between [place] and [place].  However, he works in [place].  Since medical oncology and radiation oncology will require multiple visits he prefers to establish care in [place] (he wants this through [facility]).  However, he would like to keep Dr. Mills as his surgeon removal and he would like the EUS done at [facility].  Findings discussed with his surgeon, PET/CT discussed and plans to proceed, likely, with port placement, PEG placement and diagnostic laparoscopy.  We have discussed the recent data concerning the FLOT regimen versus chemo RT as to the potential treatment options for this patient.  Record reviewed from 6/14 with the patient proceeding to EGD, right port placement and jejunostomy tube insertion  Findings including tumor in the distal esophagus from 39 to 46 cm without tumor at the GE junction though lavell abnormalities are present, no obvious metastatic disease to the liver, peritoneal lavage performed     *Multifactorial anemia-iron deficiency anemia, anemia chronic disease  6/5/2024: Ferritin 10, 5% saturation.  B12 and serum folate unremarkable.  Ferrlecit 250 mg IV daily x 3 days.  Please note I am ordering the IV iron for 3 days in case he is here for 3 days.  If he goes home before all the IV iron is completed, he cannot receive more IV iron as an outpatient (for insurance coverage reasons) until he tries oral iron and either does not tolerate oral or does not respond to it.  Therefore, he can follow-up with his medical oncologist in [place] to determine the need for further IV iron if he is not here for all 3 days of IV iron as an inpatient.  Patient admitted 6/14/2024 with further apparent blood loss anemia with black or stool over the last several days to week.  Plan to reassess per degree of iron deficiency as well as further transfusion support.  Iron studies consistent with anemia of chronic  disease status posttransfusion with H&H of 8.7 and 28.8, further transfusion planned       Assessment plan:  *Case discussed with Dr. Valente  *Plan to further transfuse today  *Radiation therapy and chemotherapy plans will be able to proceed in [place] next week-patient and wife request treatment there for the perioperative treatment regimen

## 2024-06-15 NOTE — PROGRESS NOTES
Gastroenterology   Inpatient Progress Note    Reason for Follow Up: GI bleed    Subjective     Interval History:   Family and nursing staff at bedside.  Patient is s/p J-tube placement and port placement yesterday.  Oncology following and plan of care devised.  Patient still having some nausea, denies emesis.  Tolerating clear liquid and full liquid diet.    Current Facility-Administered Medications:     sennosides-docusate (PERICOLACE) 8.6-50 MG per tablet 2 tablet, 2 tablet, Oral, BID PRN **AND** polyethylene glycol (MIRALAX) packet 17 g, 17 g, Oral, Daily PRN **AND** bisacodyl (DULCOLAX) EC tablet 5 mg, 5 mg, Oral, Daily PRN **AND** bisacodyl (DULCOLAX) suppository 10 mg, 10 mg, Rectal, Daily PRN, Jany Valente MD    bisacodyl (DULCOLAX) suppository 10 mg, 10 mg, Rectal, Daily PRN, Jany Valente MD    dextrose 5 % and sodium chloride 0.45 % with KCl 20 mEq/L infusion, 125 mL/hr, Intravenous, Continuous, Jany Valente MD, Last Rate: 125 mL/hr at 06/15/24 1436, 125 mL/hr at 06/15/24 1436    heparin (porcine) 5000 UNIT/ML injection 5,000 Units, 5,000 Units, Subcutaneous, Q8H, Jany Valente MD    HYDROmorphone (DILAUDID) injection 0.5 mg, 0.5 mg, Intravenous, Q2H PRN, Jany Valente MD, 0.5 mg at 06/15/24 1609    HYDROmorphone (DILAUDID) injection 0.5 mg, 0.5 mg, Intravenous, Q1H PRN, Jany Valente MD, 0.5 mg at 06/15/24 0650    ipratropium-albuterol (DUO-NEB) nebulizer solution 3 mL, 3 mL, Nebulization, Q8H - RT, Jany Valente MD, 3 mL at 06/15/24 1459    lactated ringers infusion, 100 mL/hr, Intravenous, Continuous, Jany Valente MD, Last Rate: 100 mL/hr at 06/14/24 0148, 100 mL/hr at 06/14/24 0148    lactated ringers infusion, 9 mL/hr, Intravenous, Continuous, Jany Valente MD, Stopped at 06/15/24 0036    morphine injection 4 mg, 4 mg, Intravenous, Q4H PRN, Jany Valente MD, 4 mg at 06/14/24 1432    nitroglycerin (NITROSTAT) SL tablet 0.4 mg, 0.4 mg, Sublingual, Q5 Min PRN, Ambrosio  Jany SALMON MD    ondansetron ODT (ZOFRAN-ODT) disintegrating tablet 4 mg, 4 mg, Oral, Q6H PRN **OR** ondansetron (ZOFRAN) injection 4 mg, 4 mg, Intravenous, Q6H PRN, Jany Valente MD    [COMPLETED] pantoprazole (PROTONIX) injection 80 mg, 80 mg, Intravenous, Once, 80 mg at 06/13/24 2201 **AND** pantoprazole (PROTONIX) 40 mg in sodium chloride 0.9 % 100 mL (0.4 mg/mL) MBP, 8 mg/hr, Intravenous, Continuous, Jany Valente MD, Last Rate: 20 mL/hr at 06/15/24 1609, 8 mg/hr at 06/15/24 1609    sodium chloride 0.9 % flush 10 mL, 10 mL, Intravenous, Q12H, Jany Valente MD, 10 mL at 06/15/24 0846    sodium chloride 0.9 % flush 10 mL, 10 mL, Intravenous, PRN, Jany Valente MD    sodium chloride 0.9 % infusion 40 mL, 40 mL, Intravenous, PRGAUDENCIO, Jany Valente MD  Review of Systems:    All systems were reviewed and negative except for:  Gastrointestinal: positive for  nausea and pain    Objective     Vital Signs  Temp:  [97.7 °F (36.5 °C)-98.5 °F (36.9 °C)] 98.3 °F (36.8 °C)  Heart Rate:  [47-68] 53  Resp:  [14-18] 18  BP: (120-163)/(64-91) 120/65  Body mass index is 22.22 kg/m².    Intake/Output Summary (Last 24 hours) at 6/15/2024 1707  Last data filed at 6/15/2024 1508  Gross per 24 hour   Intake 1525.92 ml   Output 1800 ml   Net -274.08 ml     I/O this shift:  In: 315 [Blood:315]  Out: 600 [Urine:600]     Physical Exam:   General: patient awake, alert and cooperative   Eyes: no scleral icterus   Skin: warm and dry, not jaundiced   Abdomen: soft, abdominal tenderness at J-tube site, nondistended; normal bowel sounds, no masses palpated, no periumbical lymphadenopathy   Psychiatric: Appropriate affect and behavior     Results Review:     I reviewed the patient's new clinical results.  I reviewed the patient's new imaging results and agree with the interpretation.  I reviewed the patient's other test results and agree with the interpretation    Results from last 7 days   Lab Units 06/15/24  1550 06/15/24  0538  06/14/24  1439 06/14/24  0713   WBC 10*3/mm3 8.84 6.66  --  4.36   HEMOGLOBIN g/dL 9.4* 8.7* 7.8* 6.7*   HEMATOCRIT % 29.9* 28.8* 24.5* 21.5*   PLATELETS 10*3/mm3 195 186  --  156     Results from last 7 days   Lab Units 06/15/24  0549 06/14/24  0713 06/13/24  2109   SODIUM mmol/L 137 139 139   POTASSIUM mmol/L 4.7 4.0 4.4   CHLORIDE mmol/L 106 110* 109*   CO2 mmol/L 23.2 23.0 22.0   BUN mg/dL 15 17 24*   CREATININE mg/dL 1.24 1.20 1.33*   CALCIUM mg/dL 8.4* 8.4* 8.5*   BILIRUBIN mg/dL  --   --  <0.2   ALK PHOS U/L  --   --  41   ALT (SGPT) U/L  --   --  9   AST (SGOT) U/L  --   --  12   GLUCOSE mg/dL 153* 95 96         Lab Results   Lab Value Date/Time    LIPASE 44 06/13/2024 2109    LIPASE 38 06/03/2024 1738       Radiology:  XR Chest 1 View   Final Result   Minimal likely atelectasis or scarring at the bases.   Borderline heart size. Follow-up as clinical indications persist.       This report was finalized on 6/15/2024 7:33 AM by Dr. John Sadler M.D on Workstation: [id number]          XR Chest 1 View   Final Result         Electronically signed by George Mcleod M.D. on 06-14-24 at 2335      FL C Arm During Surgery   Final Result      XR Chest 1 View   Final Result   Perhaps minimal atelectasis at the left lung base.       This report was finalized on 6/13/2024 11:39 PM by Dr. Dori Ortiz M.D on Workstation: [id number]              Assessment & Plan     Active Hospital Problems    Diagnosis     **Anemia due to gastrointestinal blood loss     Severe malnutrition     GI bleed     Esophageal mass     History of CVA (cerebrovascular accident)     Esophageal adenocarcinoma     Stage 3a chronic kidney disease     HTN (hypertension)        Assessment:  Anemia-stable at 8.7  Large fungated circumferential esophageal mass noted on EGD on 6/4/20/2024-pathology revealing moderately differentiated adenocarcinoma.  Patient being followed by oncology and thoracic surgery  Positive fecal occult blood  testing  CKD  Nausea    These problems are new to me.  Plan:  No plans for repeat EGD as GI bleeding is likely secondary to esophageal mass noted on EGD 6/4/2024  Plans for PET scan on outpatient basis per oncology.  Plans for EUS at [facility] in outpatient setting.  Plans for chemotherapy with concurrent radiation followed by surgery with goal of cure per oncology.  Patient lives in [place] and plans to establish care in [place] through [facility].  Continue supportive care  Port placement and J-tube placement 6/14 by Dr. Valente  Continue PPI therapy  GI will sign off for now.  As always, thank you for allowing us to participate in the care of your patient.  If we can be of further assistance please not hesitate to reach out to us.    I discussed the patients findings and my recommendations with patient, family, and nursing staff.    CHUN Morales APRN  [facility] [place] [phone]  Office: [phone]

## 2024-06-15 NOTE — ANESTHESIA POSTPROCEDURE EVALUATION
Patient: Nelson Garcia    Procedure Summary       Date: 06/14/24 Room / Location: [facility] OR [address] [place] [address] MAIN OR    Anesthesia Start: 1935 Anesthesia Stop: 2128    Procedure: ESOPHAGOGASTRODUODENOSCOPY, RIGHT PORT PLACEMENT, AND JEJUNOSTOMY TUBE INSERTION (Esophagus) Diagnosis:       Esophageal adenocarcinoma      (Esophageal adenocarcinoma [C15.9])    Surgeons: Jany Valente MD Provider: Bruno Strong MD    Anesthesia Type: general ASA Status: 3            Anesthesia Type: general    Vitals  Vitals Value Taken Time   /72 06/14/24 2300   Temp 36.6 °C (97.8 °F) 06/14/24 2125   Pulse 56 06/14/24 2304   Resp 16 06/14/24 2300   SpO2 98 % 06/14/24 2304   Vitals shown include unfiled device data.        Post Anesthesia Care and Evaluation    Patient location during evaluation: bedside  Pain management: adequate    Airway patency: patent  Anesthetic complications: No anesthetic complications    Cardiovascular status: acceptable  Respiratory status: acceptable  Hydration status: acceptable

## 2024-06-15 NOTE — ANESTHESIA PROCEDURE NOTES
Airway  Date/Time: 6/14/2024 7:48 PM  Airway not difficult    General Information and Staff    Patient location during procedure: OR  Anesthesiologist: Bruno Strong MD    Indications and Patient Condition    Preoxygenated: yes  Mask difficulty assessment: 2 - vent by mask + OA or adjuvant +/- NMBA    Final Airway Details  Final airway type: endotracheal airway      Successful airway: ETT  Cuffed: yes   Successful intubation technique: direct laryngoscopy  Facilitating devices/methods: intubating stylet  Endotracheal tube insertion site: oral  Blade: Gupta  Blade size: 3  ETT size (mm): 8.0  Cormack-Lehane Classification: grade I - full view of glottis  Placement verified by: capnometry   Measured from: lips  ETT/EBT  to teeth (cm): 21  ETT/EBT  to lips (cm): 21  Number of attempts at approach: 1  Assessment: lips, teeth, and gum same as pre-op and atraumatic intubation

## 2024-06-16 LAB
ALBUMIN SERPL-MCNC: 3.3 G/DL (ref 3.5–5.2)
ANION GAP SERPL CALCULATED.3IONS-SCNC: 8 MMOL/L (ref 5–15)
BUN SERPL-MCNC: 11 MG/DL (ref 6–20)
BUN/CREAT SERPL: 8.4 (ref 7–25)
CALCIUM SPEC-SCNC: 8.5 MG/DL (ref 8.6–10.5)
CHLORIDE SERPL-SCNC: 105 MMOL/L (ref 98–107)
CO2 SERPL-SCNC: 24 MMOL/L (ref 22–29)
CREAT SERPL-MCNC: 1.31 MG/DL (ref 0.76–1.27)
DEPRECATED RDW RBC AUTO: 45.8 FL (ref 37–54)
DEPRECATED RDW RBC AUTO: 46.3 FL (ref 37–54)
DEPRECATED RDW RBC AUTO: 47.3 FL (ref 37–54)
EGFRCR SERPLBLD CKD-EPI 2021: 66.7 ML/MIN/1.73
ERYTHROCYTE [DISTWIDTH] IN BLOOD BY AUTOMATED COUNT: 15.1 % (ref 12.3–15.4)
ERYTHROCYTE [DISTWIDTH] IN BLOOD BY AUTOMATED COUNT: 15.3 % (ref 12.3–15.4)
ERYTHROCYTE [DISTWIDTH] IN BLOOD BY AUTOMATED COUNT: 15.4 % (ref 12.3–15.4)
GLUCOSE BLDC GLUCOMTR-MCNC: 116 MG/DL (ref 70–130)
GLUCOSE BLDC GLUCOMTR-MCNC: 120 MG/DL (ref 70–130)
GLUCOSE BLDC GLUCOMTR-MCNC: 122 MG/DL (ref 70–130)
GLUCOSE SERPL-MCNC: 101 MG/DL (ref 65–99)
HCT VFR BLD AUTO: 27.4 % (ref 37.5–51)
HCT VFR BLD AUTO: 30 % (ref 37.5–51)
HCT VFR BLD AUTO: 30.6 % (ref 37.5–51)
HGB BLD-MCNC: 8.9 G/DL (ref 13–17.7)
HGB BLD-MCNC: 9.5 G/DL (ref 13–17.7)
HGB BLD-MCNC: 9.5 G/DL (ref 13–17.7)
MCH RBC QN AUTO: 26.5 PG (ref 26.6–33)
MCH RBC QN AUTO: 26.8 PG (ref 26.6–33)
MCH RBC QN AUTO: 27.7 PG (ref 26.6–33)
MCHC RBC AUTO-ENTMCNC: 31 G/DL (ref 31.5–35.7)
MCHC RBC AUTO-ENTMCNC: 31.7 G/DL (ref 31.5–35.7)
MCHC RBC AUTO-ENTMCNC: 32.5 G/DL (ref 31.5–35.7)
MCV RBC AUTO: 84.7 FL (ref 79–97)
MCV RBC AUTO: 85.4 FL (ref 79–97)
MCV RBC AUTO: 85.5 FL (ref 79–97)
PHOSPHATE SERPL-MCNC: 2.3 MG/DL (ref 2.5–4.5)
PLATELET # BLD AUTO: 180 10*3/MM3 (ref 140–450)
PLATELET # BLD AUTO: 209 10*3/MM3 (ref 140–450)
PLATELET # BLD AUTO: 217 10*3/MM3 (ref 140–450)
PMV BLD AUTO: 10.8 FL (ref 6–12)
PMV BLD AUTO: 11 FL (ref 6–12)
PMV BLD AUTO: 11.6 FL (ref 6–12)
POTASSIUM SERPL-SCNC: 4.1 MMOL/L (ref 3.5–5.2)
RBC # BLD AUTO: 3.21 10*6/MM3 (ref 4.14–5.8)
RBC # BLD AUTO: 3.54 10*6/MM3 (ref 4.14–5.8)
RBC # BLD AUTO: 3.58 10*6/MM3 (ref 4.14–5.8)
SODIUM SERPL-SCNC: 137 MMOL/L (ref 136–145)
WBC NRBC COR # BLD AUTO: 7.24 10*3/MM3 (ref 3.4–10.8)
WBC NRBC COR # BLD AUTO: 7.68 10*3/MM3 (ref 3.4–10.8)
WBC NRBC COR # BLD AUTO: 7.8 10*3/MM3 (ref 3.4–10.8)

## 2024-06-16 PROCEDURE — 82948 REAGENT STRIP/BLOOD GLUCOSE: CPT

## 2024-06-16 PROCEDURE — G0378 HOSPITAL OBSERVATION PER HR: HCPCS

## 2024-06-16 PROCEDURE — 85027 COMPLETE CBC AUTOMATED: CPT | Performed by: THORACIC SURGERY (CARDIOTHORACIC VASCULAR SURGERY)

## 2024-06-16 PROCEDURE — 25010000002 ONDANSETRON PER 1 MG: Performed by: THORACIC SURGERY (CARDIOTHORACIC VASCULAR SURGERY)

## 2024-06-16 PROCEDURE — 25010000002 HYDROMORPHONE PER 4 MG: Performed by: THORACIC SURGERY (CARDIOTHORACIC VASCULAR SURGERY)

## 2024-06-16 PROCEDURE — 80069 RENAL FUNCTION PANEL: CPT | Performed by: THORACIC SURGERY (CARDIOTHORACIC VASCULAR SURGERY)

## 2024-06-16 PROCEDURE — 99214 OFFICE O/P EST MOD 30 MIN: CPT | Performed by: INTERNAL MEDICINE

## 2024-06-16 PROCEDURE — 36415 COLL VENOUS BLD VENIPUNCTURE: CPT | Performed by: THORACIC SURGERY (CARDIOTHORACIC VASCULAR SURGERY)

## 2024-06-16 PROCEDURE — 85027 COMPLETE CBC AUTOMATED: CPT | Performed by: INTERNAL MEDICINE

## 2024-06-16 PROCEDURE — 99024 POSTOP FOLLOW-UP VISIT: CPT | Performed by: THORACIC SURGERY (CARDIOTHORACIC VASCULAR SURGERY)

## 2024-06-16 RX ORDER — OXYCODONE HYDROCHLORIDE 5 MG/1
10 TABLET ORAL EVERY 4 HOURS PRN
Status: DISCONTINUED | OUTPATIENT
Start: 2024-06-16 | End: 2024-06-18 | Stop reason: HOSPADM

## 2024-06-16 RX ADMIN — OXYCODONE HYDROCHLORIDE 5 MG: 5 TABLET ORAL at 08:12

## 2024-06-16 RX ADMIN — OXYCODONE HYDROCHLORIDE 10 MG: 5 TABLET ORAL at 12:20

## 2024-06-16 RX ADMIN — ONDANSETRON 4 MG: 2 INJECTION INTRAMUSCULAR; INTRAVENOUS at 20:45

## 2024-06-16 RX ADMIN — OXYCODONE HYDROCHLORIDE 10 MG: 5 TABLET ORAL at 20:45

## 2024-06-16 RX ADMIN — PANTOPRAZOLE SODIUM 8 MG/HR: 40 INJECTION, POWDER, FOR SOLUTION INTRAVENOUS at 04:08

## 2024-06-16 RX ADMIN — ONDANSETRON 4 MG: 2 INJECTION INTRAMUSCULAR; INTRAVENOUS at 08:18

## 2024-06-16 RX ADMIN — ONDANSETRON 4 MG: 2 INJECTION INTRAMUSCULAR; INTRAVENOUS at 14:17

## 2024-06-16 RX ADMIN — PANTOPRAZOLE SODIUM 8 MG/HR: 40 INJECTION, POWDER, FOR SOLUTION INTRAVENOUS at 08:12

## 2024-06-16 RX ADMIN — OXYCODONE HYDROCHLORIDE 10 MG: 5 TABLET ORAL at 16:40

## 2024-06-16 RX ADMIN — PANTOPRAZOLE SODIUM 8 MG/HR: 40 INJECTION, POWDER, FOR SOLUTION INTRAVENOUS at 20:46

## 2024-06-16 RX ADMIN — HYDROMORPHONE HYDROCHLORIDE 0.5 MG: 1 INJECTION, SOLUTION INTRAMUSCULAR; INTRAVENOUS; SUBCUTANEOUS at 02:09

## 2024-06-16 RX ADMIN — HYDROMORPHONE HYDROCHLORIDE 0.5 MG: 1 INJECTION, SOLUTION INTRAMUSCULAR; INTRAVENOUS; SUBCUTANEOUS at 09:28

## 2024-06-16 RX ADMIN — HYDROMORPHONE HYDROCHLORIDE 0.5 MG: 1 INJECTION, SOLUTION INTRAMUSCULAR; INTRAVENOUS; SUBCUTANEOUS at 06:11

## 2024-06-16 RX ADMIN — POTASSIUM CHLORIDE, DEXTROSE MONOHYDRATE AND SODIUM CHLORIDE 125 ML/HR: 150; 5; 450 INJECTION, SOLUTION INTRAVENOUS at 04:08

## 2024-06-16 RX ADMIN — PANTOPRAZOLE SODIUM 8 MG/HR: 40 INJECTION, POWDER, FOR SOLUTION INTRAVENOUS at 15:57

## 2024-06-16 RX ADMIN — OXYCODONE HYDROCHLORIDE 5 MG: 5 TABLET ORAL at 04:08

## 2024-06-16 RX ADMIN — Medication 10 ML: at 08:17

## 2024-06-16 RX ADMIN — POTASSIUM CHLORIDE, DEXTROSE MONOHYDRATE AND SODIUM CHLORIDE 125 ML/HR: 150; 5; 450 INJECTION, SOLUTION INTRAVENOUS at 14:17

## 2024-06-16 NOTE — PROGRESS NOTES
History of Present Illness    Patient is a 49-year-old male who we had seen in consultation 6/5/2024 after having developed significant dysphagia.  He was to be seen at  but presented with further dysphagia and underwent evaluation here with EGD 6/4/2024.  This was a large fungating mass lower third of the esophagus partially obstructing circumferential involving 100% of the lumen circumference and was upon biopsy found to have adenocarcinoma.  The patient lives in [place] and works in [place] and had hoped to be seen in [place] for treatment while keeping his Meghan here at [facility].     Patient presented, unfortunately, with further weakness and fatigue and was found to be significantly anemic 6/13 with H&H of 6.6 and 21.5 associated with blackish stool.  He has been seen by GI and not felt a candidate for endoscopy but has been supportively transfused.  The case has been discussed with Dr. Mills who hopes to proceed with port placement, laparoscopy and feeding tube placement particularly after recent PET/CT/10 demonstrates hypermetabolic circumferential distal esophageal malignancy as well as gastrohepatic lymphadenopathy with least 1 lymph node clearly hypermetabolic and multiple subcentimeter periaortic posterior mediastinal lymph nodes prominent but too small to characterize and 1 cm left adrenal nodule is not hypermetabolic likely adrenal adenoma.     Interval history:  6/14/2024  T98.6, pulse 60, respiration 16, /75  Patient indicates that he has been exceedingly weak and fatigued particular over the last several days leading to his representation with anemia.  He has not yet been seen by either radiation therapy or medical oncology at [facility].  H&H 6.7 and 21.5 with white count of 4360 and platelet count of 1 56,000 with being creatinine of 17 and 1.20     6/15/2024  T97.7, pulse 52, respirations 18, /75, SpO2 in 100%  Patient now on vascular  lab  Record reviewed from 6/14 with the patient proceeding to EGD, right port placement and jejunostomy tube insertion  Findings including tumor in the distal esophagus from 39 to 46 cm without tumor at the GE junction though lavell abnormalities are present, no obvious metastatic disease to the liver, peritoneal lavage performed  Patient status post transfusion now with H&H of 8.7 and 28.8, white count is 6660, platelet count 186,000, BUN/creatinine 15 and 1.24    6/16/2024  T99.5, pulse 60, respirations 18, /88  Patient notes continued abdominal pain  H&H 9.5 and 30.0, white count of 1700, platelet count of 217,000, BUN and creatinine are 11 and 1.31      Past Medical History, Past Surgical History, Social History, Family History have been reviewed and are without significant changes except as mentioned.    Review of Systems   A comprehensive 14 point review of systems was performed and was negative except as mentioned.    Medications:  The current medication list was reviewed in the EMR    ALLERGIES:  No Known Allergies    Objective      Vitals:    06/15/24 1936 06/15/24 2235 06/15/24 2323 06/16/24 0708   BP: 138/77  132/85 152/85   BP Location: Left arm  Right arm Right arm   Patient Position: Lying  Lying Lying   Pulse: 58  62 68   Resp: 18  18 18   Temp: 98.4 °F (36.9 °C)  98.4 °F (36.9 °C) 99.5 °F (37.5 °C)   TempSrc: Oral  Oral Oral   SpO2: 99% 94% 95% 93%   Weight:       Height:              No data to display                Physical Exam    Constitutional:       Appearance: Normal appearance. He is normal weight.   HENT:      Nose: Nose normal.      Mouth/Throat:      Mouth: Mucous membranes are moist.      Pharynx: Oropharynx is clear.   Eyes:      Conjunctiva/sclera: Conjunctivae normal.      Pupils: Pupils are equal, round, and reactive to light.   Cardiovascular:      Rate and Rhythm: Normal rate and regular rhythm.      Pulses: Normal pulses.      Heart sounds: Normal heart sounds.   Pulmonary:       Effort: Pulmonary effort is normal.      Breath sounds: Normal breath sounds.   Abdominal:      General: Mildly tender throughout     Palpations: Abdomen is soft, jejunostomy tube noted, bowel sounds reduced  Musculoskeletal:         General: Normal range of motion.      Cervical back: Normal range of motion and neck supple.   Skin:     General: Skin is warm and dry.   Neurological:      General: No focal deficit present.      Mental Status: He is oriented to person, place, and time.   Psychiatric:         Mood and Affect: Mood normal.          RECENT LABS:  Hematology WBC   Date Value Ref Range Status   06/16/2024 7.80 3.40 - 10.80 10*3/mm3 Final     RBC   Date Value Ref Range Status   06/16/2024 3.54 (L) 4.14 - 5.80 10*6/mm3 Final     Hemoglobin   Date Value Ref Range Status   06/16/2024 9.5 (L) 13.0 - 17.7 g/dL Final     Hematocrit   Date Value Ref Range Status   06/16/2024 30.0 (L) 37.5 - 51.0 % Final     Platelets   Date Value Ref Range Status   06/16/2024 217 140 - 450 10*3/mm3 Final              Assessment & Plan     Confirmed distal esophageal cancer-adenocarcinoma  Presented with 20 to 35 pound weight loss over 2 to 3 months because initially could not get solids down and then for the past couple of weeks trouble getting liquids down as well.  EGD 6/4/ 24: Large fungating mass lower third of esophagus.  Partially obstructing and circumferential, involving 100% of the lumen circumference.  Dr. Hernandez had trouble getting past the mass with the scope.  Partially but nearly obstructing esophageal mass.  Biopsy pending.    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  Multiple shotty retroperitoneal nodes.  Largest gastrohepatic ligament node 2 x 1.1 cm.  Unremarkable noncontrast liver.  6/5/2024: Initial inpatient consult with me.  No clear evidence of distant disease seen on CT without IV contrast.  I will order a CT with IV contrast.    Lina has already arranged a PET scan as an outpatient.  If the scans show no evidence of distant disease, the goal is cure.  Dr. Mills is setting up an EUS at [facility].  The tentative plan will be chemotherapy concurrent with radiation followed by surgery with a goal of cure.  Of course, this can change if distant disease is found.  He lives in [place] which he states he is just in between [place] and [place].  However, he works in [place].  Since medical oncology and radiation oncology will require multiple visits he prefers to establish care in [place] (he wants this through [facility]).  However, he would like to keep Dr. Mills as his surgeon removal and he would like the EUS done at [facility].  Findings discussed with his surgeon, PET/CT discussed and plans to proceed, likely, with port placement, PEG placement and diagnostic laparoscopy.  We have discussed the recent data concerning the FLOT regimen versus chemo RT as to the potential treatment options for this patient.  Record reviewed from 6/14 with the patient proceeding to EGD, right port placement and jejunostomy tube insertion  Findings including tumor in the distal esophagus from 39 to 46 cm without tumor at the GE junction though lavell abnormalities are present, no obvious metastatic disease to the liver, peritoneal lavage performed results pending     *Multifactorial anemia-iron deficiency anemia, anemia chronic disease  6/5/2024: Ferritin 10, 5% saturation.  B12 and serum folate unremarkable.  Ferrlecit 250 mg IV daily x 3 days.  Please note I am ordering the IV iron for 3 days in case he is here for 3 days.  If he goes home before all the IV iron is completed, he cannot receive more IV iron as an outpatient (for insurance coverage reasons) until he tries oral iron and either does not tolerate oral or does not respond to it.  Therefore, he can follow-up with his medical oncologist in [place] to determine the need for further IV iron if  he is not here for all 3 days of IV iron as an inpatient.  Patient admitted 6/14/2024 with further apparent blood loss anemia with black or stool over the last several days to week.  Plan to reassess per degree of iron deficiency as well as further transfusion support.  Iron studies consistent with anemia of chronic disease status posttransfusion with H&H of 8.7 and 28.8, further transfusion planned  Assessment 6/16/2024 H&H 9.5 and 30.0       Assessment plan:  *No additional transfusion at this point  *Adjust pain medications  *Radiation therapy and chemotherapy plans will be able to proceed in [place] next week-patient and wife request treatment there for the perioperative treatment regimen

## 2024-06-16 NOTE — PLAN OF CARE
Goal Outcome Evaluation:  Plan of Care Reviewed With: patient, spouse           Outcome Evaluation: Medicated for pain as ordered.  Oral Oxy dose increased to 10mg q 4 hours PRN.  Pt can still get Dilaudid but is holding off because pt was to go home soon.  Zofran given for nausea.  Diet changed to Full liquids.  Pt had some ice cream earlier(small amount)  Pt decided to go ahead and start TF at 1600 running at 10ml/hgr.  VSS, RA, NSR.  Pt ambulated to the BR with walker without issue.  Current oral pain meds are helping.

## 2024-06-16 NOTE — PROGRESS NOTES
"DAILY PROGRESS NOTE  [facility]    Patient Identification:  Name: Nelson Garcia  Age: 49 y.o.  Sex: male  :  1975  MRN: [id number]         Primary Care Physician: Chelita Scott APRN    Subjective:  Interval History: He complains of pain and hurting all over.  He is weak.    Objective:    Scheduled Meds:heparin (porcine), 5,000 Units, Subcutaneous, Q8H  ipratropium-albuterol, 3 mL, Nebulization, Q8H - RT  sodium chloride, 10 mL, Intravenous, Q12H      Continuous Infusions:dextrose 5 % and sodium chloride 0.45 % with KCl 20 mEq/L, 125 mL/hr, Last Rate: 125 mL/hr (24 1417)  lactated ringers, 100 mL/hr, Last Rate: 100 mL/hr (24 0148)  lactated ringers, 9 mL/hr, Last Rate: Stopped (06/15/24 0036)  pantoprazole, 8 mg/hr, Last Rate: 8 mg/hr (24 1035)        Vital signs in last 24 hours:  Temp:  [97.9 °F (36.6 °C)-99.5 °F (37.5 °C)] 98.6 °F (37 °C)  Heart Rate:  [47-75] 75  Resp:  [16-18] 18  BP: (120-161)/(65-88) 161/88    Intake/Output:    Intake/Output Summary (Last 24 hours) at 2024 1448  Last data filed at 2024 0812  Gross per 24 hour   Intake 4023.84 ml   Output 1150 ml   Net 2873.84 ml       Exam:  /88 (BP Location: Right arm, Patient Position: Lying)   Pulse 75   Temp 98.6 °F (37 °C) (Oral)   Resp 18   Ht 182.9 cm (72\")   Wt 74.3 kg (163 lb 12.8 oz)   SpO2 99%   BMI 22.22 kg/m²     General Appearance:    Alert, cooperative, no distress   Head:    Normocephalic, without obvious abnormality, atraumatic   Eyes:       Throat:   Lips, tongue, gums normal   Neck:   Supple, symmetrical, trachea midline, no JVD   Lungs:     Clear to auscultation bilaterally, respirations unlabored   Chest Wall:    No tenderness or deformity    Heart:    Regular rate and rhythm, S1 and S2 normal, no murmur,no  Rub or gallop   Abdomen:     Soft, nontender, bowel sounds active, no masses, no organomegaly    Extremities:   Extremities normal, atraumatic, no " cyanosis or edema   Pulses:      Skin:   Skin is warm and dry,  no rashes or palpable lesions   Neurologic:   no focal deficits noted      Lab Results (last 72 hours)       Procedure Component Value Units Date/Time    Ferritin [[id number]]  (Normal) Collected: 06/14/24 0713    Specimen: Blood Updated: 06/14/24 1349     Ferritin 55.00 ng/mL     Narrative:      Results may be falsely decreased if patient taking Biotin.      Iron Profile [[id number]]  (Abnormal) Collected: 06/14/24 0713    Specimen: Blood Updated: 06/14/24 1343     Iron 15 mcg/dL      Iron Saturation (TSAT) 5 %      Transferrin 192 mg/dL      TIBC 286 mcg/dL     CBC (No Diff) [[id number]]  (Abnormal) Collected: 06/14/24 0713    Specimen: Blood Updated: 06/14/24 0831     WBC 4.36 10*3/mm3      RBC 2.52 10*6/mm3      Hemoglobin 6.7 g/dL      Hematocrit 21.5 %      MCV 85.3 fL      MCH 26.6 pg      MCHC 31.2 g/dL      RDW 14.7 %      RDW-SD 44.4 fl      MPV 11.3 fL      Platelets 156 10*3/mm3     Basic Metabolic Panel [[id number]]  (Abnormal) Collected: 06/14/24 0713    Specimen: Blood Updated: 06/14/24 0815     Glucose 95 mg/dL      BUN 17 mg/dL      Creatinine 1.20 mg/dL      Sodium 139 mmol/L      Potassium 4.0 mmol/L      Chloride 110 mmol/L      CO2 23.0 mmol/L      Calcium 8.4 mg/dL      BUN/Creatinine Ratio 14.2     Anion Gap 6.0 mmol/L      eGFR 74.1 mL/min/1.73     Narrative:      GFR Normal >60  Chronic Kidney Disease <60  Kidney Failure <15      Urinalysis With Microscopic If Indicated (No Culture) - Urine, Clean Catch [[id number]]  (Normal) Collected: 06/14/24 0118    Specimen: Urine, Clean Catch Updated: 06/14/24 0209     Color, UA Yellow     Appearance, UA Clear     pH, UA 6.0     Specific Gravity, UA 1.013     Glucose, UA Negative     Ketones, UA Negative     Bilirubin, UA Negative     Blood, UA Negative     Protein, UA Negative     Leuk Esterase, UA Negative     Nitrite, UA Negative     Urobilinogen, UA 0.2 E.U./dL    Narrative:      Urine  microscopic not indicated.    POCT Occult Blood Stool [[id number]]  (Abnormal) Collected: 06/13/24 2152    Specimen: Stool from Per Rectum Updated: 06/13/24 2152     Fecal Occult Blood Positive     Lot Number [id number]     Expiration Date 02/28/2025     Positive Control Positive     Negative Control Negative    Comprehensive Metabolic Panel [[id number]]  (Abnormal) Collected: 06/13/24 2109    Specimen: Blood Updated: 06/13/24 2140     Glucose 96 mg/dL      BUN 24 mg/dL      Creatinine 1.33 mg/dL      Sodium 139 mmol/L      Potassium 4.4 mmol/L      Chloride 109 mmol/L      CO2 22.0 mmol/L      Calcium 8.5 mg/dL      Total Protein 5.8 g/dL      Albumin 3.6 g/dL      ALT (SGPT) 9 U/L      AST (SGOT) 12 U/L      Alkaline Phosphatase 41 U/L      Total Bilirubin <0.2 mg/dL      Globulin 2.2 gm/dL      A/G Ratio 1.6 g/dL      BUN/Creatinine Ratio 18.0     Anion Gap 8.0 mmol/L      eGFR 65.5 mL/min/1.73     Narrative:      GFR Normal >60  Chronic Kidney Disease <60  Kidney Failure <15      Lipase [[id number]]  (Normal) Collected: 06/13/24 2109    Specimen: Blood Updated: 06/13/24 2140     Lipase 44 U/L     Lactic Acid, Plasma [[id number]]  (Normal) Collected: 06/13/24 2109    Specimen: Blood Updated: 06/13/24 2135     Lactate 0.9 mmol/L     CBC & Differential [[id number]]  (Abnormal) Collected: 06/13/24 2109    Specimen: Blood Updated: 06/13/24 2126    Narrative:      The following orders were created for panel order CBC & Differential.  Procedure                               Abnormality         Status                     ---------                               -----------         ------                     CBC Auto Differential[[id number]]        Abnormal            Final result                 Please view results for these tests on the individual orders.    CBC Auto Differential [[id number]]  (Abnormal) Collected: 06/13/24 2109    Specimen: Blood Updated: 06/13/24 2126     WBC 4.20 10*3/mm3      RBC 2.52 10*6/mm3      Hemoglobin 6.6  g/dL      Hematocrit 21.5 %      MCV 85.3 fL      MCH 26.2 pg      MCHC 30.7 g/dL      RDW 14.6 %      RDW-SD 43.8 fl      MPV 11.0 fL      Platelets 186 10*3/mm3      Neutrophil % 66.4 %      Lymphocyte % 25.5 %      Monocyte % 6.9 %      Eosinophil % 0.5 %      Basophil % 0.5 %      Immature Grans % 0.2 %      Neutrophils, Absolute 2.79 10*3/mm3      Lymphocytes, Absolute 1.07 10*3/mm3      Monocytes, Absolute 0.29 10*3/mm3      Eosinophils, Absolute 0.02 10*3/mm3      Basophils, Absolute 0.02 10*3/mm3      Immature Grans, Absolute 0.01 10*3/mm3      nRBC 0.0 /100 WBC           Data Review:  Results from last 7 days   Lab Units 06/16/24  0610 06/15/24  0549 06/14/24  0713   SODIUM mmol/L 137 137 139   POTASSIUM mmol/L 4.1 4.7 4.0   CHLORIDE mmol/L 105 106 110*   CO2 mmol/L 24.0 23.2 23.0   BUN mg/dL 11 15 17   CREATININE mg/dL 1.31* 1.24 1.20   GLUCOSE mg/dL 101* 153* 95   CALCIUM mg/dL 8.5* 8.4* 8.4*     Results from last 7 days   Lab Units 06/16/24  0610 06/16/24  0000 06/15/24  1550   WBC 10*3/mm3 7.80 7.68 8.84   HEMOGLOBIN g/dL 9.5* 8.9* 9.4*   HEMATOCRIT % 30.0* 27.4* 29.9*   PLATELETS 10*3/mm3 217 180 195             Lab Results   Lab Value Date/Time    TROPONINT 0.014 02/21/2022 1514         Results from last 7 days   Lab Units 06/13/24  2109   ALK PHOS U/L 41   BILIRUBIN mg/dL <0.2   ALT (SGPT) U/L 9   AST (SGOT) U/L 12             Glucose   Date/Time Value Ref Range Status   06/16/2024 1028 116 70 - 130 mg/dL Final   06/16/2024 0605 120 70 - 130 mg/dL Final   06/15/2024 2323 124 70 - 130 mg/dL Final           Past Medical History:   Diagnosis Date    Hypertension     Kidney disease     Stroke (cerebrum)        Assessment:  Active Hospital Problems    Diagnosis  POA    **Anemia due to gastrointestinal blood loss [D50.0]  Yes    Severe malnutrition [E43]  Yes    GI bleed [K92.2]  Yes    Esophageal mass [K22.89]  Unknown    History of CVA (cerebrovascular accident) [Z86.73]  Not Applicable    Esophageal  adenocarcinoma [C15.9]  Unknown    Stage 3a chronic kidney disease [N18.31]  Yes    HTN (hypertension) [I10]  Yes      Resolved Hospital Problems   No resolved problems to display.       Plan:  Follow hemoglobin and labs and transfuse as needed.  GI consult noted.  Oncology and thoracic surgery consults noted.  PEG tube was placed and IV port in place.  Plans for chemotherapy and radiation therapy as outpatient and tube feedings.  DC planning.  Possibly home on Monday if everything is arranged.  Will ask for PT eval.    Corey Yo MD  6/16/2024  14:48 EDT

## 2024-06-16 NOTE — PROGRESS NOTES
"  POST-OPERATIVE NOTE     Chief Complaint: Esophageal cancer  S/P: Port, feeding jejunostomy, diagnostic laparotomy, EGD  POD # 2    Subjective  No complaints.  Objective:  General Appearance:  Comfortable and in no acute distress.    Vital signs: (most recent): Blood pressure 161/88, pulse 75, temperature 98.6 °F (37 °C), temperature source Oral, resp. rate 18, height 182.9 cm (72\"), weight 74.3 kg (163 lb 12.8 oz), SpO2 99%.  Vital signs are normal.    Lungs:  Normal effort.    Heart: Normal rate.    Abdomen: Abdomen is soft and distended.    Neurological: Patient is alert and oriented to person, place and time.    Skin:  Warm and dry.                Results Review:     I reviewed the patient's new clinical results.  I reviewed the patient's new imaging results and agree with the interpretation.  I reviewed the patient's other test results and agree with the interpretation    Assessment & Plan   Mr. 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  He had some issues with pain overnight with his tube feeds which were stopped this morning.  I recommend that we restart these now and try to get him to goal before switching to cyclic feeds.  The wife is angry as she is under the impression that if we do continuous feeds in the hospital he will have to do those at home which is not the case.  I have discussed with the patient that the longer it takes us to get him to go with his tube feeds the longer it takes him to go home.  I have recommended that he increase his mobility to increase his bowel function.  His hemoglobin is stable today and as long as it continues to be stable, will not plan a radiation therapy consult at this time.  If he continues to ooze from his tumor, then radiation is the best answer for this.  He is scheduled to see medical and radiation oncology next week in [place].    Jany Valente MD  [facility]  06/16/24  16:04 " EDT    Patient was seen and assessed while wearing personal protective equipment including facemask, protective eyewear and gloves.  Hand hygiene performed prior to entering the room and upon exiting with doffing of gloves.

## 2024-06-16 NOTE — NURSING NOTE
Pt continues to refuse the Hep SubQ medication.  Pt understands and has been educated on the reasons for the shot.  MD's aware.

## 2024-06-16 NOTE — PLAN OF CARE
Goal Outcome Evaluation:  Plan of Care Reviewed With: patient        Progress: improving  Outcome Evaluation: Med for pain x4 with some relief stated nausea med gven x 1, relief stated, tubefeeding at 20ml/hr, tolerating, VSS, NSR on monitor, refusing heparin, dozing short intervals, resting quietly in room

## 2024-06-17 ENCOUNTER — HOME HEALTH ADMISSION (OUTPATIENT)
Dept: HOME HEALTH SERVICES | Facility: HOME HEALTHCARE | Age: 49
End: 2024-06-17
Payer: MEDICAID

## 2024-06-17 LAB
ALBUMIN SERPL-MCNC: 3.2 G/DL (ref 3.5–5.2)
ANION GAP SERPL CALCULATED.3IONS-SCNC: 8.9 MMOL/L (ref 5–15)
BH BB BLOOD EXPIRATION DATE: NORMAL
BH BB BLOOD EXPIRATION DATE: NORMAL
BH BB BLOOD TYPE BARCODE: 6200
BH BB BLOOD TYPE BARCODE: 6200
BH BB DISPENSE STATUS: NORMAL
BH BB DISPENSE STATUS: NORMAL
BH BB PRODUCT CODE: NORMAL
BH BB PRODUCT CODE: NORMAL
BH BB UNIT NUMBER: NORMAL
BH BB UNIT NUMBER: NORMAL
BUN SERPL-MCNC: 8 MG/DL (ref 6–20)
BUN/CREAT SERPL: 6.7 (ref 7–25)
CALCIUM SPEC-SCNC: 8.6 MG/DL (ref 8.6–10.5)
CHLORIDE SERPL-SCNC: 105 MMOL/L (ref 98–107)
CO2 SERPL-SCNC: 23.1 MMOL/L (ref 22–29)
CREAT SERPL-MCNC: 1.2 MG/DL (ref 0.76–1.27)
CROSSMATCH INTERPRETATION: NORMAL
CROSSMATCH INTERPRETATION: NORMAL
DEPRECATED RDW RBC AUTO: 46.6 FL (ref 37–54)
DEPRECATED RDW RBC AUTO: 46.7 FL (ref 37–54)
DEPRECATED RDW RBC AUTO: 46.7 FL (ref 37–54)
EGFRCR SERPLBLD CKD-EPI 2021: 74.1 ML/MIN/1.73
ERYTHROCYTE [DISTWIDTH] IN BLOOD BY AUTOMATED COUNT: 15.4 % (ref 12.3–15.4)
GLUCOSE BLDC GLUCOMTR-MCNC: 120 MG/DL (ref 70–130)
GLUCOSE BLDC GLUCOMTR-MCNC: 125 MG/DL (ref 70–130)
GLUCOSE BLDC GLUCOMTR-MCNC: 132 MG/DL (ref 70–130)
GLUCOSE BLDC GLUCOMTR-MCNC: 147 MG/DL (ref 70–130)
GLUCOSE SERPL-MCNC: 115 MG/DL (ref 65–99)
HCT VFR BLD AUTO: 29.1 % (ref 37.5–51)
HCT VFR BLD AUTO: 29.8 % (ref 37.5–51)
HCT VFR BLD AUTO: 32.3 % (ref 37.5–51)
HGB BLD-MCNC: 10.2 G/DL (ref 13–17.7)
HGB BLD-MCNC: 9.2 G/DL (ref 13–17.7)
HGB BLD-MCNC: 9.8 G/DL (ref 13–17.7)
MCH RBC QN AUTO: 26.8 PG (ref 26.6–33)
MCH RBC QN AUTO: 26.9 PG (ref 26.6–33)
MCH RBC QN AUTO: 27.7 PG (ref 26.6–33)
MCHC RBC AUTO-ENTMCNC: 31.6 G/DL (ref 31.5–35.7)
MCHC RBC AUTO-ENTMCNC: 31.6 G/DL (ref 31.5–35.7)
MCHC RBC AUTO-ENTMCNC: 32.9 G/DL (ref 31.5–35.7)
MCV RBC AUTO: 84.2 FL (ref 79–97)
MCV RBC AUTO: 84.8 FL (ref 79–97)
MCV RBC AUTO: 85.2 FL (ref 79–97)
PHOSPHATE SERPL-MCNC: 3.2 MG/DL (ref 2.5–4.5)
PLATELET # BLD AUTO: 199 10*3/MM3 (ref 140–450)
PLATELET # BLD AUTO: 210 10*3/MM3 (ref 140–450)
PLATELET # BLD AUTO: 211 10*3/MM3 (ref 140–450)
PMV BLD AUTO: 10.6 FL (ref 6–12)
PMV BLD AUTO: 11.2 FL (ref 6–12)
PMV BLD AUTO: 11.7 FL (ref 6–12)
POTASSIUM SERPL-SCNC: 4.2 MMOL/L (ref 3.5–5.2)
RBC # BLD AUTO: 3.43 10*6/MM3 (ref 4.14–5.8)
RBC # BLD AUTO: 3.54 10*6/MM3 (ref 4.14–5.8)
RBC # BLD AUTO: 3.79 10*6/MM3 (ref 4.14–5.8)
SODIUM SERPL-SCNC: 137 MMOL/L (ref 136–145)
UNIT  ABO: NORMAL
UNIT  ABO: NORMAL
UNIT  RH: NORMAL
UNIT  RH: NORMAL
WBC NRBC COR # BLD AUTO: 6.18 10*3/MM3 (ref 3.4–10.8)
WBC NRBC COR # BLD AUTO: 6.21 10*3/MM3 (ref 3.4–10.8)
WBC NRBC COR # BLD AUTO: 6.77 10*3/MM3 (ref 3.4–10.8)

## 2024-06-17 PROCEDURE — 82948 REAGENT STRIP/BLOOD GLUCOSE: CPT

## 2024-06-17 PROCEDURE — G0378 HOSPITAL OBSERVATION PER HR: HCPCS

## 2024-06-17 PROCEDURE — 36415 COLL VENOUS BLD VENIPUNCTURE: CPT | Performed by: THORACIC SURGERY (CARDIOTHORACIC VASCULAR SURGERY)

## 2024-06-17 PROCEDURE — 97530 THERAPEUTIC ACTIVITIES: CPT

## 2024-06-17 PROCEDURE — 97162 PT EVAL MOD COMPLEX 30 MIN: CPT

## 2024-06-17 PROCEDURE — 99024 POSTOP FOLLOW-UP VISIT: CPT | Performed by: NURSE PRACTITIONER

## 2024-06-17 PROCEDURE — 25010000002 HYDROMORPHONE PER 4 MG: Performed by: THORACIC SURGERY (CARDIOTHORACIC VASCULAR SURGERY)

## 2024-06-17 PROCEDURE — 85027 COMPLETE CBC AUTOMATED: CPT | Performed by: THORACIC SURGERY (CARDIOTHORACIC VASCULAR SURGERY)

## 2024-06-17 PROCEDURE — 25010000002 ONDANSETRON PER 1 MG: Performed by: THORACIC SURGERY (CARDIOTHORACIC VASCULAR SURGERY)

## 2024-06-17 PROCEDURE — 99214 OFFICE O/P EST MOD 30 MIN: CPT | Performed by: INTERNAL MEDICINE

## 2024-06-17 PROCEDURE — 63710000001 ONDANSETRON ODT 4 MG TABLET DISPERSIBLE: Performed by: THORACIC SURGERY (CARDIOTHORACIC VASCULAR SURGERY)

## 2024-06-17 RX ORDER — NEOMYCIN SULFATE, POLYMYXIN B SULFATE AND GRAMICIDIN 1.75; 10000; .025 MG/ML; [USP'U]/ML; MG/ML
2 SOLUTION/ DROPS OPHTHALMIC
Status: DISCONTINUED | OUTPATIENT
Start: 2024-06-17 | End: 2024-06-18 | Stop reason: HOSPADM

## 2024-06-17 RX ADMIN — HYDROMORPHONE HYDROCHLORIDE 0.5 MG: 1 INJECTION, SOLUTION INTRAMUSCULAR; INTRAVENOUS; SUBCUTANEOUS at 09:48

## 2024-06-17 RX ADMIN — ONDANSETRON 4 MG: 4 TABLET, ORALLY DISINTEGRATING ORAL at 06:54

## 2024-06-17 RX ADMIN — OXYCODONE HYDROCHLORIDE 10 MG: 5 TABLET ORAL at 01:03

## 2024-06-17 RX ADMIN — POTASSIUM CHLORIDE, DEXTROSE MONOHYDRATE AND SODIUM CHLORIDE 125 ML/HR: 150; 5; 450 INJECTION, SOLUTION INTRAVENOUS at 22:26

## 2024-06-17 RX ADMIN — PANTOPRAZOLE SODIUM 8 MG/HR: 40 INJECTION, POWDER, FOR SOLUTION INTRAVENOUS at 12:45

## 2024-06-17 RX ADMIN — POTASSIUM CHLORIDE, DEXTROSE MONOHYDRATE AND SODIUM CHLORIDE 125 ML/HR: 150; 5; 450 INJECTION, SOLUTION INTRAVENOUS at 09:49

## 2024-06-17 RX ADMIN — PANTOPRAZOLE SODIUM 8 MG/HR: 40 INJECTION, POWDER, FOR SOLUTION INTRAVENOUS at 07:45

## 2024-06-17 RX ADMIN — OXYCODONE HYDROCHLORIDE 10 MG: 5 TABLET ORAL at 15:53

## 2024-06-17 RX ADMIN — PANTOPRAZOLE SODIUM 8 MG/HR: 40 INJECTION, POWDER, FOR SOLUTION INTRAVENOUS at 20:23

## 2024-06-17 RX ADMIN — POTASSIUM CHLORIDE, DEXTROSE MONOHYDRATE AND SODIUM CHLORIDE 125 ML/HR: 150; 5; 450 INJECTION, SOLUTION INTRAVENOUS at 01:03

## 2024-06-17 RX ADMIN — NEOMYCIN SULFATE, POLYMYXIN B SULFATE AND GRAMICIDIN 2 DROP: 1.75; 10000; .025 SOLUTION/ DROPS OPHTHALMIC at 21:15

## 2024-06-17 RX ADMIN — POTASSIUM CHLORIDE, DEXTROSE MONOHYDRATE AND SODIUM CHLORIDE 125 ML/HR: 150; 5; 450 INJECTION, SOLUTION INTRAVENOUS at 07:46

## 2024-06-17 RX ADMIN — OXYCODONE HYDROCHLORIDE 10 MG: 5 TABLET ORAL at 11:02

## 2024-06-17 RX ADMIN — OXYCODONE HYDROCHLORIDE 10 MG: 5 TABLET ORAL at 20:22

## 2024-06-17 RX ADMIN — ONDANSETRON 4 MG: 2 INJECTION INTRAMUSCULAR; INTRAVENOUS at 13:04

## 2024-06-17 RX ADMIN — OXYCODONE HYDROCHLORIDE 10 MG: 5 TABLET ORAL at 06:54

## 2024-06-17 RX ADMIN — ONDANSETRON 4 MG: 2 INJECTION INTRAMUSCULAR; INTRAVENOUS at 19:03

## 2024-06-17 NOTE — DISCHARGE PLACEMENT REQUEST
"Michael Garcia (49 y.o. Male)       Date of Birth   1975    Social Security Number       Address   [address] [place] [address]    Home Phone   [phone]    MRN   [id number]       Jew   None    Marital Status                               Admission Date   6/13/24    Admission Type   Emergency    Admitting Provider   Zena Brooks MD    Attending Provider   Corey Yo MD    Department, Room/Bed   [address], [address]/1       Discharge Date       Discharge Disposition       Discharge Destination                                 Attending Provider: Corey Yo MD    Allergies: No Known Allergies    Isolation: None   Infection: None   Code Status: CPR    Ht: 182.9 cm (72\")   Wt: 74.3 kg (163 lb 12.8 oz)    Admission Cmt: None   Principal Problem: Anemia due to gastrointestinal blood loss [D50.0]                   Active Insurance as of 6/13/2024       Primary Coverage       Payor Plan Insurance Group Employer/Plan Group    [place] [id number]       Payor Plan Address Payor Plan Phone Number Payor Plan Fax Number Effective Dates    [employer] [address] [address] [phone]  1/1/2021 - None Entered    [facility] [id number]         Subscriber Name Subscriber Birth Date Member ID       MICHAEL GARCIA 1975 [id number]                     Emergency Contacts        (Rel.) Home Phone Work Phone Mobile Phone    Radha Garcia (Spouse) -- [phone]                "

## 2024-06-17 NOTE — NURSING NOTE
Pt walked to the BR with walker and then out of room to the left to the window and back to room without issue with walker and wife at side.

## 2024-06-17 NOTE — THERAPY EVALUATION
Patient Name: Nelson Garcia  : 1975    MRN: [id number]                              Today's Date: 2024       Admit Date: 2024    Visit Dx:     ICD-10-CM ICD-9-CM   1. Symptomatic anemia  D64.9 285.9   2. Anemia due to gastrointestinal blood loss  D50.0 280.0   3. TY (acute kidney injury)  N17.9 584.9   4. Esophageal adenocarcinoma  C15.9 150.9   5. Severe malnutrition  E43 261   6. History of CVA (cerebrovascular accident)  Z86.73 V12.54   7. Esophageal mass  K22.89 530.89   8. Acute ischemic stroke  I63.9 434.91   9. Nontraumatic hemorrhage of cerebral hemisphere, unspecified laterality  I61.2 431     Patient Active Problem List   Diagnosis    Left leg weakness    HTN (hypertension)    Cerebral microhemorrhages    Acute ischemic stroke    Tobacco use    Stage 3a chronic kidney disease    Observed sleep apnea    Snoring    Non-restorative sleep    Erectile dysfunction    Dysphagia    GI bleed    Anemia due to gastrointestinal blood loss    Esophageal mass    History of CVA (cerebrovascular accident)    Esophageal adenocarcinoma    Severe malnutrition     Past Medical History:   Diagnosis Date    Hypertension     Kidney disease     Stroke (cerebrum)      Past Surgical History:   Procedure Laterality Date    BACK SURGERY      ENDOSCOPY N/A 2024    Procedure: ESOPHAGOGASTRODUODENOSCOPY with biopsies;  Surgeon: Taras Hernandez MD;  Location: [facility] ENDOSCOPY;  Service: Gastroenterology;  Laterality: N/A;  pre- nausea/vomiting   post- nearly obstructing distal esophageal mass, gastritis    ENDOSCOPY N/A 2024    Procedure: ESOPHAGOGASTRODUODENOSCOPY, RIGHT PORT PLACEMENT, AND JEJUNOSTOMY TUBE INSERTION;  Surgeon: Jany Valente MD;  Location: [facility] MAIN OR;  Service: Gastroenterology;  Laterality: N/A;    KNEE SURGERY      TEETH EXTRACTION      all      General Information       Row Name 24 0917          Physical Therapy Time and Intention    Document Type evaluation  -CB     Mode  of Treatment individual therapy;physical therapy  -CB       Row Name 06/17/24 0917          General Information    Patient Profile Reviewed yes  -CB     Prior Level of Function independent:;gait;transfer;bed mobility  -CB     Existing Precautions/Restrictions fall  feeding tube  -CB     Barriers to Rehab none identified  -CB       Row Name 06/17/24 0917          Living Environment    People in Home child(haresh), dependent;spouse  -CB       Row Name 06/17/24 0917          Home Main Entrance    Number of Stairs, Main Entrance one  -CB       Row Name 06/17/24 0917          Cognition    Orientation Status (Cognition) oriented x 3  -CB       Row Name 06/17/24 0917          Safety Issues, Functional Mobility    Impairments Affecting Function (Mobility) endurance/activity tolerance;pain  -CB               User Key  (r) = Recorded By, (t) = Taken By, (c) = Cosigned By      Initials Name Provider Type    Ruth Metcalf PT Physical Therapist                   Mobility       Row Name 06/17/24 1319          Bed Mobility    Bed Mobility supine-sit  -CB     Supine-Sit [place] (Bed Mobility) standby assist;verbal cues  -CB       Row Name 06/17/24 1319          Sit-Stand Transfer    Sit-Stand [place] (Transfers) supervision  -CB     Assistive Device (Sit-Stand Transfers) walker, front-wheeled  -CB       Row Name 06/17/24 1319          Gait/Stairs (Locomotion)    [place] Level (Gait) supervision;verbal cues  -CB     Assistive Device (Gait) walker, front-wheeled  -CB     Distance in Feet (Gait) 20  10ft  -CB     Deviations/Abnormal Patterns (Gait) gait speed decreased;stride length decreased  -CB     Comment, (Gait/Stairs) no overt LOB or unsteadiness noted  -CB               User Key  (r) = Recorded By, (t) = Taken By, (c) = Cosigned By      Initials Name Provider Type    Ruth Metcalf PT Physical Therapist                   Obj/Interventions       Row Name 06/17/24 1320          Range of Motion Comprehensive     General Range of Motion bilateral lower extremity ROM WFL  -CB       Row Name 06/17/24 1320          Strength Comprehensive (MMT)    General Manual Muscle Testing (MMT) Assessment no strength deficits identified  -CB       Row Name 06/17/24 1320          Balance    Balance Assessment standing static balance;standing dynamic balance  -CB     Static Standing Balance supervision  -CB     Dynamic Standing Balance supervision  -CB     Position/Device Used, Standing Balance supported;walker, front-wheeled  -CB       Row Name 06/17/24 1320          Sensory Assessment (Somatosensory)    Sensory Assessment (Somatosensory) sensation intact  -CB               User Key  (r) = Recorded By, (t) = Taken By, (c) = Cosigned By      Initials Name Provider Type    CB Adriano, Ruth, PT Physical Therapist                   Goals/Plan       Row Name 06/17/24 1321          Bed Mobility Goal 1 (PT)    Activity/Assistive Device (Bed Mobility Goal 1, PT) bed mobility activities, all  -CB     [place] Level/Cues Needed (Bed Mobility Goal 1, PT) modified independence  -CB     Time Frame (Bed Mobility Goal 1, PT) long term goal (LTG);1 week  -CB       Row Name 06/17/24 1321          Transfer Goal 1 (PT)    Activity/Assistive Device (Transfer Goal 1, PT) sit-to-stand/stand-to-sit;bed-to-chair/chair-to-bed  -CB     [place] Level/Cues Needed (Transfer Goal 1, PT) modified independence  -CB     Time Frame (Transfer Goal 1, PT) long term goal (LTG);1 week  -CB       Row Name 06/17/24 1321          Gait Training Goal 1 (PT)    Activity/Assistive Device (Gait Training Goal 1, PT) gait (walking locomotion);improve balance and speed;increase endurance/gait distance;decrease fall risk;diminish gait deviation  -CB     [place] Level (Gait Training Goal 1, PT) modified independence  -CB     Distance (Gait Training Goal 1, PT) 100ft  -CB     Time Frame (Gait Training Goal 1, PT) 1 week;long term goal (LTG)  -CB       Row Name 06/17/24 1321           Therapy Assessment/Plan (PT)    Planned Therapy Interventions (PT) balance training;bed mobility training;gait training;home exercise program;patient/family education;transfer training  -CB               User Key  (r) = Recorded By, (t) = Taken By, (c) = Cosigned By      Initials Name Provider Type    Ruth Metcalf, PT Physical Therapist                   Clinical Impression       Row Name 06/17/24 1320          Pain    Pretreatment Pain Rating 5/10  -CB     Posttreatment Pain Rating 5/10  -CB     Pain Location - abdomen  -CB     Pain Intervention(s) Repositioned;Rest;Ambulation/increased activity  -CB       Row Name 06/17/24 1320          Plan of Care Review    Plan of Care Reviewed With patient  -CB     Outcome Evaluation Patient is a 49 y.o. male admitted to [facility] for esophageal adenocarcinoma on 6/13/2024. Pt s/p R port placement, EGD, and J tube insertion. PMHx includes HTN, CKD. Patient is ind at baseline and lives with his spouse. No use of AD at baseline. Today, patient performed bed mobility with SBA, required S for transfers, and ambulated 20ft and 10ft using rwx requiring S. No overt LOB noted. Activity tolerance and pain deficits noted. Patient may benefit from skilled PT services to address functional deficits and improve level of independence prior to discharge. Anticipate home with family upon DC.  -CB       Row Name 06/17/24 1320          Therapy Assessment/Plan (PT)    Rehab Potential (PT) good, to achieve stated therapy goals  -CB     Criteria for Skilled Interventions Met (PT) yes  -CB     Therapy Frequency (PT) 5 times/wk  -CB       Row Name 06/17/24 1320          Positioning and Restraints    Pre-Treatment Position in bed  -CB     Post Treatment Position chair  -CB     In Chair notified nsg;sitting;call light within reach;encouraged to call for assist  -CB               User Key  (r) = Recorded By, (t) = Taken By, (c) = Cosigned By      Initials Name Provider Type    JANET Oh  Ruth, SHAGUFTA Physical Therapist                   Outcome Measures       Row Name 06/17/24 1322 06/17/24 0746       How much help from another person do you currently need...    Turning from your back to your side while in flat bed without using bedrails? 4  -CB 4  -RW    Moving from lying on back to sitting on the side of a flat bed without bedrails? 3  -CB 4  -RW    Moving to and from a bed to a chair (including a wheelchair)? 3  -CB 3  -RW    Standing up from a chair using your arms (e.g., wheelchair, bedside chair)? 3  -CB 3  -RW    Climbing 3-5 steps with a railing? 3  -CB 3  -RW    To walk in hospital room? 3  -CB 3  -RW    AM-PAC 6 Clicks Score (PT) 19  -CB 20  -RW    Highest Level of Mobility Goal 6 --> Walk 10 steps or more  -CB 6 --> Walk 10 steps or more  -RW      Row Name 06/17/24 1322          Functional Assessment    Outcome Measure Options AM-PAC 6 Clicks Basic Mobility (PT)  -CB               User Key  (r) = Recorded By, (t) = Taken By, (c) = Cosigned By      Initials Name Provider Type    RW Naldo Espinoza, RN Registered Nurse    CB Ruth Oh, SHAGUFTA Physical Therapist                                 Physical Therapy Education       Title: PT OT SLP Therapies (In Progress)       Topic: Physical Therapy (In Progress)       Point: Mobility training (Done)       Learning Progress Summary             Patient Acceptance, E,TB, VU,NR by  at 6/17/2024 1323                         Point: Home exercise program (Not Started)       Learner Progress:  Not documented in this visit.              Point: Body mechanics (Not Started)       Learner Progress:  Not documented in this visit.              Point: Precautions (Not Started)       Learner Progress:  Not documented in this visit.                              User Key       Initials Effective Dates Name Provider Type Discipline     10/22/21 -  Ruth Oh, SHAGUFTA Physical Therapist PT                  PT Recommendation and Plan  Planned Therapy Interventions  (PT): balance training, bed mobility training, gait training, home exercise program, patient/family education, transfer training  Plan of Care Reviewed With: patient  Outcome Evaluation: Patient is a 49 y.o. male admitted to [facility] for esophageal adenocarcinoma on 6/13/2024. Pt s/p R port placement, EGD, and J tube insertion. PMHx includes HTN, CKD. Patient is ind at baseline and lives with his spouse. No use of AD at baseline. Today, patient performed bed mobility with SBA, required S for transfers, and ambulated 20ft and 10ft using rwx requiring S. No overt LOB noted. Activity tolerance and pain deficits noted. Patient may benefit from skilled PT services to address functional deficits and improve level of independence prior to discharge. Anticipate home with family upon DC.     Time Calculation:         PT Charges       Row Name 06/17/24 1326             Time Calculation    Start Time 0814  -CB      Stop Time 0830  -CB      Time Calculation (min) 16 min  -CB      PT Received On 06/17/24  -CB      PT - Next Appointment 06/18/24  -CB      PT Goal Re-Cert Due Date 06/24/24  -CB         Time Calculation- PT    Total Timed Code Minutes- PT 8 minute(s)  -CB         Timed Charges    60601 - PT Therapeutic Activity Minutes 8  -CB         Total Minutes    Timed Charges Total Minutes 8  -CB       Total Minutes 8  -CB                User Key  (r) = Recorded By, (t) = Taken By, (c) = Cosigned By      Initials Name Provider Type    CB Ruth Oh, PT Physical Therapist                  Therapy Charges for Today       Code Description Service Date Service Provider Modifiers Qty    71166642434 HC PT THERAPEUTIC ACT EA 15 MIN 6/17/2024 Ruth Oh, PT GP 1    34619173053 HC PT EVAL MOD COMPLEXITY 3 6/17/2024 Ruth Oh, PT GP 1            PT G-Codes  Outcome Measure Options: AM-PAC 6 Clicks Basic Mobility (PT)  AM-PAC 6 Clicks Score (PT): 19  PT Discharge Summary  Anticipated Discharge Disposition (PT): home with assist,  home with home health    Ruth Oh, PT  6/17/2024

## 2024-06-17 NOTE — DISCHARGE PLACEMENT REQUEST
"Michael Garcia (49 y.o. Male)       Date of Birth   1975    Social Security Number       Address   [address] [place] [address]    Home Phone   [phone]    MRN   [id number]       Judaism   None    Marital Status                               Admission Date   6/13/24    Admission Type   Emergency    Admitting Provider   Zena Brooks MD    Attending Provider   Corey Yo MD    Department, Room/Bed   [address], [address]/1       Discharge Date       Discharge Disposition       Discharge Destination                                 Attending Provider: Corey Yo MD    Allergies: No Known Allergies    Isolation: None   Infection: None   Code Status: CPR    Ht: 182.9 cm (72\")   Wt: 74.3 kg (163 lb 12.8 oz)    Admission Cmt: None   Principal Problem: Anemia due to gastrointestinal blood loss [D50.0]                   Active Insurance as of 6/13/2024       Primary Coverage       Payor Plan Insurance Group Employer/Plan Group    [place] [id number]       Payor Plan Address Payor Plan Phone Number Payor Plan Fax Number Effective Dates    [employer] [address] [address] [phone]  1/1/2021 - None Entered    [facility] [id number]         Subscriber Name Subscriber Birth Date Member ID       MICHAEL GARCIA 1975 [id number]                     Emergency Contacts        (Rel.) Home Phone Work Phone Mobile Phone    Radha Garcia (Spouse) -- [phone]                "

## 2024-06-17 NOTE — CONSULTS
I was requested to speak with patient regarding an Advance Directive.  Patient stated that his wife wanted the information.  However, his wife was not in the room at this time.  I left an AD form in the room and said I would be glad to come back.  He did not express any interest at this time.

## 2024-06-17 NOTE — PROGRESS NOTES
History of Present Illness    Patient is a 49-year-old male who we had seen in consultation 6/5/2024 after having developed significant dysphagia.  He was to be seen at  but presented with further dysphagia and underwent evaluation here with EGD 6/4/2024.  This was a large fungating mass lower third of the esophagus partially obstructing circumferential involving 100% of the lumen circumference and was upon biopsy found to have adenocarcinoma.  The patient lives in [place] and works in [place] and had hoped to be seen in [place] for treatment while keeping his Meghan here at [facility].     Patient presented, unfortunately, with further weakness and fatigue and was found to be significantly anemic 6/13 with H&H of 6.6 and 21.5 associated with blackish stool.  He has been seen by GI and not felt a candidate for endoscopy but has been supportively transfused.  The case has been discussed with Dr. Mills who hopes to proceed with port placement, laparoscopy and feeding tube placement particularly after recent PET/CT/10 demonstrates hypermetabolic circumferential distal esophageal malignancy as well as gastrohepatic lymphadenopathy with least 1 lymph node clearly hypermetabolic and multiple subcentimeter periaortic posterior mediastinal lymph nodes prominent but too small to characterize and 1 cm left adrenal nodule is not hypermetabolic likely adrenal adenoma.     Interval history:  6/14/2024  T98.6, pulse 60, respiration 16, /75  Patient indicates that he has been exceedingly weak and fatigued particular over the last several days leading to his representation with anemia.  He has not yet been seen by either radiation therapy or medical oncology at [facility].  H&H 6.7 and 21.5 with white count of 4360 and platelet count of 1 56,000 with being creatinine of 17 and 1.20     6/15/2024  T97.7, pulse 52, respirations 18, /75, SpO2 in 100%  Patient now on vascular  lab  Record reviewed from 6/14 with the patient proceeding to EGD, right port placement and jejunostomy tube insertion  Findings including tumor in the distal esophagus from 39 to 46 cm without tumor at the GE junction though lavell abnormalities are present, no obvious metastatic disease to the liver, peritoneal lavage performed  Patient status post transfusion now with H&H of 8.7 and 28.8, white count is 6660, platelet count 186,000, BUN/creatinine 15 and 1.24    6/16/2024  T99.5, pulse 60, respirations 18, /88  Patient notes continued abdominal pain  H&H 9.5 and 30.0, white count of 1700, platelet count of 217,000, BUN and creatinine are 11 and 1.31    6/17/24  Patient sitting up in the chair, abdominal pain some better today.  Reports no BM overnight.  Hemoglobin is stable 9.8.  Tolerating tube feeds at 30 cc/h.      Past Medical History, Past Surgical History, Social History, Family History have been reviewed and are without significant changes except as mentioned.    Review of Systems   A comprehensive 14 point review of systems was performed and was negative except as mentioned.    Medications:  The current medication list was reviewed in the EMR    ALLERGIES:  No Known Allergies    Objective      Vitals:    06/16/24 1206 06/16/24 1929 06/16/24 2248 06/17/24 0726   BP: 161/88 173/88 128/77 142/92   BP Location: Right arm Right arm Right arm Right arm   Patient Position: Lying Lying Lying Lying   Pulse: 75 87 67 67   Resp: 18 18 18 18   Temp: 98.6 °F (37 °C) 98.4 °F (36.9 °C) 98.3 °F (36.8 °C) 98.1 °F (36.7 °C)   TempSrc: Oral Oral Oral Oral   SpO2: 99% 90% 94% 90%   Weight:       Height:              No data to display                Physical Exam    CONSTITUTIONAL: pleasant well-developed adult man sitting up to chair  HEENT: no icterus, no thrush, moist membranes  LYMPH: no cervical or supraclavicular lad  CV: RRR, S1S2, no murmur  RESP: cta bilat, no wheezing, no rales  GI: soft, feeding tube in  place  MUSC: no edema   NEURO: alert and oriented x3, mild global weakness  PSYCH: Mild dysphoric mood and flat affect    RECENT LABS:  Hematology WBC   Date Value Ref Range Status   06/17/2024 6.77 3.40 - 10.80 10*3/mm3 Final     RBC   Date Value Ref Range Status   06/17/2024 3.54 (L) 4.14 - 5.80 10*6/mm3 Final     Hemoglobin   Date Value Ref Range Status   06/17/2024 9.8 (L) 13.0 - 17.7 g/dL Final     Hematocrit   Date Value Ref Range Status   06/17/2024 29.8 (L) 37.5 - 51.0 % Final     Platelets   Date Value Ref Range Status   06/17/2024 210 140 - 450 10*3/mm3 Final     Lab Results   Component Value Date    GLUCOSE 115 (H) 06/16/2024    BUN 8 06/16/2024    CREATININE 1.20 06/16/2024    EGFR 74.1 06/16/2024    BCR 6.7 (L) 06/16/2024    K 4.2 06/16/2024    CO2 23.1 06/16/2024    CALCIUM 8.6 06/16/2024    ALBUMIN 3.2 (L) 06/16/2024    BILITOT <0.2 06/13/2024    AST 12 06/13/2024    ALT 9 06/13/2024              Assessment & Plan     Confirmed distal esophageal cancer-adenocarcinoma  Presented with 20 to 35 pound weight loss over 2 to 3 months because initially could not get solids down and then for the past couple of weeks trouble getting liquids down as well.  EGD 6/4/ 24: Large fungating mass lower third of esophagus.  Partially obstructing and circumferential, involving 100% of the lumen circumference.  Dr. Hernandez had trouble getting past the mass with the scope.  Partially but nearly obstructing esophageal mass.  Biopsy positive for moderately differentiated adenocarcinoma  CT CAP without contrast 6/4/ 24: Multiple enlarged nodes gastrohepatic ligament suspicious for lavell metastasis.  Multiple shotty retroperitoneal nodes.  Largest gastrohepatic ligament node 2 x 1.1 cm.  Unremarkable noncontrast liver.  6/10/2024 PET-hypermetabolic circumferential distal esophageal wall thickening, gastrohepatic lymphadenopathy at least 1 hypermetabolic, multiple subcentimeter periaortic posterior mediastinal lymph nodes  prominent but too small for PET characterization, non-hypermetabolic left adrenal nodule suggestive of adenoma, 2 right middle lobe sub-6 mm pulmonary nodules too small to characterize  6/14/2024-EGD, port placement, J-tube EGD findings including tumor in the distal esophagus from 39 to 46 cm without tumor at the GE junction though lavell abnormalities are present, no obvious metastatic disease to the liver, peritoneal lavage performed results pending     *Multifactorial anemia-iron deficiency anemia, anemia chronic disease  6/5/2024: Ferritin 10, 5% saturation.  B12 and serum folate unremarkable.  Ferrlecit 250 mg IV daily x 3 days.  Please note I am ordering the IV iron for 3 days in case he is here for 3 days.  If he goes home before all the IV iron is completed, he cannot receive more IV iron as an outpatient (for insurance coverage reasons) until he tries oral iron and either does not tolerate oral or does not respond to it.  Therefore, he can follow-up with his medical oncologist in [place] to determine the need for further IV iron if he is not here for all 3 days of IV iron as an inpatient.  Patient admitted 6/14/2024 with further apparent blood loss anemia with black or stool over the last several days to week.  Plan to reassess per degree of iron deficiency as well as further transfusion support.  Iron studies consistent with anemia of chronic disease status posttransfusion with H&H of 8.7 and 28.8, further transfusion planned  Assessment 6/16/2024 H&H 9.5 and 30.0  6/17/2024-hemoglobin stable 9.8       Assessment plan:  *No additional transfusion at this point  *Radiation therapy and chemotherapy plans to be determined at [facility] this week (initial consults Wednesday/Thursday)  *Monitor CBC while admitted

## 2024-06-17 NOTE — CASE MANAGEMENT/SOCIAL WORK
Continued Stay Note  [facility]     Patient Name: Nelson Garcia  MRN: [id number]  Today's Date: 6/17/2024    Admit Date: 6/13/2024    Plan: home with [facility] for tube feeds   Discharge Plan       Row Name 06/17/24 1444       Plan    Plan home with [facility] for tube feeds    Patient/Family in Agreement with Plan yes    Plan Comments Spoke with patient and wife at bedside. Patient had PEG tube placed and will need tube feeds @ discharge. Referral placed in [facility] to [facility]. If possible,patient and wife would like an Infinity pump since they used one in the past for their son. [facility] infusion aware and will be able to supply this. HH was following, but patient is planning to go back to work so he will not qualify. Wife is aware that should his condition change, they can request  services through the patient's oncologist. Vinay has an oncology appointment Wednesday and radiation oncology appointment Thursday so he is anxious for dc tomorrow. Both appointments are at [facility]. Order for walker has been obtained and [place] has deliver to the bedside. CCP will follow.                   Discharge Codes    No documentation.                 Expected Discharge Date and Time       Expected Discharge Date Expected Discharge Time    Jun 18, 2024               Chelita Simons RN

## 2024-06-17 NOTE — PROGRESS NOTES
"  POST-OPERATIVE NOTE     Chief Complaint: Esophageal cancer  S/P: Port, feeding jejunostomy, diagnostic laparotomy, EGD  POD # 3    Subjective  Resting comfortably in bed.  Pain well-controlled.  Tolerating tube feeds at 30 cc/h without significant nausea or or abdominal pain.  Minimal p.o. intake.    Objective:  General Appearance:  Comfortable and in no acute distress.    Vital signs: (most recent): Blood pressure (!) 158/101, pulse 71, temperature 97.8 °F (36.6 °C), temperature source Oral, resp. rate 16, height 182.9 cm (72\"), weight 74.3 kg (163 lb 12.8 oz), SpO2 95%.  Vital signs are normal.    Lungs:  Normal effort.    Heart: Normal rate.    Abdomen: Abdomen is soft and distended.    Neurological: Patient is alert and oriented to person, place and time.    Skin:  Warm and dry.                Results Review:     I reviewed the patient's new clinical results.  I reviewed the patient's new imaging results and agree with the interpretation.  I reviewed the patient's other test results and agree with the interpretation    Assessment & Plan     Mr. 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  Tube feeds were stopped intermittently over the weekend due to increased pain.  They are currently running at 30 cc an hour on my assessment today.  Goal rate is 50 cc/h which he should be at later today.  We will plan to transition to cyclic feeds at that time.  Hopefully patient is able to discharge home tomorrow or Wednesday.    He has an appointment with radiation oncology at [facility] on Wednesday, 6/19/2024 and medical oncology on 6/24/2024.    Discussed with patient, RN and wife via phone.    Naheed Rader DNP, APRN  [facility]  06/17/24  15:37 EDT    Patient was seen and assessed while wearing personal protective equipment including facemask, protective eyewear and gloves.  Hand hygiene performed prior to entering the " room and upon exiting with doffing of gloves.

## 2024-06-17 NOTE — PLAN OF CARE
Goal Outcome Evaluation:  Plan of Care Reviewed With: patient              Patient is a 49 y.o. male admitted to [facility] for esophageal adenocarcinoma on 6/13/2024. Pt s/p R port placement, EGD, and J tube insertion. PMHx includes HTN, CKD. Patient is ind at baseline and lives with his spouse. No use of AD at baseline. Today, patient performed bed mobility with SBA, required S for transfers, and ambulated 20ft and 10ft using rwx requiring S. No overt LOB noted. Activity tolerance and pain deficits noted. Patient may benefit from skilled PT services to address functional deficits and improve level of independence prior to discharge. Anticipate home with family upon DC.      Anticipated Discharge Disposition (PT): home with assist, home with home health

## 2024-06-17 NOTE — PLAN OF CARE
Goal Outcome Evaluation:  Plan of Care Reviewed With: patient, spouse           Outcome Evaluation: Pt sleeping in room with wife at bedside with the lights off.  Pt requested Boost shake with each meal because that is whay he prefers over the hospital food.  2 new IV placed.  RA, NSR(all day), VSS.  Tolerating TF, currently at 40ml and will be set to Goal Rate of 50ml/hr at 2000 tonight.  Pt coughed and incision was very painful so pt requested Dilaudid x 1 this AM.  Oral Oxy via J tube the rest of the day along with the Zofran.  Thoracic spoke to pt with wife not in room and they will call her to update and answer questions.  TF being setup for home.  Possibe DC tomorrow if pt can still tolerate TF and pain control.

## 2024-06-18 ENCOUNTER — APPOINTMENT (OUTPATIENT)
Dept: GENERAL RADIOLOGY | Facility: HOSPITAL | Age: 49
End: 2024-06-18
Payer: MEDICAID

## 2024-06-18 ENCOUNTER — READMISSION MANAGEMENT (OUTPATIENT)
Dept: CALL CENTER | Facility: HOSPITAL | Age: 49
End: 2024-06-18
Payer: MEDICAID

## 2024-06-18 VITALS
DIASTOLIC BLOOD PRESSURE: 91 MMHG | TEMPERATURE: 97.9 F | RESPIRATION RATE: 16 BRPM | SYSTOLIC BLOOD PRESSURE: 148 MMHG | HEART RATE: 81 BPM | HEIGHT: 72 IN | BODY MASS INDEX: 22.19 KG/M2 | WEIGHT: 163.8 LBS | OXYGEN SATURATION: 93 %

## 2024-06-18 LAB
ALBUMIN SERPL-MCNC: 3.1 G/DL (ref 3.5–5.2)
ANION GAP SERPL CALCULATED.3IONS-SCNC: 7.2 MMOL/L (ref 5–15)
BUN SERPL-MCNC: 11 MG/DL (ref 6–20)
BUN/CREAT SERPL: 9.4 (ref 7–25)
CALCIUM SPEC-SCNC: 8.8 MG/DL (ref 8.6–10.5)
CHLORIDE SERPL-SCNC: 106 MMOL/L (ref 98–107)
CO2 SERPL-SCNC: 25.8 MMOL/L (ref 22–29)
CREAT SERPL-MCNC: 1.17 MG/DL (ref 0.76–1.27)
CYTO UR: NORMAL
DEPRECATED RDW RBC AUTO: 45.4 FL (ref 37–54)
DEPRECATED RDW RBC AUTO: 47.4 FL (ref 37–54)
EGFRCR SERPLBLD CKD-EPI 2021: 76.4 ML/MIN/1.73
ERYTHROCYTE [DISTWIDTH] IN BLOOD BY AUTOMATED COUNT: 15 % (ref 12.3–15.4)
ERYTHROCYTE [DISTWIDTH] IN BLOOD BY AUTOMATED COUNT: 15.4 % (ref 12.3–15.4)
GLUCOSE BLDC GLUCOMTR-MCNC: 110 MG/DL (ref 70–130)
GLUCOSE BLDC GLUCOMTR-MCNC: 116 MG/DL (ref 70–130)
GLUCOSE SERPL-MCNC: 115 MG/DL (ref 65–99)
HCT VFR BLD AUTO: 29.3 % (ref 37.5–51)
HCT VFR BLD AUTO: 29.5 % (ref 37.5–51)
HGB BLD-MCNC: 9.3 G/DL (ref 13–17.7)
HGB BLD-MCNC: 9.3 G/DL (ref 13–17.7)
LAB AP CASE REPORT: NORMAL
MCH RBC QN AUTO: 27 PG (ref 26.6–33)
MCH RBC QN AUTO: 27 PG (ref 26.6–33)
MCHC RBC AUTO-ENTMCNC: 31.5 G/DL (ref 31.5–35.7)
MCHC RBC AUTO-ENTMCNC: 31.7 G/DL (ref 31.5–35.7)
MCV RBC AUTO: 85.2 FL (ref 79–97)
MCV RBC AUTO: 85.8 FL (ref 79–97)
PATH REPORT.FINAL DX SPEC: NORMAL
PATH REPORT.GROSS SPEC: NORMAL
PHOSPHATE SERPL-MCNC: 3.3 MG/DL (ref 2.5–4.5)
PLATELET # BLD AUTO: 215 10*3/MM3 (ref 140–450)
PLATELET # BLD AUTO: 236 10*3/MM3 (ref 140–450)
PMV BLD AUTO: 11.2 FL (ref 6–12)
PMV BLD AUTO: 11.6 FL (ref 6–12)
POTASSIUM SERPL-SCNC: 4.3 MMOL/L (ref 3.5–5.2)
RBC # BLD AUTO: 3.44 10*6/MM3 (ref 4.14–5.8)
RBC # BLD AUTO: 3.44 10*6/MM3 (ref 4.14–5.8)
SODIUM SERPL-SCNC: 139 MMOL/L (ref 136–145)
WBC NRBC COR # BLD AUTO: 5.94 10*3/MM3 (ref 3.4–10.8)
WBC NRBC COR # BLD AUTO: 6.31 10*3/MM3 (ref 3.4–10.8)

## 2024-06-18 PROCEDURE — 85027 COMPLETE CBC AUTOMATED: CPT | Performed by: THORACIC SURGERY (CARDIOTHORACIC VASCULAR SURGERY)

## 2024-06-18 PROCEDURE — 97530 THERAPEUTIC ACTIVITIES: CPT

## 2024-06-18 PROCEDURE — 99024 POSTOP FOLLOW-UP VISIT: CPT | Performed by: NURSE PRACTITIONER

## 2024-06-18 PROCEDURE — G0378 HOSPITAL OBSERVATION PER HR: HCPCS

## 2024-06-18 PROCEDURE — 25010000002 HEPARIN (PORCINE) PER 1000 UNITS: Performed by: THORACIC SURGERY (CARDIOTHORACIC VASCULAR SURGERY)

## 2024-06-18 PROCEDURE — 80069 RENAL FUNCTION PANEL: CPT | Performed by: THORACIC SURGERY (CARDIOTHORACIC VASCULAR SURGERY)

## 2024-06-18 PROCEDURE — 82948 REAGENT STRIP/BLOOD GLUCOSE: CPT

## 2024-06-18 PROCEDURE — 71045 X-RAY EXAM CHEST 1 VIEW: CPT

## 2024-06-18 PROCEDURE — 25010000002 ONDANSETRON PER 1 MG: Performed by: THORACIC SURGERY (CARDIOTHORACIC VASCULAR SURGERY)

## 2024-06-18 RX ORDER — NEOMYCIN POLYMYXIN B SULFATES AND DEXAMETHASONE 3.5; 10000; 1 MG/ML; [USP'U]/ML; MG/ML
2 SUSPENSION/ DROPS OPHTHALMIC 4 TIMES DAILY
Qty: 5 ML | Refills: 0 | Status: SHIPPED | OUTPATIENT
Start: 2024-06-18

## 2024-06-18 RX ORDER — OXYCODONE HYDROCHLORIDE 10 MG/1
10 TABLET ORAL EVERY 4 HOURS PRN
Qty: 30 TABLET | Refills: 0 | Status: SHIPPED | OUTPATIENT
Start: 2024-06-18 | End: 2024-06-24 | Stop reason: SDUPTHER

## 2024-06-18 RX ORDER — NYSTATIN 100000 [USP'U]/G
POWDER TOPICAL 2 TIMES DAILY
Qty: 30 G | Refills: 0 | Status: SHIPPED | OUTPATIENT
Start: 2024-06-18

## 2024-06-18 RX ORDER — NEOMYCIN SULFATE, POLYMYXIN B SULFATE AND GRAMICIDIN 1.75; 10000; .025 MG/ML; [USP'U]/ML; MG/ML
2 SOLUTION/ DROPS OPHTHALMIC
Qty: 10 ML | Refills: 0 | Status: SHIPPED | OUTPATIENT
Start: 2024-06-18 | End: 2024-06-18

## 2024-06-18 RX ORDER — ONDANSETRON 4 MG/1
4 TABLET, ORALLY DISINTEGRATING ORAL EVERY 6 HOURS PRN
Qty: 20 TABLET | Refills: 0 | Status: SHIPPED | OUTPATIENT
Start: 2024-06-18 | End: 2024-06-26

## 2024-06-18 RX ADMIN — POTASSIUM CHLORIDE, DEXTROSE MONOHYDRATE AND SODIUM CHLORIDE 125 ML/HR: 150; 5; 450 INJECTION, SOLUTION INTRAVENOUS at 06:21

## 2024-06-18 RX ADMIN — OXYCODONE HYDROCHLORIDE 10 MG: 5 TABLET ORAL at 00:22

## 2024-06-18 RX ADMIN — PANTOPRAZOLE SODIUM 8 MG/HR: 40 INJECTION, POWDER, FOR SOLUTION INTRAVENOUS at 01:33

## 2024-06-18 RX ADMIN — NEOMYCIN SULFATE, POLYMYXIN B SULFATE AND GRAMICIDIN 2 DROP: 1.75; 10000; .025 SOLUTION/ DROPS OPHTHALMIC at 00:23

## 2024-06-18 RX ADMIN — OXYCODONE HYDROCHLORIDE 10 MG: 5 TABLET ORAL at 14:33

## 2024-06-18 RX ADMIN — ONDANSETRON 4 MG: 2 INJECTION INTRAMUSCULAR; INTRAVENOUS at 00:22

## 2024-06-18 RX ADMIN — NEOMYCIN SULFATE, POLYMYXIN B SULFATE AND GRAMICIDIN 2 DROP: 1.75; 10000; .025 SOLUTION/ DROPS OPHTHALMIC at 04:58

## 2024-06-18 RX ADMIN — ONDANSETRON 4 MG: 2 INJECTION INTRAMUSCULAR; INTRAVENOUS at 11:10

## 2024-06-18 RX ADMIN — OXYCODONE HYDROCHLORIDE 10 MG: 5 TABLET ORAL at 06:30

## 2024-06-18 RX ADMIN — NEOMYCIN SULFATE, POLYMYXIN B SULFATE AND GRAMICIDIN 2 DROP: 1.75; 10000; .025 SOLUTION/ DROPS OPHTHALMIC at 11:57

## 2024-06-18 RX ADMIN — PANTOPRAZOLE SODIUM 8 MG/HR: 40 INJECTION, POWDER, FOR SOLUTION INTRAVENOUS at 06:21

## 2024-06-18 RX ADMIN — OXYCODONE HYDROCHLORIDE 10 MG: 5 TABLET ORAL at 10:27

## 2024-06-18 RX ADMIN — NEOMYCIN SULFATE, POLYMYXIN B SULFATE AND GRAMICIDIN 2 DROP: 1.75; 10000; .025 SOLUTION/ DROPS OPHTHALMIC at 07:25

## 2024-06-18 NOTE — PROGRESS NOTES
"  POST-OPERATIVE NOTE     Chief Complaint: Esophageal cancer  S/P: Port, feeding jejunostomy, diagnostic laparotomy, EGD  POD # 3    Subjective  Improved overall. OOB today.  Pain well-controlled.  Tolerating tube feeds at 50 cc/h without significant nausea or or abdominal pain.  Minimal p.o. intake.    Objective:  General Appearance:  Comfortable and in no acute distress.    Vital signs: (most recent): Blood pressure 155/93, pulse 68, temperature 97.9 °F (36.6 °C), temperature source Oral, resp. rate 16, height 182.9 cm (72\"), weight 74.3 kg (163 lb 12.8 oz), SpO2 91%.  Vital signs are normal.    Lungs:  Normal effort.    Heart: Normal rate.    Abdomen: Abdomen is soft and distended.    Neurological: Patient is alert and oriented to person, place and time.    Skin:  Warm and dry.                Results Review:     I reviewed the patient's new clinical results.  I reviewed the patient's new imaging results and agree with the interpretation.  I reviewed the patient's other test results and agree with the interpretation    Assessment & Plan     Mr. 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  Tube feeds were stopped intermittently over the weekend due to increased pain.  He is now tolerating tube feeds at goal rate of 50 cc/h.  Stable for discharge from a thoracic perspective as he has an appointment with radiation oncology in [place] tomorrow.  We will defer to the dietitian at the cancer center there to determine cyclic feed rate.  We will follow-up with him after he completes his treatment.    Discussed with patient, wife and RN.  Discharge per primary service.    Naheed Rader DNP, APRN  [facility]  06/18/24  09:18 EDT    Patient was seen and assessed while wearing personal protective equipment including facemask, protective eyewear and gloves.  Hand hygiene performed prior to entering the room and upon exiting with doffing of " gloves.

## 2024-06-18 NOTE — DISCHARGE SUMMARY
PHYSICIAN DISCHARGE SUMMARY                                                                        [facility]    Patient Identification:  Name: Nelson Garcia  Age: 49 y.o.  Sex: male  :  1975  MRN: [id number]  Primary Care Physician: Chelita Scott APRN    Admit date: 2024  Discharge date and time:2024  Discharged Condition: good    Discharge Diagnoses:  Active Hospital Problems    Diagnosis  POA    **Anemia due to gastrointestinal blood loss [D50.0]  Yes    Severe malnutrition [E43]  Yes    GI bleed [K92.2]  Yes    Esophageal mass [K22.89]  Unknown    History of CVA (cerebrovascular accident) [Z86.73]  Not Applicable    Esophageal adenocarcinoma [C15.9]  Unknown    Stage 3a chronic kidney disease [N18.31]  Yes    HTN (hypertension) [I10]  Yes      Resolved Hospital Problems   No resolved problems to display.          PMHX:   Past Medical History:   Diagnosis Date    Hypertension     Kidney disease     Stroke (cerebrum)      PSHX:   Past Surgical History:   Procedure Laterality Date    BACK SURGERY      ENDOSCOPY N/A 2024    Procedure: ESOPHAGOGASTRODUODENOSCOPY with biopsies;  Surgeon: Taras Hernandez MD;  Location: [facility] ENDOSCOPY;  Service: Gastroenterology;  Laterality: N/A;  pre- nausea/vomiting   post- nearly obstructing distal esophageal mass, gastritis    ENDOSCOPY N/A 2024    Procedure: ESOPHAGOGASTRODUODENOSCOPY, RIGHT PORT PLACEMENT, AND JEJUNOSTOMY TUBE INSERTION;  Surgeon: Jany Valente MD;  Location: [facility] MAIN OR;  Service: Gastroenterology;  Laterality: N/A;    KNEE SURGERY      TEETH EXTRACTION      all       Hospital Course: Nelson Garcia   is a 49 y.o. male with a history of CKD and stroke that presents to [facility] complaining of generalized weakness.  The patient was discharged 10 days ago after being admitted for 3 months of progressive  dysphagia and weight loss.  During that admission, EGD revealed a large fungating mass with no bleeding. The mass was partially obstructing and circumferential, and thoracic surgery was consulted.  CT abdomen/pelvis/chest revealed findings concerning for metastatic disease.  Thoracic surgery evaluated the patient and planned for outpatient follow-up.  GI also planned to evaluate the patient on an outpatient basis.  Oncology was consulted during this admission as well, and the patient plan to follow-up with oncology in [place] given this was closer to home.  Since discharge home, patient has been tolerating a liquid diet with no issue.  Has not had any choking episodes or regurgitation.  He followed up with thoracic surgery today, and wife reports that plan is for him to get a feeding tube in the next week.  Other than feeling weak, the patient has no other complaints.  No cough, chest pain, shortness of breath, sore throat, abdominal pain, vomiting, or diarrhea.  In the ER, patient was febrile to 101.2, however it is possible this was an error.  Upon going to the room his temperature was normal.  Labs revealed creatinine 1.33 and significant anemia of 6.6.  Given all of the above, the patient was admitted to [facility] for further management.  The patient was admitted to the hospital and had transfusion of 2 units of packed red blood cells.  The patient was seen by oncology, thoracic surgery, and GI medicine.  Patient had placement of PEG tube and IV port and the plan is going to be radiation and chemotherapy as an outpatient and patient prefers to go to [place] for this.  He will follow-up with his primary care and also follow-up with thoracic surgery.  He will have home health services and tube feedings at home.    Consults:     Consults       Date and Time Order Name Status Description    6/14/2024 11:56 AM Inpatient Thoracic Surgery Consult Completed     6/14/2024 11:55 AM Hematology & Oncology Inpatient Consult  "Completed     6/13/2024 11:01 PM Inpatient Gastroenterology Consult Completed     6/13/2024  9:51 PM [facility] (on-call MD unless specified) Details      6/5/2024 10:03 AM Hematology & Oncology Inpatient Consult Completed     6/4/2024 12:05 PM Inpatient Thoracic Surgery Consult Completed           Results from last 7 days   Lab Units 06/18/24  0743   WBC 10*3/mm3 5.94   HEMOGLOBIN g/dL 9.3*   HEMATOCRIT % 29.5*   PLATELETS 10*3/mm3 215     Results from last 7 days   Lab Units 06/18/24  0837   SODIUM mmol/L 139   POTASSIUM mmol/L 4.3   CHLORIDE mmol/L 106   CO2 mmol/L 25.8   BUN mg/dL 11   CREATININE mg/dL 1.17   GLUCOSE mg/dL 115*   CALCIUM mg/dL 8.8     Significant Diagnostic Studies:   WBC   Date Value Ref Range Status   06/18/2024 5.94 3.40 - 10.80 10*3/mm3 Final     Hemoglobin   Date Value Ref Range Status   06/18/2024 9.3 (L) 13.0 - 17.7 g/dL Final     Hematocrit   Date Value Ref Range Status   06/18/2024 29.5 (L) 37.5 - 51.0 % Final     Platelets   Date Value Ref Range Status   06/18/2024 215 140 - 450 10*3/mm3 Final     Sodium   Date Value Ref Range Status   06/18/2024 139 136 - 145 mmol/L Final     Potassium   Date Value Ref Range Status   06/18/2024 4.3 3.5 - 5.2 mmol/L Final     Chloride   Date Value Ref Range Status   06/18/2024 106 98 - 107 mmol/L Final     CO2   Date Value Ref Range Status   06/18/2024 25.8 22.0 - 29.0 mmol/L Final     BUN   Date Value Ref Range Status   06/18/2024 11 6 - 20 mg/dL Final     Creatinine   Date Value Ref Range Status   06/18/2024 1.17 0.76 - 1.27 mg/dL Final     Glucose   Date Value Ref Range Status   06/18/2024 115 (H) 65 - 99 mg/dL Final     Calcium   Date Value Ref Range Status   06/18/2024 8.8 8.6 - 10.5 mg/dL Final     Phosphorus   Date Value Ref Range Status   06/18/2024 3.3 2.5 - 4.5 mg/dL Final     No results found for: \"AST\", \"ALT\", \"ALKPHOS\"  No results found for: \"APTT\", \"INR\"  No results found for: \"COLORU\", \"CLARITYU\", \"SPECGRAV\", \"PHUR\", \"PROTEINUR\", \"GLUCOSEU\", " "\"KETONESU\", \"BLOODU\", \"NITRITE\", \"LEUKOCYTESUR\", \"BILIRUBINUR\", \"UROBILINOGEN\", \"RBCUA\", \"WBCUA\", \"BACTERIA\", \"UACOMMENT\"  No results found for: \"TROPONINT\", \"TROPONINI\", \"BNP\"  No components found for: \"HGBA1C;2\"  No components found for: \"TSH;2\"  Imaging Results (All)       Procedure Component Value Units Date/Time    XR Chest 1 View [[id number]] Collected: 06/18/24 0751     Updated: 06/18/24 0757    Narrative:      XR CHEST 1 VW-     Clinical: Postop port placement     COMPARISON 6/15/2024     FINDINGS: Mediport catheter position stable and satisfactory. No  pneumothorax seen. Interval development of right-sided pleural effusion  with right base atelectasis. There is a shallow inspiratory effort. The  cardiomediastinal silhouette is stable. There could be a small  left-sided pleural effusion blunting the costophrenic sulcus. No  vascular congestion seen. The remainder is unremarkable.     This report was finalized on 6/18/2024 7:54 AM by Dr. Charles Kruse M.D  on Workstation: [id number]       XR Chest 1 View [[id number]] Collected: 06/15/24 0731     Updated: 06/15/24 0736    Narrative:      XR CHEST 1 VW-     HISTORY: Male who is 49 years-old, postoperative evaluation     TECHNIQUE: Frontal view of the chest     COMPARISON: 6/14/2024     FINDINGS: Right chest port extends to the superior vena cava. Heart size  is borderline. Pulmonary vasculature is unremarkable. Minimal likely  atelectasis or scarring at the bases. No focal pulmonary consolidation,  pleural effusion, or pneumothorax. No acute osseous process.       Impression:      Minimal likely atelectasis or scarring at the bases.  Borderline heart size. Follow-up as clinical indications persist.     This report was finalized on 6/15/2024 7:33 AM by Dr. John Sadler M.D on Workstation: [id number]       XR Chest 1 View [[id number]] Collected: 06/14/24 2336     Updated: 06/14/24 2336    Narrative:        Patient: MICHAEL PALOMO  Time Out: 23:35  Exam(s): XR " CXR 1 VIEW     EXAM:    XR Chest, 1 View    CLINICAL HISTORY:     Reason for exam: Post Op Lung Surgery.    TECHNIQUE:    Frontal view of the chest.    COMPARISON:    Chest x-ray 6 13 24    FINDINGS:    Lungs:  Trace linear left lung base atelectasis scarring.    Pleural space:  Unremarkable.  No pneumothorax.    Heart:  Unremarkable.  No cardiomegaly.    Mediastinum:  Unremarkable.  Normal mediastinal contour.    Bones joints:  Unremarkable.  No acute fracture.    Tubes, lines and devices:  Right IJ Port-A-Cath terminates in the SVC.    IMPRESSION:       1.  No radiographic evidence of acute cardiopulmonary process.  2.  Right IJ Port-A-Cath now present terminating in the SVC.      Impression:          Electronically signed by George Mcleod M.D. on 06-14-24 at 2335    FL C Arm During Surgery [[id number]] Resulted: 06/14/24 2024     Updated: 06/14/24 2024    Narrative:      This procedure was auto-finalized with no dictation required.    XR Chest 1 View [[id number]] Collected: 06/13/24 2338     Updated: 06/13/24 2342    Narrative:      SINGLE VIEW OF THE CHEST     HISTORY: Generalized weakness     COMPARISON: None available.     FINDINGS:  There is cardiomegaly. There is no vascular congestion. No pneumothorax  is seen. No acute infiltrates are seen. There may be some minimal  atelectasis at the left lung base.       Impression:      Perhaps minimal atelectasis at the left lung base.     This report was finalized on 6/13/2024 11:39 PM by Dr. Dori Ortiz M.D on Workstation: [id number]             Lab Results (last 7 days)       Procedure Component Value Units Date/Time    Non-gynecologic Cytology [[id number]] Collected: 06/14/24 2036    Specimen: Peritoneal Fluid from Abdominal Cavity Updated: 06/18/24 1111     Case Report --     Non-gynecologic Cytology                          Case: [id number]                                  Authorizing Provider:  Jany Valente MD        Collected:           06/14/2024  08:36 PM          Ordering Location:     [facility]  Received:            06/17/2024 02:35 PM                                 MAIN OR                                                                      Pathologist:           Walter Braun MD                                                         Specimen:    Abdominal Cavity, PEITONEAL LAVAGE FOR CYTOLOGY ONLY                                        Final Diagnosis --     Abdominal Fluid, Peritoneal Lavage:   A. Negative for malignant cells.   B. Scattered mesothelial cells, histiocytes, mixed inflammation, blood and filamentous debris present.       Gross Description --     1. Abdominal Cavity.  1 vacuum bucket containing 500 ml of red fluid w/ clotted blood received. ThinPrep(1), cell block- in formalin @ 14:56 on 6/17/24. 50 ml of fluid remaining.            Microscopic Description --     Screened by S. Sacksteder      POC Glucose Once [[id number]]  (Normal) Collected: 06/18/24 1040    Specimen: Blood Updated: 06/18/24 1041     Glucose 110 mg/dL     Renal Function Panel [[id number]]  (Abnormal) Collected: 06/18/24 0837    Specimen: Blood Updated: 06/18/24 0929     Glucose 115 mg/dL      BUN 11 mg/dL      Creatinine 1.17 mg/dL      Sodium 139 mmol/L      Potassium 4.3 mmol/L      Chloride 106 mmol/L      CO2 25.8 mmol/L      Calcium 8.8 mg/dL      Albumin 3.1 g/dL      Phosphorus 3.3 mg/dL      Anion Gap 7.2 mmol/L      BUN/Creatinine Ratio 9.4     eGFR 76.4 mL/min/1.73     Narrative:      GFR Normal >60  Chronic Kidney Disease <60  Kidney Failure <15      CBC (No Diff) [[id number]]  (Abnormal) Collected: 06/18/24 0743    Specimen: Blood Updated: 06/18/24 0801     WBC 5.94 10*3/mm3      RBC 3.44 10*6/mm3      Hemoglobin 9.3 g/dL      Hematocrit 29.5 %      MCV 85.8 fL      MCH 27.0 pg      MCHC 31.5 g/dL      RDW 15.4 %      RDW-SD 47.4 fl      MPV 11.6 fL      Platelets 215 10*3/mm3     POC Glucose Once [[id number]]  (Normal) Collected: 06/18/24  0557    Specimen: Blood Updated: 06/18/24 0558     Glucose 116 mg/dL     CBC (No Diff) [[id number]]  (Abnormal) Collected: 06/17/24 2349    Specimen: Blood Updated: 06/18/24 0100     WBC 6.31 10*3/mm3      RBC 3.44 10*6/mm3      Hemoglobin 9.3 g/dL      Hematocrit 29.3 %      MCV 85.2 fL      MCH 27.0 pg      MCHC 31.7 g/dL      RDW 15.0 %      RDW-SD 45.4 fl      MPV 11.2 fL      Platelets 236 10*3/mm3     POC Glucose Once [[id number]]  (Abnormal) Collected: 06/17/24 2333    Specimen: Blood Updated: 06/17/24 2333     Glucose 132 mg/dL     POC Glucose Once [[id number]]  (Normal) Collected: 06/17/24 1858    Specimen: Blood Updated: 06/17/24 1859     Glucose 125 mg/dL     CBC (No Diff) [[id number]]  (Abnormal) Collected: 06/17/24 1602    Specimen: Blood Updated: 06/17/24 1621     WBC 6.18 10*3/mm3      RBC 3.79 10*6/mm3      Hemoglobin 10.2 g/dL      Hematocrit 32.3 %      MCV 85.2 fL      MCH 26.9 pg      MCHC 31.6 g/dL      RDW 15.4 %      RDW-SD 46.6 fl      MPV 10.6 fL      Platelets 211 10*3/mm3     POC Glucose Once [[id number]]  (Normal) Collected: 06/17/24 1135    Specimen: Blood Updated: 06/17/24 1136     Glucose 120 mg/dL     CBC (No Diff) [[id number]]  (Abnormal) Collected: 06/17/24 0748    Specimen: Blood Updated: 06/17/24 0828     WBC 6.77 10*3/mm3      RBC 3.54 10*6/mm3      Hemoglobin 9.8 g/dL      Hematocrit 29.8 %      MCV 84.2 fL      MCH 27.7 pg      MCHC 32.9 g/dL      RDW 15.4 %      RDW-SD 46.7 fl      MPV 11.7 fL      Platelets 210 10*3/mm3     POC Glucose Once [[id number]]  (Abnormal) Collected: 06/17/24 0703    Specimen: Blood Updated: 06/17/24 0704     Glucose 147 mg/dL     Renal Function Panel [[id number]]  (Abnormal) Collected: 06/16/24 2355    Specimen: Blood Updated: 06/17/24 0027     Glucose 115 mg/dL      BUN 8 mg/dL      Creatinine 1.20 mg/dL      Sodium 137 mmol/L      Potassium 4.2 mmol/L      Chloride 105 mmol/L      CO2 23.1 mmol/L      Calcium 8.6 mg/dL      Albumin 3.2 g/dL       Phosphorus 3.2 mg/dL      Anion Gap 8.9 mmol/L      BUN/Creatinine Ratio 6.7     eGFR 74.1 mL/min/1.73     Narrative:      GFR Normal >60  Chronic Kidney Disease <60  Kidney Failure <15      CBC (No Diff) [[id number]]  (Abnormal) Collected: 06/16/24 2355    Specimen: Blood Updated: 06/17/24 0006     WBC 6.21 10*3/mm3      RBC 3.43 10*6/mm3      Hemoglobin 9.2 g/dL      Hematocrit 29.1 %      MCV 84.8 fL      MCH 26.8 pg      MCHC 31.6 g/dL      RDW 15.4 %      RDW-SD 46.7 fl      MPV 11.2 fL      Platelets 199 10*3/mm3     CBC (No Diff) [[id number]]  (Abnormal) Collected: 06/16/24 1552    Specimen: Blood Updated: 06/16/24 1659     WBC 7.24 10*3/mm3      RBC 3.58 10*6/mm3      Hemoglobin 9.5 g/dL      Hematocrit 30.6 %      MCV 85.5 fL      MCH 26.5 pg      MCHC 31.0 g/dL      RDW 15.3 %      RDW-SD 46.3 fl      MPV 11.6 fL      Platelets 209 10*3/mm3     POC Glucose Once [[id number]]  (Normal) Collected: 06/16/24 1652    Specimen: Blood Updated: 06/16/24 1654     Glucose 122 mg/dL     POC Glucose Once [[id number]]  (Normal) Collected: 06/16/24 1028    Specimen: Blood Updated: 06/16/24 1030     Glucose 116 mg/dL     Renal Function Panel [[id number]]  (Abnormal) Collected: 06/16/24 0610    Specimen: Blood Updated: 06/16/24 0647     Glucose 101 mg/dL      BUN 11 mg/dL      Creatinine 1.31 mg/dL      Sodium 137 mmol/L      Potassium 4.1 mmol/L      Chloride 105 mmol/L      CO2 24.0 mmol/L      Calcium 8.5 mg/dL      Albumin 3.3 g/dL      Phosphorus 2.3 mg/dL      Anion Gap 8.0 mmol/L      BUN/Creatinine Ratio 8.4     eGFR 66.7 mL/min/1.73     Narrative:      GFR Normal >60  Chronic Kidney Disease <60  Kidney Failure <15      CBC (No Diff) [[id number]]  (Abnormal) Collected: 06/16/24 0610    Specimen: Blood Updated: 06/16/24 0629     WBC 7.80 10*3/mm3      RBC 3.54 10*6/mm3      Hemoglobin 9.5 g/dL      Hematocrit 30.0 %      MCV 84.7 fL      MCH 26.8 pg      MCHC 31.7 g/dL      RDW 15.1 %      RDW-SD 45.8 fl      MPV 11.0 fL       Platelets 217 10*3/mm3     POC Glucose Once [[id number]]  (Normal) Collected: 06/16/24 0605    Specimen: Blood Updated: 06/16/24 0606     Glucose 120 mg/dL     CBC (No Diff) [[id number]]  (Abnormal) Collected: 06/16/24 0000    Specimen: Blood Updated: 06/16/24 0011     WBC 7.68 10*3/mm3      RBC 3.21 10*6/mm3      Hemoglobin 8.9 g/dL      Hematocrit 27.4 %      MCV 85.4 fL      MCH 27.7 pg      MCHC 32.5 g/dL      RDW 15.4 %      RDW-SD 47.3 fl      MPV 10.8 fL      Platelets 180 10*3/mm3     POC Glucose Once [[id number]]  (Normal) Collected: 06/15/24 2323    Specimen: Blood Updated: 06/15/24 2323     Glucose 124 mg/dL     CBC (No Diff) [[id number]]  (Abnormal) Collected: 06/15/24 1550    Specimen: Blood Updated: 06/15/24 1606     WBC 8.84 10*3/mm3      RBC 3.55 10*6/mm3      Hemoglobin 9.4 g/dL      Hematocrit 29.9 %      MCV 84.2 fL      MCH 26.5 pg      MCHC 31.4 g/dL      RDW 15.0 %      RDW-SD 44.9 fl      MPV 11.3 fL      Platelets 195 10*3/mm3     Renal Function Panel [[id number]]  (Abnormal) Collected: 06/15/24 0549    Specimen: Blood Updated: 06/15/24 0636     Glucose 153 mg/dL      BUN 15 mg/dL      Creatinine 1.24 mg/dL      Sodium 137 mmol/L      Potassium 4.7 mmol/L      Chloride 106 mmol/L      CO2 23.2 mmol/L      Calcium 8.4 mg/dL      Albumin 3.3 g/dL      Phosphorus 3.3 mg/dL      Anion Gap 7.8 mmol/L      BUN/Creatinine Ratio 12.1     eGFR 71.3 mL/min/1.73     Narrative:      GFR Normal >60  Chronic Kidney Disease <60  Kidney Failure <15      CBC & Differential [[id number]]  (Abnormal) Collected: 06/15/24 0549    Specimen: Blood Updated: 06/15/24 0620    Narrative:      The following orders were created for panel order CBC & Differential.  Procedure                               Abnormality         Status                     ---------                               -----------         ------                     CBC Auto Differential[[id number]]        Abnormal            Final result                  Please view results for these tests on the individual orders.    CBC Auto Differential [[id number]]  (Abnormal) Collected: 06/15/24 0549    Specimen: Blood Updated: 06/15/24 0620     WBC 6.66 10*3/mm3      RBC 3.35 10*6/mm3      Hemoglobin 8.7 g/dL      Hematocrit 28.8 %      MCV 86.0 fL      MCH 26.0 pg      MCHC 30.2 g/dL      RDW 14.9 %      RDW-SD 45.3 fl      MPV 11.7 fL      Platelets 186 10*3/mm3      Neutrophil % 89.9 %      Lymphocyte % 7.2 %      Monocyte % 2.1 %      Eosinophil % 0.0 %      Basophil % 0.3 %      Immature Grans % 0.5 %      Neutrophils, Absolute 5.99 10*3/mm3      Lymphocytes, Absolute 0.48 10*3/mm3      Monocytes, Absolute 0.14 10*3/mm3      Eosinophils, Absolute 0.00 10*3/mm3      Basophils, Absolute 0.02 10*3/mm3      Immature Grans, Absolute 0.03 10*3/mm3      nRBC 0.0 /100 WBC     Hemoglobin & Hematocrit, Blood [[id number]]  (Abnormal) Collected: 06/14/24 1439    Specimen: Blood Updated: 06/14/24 1510     Hemoglobin 7.8 g/dL      Hematocrit 24.5 %     Ferritin [[id number]]  (Normal) Collected: 06/14/24 0713    Specimen: Blood Updated: 06/14/24 1349     Ferritin 55.00 ng/mL     Narrative:      Results may be falsely decreased if patient taking Biotin.      Iron Profile [[id number]]  (Abnormal) Collected: 06/14/24 0713    Specimen: Blood Updated: 06/14/24 1343     Iron 15 mcg/dL      Iron Saturation (TSAT) 5 %      Transferrin 192 mg/dL      TIBC 286 mcg/dL     CBC (No Diff) [[id number]]  (Abnormal) Collected: 06/14/24 0713    Specimen: Blood Updated: 06/14/24 0831     WBC 4.36 10*3/mm3      RBC 2.52 10*6/mm3      Hemoglobin 6.7 g/dL      Hematocrit 21.5 %      MCV 85.3 fL      MCH 26.6 pg      MCHC 31.2 g/dL      RDW 14.7 %      RDW-SD 44.4 fl      MPV 11.3 fL      Platelets 156 10*3/mm3     Basic Metabolic Panel [[id number]]  (Abnormal) Collected: 06/14/24 0713    Specimen: Blood Updated: 06/14/24 0815     Glucose 95 mg/dL      BUN 17 mg/dL      Creatinine 1.20 mg/dL      Sodium 139  mmol/L      Potassium 4.0 mmol/L      Chloride 110 mmol/L      CO2 23.0 mmol/L      Calcium 8.4 mg/dL      BUN/Creatinine Ratio 14.2     Anion Gap 6.0 mmol/L      eGFR 74.1 mL/min/1.73     Narrative:      GFR Normal >60  Chronic Kidney Disease <60  Kidney Failure <15      Urinalysis With Microscopic If Indicated (No Culture) - Urine, Clean Catch [[id number]]  (Normal) Collected: 06/14/24 0118    Specimen: Urine, Clean Catch Updated: 06/14/24 0209     Color, UA Yellow     Appearance, UA Clear     pH, UA 6.0     Specific Gravity, UA 1.013     Glucose, UA Negative     Ketones, UA Negative     Bilirubin, UA Negative     Blood, UA Negative     Protein, UA Negative     Leuk Esterase, UA Negative     Nitrite, UA Negative     Urobilinogen, UA 0.2 E.U./dL    Narrative:      Urine microscopic not indicated.    POCT Occult Blood Stool [[id number]]  (Abnormal) Collected: 06/13/24 2152    Specimen: Stool from Per Rectum Updated: 06/13/24 2152     Fecal Occult Blood Positive     Lot Number [id number]     Expiration Date 02/28/2025     Positive Control Positive     Negative Control Negative    Comprehensive Metabolic Panel [[id number]]  (Abnormal) Collected: 06/13/24 2109    Specimen: Blood Updated: 06/13/24 2140     Glucose 96 mg/dL      BUN 24 mg/dL      Creatinine 1.33 mg/dL      Sodium 139 mmol/L      Potassium 4.4 mmol/L      Chloride 109 mmol/L      CO2 22.0 mmol/L      Calcium 8.5 mg/dL      Total Protein 5.8 g/dL      Albumin 3.6 g/dL      ALT (SGPT) 9 U/L      AST (SGOT) 12 U/L      Alkaline Phosphatase 41 U/L      Total Bilirubin <0.2 mg/dL      Globulin 2.2 gm/dL      A/G Ratio 1.6 g/dL      BUN/Creatinine Ratio 18.0     Anion Gap 8.0 mmol/L      eGFR 65.5 mL/min/1.73     Narrative:      GFR Normal >60  Chronic Kidney Disease <60  Kidney Failure <15      Lipase [[id number]]  (Normal) Collected: 06/13/24 2109    Specimen: Blood Updated: 06/13/24 2140     Lipase 44 U/L     Lactic Acid, Plasma [[id number]]  (Normal) Collected:  "06/13/24 2109    Specimen: Blood Updated: 06/13/24 2135     Lactate 0.9 mmol/L     CBC & Differential [[id number]]  (Abnormal) Collected: 06/13/24 2109    Specimen: Blood Updated: 06/13/24 2126    Narrative:      The following orders were created for panel order CBC & Differential.  Procedure                               Abnormality         Status                     ---------                               -----------         ------                     CBC Auto Differential[[id number]]        Abnormal            Final result                 Please view results for these tests on the individual orders.    CBC Auto Differential [[id number]]  (Abnormal) Collected: 06/13/24 2109    Specimen: Blood Updated: 06/13/24 2126     WBC 4.20 10*3/mm3      RBC 2.52 10*6/mm3      Hemoglobin 6.6 g/dL      Hematocrit 21.5 %      MCV 85.3 fL      MCH 26.2 pg      MCHC 30.7 g/dL      RDW 14.6 %      RDW-SD 43.8 fl      MPV 11.0 fL      Platelets 186 10*3/mm3      Neutrophil % 66.4 %      Lymphocyte % 25.5 %      Monocyte % 6.9 %      Eosinophil % 0.5 %      Basophil % 0.5 %      Immature Grans % 0.2 %      Neutrophils, Absolute 2.79 10*3/mm3      Lymphocytes, Absolute 1.07 10*3/mm3      Monocytes, Absolute 0.29 10*3/mm3      Eosinophils, Absolute 0.02 10*3/mm3      Basophils, Absolute 0.02 10*3/mm3      Immature Grans, Absolute 0.01 10*3/mm3      nRBC 0.0 /100 WBC           /91 (BP Location: Right arm, Patient Position: Sitting)   Pulse 81   Temp 97.9 °F (36.6 °C) (Oral)   Resp 16   Ht 182.9 cm (72\")   Wt 74.3 kg (163 lb 12.8 oz)   SpO2 93%   BMI 22.22 kg/m²     Discharge Exam:  General Appearance:    Alert, cooperative, no distress                          Head:    Normocephalic, without obvious abnormality, atraumatic                          Eyes:                            Throat:   Lips, tongue, gums normal                          Neck:   Supple, symmetrical, trachea midline, no JVD                        Lungs:     " Clear to auscultation bilaterally, respirations unlabored                Chest Wall:    No tenderness or deformity                        Heart:    Regular rate and rhythm, S1 and S2 normal, no murmur,no  Rub  or gallop                  Abdomen:     Soft, non-tender, bowel sounds active, no masses, no                                                        organomegaly                  Extremities:   Extremities normal, atraumatic, no cyanosis or edema                             Skin:   Skin is warm and dry,  no rashes or palpable lesions                  Neurologic:   no focal deficits noted     Disposition:  Home with home health    Activity as tolerated    Diet as tolerated  Diet Order   Procedures    Diet: Liquid; Full Liquid; Fluid Consistency: Thin (IDDSI 0)       Patient Instructions:      Discharge Medications        New Medications        Instructions Start Date   neomycin-polymyxin-gramicidin 1.75-11406-.025 ophthalmic solution  Commonly known as: NEOSPORIN   2 drops, Both Eyes, Every 4 Hours Scheduled      nystatin 686065 UNIT/GM powder  Commonly known as: MYCOSTATIN   Apply to skin folds topically to the appropriate area as directed 2 (Two) Times a Day      ondansetron ODT 4 MG disintegrating tablet  Commonly known as: ZOFRAN-ODT   4 mg, Oral, Every 6 Hours PRN      oxyCODONE 10 MG tablet  Commonly known as: ROXICODONE   10 mg, Per J Tube, Every 4 Hours PRN             Continue These Medications        Instructions Start Date   acetaminophen 325 MG tablet  Commonly known as: TYLENOL   650 mg, Oral, Every 6 Hours PRN      atorvastatin 80 MG tablet  Commonly known as: LIPITOR   80 mg, Oral, Nightly      carvedilol 12.5 MG tablet  Commonly known as: COREG   12.5 mg, Oral, 2 Times Daily With Meals      cloNIDine 0.2 MG tablet  Commonly known as: CATAPRES   0.2 mg, Oral, As Needed      gabapentin 600 MG tablet  Commonly known as: NEURONTIN   800 mg, Oral, 4 Times Daily      methocarbamol 500 MG  tablet  Commonly known as: ROBAXIN   500 mg, Oral, 4 Times Daily      sildenafil 50 MG tablet  Commonly known as: VIAGRA   50 mg, Oral, Daily PRN      vitamin D3 125 MCG (5000 UT) capsule capsule   5,000 Units, Oral, Daily             Stop These Medications      aspirin 325 MG EC tablet     clopidogrel 75 MG tablet  Commonly known as: PLAVIX     valsartan 160 MG tablet  Commonly known as: DIOVAN            Future Appointments   Date Time Provider Department Center   6/19/2024  2:00 PM Viet Ramirez MD [facility] None   6/24/2024  8:15 AM Aquiles Reddy MD [facility]     Additional Instructions for the Follow-ups that You Need to Schedule       Ambulatory Referral to Home Health (Hospital)   As directed      Face to Face Visit Date: 6/17/2024   Follow-up provider for Plan of Care?: I will be treating the patient on an ongoing basis.  Please send me the Plan of Care for signature.   Follow-up provider: MARCI CARRASCO [[id number]]   Reason/Clinical Findings: esophageal cancer   Describe mobility limitations that make leaving home difficult: esophageal cancer   Nursing/Therapeutic Services Requested: Skilled Nursing   Skilled nursing orders: Other   Frequency: 1 Week 1               Contact information for follow-up providers       Marci Carrasco MD Follow up.    Specialty: Thoracic Surgery  Why: Call to schedule an appointment after you complete chemotherapy and radiation  Contact information:  [address] [facility] [address]  [facility] [address]  [phone]               Chelita Scott, APRN .    Specialties: Family Medicine, Nurse Practitioner  Contact information:  [address] [facility] [address]  [phone]                       Contact information for after-discharge care       Durable Medical Equipment       [facility] .    Service: Durable Medical Equipment  Contact information:  [address]  [facility] [address]  [phone]                      Dialysis/Infusion       [facility] .    Service: Infusion and IV Therapy  Contact information:  [address]  [facility] [id number] [phone]                     Home Medical Care       [facility] .    Service: Home Health Services  Contact information:  [address]  [facility] [address] [phone]                                 Discharge Order (From admission, onward)       Start     Ordered    06/18/24 1200  Discharge patient  Once        Expected Discharge Date: 06/18/24   Discharge Disposition: Home or Self Care   Physician of Record for Attribution - Please select from Treatment Team: BEVERLY YO [[id number]]   Review needed by CMO to determine Physician of Record: No      Question Answer Comment   Physician of Record for Attribution - Please select from Treatment Team BEVERLY YO    Review needed by CMO to determine Physician of Record No        06/18/24 2409                    Total time spent discharging patient including evaluation,post hospitalization follow up,  medication and post hospitalization instructions and education total time exceeds 30 minutes.    Signed:  Beverly Yo MD  6/18/2024  13:19 EDT

## 2024-06-18 NOTE — THERAPY DISCHARGE NOTE
Patient Name: Nelson Garcia  : 1975    MRN: [id number]                              Today's Date: 2024       Admit Date: 2024    Visit Dx:     ICD-10-CM ICD-9-CM   1. Symptomatic anemia  D64.9 285.9   2. Anemia due to gastrointestinal blood loss  D50.0 280.0   3. TY (acute kidney injury)  N17.9 584.9   4. Esophageal adenocarcinoma  C15.9 150.9   5. Severe malnutrition  E43 261   6. History of CVA (cerebrovascular accident)  Z86.73 V12.54   7. Esophageal mass  K22.89 530.89   8. Acute ischemic stroke  I63.9 434.91   9. Nontraumatic hemorrhage of cerebral hemisphere, unspecified laterality  I61.2 431     Patient Active Problem List   Diagnosis    Left leg weakness    HTN (hypertension)    Cerebral microhemorrhages    Acute ischemic stroke    Tobacco use    Stage 3a chronic kidney disease    Observed sleep apnea    Snoring    Non-restorative sleep    Erectile dysfunction    Dysphagia    GI bleed    Anemia due to gastrointestinal blood loss    Esophageal mass    History of CVA (cerebrovascular accident)    Esophageal adenocarcinoma    Severe malnutrition     Past Medical History:   Diagnosis Date    Hypertension     Kidney disease     Stroke (cerebrum)      Past Surgical History:   Procedure Laterality Date    BACK SURGERY      ENDOSCOPY N/A 2024    Procedure: ESOPHAGOGASTRODUODENOSCOPY with biopsies;  Surgeon: Taras Hernandez MD;  Location: [facility] ENDOSCOPY;  Service: Gastroenterology;  Laterality: N/A;  pre- nausea/vomiting   post- nearly obstructing distal esophageal mass, gastritis    ENDOSCOPY N/A 2024    Procedure: ESOPHAGOGASTRODUODENOSCOPY, RIGHT PORT PLACEMENT, AND JEJUNOSTOMY TUBE INSERTION;  Surgeon: Jany Valente MD;  Location: [facility] MAIN OR;  Service: Gastroenterology;  Laterality: N/A;    KNEE SURGERY      TEETH EXTRACTION      all      General Information       Row Name 24 1044          Physical Therapy Time and Intention    Document Type discharge treatment  -CB      Mode of Treatment individual therapy;physical therapy  -CB       Row Name 06/18/24 1044          General Information    Existing Precautions/Restrictions --  feeding tube  -CB       Row Name 06/18/24 1044          Cognition    Orientation Status (Cognition) oriented x 3  -CB       Row Name 06/18/24 1044          Safety Issues, Functional Mobility    Impairments Affecting Function (Mobility) endurance/activity tolerance;pain  -CB               User Key  (r) = Recorded By, (t) = Taken By, (c) = Cosigned By      Initials Name Provider Type    Ruth Metcalf PT Physical Therapist                   Mobility       Row Name 06/18/24 1045          Bed Mobility    Comment, (Bed Mobility) sitting EOB- PT spendt extended time adjusting rwx that was delivered to room and setting up properly  -CB       Row Name 06/18/24 1045          Sit-Stand Transfer    Sit-Stand [place] (Transfers) supervision  -CB     Assistive Device (Sit-Stand Transfers) walker, front-wheeled  -CB       Row Name 06/18/24 1045          Gait/Stairs (Locomotion)    [place] Level (Gait) supervision;verbal cues  -CB     Assistive Device (Gait) walker, front-wheeled  -CB     Distance in Feet (Gait) 80  -CB     Deviations/Abnormal Patterns (Gait) gait speed decreased;stride length decreased  -CB     Comment, (Gait/Stairs) no LOB or unsteadiness noted  -CB               User Key  (r) = Recorded By, (t) = Taken By, (c) = Cosigned By      Initials Name Provider Type    Ruth Metcalf PT Physical Therapist                   Obj/Interventions       Row Name 06/18/24 1045          Balance    Balance Assessment standing static balance;standing dynamic balance  -CB     Static Standing Balance supervision;modified independence  -CB     Dynamic Standing Balance supervision  -CB     Position/Device Used, Standing Balance supported;walker, front-wheeled  -CB               User Key  (r) = Recorded By, (t) = Taken By, (c) = Cosigned By      Initials Name  Provider Type    Ruth Metcalf, PT Physical Therapist                   Goals/Plan    No documentation.                  Clinical Impression       Row Name 06/18/24 1046          Pain    Pre/Posttreatment Pain Comment pt reports abdominal pain but does not state numerical value to PT  -CB       Row Name 06/18/24 1046          Plan of Care Review    Plan of Care Reviewed With patient;spouse  -CB     Progress improving  -CB     Outcome Evaluation Patient is agreeable to PT this date and PT spent extended time setting up walker for pt to take home. He completed STS with S and ambulated 80ft using rwx requiring S. No LOB or unsteadiness noted. Plan home with spouse.  -CB       Row Name 06/18/24 1046          Positioning and Restraints    Pre-Treatment Position in bed  -CB     Post Treatment Position chair  -CB     In Chair notified nsg;sitting;call light within reach;encouraged to call for assist;with family/caregiver  -CB               User Key  (r) = Recorded By, (t) = Taken By, (c) = Cosigned By      Initials Name Provider Type    CB Ruth Oh, PT Physical Therapist                   Outcome Measures       Row Name 06/18/24 1048 06/18/24 0726       How much help from another person do you currently need...    Turning from your back to your side while in flat bed without using bedrails? 4  -CB 4  -RW    Moving from lying on back to sitting on the side of a flat bed without bedrails? 3  -CB 3  -RW    Moving to and from a bed to a chair (including a wheelchair)? 3  -CB 3  -RW    Standing up from a chair using your arms (e.g., wheelchair, bedside chair)? 3  -CB 3  -RW    Climbing 3-5 steps with a railing? 3  -CB 3  -RW    To walk in hospital room? 3  -CB 3  -RW    AM-PAC 6 Clicks Score (PT) 19  -CB 19  -RW    Highest Level of Mobility Goal 6 --> Walk 10 steps or more  -CB 6 --> Walk 10 steps or more  -RW      Row Name 06/18/24 1048          Functional Assessment    Outcome Measure Options AM-PAC 6 Clicks Basic  Mobility (PT)  -CB               User Key  (r) = Recorded By, (t) = Taken By, (c) = Cosigned By      Initials Name Provider Type    RW Naldo Espinoza, RN Registered Nurse    Ruth Metcalf PT Physical Therapist                  Physical Therapy Education       Title: PT OT SLP Therapies (In Progress)       Topic: Physical Therapy (In Progress)       Point: Mobility training (Done)       Learning Progress Summary             Patient Acceptance, E,TB, VU by CB at 6/18/2024 1048    Acceptance, E,TB, VU,NR by  at 6/17/2024 1323   Significant Other Acceptance, E,TB, VU by CB at 6/18/2024 1048                         Point: Home exercise program (Not Started)       Learner Progress:  Not documented in this visit.              Point: Body mechanics (Not Started)       Learner Progress:  Not documented in this visit.              Point: Precautions (Not Started)       Learner Progress:  Not documented in this visit.                              User Key       Initials Effective Dates Name Provider Type Discipline     10/22/21 -  Ruth Oh PT Physical Therapist PT                  PT Recommendation and Plan  Planned Therapy Interventions (PT): balance training, bed mobility training, gait training, home exercise program, patient/family education, transfer training  Plan of Care Reviewed With: patient, spouse  Progress: improving  Outcome Evaluation: Patient is agreeable to PT this date and PT spent extended time setting up walker for pt to take home. He completed STS with S and ambulated 80ft using rwx requiring S. No LOB or unsteadiness noted. Plan home with spouse.     Time Calculation:         PT Charges       Row Name 06/18/24 1048             Time Calculation    Start Time 0926  -CB      Stop Time 0941  -CB      Time Calculation (min) 15 min  -CB      PT Received On 06/18/24  -CB         Time Calculation- PT    Total Timed Code Minutes- PT 15 minute(s)  -CB         Timed Charges    79715 - PT Therapeutic  Activity Minutes 15  -CB         Total Minutes    Timed Charges Total Minutes 15  -CB       Total Minutes 15  -CB                User Key  (r) = Recorded By, (t) = Taken By, (c) = Cosigned By      Initials Name Provider Type    CB Ruth Oh, PT Physical Therapist                  Therapy Charges for Today       Code Description Service Date Service Provider Modifiers Qty    48549370553  PT THERAPEUTIC ACT EA 15 MIN 6/17/2024 Ruth Oh, PT GP 1    [id number]  PT EVAL MOD COMPLEXITY 3 6/17/2024 Ruth Oh, PT GP 1    [id number]  PT THERAPEUTIC ACT EA 15 MIN 6/18/2024 Ruth Oh, PT GP 1            PT G-Codes  Outcome Measure Options: AM-PAC 6 Clicks Basic Mobility (PT)  AM-PAC 6 Clicks Score (PT): 19    PT Discharge Summary  Anticipated Discharge Disposition (PT): home with assist, home with home health    Ruth Oh, SHAGUFTA  6/18/2024

## 2024-06-18 NOTE — PLAN OF CARE
Goal Outcome Evaluation:  Plan of Care Reviewed With: patient, spouse        Progress: improving  Outcome Evaluation: Patient is agreeable to PT this date and PT spent extended time setting up walker for pt to take home. He completed STS with S and ambulated 80ft using rwx requiring S. No LOB or unsteadiness noted. Plan home with spouse.      Anticipated Discharge Disposition (PT): home with assist, home with home health

## 2024-06-18 NOTE — CASE MANAGEMENT/SOCIAL WORK
Case Management Discharge Note      Final Note: home with BHI    Provided Post Acute Provider List?: N/A  Provided Post Acute Provider Quality & Resource List?: N/A    Selected Continued Care - Discharged on 6/18/2024 Admission date: 6/13/2024 - Discharge disposition: Home or Self Care      Destination    No services have been selected for the patient.                Durable Medical Equipment       Service Provider Selected Services Address Phone Fax Patient Preferred    [facility] [address] [facility] [address], [place] [id number] [phone] --              Dialysis/Infusion Coordination complete.      Service Provider Selected Services Address Phone Fax Patient Preferred    [facility] Infusion and IV Therapy [address], [place] [id number] [phone] --              Home Medical Care    No services have been selected for the patient.                Therapy    No services have been selected for the patient.                Community Resources    No services have been selected for the patient.                Community & DME    No services have been selected for the patient.                    Transportation Services  Private: Car    Final Discharge Disposition Code: 01 - home or self-care

## 2024-06-18 NOTE — PROGRESS NOTES
"Nutrition Services    Patient Name:  Nelson Garcia  YOB: 1975  MRN: [id number]  Admit Date:  6/13/2024    Assessment Date:  06/18/24    Summary: TF Follow up:  Pt to d/c home today. Pt will have TF pump at home for nocturnal TF's. Pt tolerating J-tube feeds. Pt provided with 3 cases of Isosource 1.5 and a case of TF bags. Pt and wife educated on TF's and familiar with scheduled nocturnal feeds.     Recommend:   Continue J-tube nocturnal TF's of Isosource 1.5 @ 80 ml/hr to run 8pm to 8am (12hrs)  Water flushes 20 ml/hr   PO diet per MD recommendations.     Note: Tf's will meet about 60% of needs and can be adjusted pending po intake.    Will follow per protocol.      CLINICAL NUTRITION ASSESSMENT      Reason for Assessment Follow-up Protocol     Diagnosis/Problem   Generalized weakness, vomiting, esophageal mass, anemia concerning for GI bleed, dysphagia   Medical/Surgical History Past Medical History:   Diagnosis Date    Hypertension     Kidney disease     Stroke (cerebrum)        Past Surgical History:   Procedure Laterality Date    BACK SURGERY      ENDOSCOPY N/A 6/4/2024    Procedure: ESOPHAGOGASTRODUODENOSCOPY with biopsies;  Surgeon: Taras Hernandez MD;  Location: [facility] ENDOSCOPY;  Service: Gastroenterology;  Laterality: N/A;  pre- nausea/vomiting   post- nearly obstructing distal esophageal mass, gastritis    ENDOSCOPY N/A 6/14/2024    Procedure: ESOPHAGOGASTRODUODENOSCOPY, RIGHT PORT PLACEMENT, AND JEJUNOSTOMY TUBE INSERTION;  Surgeon: Jany Valente MD;  Location: [facility] MAIN OR;  Service: Gastroenterology;  Laterality: N/A;    KNEE SURGERY      TEETH EXTRACTION      all        Anthropometrics        Current Height  Current Weight  BMI kg/m2 Height: 182.9 cm (72\")  Weight: 74.3 kg (163 lb 12.8 oz) (06/13/24 9396)  Body mass index is 22.22 kg/m².   Adjusted BMI (if applicable)    BMI Category Normal/Healthy (18.4 - 24.9)   Ideal Body Weight (IBW) 171 lb   Usual Body Weight (UBW) 185 lb " (3 months ago) per pt   Weight Trend Loss, Amount/Timeframe: 29 lb (16%) loss x 3m   Weight History Wt Readings from Last 30 Encounters:   06/13/24 2256 74.3 kg (163 lb 12.8 oz)   06/13/24 2040 70.3 kg (155 lb)   06/13/24 2032 70.9 kg (156 lb 4.9 oz)   06/13/24 1325 70.9 kg (156 lb 4.8 oz)   06/03/24 2100 75.3 kg (166 lb)   06/03/24 1956 75.3 kg (166 lb)   10/21/22 1426 82.6 kg (182 lb 3.2 oz)   06/30/22 0900 80.3 kg (177 lb)   04/28/22 1200 79.8 kg (176 lb)   04/06/22 1425 78.6 kg (173 lb 3.2 oz)   02/22/22 1338 73.9 kg (163 lb)   02/21/22 1509 73.9 kg (163 lb)      --  Estimated/Assessed Needs        Current Weight  Weight: 74.3 kg (163 lb 12.8 oz) (06/13/24 2256)       Energy Requirements    Weight for Calculation 71 kg   Method for Estimation  30-35 kcal/kg   EST Needs (kcal/day) 9214-8812 kcals       Protein Requirements    Weight for Calculation 71 kg   EST Protein Needs (g/kg) 1.2 - 1.5 gm/kg   EST Daily Needs (g/day)  g       Fluid Requirements     Method for Estimation 30 mL/kg    EST Needs (mL/day) 2130 ml     Labs       Pertinent Labs    Results from last 7 days   Lab Units 06/18/24  0837 06/16/24  2355 06/16/24  0610 06/14/24  0713 06/13/24  2109   SODIUM mmol/L 139 137 137   < > 139   POTASSIUM mmol/L 4.3 4.2 4.1   < > 4.4   CHLORIDE mmol/L 106 105 105   < > 109*   CO2 mmol/L 25.8 23.1 24.0   < > 22.0   BUN mg/dL 11 8 11   < > 24*   CREATININE mg/dL 1.17 1.20 1.31*   < > 1.33*   CALCIUM mg/dL 8.8 8.6 8.5*   < > 8.5*   BILIRUBIN mg/dL  --   --   --   --  <0.2   ALK PHOS U/L  --   --   --   --  41   ALT (SGPT) U/L  --   --   --   --  9   AST (SGOT) U/L  --   --   --   --  12   GLUCOSE mg/dL 115* 115* 101*   < > 96    < > = values in this interval not displayed.     Results from last 7 days   Lab Units 06/18/24  0837 06/18/24  0743   PHOSPHORUS mg/dL 3.3  --    HEMOGLOBIN g/dL  --  9.3*   HEMATOCRIT %  --  29.5*   WBC 10*3/mm3  --  5.94   ALBUMIN g/dL 3.1*  --      Results from last 7 days   Lab  Units 06/18/24  0743 06/17/24  2349 06/17/24  1602 06/17/24  0748 06/16/24  2355   PLATELETS 10*3/mm3 215 236 211 210 199     COVID19   Date Value Ref Range Status   02/21/2022 Not Detected Not Detected - Ref. Range Final     Lab Results   Component Value Date    HGBA1C 5.00 02/22/2022          Medications           Scheduled Medications heparin (porcine), 5,000 Units, Subcutaneous, Q8H  neomycin-polymyxin-gramicidin, 2 drop, Both Eyes, Q4H  sodium chloride, 10 mL, Intravenous, Q12H       Infusions       PRN Medications   senna-docusate sodium **AND** polyethylene glycol **AND** bisacodyl **AND** bisacodyl    bisacodyl    HYDROmorphone    HYDROmorphone    Morphine    nitroglycerin    ondansetron ODT **OR** ondansetron    oxyCODONE    sodium chloride    sodium chloride     Physical Findings          General Findings alert, oriented, room air   Oral/Mouth Cavity tooth or teeth missing   Edema  not assessed   Gastrointestinal last bowel movement: 6/11   Skin  skin intact   Tubes/Drains/Lines jejunostomy    NFPE See Malnutrition Severity Assessment, Date Completed: 6/15   --  Malnutrition Severity Assessment      Patient meets criteria for : Severe Malnutrition           Current Nutrition Orders & Evaluation of Intake       Oral Nutrition     Food Allergies NKFA   Current PO Diet Diet: Liquid; Full Liquid; Fluid Consistency: Thin (IDDSI 0)   Supplement Boost Plus, TID   PO Evaluation     % PO Intake Pt drinking Boost     Factors Affecting Intake: swallow impairment, vomiting   --   Enteral Nutrition     Enteral Route Jejunostomy    TF Delivery Method Cyclic      Propofol Rate/Kcal     Current TF Order/Rate  Isosource 1.5 at 50ml/hr x 12hr.    TF Goal Rate 80 ml/hr x 12 hrs    Current Water Flush 50 ml q 4 hrs    Modular                  PES STATEMENT / NUTRITION DIAGNOSIS      Nutrition Dx Problem  Problem: Malnutrition (severe)  Etiology: Medical Diagnosis - esophageal mass and Factors Affecting Nutrition - N/V,  dysphagia    Signs/Symptoms: Report of Minimal PO Intake and Unintended Weight Change     NUTRITION INTERVENTION / PLAN OF CARE      Intervention Goal(s) Maintain nutrition status, Reduce/improve symptoms, Meet estimated needs, Disease management/therapy, Initiate TF/PN, Tolerate TF/PN at goal, and Appropriate weight gain         RD Intervention/Action Await initiation of EN/PN, Continue to monitor, Care plan reviewed, and Recommend/order: EN   --      Prescription/Orders:       PO Diet       Supplements       Enteral Nutrition    Enteral Prescription:     Enteral Route Jejunostomy    TF Delivery Method Cyclic    Enteral Product Isosource 1.5    Modular None    Propofol Rate/Kcal     TF Start Rate  20 mL/hr x 12 hr    TF Goal Rate  80 mL/hr x 12 hr    Free Water Flush 20 mL Q 1 hr x 12hr    Provision at Goal:          Calories 1440kcal, meets 57-67% needs         Protein  65 gm protein, meets 61-76% needs         Fluid (mL) 729 mL free water + 240 mL in flushes, 45%         Parenteral Nutrition    New Prescription Ordered? Yes   --      Monitor/Evaluation Per protocol, I&O, PO intake, Pertinent labs, EN delivery/tolerance, Weight, GI status, Symptoms, Swallow function   Discharge Plan/Needs Pending clinical course   --    RD to follow per protocol.      Electronically signed by:  Esme Bermudez RD  06/18/24 15:01 EDT

## 2024-06-18 NOTE — PLAN OF CARE
Goal Outcome Evaluation:  Plan of Care Reviewed With: patient         Patient medicated for pain and nausea per MAR.Tolerating TF at 50ml/hr.Continues with IVFs.Possible d/c.VSS.Room air

## 2024-06-18 NOTE — PLAN OF CARE
Goal Outcome Evaluation:  Plan of Care Reviewed With: patient, spouse           Outcome Evaluation: Pt to be DC'd home with spouse as transport.  TF equipment delivered and educated pt and spouse.  Meds to Beds to be delivered.  TF turned off per request for transport home.  VSS, RA, NSR.  IVs removed.

## 2024-06-19 ENCOUNTER — OFFICE VISIT (OUTPATIENT)
Dept: RADIATION ONCOLOGY | Facility: HOSPITAL | Age: 49
End: 2024-06-19
Payer: MEDICAID

## 2024-06-19 ENCOUNTER — HOSPITAL ENCOUNTER (OUTPATIENT)
Dept: RADIATION ONCOLOGY | Facility: HOSPITAL | Age: 49
Setting detail: RADIATION/ONCOLOGY SERIES
Discharge: HOME | End: 2024-06-19
Payer: MEDICAID

## 2024-06-19 VITALS
BODY MASS INDEX: 22.21 KG/M2 | RESPIRATION RATE: 20 BRPM | HEART RATE: 73 BPM | DIASTOLIC BLOOD PRESSURE: 86 MMHG | HEIGHT: 72 IN | TEMPERATURE: 97.8 F | WEIGHT: 164 LBS | OXYGEN SATURATION: 90 % | SYSTOLIC BLOOD PRESSURE: 156 MMHG

## 2024-06-19 DIAGNOSIS — C15.9 ESOPHAGEAL ADENOCARCINOMA: Primary | ICD-10-CM

## 2024-06-19 PROCEDURE — G0463 HOSPITAL OUTPT CLINIC VISIT: HCPCS

## 2024-06-19 RX ORDER — LACTOSE-REDUCED FOOD 0.04G-1/ML
237 LIQUID (ML) ORAL 3 TIMES DAILY
COMMUNITY
Start: 2024-05-06 | End: 2025-05-06

## 2024-06-19 RX ORDER — GABAPENTIN 800 MG/1
TABLET ORAL
COMMUNITY
Start: 2024-06-01

## 2024-06-19 RX ORDER — CLONIDINE HYDROCHLORIDE 0.2 MG/1
0.2 TABLET ORAL AS NEEDED
COMMUNITY

## 2024-06-19 RX ORDER — ATORVASTATIN CALCIUM 80 MG/1
80 TABLET, FILM COATED ORAL DAILY
COMMUNITY

## 2024-06-19 RX ORDER — PANTOPRAZOLE SODIUM 40 MG/1
40 TABLET, DELAYED RELEASE ORAL DAILY
Qty: 30 TABLET | Refills: 2 | Status: SHIPPED | OUTPATIENT
Start: 2024-06-19

## 2024-06-19 NOTE — PROGRESS NOTES
CONSULTATION NOTE      :                                                          1975  DATE OF CONSULTATION:                       2024   REQUESTING PHYSICIAN:                   Que Lopez II, MD  REASON FOR CONSULTATION:           Esophageal adenocarcinoma  - Stage III (cT3, cN1, cM0, G2)         BRIEF HISTORY:  The patient is a very pleasant 49 y.o. male  with recent diagnosis of distal esophageal cancer involving the gastroesophageal junction.  He presented with a 3-month history of 25 to 30 pound weight loss, dysphagia and abdominal pain.  He was initially evaluated at the [facility] clinic and plan for endoscopic evaluation.  Prior to that procedure, he was admitted to [facility] for malignant esophageal obstruction.  EGD performed 2024 identified a mass involving the distal esophagus.  Biopsy showed moderately differentiated adenocarcinoma.  Special testing for HER2, FISH, PD-L1 and MSI are pending.  On PET/CT the mass involves the distal esophagus circumferentially and extending into the gastroesophageal junction into the proximal stomach with apparent full-thickness hypermetabolic activity, SUV 19.  1 or 2 regional upper abdominal lymph nodes measuring approximate 1 cm were also hypermetabolic with SUVs in the range of 2.1-3.9.  Other small pulmonary, mediastinal, periaortic or adrenal abnormalities were either not hypermetabolic or too small to characterize.  No definite evidence of distant metastatic disease.  Gastrointestinal bleeding resulting in anemia with hemoglobin as low as 6.6 requiring transfusion.  He underwent placement of the port as well as a jejunostomy tube.  Patient is now been discharged to home and planning to undergo preoperative treatment at [facility].  He wishes to have eventual esophageal resection surgery at [facility].      Allergy: No Known Allergies    Social History:   Social History     Socioeconomic  History    Marital status:    Tobacco Use    Smoking status: Former     Current packs/day: 0.50     Average packs/day: 0.5 packs/day for 35.0 years (17.5 ttl pk-yrs)     Types: Cigarettes    Smokeless tobacco: Former    Tobacco comments:     Quit smoking 3 years ago   Vaping Use    Vaping status: Never Used   Substance and Sexual Activity    Alcohol use: Not Currently    Drug use: Never    Sexual activity: Defer       Past Medical History:   Past Medical History:   Diagnosis Date    Esophageal cancer     Hypertension     Stroke (cerebrum)        Family History: family history includes Diabetes in his mother and sister; Heart failure in his sister; Stroke in his mother.     Surgical History:   Past Surgical History:   Procedure Laterality Date    BACK SURGERY      ENDOSCOPY N/A 6/4/2024    Procedure: ESOPHAGOGASTRODUODENOSCOPY with biopsies;  Surgeon: Taras Hernandez MD;  Location: [facility] ENDOSCOPY;  Service: Gastroenterology;  Laterality: N/A;  pre- nausea/vomiting   post- nearly obstructing distal esophageal mass, gastritis    ENDOSCOPY N/A 6/14/2024    Procedure: ESOPHAGOGASTRODUODENOSCOPY, RIGHT PORT PLACEMENT, AND JEJUNOSTOMY TUBE INSERTION;  Surgeon: Jany Valente MD;  Location: [facility] MAIN OR;  Service: Gastroenterology;  Laterality: N/A;    KNEE SURGERY      TEETH EXTRACTION      all        Review of Systems:   Review of Systems   Constitutional:  Positive for fatigue and unexpected weight change.   HENT:   Positive for trouble swallowing and voice change.    Respiratory:  Positive for cough and shortness of breath.    Cardiovascular:  Positive for leg swelling.   Gastrointestinal:  Positive for abdominal distention, blood in stool, constipation, nausea and vomiting.   Musculoskeletal:  Positive for back pain and neck pain.   Skin:         Pt has Jtube in place with dressing CDI.   Neurological:  Positive for dizziness, light-headedness and numbness.           Objective   VITAL SIGNS:   Vitals:     ambulatory "06/19/24 1404   BP: 156/86   Pulse: 73   Resp: 20   Temp: 97.8 °F (36.6 °C)   TempSrc: Skin   SpO2: 90%   Weight: 74.4 kg (164 lb)   Height: 182.9 cm (72\")   PainSc:   7   PainLoc: Abdomen        Karnofsky score: 80       Physical Exam:   Physical Exam  Vitals and nursing note reviewed.   Constitutional:       Appearance: He is well-developed.   HENT:      Head: Normocephalic and atraumatic.   Cardiovascular:      Rate and Rhythm: Normal rate and regular rhythm.      Heart sounds: Normal heart sounds. No murmur heard.     Comments: Venous access port right upper anterior chest  Pulmonary:      Effort: Pulmonary effort is normal.      Breath sounds: Normal breath sounds. No wheezing or rales.   Abdominal:      General: Bowel sounds are normal. There is no distension.      Palpations: Abdomen is soft.      Tenderness: There is no abdominal tenderness.      Comments: Well-healing small lower midline incision and clean dressing over jejunostomy tube left lower quadrant   Musculoskeletal:         General: No tenderness. Normal range of motion.      Cervical back: Normal range of motion and neck supple.   Lymphadenopathy:      Cervical: No cervical adenopathy.      Upper Body:      Right upper body: No supraclavicular adenopathy.      Left upper body: No supraclavicular adenopathy.   Skin:     General: Skin is warm and dry.   Neurological:      Mental Status: He is alert and oriented to person, place, and time.      Sensory: No sensory deficit.   Psychiatric:         Behavior: Behavior normal.         Thought Content: Thought content normal.         Judgment: Judgment normal.              The following portions of the patient's history were reviewed and updated as appropriate: allergies, current medications, past family history, past medical history, past social history, past surgical history, and problem list.    Assessment:   Assessment      Adenocarcinoma of the lower third of esophagus/gastrointestinal junction, " clinical stage III (T3, N1, M0).  Preoperative chemoradiotherapy is indicated.  We reviewed the use of radiotherapy in this situation.  Informed consent was obtained today.    RECOMMENDATIONS: He will return tomorrow morning for CT simulation.  I will try to expedite start of treatment due to recent history of bleeding and obstruction.  Medical oncology consultation scheduled for early next week with Dr. Reddy.  I prescribed Protonix per J-tube since patient had been on this medication via intravenous administration during recent hospitalization.  The distal esophagus, GE junction and regional lymphatics will receive a dose of approximately 45 Jaramillo delivered over 5 weeks with an additional boost to take the PET positive gross disease to approximately 50.4 Gray over 5 and half weeks.  Intensity modulated radiotherapy will be used to best protect adjacent critical structures, especially the stomach which would need to be reconstructed at the time of surgery and the kidneys since he has a history of acute on chronic kidney failure.  Special treatment procedure will be requested for concurrent cytotoxic chemotherapy and overall complexity of treatment management.  Social work consultation for distress level 8 diagnosis, weight loss, financial issues.  Dietitian consultation.    I spent a total of 55 minutes on todays visit, with more than 45 minutes in direct face to face communication, and the remainder of the time spent in reviewing the relevant history, records, available imaging, and for documentation.    Follow Up:   Return in about 1 day (around 6/20/2024) for Simulation.  Diagnoses and all orders for this visit:    1. Esophageal adenocarcinoma (Primary)  -     Ambulatory Referral to OP ONC Nutrition Services    Other orders  -     pantoprazole (Protonix) 40 MG EC tablet; Take 1 tablet by mouth Daily.  Dispense: 30 tablet; Refill: 2         Viet Ramirez MD

## 2024-06-19 NOTE — OUTREACH NOTE
Prep Survey      Flowsheet Row Responses   [facility] facility patient discharged from? [place]   Is LACE score < 7 ? No   Eligibility Readm Mgmt   Discharge diagnosis Anemia due to gastrointestinal blood loss, EGD, RIGHT PORT PLACEMENT, AND JEJUNOSTOMY TUBE INSERTION   Does the patient have one of the following disease processes/diagnoses(primary or secondary)? General Surgery   Does the patient have Home health ordered? Yes   What is the Home health agency?  [facility]   Is there a DME ordered? Yes   What DME was ordered? walker   Is this a private patient? Yes   Prep survey completed? Yes            Bibiana RIVERA - Registered Nurse

## 2024-06-20 ENCOUNTER — HOSPITAL ENCOUNTER (OUTPATIENT)
Dept: RADIATION ONCOLOGY | Facility: HOSPITAL | Age: 49
Discharge: HOME | End: 2024-06-20

## 2024-06-20 ENCOUNTER — TELEPHONE (OUTPATIENT)
Dept: NUTRITION | Facility: HOSPITAL | Age: 49
End: 2024-06-20
Payer: MEDICAID

## 2024-06-20 PROCEDURE — 77470 SPECIAL RADIATION TREATMENT: CPT | Performed by: RADIOLOGY

## 2024-06-20 PROCEDURE — 77290 THER RAD SIMULAJ FIELD CPLX: CPT | Performed by: RADIOLOGY

## 2024-06-20 PROCEDURE — 77332 RADIATION TREATMENT AID(S): CPT | Performed by: RADIOLOGY

## 2024-06-20 NOTE — PROGRESS NOTES
Onc Nutrition    Patient: Nelson Garcia  YOB: 1975    Diagnosis: Adenocarcinoma of the lower third of esophagus/gastrointestinal junction, clinical stage III (T3, N1, M0).      Surgery: wishes to have eventual esophageal resection surgery at [facility]      Chemotherapy:  MED ONC appointment with Dr. Reddy 6/24/24     Radiation: distal esophagus, GE junction and regional lymphatics will receive a dose of approximately 45 Jaramillo delivered over 5 weeks with an additional boost to take the PET positive gross disease to approximately 50.4 Gray over 5 and half weeks - planning in progress     Weight - 164# (6/19/24)  Weight Change - ~25# weight loss past 3 months related to dysphagia / 13% weight loss     Estimated Nutritional Needs:  Calories - 2640 (35 kcal / kg)  Protein - 94 -110+ grams (1.25 -1.5 grams per kg as tolerated with Stage III CKD)  Water - 80+ ounces    Current Enteral Nutrition Regimen:  Isosource 1.5 @ 80 ml/hour x 12 hours nightly = 1440 calories, 65 grams protein, 730 ml water    Oral Diet: Full Liquids    Referral received from Dr. Ramirez's office.  Spoke with patient's wife via phone call.  Note initial nutritional assessment / education was completed by my colleague, Lakshmi Naranjo RD on 6/11/24, see note for details.      She confirms nocturnal tube feeding as above and reports patient is tolerating tube feeding without noted difficulty.  She also reports patient is taking liquids by mouth and is interested in receiving ONS samples as discussed with my colleague, Lakshmi Naranjo RD.  She states patient is returning to [facility] on Monday 6/24/24 for MedOnc appointment - will plan to meet with patient and his wife then to provide ONS samples and discuss any nutrition interventions needed at that time.    Answered her questions and she is in agreement with the above plan.  Advised she contact JOJO with further questions.  Will continue to follow patient throughout his  treatment course.    Maryjane Lange, JOJO  06/20/24

## 2024-06-21 PROCEDURE — 77399 UNLISTED PX MED RADJ PHYSICS: CPT | Performed by: RADIOLOGY

## 2024-06-24 ENCOUNTER — DOCUMENTATION (OUTPATIENT)
Dept: NUTRITION | Facility: HOSPITAL | Age: 49
End: 2024-06-24
Payer: MEDICAID

## 2024-06-24 ENCOUNTER — CONSULT (OUTPATIENT)
Dept: ONCOLOGY | Facility: CLINIC | Age: 49
End: 2024-06-24
Payer: MEDICAID

## 2024-06-24 VITALS
SYSTOLIC BLOOD PRESSURE: 141 MMHG | DIASTOLIC BLOOD PRESSURE: 82 MMHG | BODY MASS INDEX: 21.26 KG/M2 | TEMPERATURE: 99.7 F | WEIGHT: 157 LBS | HEART RATE: 92 BPM | OXYGEN SATURATION: 95 % | HEIGHT: 72 IN

## 2024-06-24 DIAGNOSIS — C15.9 ESOPHAGEAL ADENOCARCINOMA: Primary | ICD-10-CM

## 2024-06-24 PROCEDURE — 3077F SYST BP >= 140 MM HG: CPT | Performed by: INTERNAL MEDICINE

## 2024-06-24 PROCEDURE — 3079F DIAST BP 80-89 MM HG: CPT | Performed by: INTERNAL MEDICINE

## 2024-06-24 PROCEDURE — 99215 OFFICE O/P EST HI 40 MIN: CPT | Performed by: INTERNAL MEDICINE

## 2024-06-24 PROCEDURE — 1125F AMNT PAIN NOTED PAIN PRSNT: CPT | Performed by: INTERNAL MEDICINE

## 2024-06-24 RX ORDER — LIDOCAINE AND PRILOCAINE 25; 25 MG/G; MG/G
CREAM TOPICAL
Qty: 30 G | Refills: 2 | Status: SHIPPED | OUTPATIENT
Start: 2024-06-24

## 2024-06-24 RX ORDER — PALONOSETRON 0.05 MG/ML
0.25 INJECTION, SOLUTION INTRAVENOUS ONCE
Status: CANCELLED | OUTPATIENT
Start: 2024-07-01

## 2024-06-24 RX ORDER — FAMOTIDINE 10 MG/ML
20 INJECTION, SOLUTION INTRAVENOUS AS NEEDED
Status: CANCELLED | OUTPATIENT
Start: 2024-07-01

## 2024-06-24 RX ORDER — FAMOTIDINE 10 MG/ML
20 INJECTION, SOLUTION INTRAVENOUS ONCE
Status: CANCELLED | OUTPATIENT
Start: 2024-07-01

## 2024-06-24 RX ORDER — SODIUM CHLORIDE 9 MG/ML
20 INJECTION, SOLUTION INTRAVENOUS ONCE
Status: CANCELLED | OUTPATIENT
Start: 2024-07-01

## 2024-06-24 RX ORDER — DIPHENHYDRAMINE HYDROCHLORIDE 50 MG/ML
50 INJECTION INTRAMUSCULAR; INTRAVENOUS AS NEEDED
Status: CANCELLED | OUTPATIENT
Start: 2024-07-01

## 2024-06-24 RX ORDER — OXYCODONE HYDROCHLORIDE 10 MG/1
10 TABLET ORAL EVERY 4 HOURS PRN
Qty: 180 TABLET | Refills: 0 | Status: SHIPPED | OUTPATIENT
Start: 2024-06-24 | End: 2024-06-27 | Stop reason: CLARIF

## 2024-06-24 NOTE — PROGRESS NOTES
New Patient Office Visit      Date: 2024     Patient Name: Nelson Garcia  MRN: [id number]  : 1975  Referring Physician: Que Lopez    Chief Complaint: Establish care for esophageal adenocarcinoma    History of Present Illness: Nelson Garcia is a pleasant 49 y.o. male with a past medical history of hyperlipidemia, hypertension, GERD, tobacco abuse, CVA who presents today for evaluation of esophageal adenocarcinoma. The patient is accompanied by their wife who contributes to the history of their care.  Patient had been experiencing worsening dysphagia and weight loss.  Noted the sensation of food getting stuck and regurgitating undigested food.  Was seen at [facility] where he had an EGD completed which showed a fungating mass.  Biopsy consistent with an adenocarcinoma.  PET/CT without evidence of distant disease but did note some local regional lymphadenopathy.  Given his weight loss and significant obstructing lesion, he had a J-tube placed and is currently on tube feeds and tolerating these well.  He notes some pain and discomfort related to his cancer.  He has been seen by Dr. Ramirez with plans to start concurrent chemo XRT followed by surgical intervention with Dr. Mills in [place]    Oncology History:    Oncology/Hematology History   Esophageal adenocarcinoma   2024 Cancer Staged    Staging form: Esophagus - Adenocarcinoma, AJCC 8th Edition  - Clinical stage from 2024: Stage III (cT3, cN1, cM0, G2) - Signed by Viet Ramirez MD on 2024 Initial Diagnosis    Esophageal adenocarcinoma     2024 -  Chemotherapy    OP GE PACLitaxel / CARBOplatin (weekly) + XRT         Subjective      Review of Systems:     Constitutional: Negative for fevers, chills, or weight loss  Eyes: Negative for blurred vision or discharge         Ear/Nose/Throat: Negative for difficulty swallowing, sore throat, LAD                                                        Respiratory: Negative for cough, SOA, wheezing                                                                                        Cardiovascular: Negative for chest pain or palpitations                                                                  Gastrointestinal: Negative for nausea, vomiting or diarrhea                                                                     Genitourinary: Negative for dysuria or hematuria                                                                                           Musculoskeletal: Negative for any joint pains or muscle aches                                                                        Neurologic: Negative for any weakness, headaches, dizziness                                                                         Hematologic: Negative for any easy bleeding or bruising                                                                                   Psychiatric: Negative for anxiety or depression                             Past Medical History:   Past Medical History:   Diagnosis Date    Esophageal cancer     Hypertension     Stroke (cerebrum)        Past Surgical History:   Past Surgical History:   Procedure Laterality Date    BACK SURGERY      ENDOSCOPY N/A 6/4/2024    Procedure: ESOPHAGOGASTRODUODENOSCOPY with biopsies;  Surgeon: Taras Hernandez MD;  Location: [facility] ENDOSCOPY;  Service: Gastroenterology;  Laterality: N/A;  pre- nausea/vomiting   post- nearly obstructing distal esophageal mass, gastritis    ENDOSCOPY N/A 6/14/2024    Procedure: ESOPHAGOGASTRODUODENOSCOPY, RIGHT PORT PLACEMENT, AND JEJUNOSTOMY TUBE INSERTION;  Surgeon: Jany Valente MD;  Location: [facility] MAIN OR;  Service: Gastroenterology;  Laterality: N/A;    KNEE SURGERY      TEETH EXTRACTION      all       Family History:   Family History   Problem Relation Age of Onset    Diabetes Mother     Stroke Mother     Diabetes Sister     Heart failure Sister     Malig Hyperthermia  Neg Hx        Social History:   Social History     Socioeconomic History    Marital status:    Tobacco Use    Smoking status: Former     Current packs/day: 0.50     Average packs/day: 0.5 packs/day for 35.0 years (17.5 ttl pk-yrs)     Types: Cigarettes    Smokeless tobacco: Former    Tobacco comments:     Quit smoking 3 years ago   Vaping Use    Vaping status: Never Used   Substance and Sexual Activity    Alcohol use: Not Currently    Drug use: Never    Sexual activity: Defer       Medications:     Current Outpatient Medications:     oxyCODONE (ROXICODONE) 10 MG tablet, Administer 1 tablet per J tube Every 4 (Four) Hours As Needed for Moderate Pain., Disp: 180 tablet, Rfl: 0    acetaminophen (TYLENOL) 325 MG tablet, Take 2 tablets by mouth Every 6 (Six) Hours As Needed for Mild Pain ., Disp: , Rfl:     atorvastatin (LIPITOR) 80 MG tablet, Take 1 tablet by mouth Daily., Disp: , Rfl:     cloNIDine (CATAPRES) 0.2 MG tablet, Take 1 tablet by mouth As Needed., Disp: , Rfl:     gabapentin (NEURONTIN) 600 MG tablet, Take 800 mg by mouth 4 (Four) Times a Day., Disp: , Rfl:     gabapentin (NEURONTIN) 800 MG tablet, , Disp: , Rfl:     lidocaine-prilocaine (EMLA) 2.5-2.5 % cream, Please apply a small amount to port site about 45 min prior to infusion and cover with Saran Wrap, Disp: 30 g, Rfl: 2    methocarbamol (ROBAXIN) 500 MG tablet, Take 1 tablet by mouth 4 (Four) Times a Day., Disp: , Rfl:     neomycin-polymyxin-dexamethasone (MAXITROL) 0.1 % ophthalmic suspension, Administer 2 drops to both eyes 4 (Four) Times a Day., Disp: 5 mL, Rfl: 0    nicotine polacrilex (NICORETTE) 4 MG gum, , Disp: , Rfl:     Nutritional Supplements (Boost Plus) liquid, Take 237 mL by mouth 3 (Three) Times a Day., Disp: , Rfl:     nystatin (MYCOSTATIN) 666689 UNIT/GM powder, Apply to skin folds topically to the appropriate area as directed 2 (Two) Times a Day, Disp: 30 g, Rfl: 0    ondansetron ODT (ZOFRAN-ODT) 4 MG disintegrating tablet,  "Take 1 tablet by mouth Every 6 (Six) Hours As Needed for Nausea or Vomiting., Disp: 20 tablet, Rfl: 0    pantoprazole (Protonix) 40 MG EC tablet, Take 1 tablet by mouth Daily., Disp: 30 tablet, Rfl: 2    sildenafil (VIAGRA) 50 MG tablet, TAKE ONE TABLET BY MOUTH DAILY AS NEEDED FOR ERECTILE DYSFUNCTION, Disp: 30 tablet, Rfl: 3    vitamin D3 125 MCG (5000 UT) capsule capsule, Take 1 capsule by mouth Daily., Disp: , Rfl:     Allergies:   No Known Allergies    Objective     Physical Exam:  Vital Signs:   Vitals:    06/24/24 0756   BP: 141/82   Pulse: 92   Temp: 99.7 °F (37.6 °C)   TempSrc: Temporal   SpO2: 95%   Weight: 71.2 kg (157 lb)   Height: 182.9 cm (72.01\")   PainSc: 3  Comment: right side     Pain Score    06/24/24 0756   PainSc: 3  Comment: right side     ECOG Performance Status: 1 - Symptomatic but completely ambulatory    Constitutional: NAD, ECOG 1  Eyes: PERRLA, scleral anicteric  ENT: No LAD, no thyromegaly  Respiratory: CTAB, no wheezing, rales, rhonchi  Cardiovascular: RRR, no murmurs, pulses 2+ bilaterally  Abdomen: soft, NT/ND, no HSM  Musculoskeletal: strength 5/5 bilaterally, no c/c/e  Neurologic: A&O x 3, CN II-XII intact grossly  Psych: mood and affect congruent, no SI or HI    Results Review:   No results displayed because visit has over 200 results.          XR Chest 1 View    Result Date: 6/18/2024  Narrative: XR CHEST 1 VW-  Clinical: Postop port placement  COMPARISON 6/15/2024  FINDINGS: Mediport catheter position stable and satisfactory. No pneumothorax seen. Interval development of right-sided pleural effusion with right base atelectasis. There is a shallow inspiratory effort. The cardiomediastinal silhouette is stable. There could be a small left-sided pleural effusion blunting the costophrenic sulcus. No vascular congestion seen. The remainder is unremarkable.  This report was finalized on 6/18/2024 7:54 AM by Dr. Charles Kruse M.D on Workstation: [id number]      Duplex Carotid Ultrasound " CAR    Result Date: 6/15/2024  Narrative:   Right internal carotid artery demonstrates a less than 50% stenosis.   Left internal carotid artery demonstrates a less than 50% stenosis.     XR Chest 1 View    Result Date: 6/15/2024  Narrative: XR CHEST 1 VW-  HISTORY: Male who is 49 years-old, postoperative evaluation  TECHNIQUE: Frontal view of the chest  COMPARISON: 6/14/2024  FINDINGS: Right chest port extends to the superior vena cava. Heart size is borderline. Pulmonary vasculature is unremarkable. Minimal likely atelectasis or scarring at the bases. No focal pulmonary consolidation, pleural effusion, or pneumothorax. No acute osseous process.      Impression: Minimal likely atelectasis or scarring at the bases. Borderline heart size. Follow-up as clinical indications persist.  This report was finalized on 6/15/2024 7:33 AM by Dr. John Sadler M.D on Workstation: [id number]      XR Chest 1 View    Result Date: 6/14/2024  Narrative: Patient: MICHAEL PALOMO  Time Out: 23:35 Exam(s): XR CXR 1 VIEW EXAM:   XR Chest, 1 View CLINICAL HISTORY:    Reason for exam: Post Op Lung Surgery. TECHNIQUE:   Frontal view of the chest. COMPARISON:   Chest x-ray 6 13 24 FINDINGS:   Lungs:  Trace linear left lung base atelectasis scarring.   Pleural space:  Unremarkable.  No pneumothorax.   Heart:  Unremarkable.  No cardiomegaly.   Mediastinum:  Unremarkable.  Normal mediastinal contour.   Bones joints:  Unremarkable.  No acute fracture.   Tubes, lines and devices:  Right IJ Port-A-Cath terminates in the SVC. IMPRESSION:     1.  No radiographic evidence of acute cardiopulmonary process. 2.  Right IJ Port-A-Cath now present terminating in the SVC.     Impression: Electronically signed by George Mcleod M.D. on 06-14-24 at 2335    FL C Arm During Surgery    Result Date: 6/14/2024  Narrative: This procedure was auto-finalized with no dictation required.    XR Chest 1 View    Result Date: 6/13/2024  Narrative: SINGLE VIEW OF THE CHEST   HISTORY: Generalized weakness  COMPARISON: None available.  FINDINGS: There is cardiomegaly. There is no vascular congestion. No pneumothorax is seen. No acute infiltrates are seen. There may be some minimal atelectasis at the left lung base.      Impression: Perhaps minimal atelectasis at the left lung base.  This report was finalized on 6/13/2024 11:39 PM by Dr. Dori Ortiz M.D on Workstation: [id number]      NM PET/CT Skull Base to Mid Thigh    Result Date: 6/11/2024  Narrative: F-18 FDG PET SKULL BASE TO MID THIGH WITH PET CT FUSION.  HISTORY: Recently diagnosed distal esophageal invasive moderately differentiated adenocarcinoma. Initial treatment strategy.  TECHNIQUE: Radiation dose reduction techniques were utilized, including automated exposure control and exposure modulation based on body size. Blood glucose level at time of injection was 92 mg/dL. 6.8 mCi of F-18 FDG were injected and PET was performed from skull base to mid thigh. CT was obtained for localization and attenuation correction. Normalization method: patient weight. Time at injection 145. PET start time 327.  Comparison: CT chest abdomen pelvis 6/5/2024.  FINDINGS:  Head/neck: No suspicious uptake.  Chest: Hypermetabolic circumferential wall thickening throughout the distal esophagus extending to the level of the gastroesophageal junction with max SUV of 19 (series 4/image 202).  Two right middle lobe sub-6 mm solid pulmonary nodules are too small for accurate PET characterization without overt hypermetabolism. Few tiny calcified granulomas. Bibasilar subsegmental atelectasis/scarring.  Multiple subcentimeter posterior mediastinal paraortic lymph nodes are prominent but too small for accurate PET characterization without overt hypermetabolism (series 4/image 206). Reference subcentimeter lymph node with max SUV of 2.4 (series 4/image 189).  Abdomen/pelvis: Two adjacent 1 cm gastrohepatic lymph nodes, the more posterior with max SUV of  3.9 (series 4/image 221). The more anterior with max SUV of 2.1 (series 4/image 223). Additional 0.7 cm gastrohepatic lymph node is too small for accurate PET characterization without overt hypermetabolism (series 4/image 223).  The 1 cm left adrenal nodule is not hypermetabolic (series 4/image 222) and measured near 10 Hounsfield units on prior noncontrast CT.  Bones: No suspicious uptake.       Impression: 1. Hypermetabolic circumferential distal esophageal wall thickening consistent with known adenocarcinoma. 2. Gastrohepatic lymphadenopathy. At least one of the lymph nodes is definitively hypermetabolic. Findings are concerning for lavell metastatic disease. 3. Multiple subcentimeter paraortic posterior mediastinal lymph nodes are prominent but too small for accurate PET characterization without overt hypermetabolism. Continued attention on follow-up. 4. The 1 cm left adrenal nodule is not hypermetabolic and measured near 10 Hounsfield units on prior noncontrast CT, suggesting adrenal adenoma. 5. Two right middle lobe sub-6 mm solid pulmonary nodules are too small for accurate PET characterization without overt hypermetabolism. Attention on follow-up.   This report was finalized on 6/11/2024 9:31 PM by Dr. Nick Randall M.D on Workstation: [id number]      CT Chest With Contrast Diagnostic, CT Abdomen Pelvis With Contrast    Result Date: 6/5/2024  Narrative: CT CHEST W CONTRAST DIAGNOSTIC-, CT ABDOMEN PELVIS W CONTRAST-  INDICATION: New esophageal cancer  COMPARISON: CT chest, abdomen pelvis June 4, 2024  TECHNIQUE: Routine CT chest, abdomen and pelvis with IV contrast. Coronal and sagittal reformats. Radiation dose reduction techniques were utilized, including automated exposure control and exposure modulation based on body size.  FINDINGS:  Chest wall: No lymphadenopathy.  Mediastinum: Coronary artery atherosclerotic calcifications. Heart is normal in size. No pericardial effusion. Calcified mediastinal  and right hilar lymph node, consistent with prior granulomatous infection. Subcarinal lymph node measures 9 mm in short axis. Left paraesophageal/periaortic lymph nodes, largest seen on series 2, axial image 73, measuring 7 mm in short axis. Left paraesophageal lymph node, series 2, axial mage 52, measures 8 mm in short axis.  Lungs/pleura: No effusions. Airways wall thickening. Respiratory motion artifact. Posterior dependent and bibasilar subsegmental atelectasis. Juxtapleural solid pulmonary nodule along the minor fissure, series 3, axial mage 62, measures 2 mm, unchanged. Solid pulmonary nodule in the lateral segment right middle lobe, series 3, axial image 77, measures 2 mm, unchanged.  ABDOMEN: Normal liver. Gallbladder is partially contracted. No biliary ductal dilatation. Spleen is normal in size. No pancreatic mass or pancreatic ductal dilatation seen. Left adrenal nodule, series 2, axial image 97, measures 1.4 cm, stable in the interval, previously measuring 6 Hounsfield units on noncontrast CT and therefore most consistent with a lipid rich adrenal adenoma. No renal mass or hydronephrosis.  Pelvis: Moderate bladder distention. Prostate is normal in size. Trace free fluid in the rectovesicular pouch.  Bowel: Mild patulous appearance to the esophagus, containing some retained fluid. Masslike thickening of the distal esophagus, extends to the GE junction. No small bowel obstruction. Normal appendix.  Abdominal wall: Rectus diastases. Small fat-containing umbilical hernia.  Retroperitoneum: Prominent gastrocolic ligament lymph nodes. For example, gastrocolic ligament lymph node, series 2, axial mage 95, measures 1.1 cm, unchanged. Adjacent gastrohepatic ligament lymph node, series 2, axial mage 95, measures 1.0 cm. No abdominal retroperitoneal lymphadenopathy seen.  Vasculature: Patent. No abdominal aortic aneurysm. Mild to moderate aortoiliac atherosclerotic calcification.  Osseous structures: No destructive  osseous lesions. Moderate spondylosis/degenerative disc disease at L5/S1 with disc osteophyte complex.      Impression:  1. Masslike appearance to the distal esophagus, consistent with patient's known esophageal malignancy. Some retrained fluid seen in the esophagus. 2. Prominent subcarinal, left paraesophageal/periaortic lymph nodes that are indeterminate for lavell metastatic disease. Mildly enlarged superior gastrohepatic ligament lymph nodes, also indeterminate. PET/CT could evaluate for hypermetabolic activity. 3. Small left adrenal nodule is stable, most characteristic for lipid rich adrenal adenoma. 4. Couple small pulmonary nodules in the right middle lobe that can be followed.  This report was finalized on 6/5/2024 4:05 PM by Dr. Junior Teresa M.D on Workstation: [id number]      CT Abdomen Pelvis Without Contrast, CT Chest Without Contrast Diagnostic    Result Date: 6/4/2024  Narrative: CT CHEST, ABDOMEN, AND PELVIS WITHOUT CONTRAST.  HISTORY: 49-year-old male with nausea and vomiting. Esophageal mass.  TECHNIQUE: Radiation dose reduction techniques were utilized, including automated exposure control and exposure modulation based on body size. 3 mm images were obtained through the chest, abdomen, and pelvis without the administration of IV contrast. No previous CTs for comparison.  FINDINGS: CHEST: Coronary artery calcifications are noted. 1. Very difficult to characterize in the absence of IV contrast, but there is circumferential thickening of an approximately 6 cm segment of distal esophagus with moderate dilatation of the esophagus proximally. There is fluid within the proximal esophagus. There are ill-defined nodes adjacently which are suspicious. There are also shotty mediastinal nodes which are indeterminate.  There are airspace opacities at the dependent aspect of both lower lobes which may represent atelectasis, but pneumonia can't be excluded. There is a minimal right pleural effusion.  There  is a sub-6 mm right middle lobe pulmonary nodule, series 3 image 3 and there is a sub-6 mm nodular density along the right minor fissure. Conservative surveillance is recommended with a follow-up chest CT in 3 months.  ABDOMEN/PELVIS:  1. There are multiple enlarged nodes at the gastrohepatic ligament which are suspicious for lavell metastases. Multiple shotty retroperitoneal nodes are noted as well. Largest gastrohepatic ligament node measures 2.0 x 1.1 cm. Noncontrasted liver appears unremarkable. There is a low-attenuation 1.6 cm left adrenal nodule with internal density measurements of less than 10 Hounsfield units likely representing a benign adrenal adenoma.  2. Noncontrasted liver, right adrenal, spleen, and kidneys appear unremarkable. Urinary bladder wall appears diffusely thickened which may be related to the bladder being incompletely distended, but please correlate clinically for acute cystitis.  3. There is no acute bowel abnormality. Appendix appears within normal limits. There is a trace amount of free fluid at the dependent aspect of the pelvis.  4. There are moderately extensive abdominal aortic atherosclerotic changes without aneurysmal dilatation.    This report was finalized on 6/4/2024 9:03 PM by Dr. Anne Sharp M.D on Workstation: [id number]       Assessment / Plan      Assessment/Plan:   1. Esophageal adenocarcinoma (Primary)  -Noted on EGD in June 2024.  Pathology notable for an adenocarcinoma  -CPS 10%, HER2/al negative, MSI stable  -PET/CT without evidence of metastatic disease although local regional lymphadenopathy was noted  -Clinical stage III  -Discussed the diagnosis, prognosis, treatment plans with patient and wife today  -Plan to initiate weekly CarboTaxol with concurrent radiation followed by surgical intervention  -Discussed the side effects including but not limited to fatigue, nausea, vomiting, diarrhea, neuropathy, renal dysfunction, alopecia, anemia,  immunosuppression  -Plan to initiate treatment within the next 1-2 weeks    2.  Cancer-related pain  -I have refilled oxycodone 10 mg every 4 hours today     3.  Severe protein caloric malnutrition  -Currently on tube feeds through J-tube and tolerating well  -Continue follow-up with nutrition    I spent over 60 minutes on patient's day of visit reviewing his outside records including progress notes, imaging, pathology as well as discussing his diagnosis, prognosis, treatment plans with patient and wife along with ordering chemotherapy, pain medications and dictating his case to the medical record    Follow Up:   Follow-up in 1-2 weeks     Aquiles Reddy MD  Hematology and Oncology     Please note that portions of this note may have been completed with a voice recognition program. Efforts were made to edit the dictations, but occasionally words are mistranscribed.

## 2024-06-24 NOTE — PROGRESS NOTES
Onc Nutrition    Patient: Nelson Garcia  YOB: 1975    Diagnosis: Adenocarcinoma of the lower third of esophagus/gastrointestinal junction, clinical stage III (T3, N1, M0).      Surgery: wishes to have eventual esophageal resection surgery at [facility]      Chemotherapy:  OP GE PACLitaxel / CARBOplatin (weekly) + XRT (start date 7/1/24)     Radiation: distal esophagus, GE junction and regional lymphatics will receive a dose of approximately 45 Jaramillo delivered over 5 weeks with an additional boost to take the PET positive gross disease to approximately 50.4 Gray over 5 and half weeks - planning in progress      Weight - 157# (6/24/24)  Weight Change - ~25# weight loss past 3 months related to dysphagia / 13% weight loss      Estimated Nutritional Needs:  Calories - 2640 (35 kcal / kg)  Protein - 94 -110+ grams (1.25 -1.5 grams per kg as tolerated with Stage III CKD)  Water - 80+ ounces     Current Enteral Nutrition Regimen:  Isosource 1.5 @ 80 ml/hour x 12 hours nightly = 1440 calories, 65 grams protein, 730 ml water     Oral Diet: Full Liquids    Briefly met with patient's wife after his Med Onc appointment.  Provided samples of Boost Very High Calorie for patient to try orally to aid with calorie / protein intake.  She states patient is up to his goal rate of tube feeding and is tolerating it without noted difficulty at this time.  Advised her to contact RD if further questions or concerns arise.  She voiced understanding of information discussed.  RD will follow patient throughout treatment as it is scheduled to begin next week.     Maryjane Lange RD  06/24/24

## 2024-06-26 ENCOUNTER — PATIENT OUTREACH (OUTPATIENT)
Dept: ONCOLOGY | Facility: CLINIC | Age: 49
End: 2024-06-26
Payer: MEDICAID

## 2024-06-26 ENCOUNTER — DOCUMENTATION (OUTPATIENT)
Dept: SURGERY | Facility: CLINIC | Age: 49
End: 2024-06-26
Payer: MEDICAID

## 2024-06-26 ENCOUNTER — OFFICE VISIT (OUTPATIENT)
Dept: ONCOLOGY | Facility: CLINIC | Age: 49
End: 2024-06-26
Payer: MEDICAID

## 2024-06-26 ENCOUNTER — HOSPITAL ENCOUNTER (OUTPATIENT)
Dept: ONCOLOGY | Facility: HOSPITAL | Age: 49
Discharge: HOME | End: 2024-06-26
Payer: MEDICAID

## 2024-06-26 ENCOUNTER — DOCUMENTATION (OUTPATIENT)
Dept: OTHER | Facility: HOSPITAL | Age: 49
End: 2024-06-26
Payer: MEDICAID

## 2024-06-26 ENCOUNTER — SPECIALTY PHARMACY (OUTPATIENT)
Dept: ONCOLOGY | Facility: HOSPITAL | Age: 49
End: 2024-06-26
Payer: MEDICAID

## 2024-06-26 VITALS
SYSTOLIC BLOOD PRESSURE: 136 MMHG | HEIGHT: 72 IN | OXYGEN SATURATION: 97 % | TEMPERATURE: 97.7 F | WEIGHT: 151 LBS | RESPIRATION RATE: 18 BRPM | DIASTOLIC BLOOD PRESSURE: 79 MMHG | BODY MASS INDEX: 20.45 KG/M2 | HEART RATE: 70 BPM

## 2024-06-26 DIAGNOSIS — C15.9 ESOPHAGEAL ADENOCARCINOMA: Primary | ICD-10-CM

## 2024-06-26 PROCEDURE — 3078F DIAST BP <80 MM HG: CPT | Performed by: NURSE PRACTITIONER

## 2024-06-26 PROCEDURE — 1126F AMNT PAIN NOTED NONE PRSNT: CPT | Performed by: NURSE PRACTITIONER

## 2024-06-26 PROCEDURE — 3075F SYST BP GE 130 - 139MM HG: CPT | Performed by: NURSE PRACTITIONER

## 2024-06-26 PROCEDURE — 99214 OFFICE O/P EST MOD 30 MIN: CPT | Performed by: NURSE PRACTITIONER

## 2024-06-26 RX ORDER — OXYCODONE HCL 5 MG/5 ML
10 SOLUTION, ORAL ORAL EVERY 4 HOURS PRN
Qty: 473 ML | Refills: 0 | Status: SHIPPED | OUTPATIENT
Start: 2024-06-26 | End: 2024-06-27 | Stop reason: CLARIF

## 2024-06-26 RX ORDER — ONDANSETRON HYDROCHLORIDE 8 MG/1
8 TABLET, FILM COATED ORAL 3 TIMES DAILY PRN
Qty: 30 TABLET | Refills: 5 | Status: SHIPPED | OUTPATIENT
Start: 2024-06-26

## 2024-06-26 RX ORDER — ONDANSETRON HYDROCHLORIDE 8 MG/1
8 TABLET, FILM COATED ORAL EVERY 8 HOURS PRN
Qty: 90 TABLET | Refills: 2 | Status: SHIPPED | OUTPATIENT
Start: 2024-06-26

## 2024-06-26 RX ORDER — PANTOPRAZOLE SODIUM 40 MG/1
40 FOR SUSPENSION ORAL
Qty: 30 EACH | Refills: 3 | Status: SHIPPED | OUTPATIENT
Start: 2024-06-26

## 2024-06-26 NOTE — SIGNIFICANT NOTE
Encouraged spouse to at least come and get one, but she says that she would prefer a clamp that could stay on tubing, making it easier for pt while he was at work. Told spouse to please call me back if she changed her mind and she said that she would.

## 2024-06-26 NOTE — PROGRESS NOTES
"CHEMOTHERAPY PREPARATION    Nelson Garcia  [id number]  1975    Subjective   Chief Complaint: Treatment Preparation and Needs Assessment    History of present illness:  Nelson Garcia is a 49 y.o. year old male who is here today for chemotherapy preparation and needs assessment. The patient has been diagnosed with clinical stage II esophageal cancer and is scheduled to begin treatment with carbo/taxol with concurrent radiation.     Oncology History:    Oncology/Hematology History   Esophageal adenocarcinoma   6/4/2024 Cancer Staged    Staging form: Esophagus - Adenocarcinoma, AJCC 8th Edition  - Clinical stage from 6/4/2024: Stage III (cT3, cN1, cM0, G2) - Signed by Viet Ramirez MD on 6/19/2024 6/13/2024 Initial Diagnosis    Esophageal adenocarcinoma     7/1/2024 -  Chemotherapy    OP GE PACLitaxel / CARBOplatin (weekly) + XRT         The current medication list and allergy list were reviewed and reconciled.     Past Medical History, Past Surgical History, Social History, Family History have been reviewed and are without significant changes except as mentioned.    Review of Systems   Constitutional:  Positive for fatigue.   HENT:  Positive for trouble swallowing.    Gastrointestinal:  Positive for abdominal pain, constipation, diarrhea and nausea.   Musculoskeletal:  Positive for arthralgias.   Psychiatric/Behavioral:  Positive for sleep disturbance.        Objective   Physical Exam  Vital Signs: /79   Pulse 70   Temp 97.7 °F (36.5 °C)   Resp 18   Ht 182.9 cm (72.01\")   Wt 68.5 kg (151 lb)   SpO2 97%   BMI 20.47 kg/m²   Vitals:    06/26/24 0920   PainSc: 0-No pain           General Appearance:  alert, cooperative, no apparent distress and frail appearing   Neurologic/Psych: A&O x 3, gait steady, appropriate affect   HEENT:  Normocephalic, without obvious abnormality, mucous membranes moist   Lungs:   Clear to auscultation bilaterally; respirations regular, even, and unlabored " bilaterally   Heart:  Regular rate and rhythm, no murmurs appreciated   Extremities: Normal, atraumatic; no clubbing, cyanosis, or edema    Skin: No rashes, lesions, or abnormal coloration noted     ECOG Performance Status: 1 - Symptomatic but completely ambulatory            NEEDS ASSESSMENTS    Genetics  The patient's new diagnosis and family history have been reviewed for genetic counseling needs. A genetic referral is not recommended.     Molecular Testing  Further molecular testing on tumor is indicated. MSI and HER2 testing completed.     Psychosocial  The patient has completed a PHQ-9 Depression Screening and the Distress Thermometer (DT) today.   PHQ-9 Total Score:  . PHQ-9 results show 0 (No Depression). The patient scored their distress today as 3 on a scale of 0-10 with 0 being no distress and 10 being extreme distress.   Problems marked by the patient as being an issue for them within the last week include practical problems and physical problems.   Results were reviewed along with psychosocial resources offered by our cancer center. Our oncology social worker will be flagged for a DT score of 4 or above, and a same day call will be made for a score of 9 or 10. A mental health referral is not recommended at this time. The patient is not accepting of a referral to [facility].   Copies of patient's questionnaires will be scanned into EMR for details and further reference.    Barriers to care  A barriers form was also completed by the patient today. We discussed services offered by our facility to help him have adequate access to care. The patient was given the name and card for our Oncology Social Worker. Based upon barriers assessment today, the patient will require a follow-up call from the  to further discuss needs.   A copy of the barriers form will also be scanned into EMR for details and further reference.     VAD Assessment  The patient and I discussed planned intervenous  "chemotherapy as well as other IV treatments that are often needed throughout the course of treatment. These may include, but are not limited to blood transfusions, antibiotics, and IV hydration. The vasculature does not appear to be adequate for multiple peripheral IVs throughout their treatment course. Discussed risks and benefits of VADs. The patient would like to pursue Port-A-Cath insertion prior to initiation of treatment. He is status post port placement.    Advance Care Planning   ACP discussion was held with the patient during this visit. Patient has an advance directive (not in EMR), copy requested.  The patient and I discussed advanced care planning, \"Conversations that Matter\".   This service was offered, free of charge, for development of advance directives with a certified ACP facilitator.  The patient does have an up-to-date advanced directive. This document is not on file with our office. The patient is not interested in an appointment with one of our facilitators to create or update their advanced directives.         Palliative Care  The patient and I discussed palliative care services. Palliative care is not the same as Hospice care. This is specialized medical care for people living with serious illness with the goal of improving quality of life for the patient and their family. [facility] offers our patients outpatient palliative care early along with their treatment to assist in coordination of care, symptom management, pain management, and medical decision making.  Oncology criteria for palliative care referral is met at this time. The patient is not interested in a palliative care consultation.     Additional Referral needs  none      CHEMOTHERAPY EDUCATION    Booklets Given: Chemotherapy and You [x]  Eating Hints [x]    Sexuality/Fertility Books []      Chemotherapy/Biotherapy Education Sheets: (list all that apply)  nausea management, acid reflux management, diarrhea management, Cancer resourse " contacts information, skin and mouth care, and Treatment Calendar                                                                                                                                                                 Chemotherapy Regimen:   Treatment Plans       Name Type Plan Dates Plan Provider         Active    OP GE PACLitaxel / CARBOplatin (weekly) + XRT ONCOLOGY TREATMENT  6/30/2024 - Present Aquiles Reddy MD                      DETAILED CHEMOTHERAPY TEACHING COMPLETED BY PHARMACY. CHEMOTHERAPY CONSENT COMPLETED BY PHARMACY. SEE PHARMACY EDUCATION FOR DOCUMENTATION.     Chemotherapy education comprehension reviewed. Questions answered and additional information discussed on topics including:  Anemia, Thrombocytopenia, Neutropenia, Nutrition and appetite changes, Constipation, Nausea & vomiting, Nervous system changes, Pain, and Organ toxicities        Assessment and Plan:    Diagnoses and all orders for this visit:    1. Esophageal adenocarcinoma (Primary)  -     Ambulatory Referral to ONC Social Work    Other orders  -     ondansetron (Zofran) 8 MG tablet; Take 1 tablet by mouth Every 8 (Eight) Hours As Needed for Nausea or Vomiting.  Dispense: 90 tablet; Refill: 2          I spent 35 minutes caring for Nelson on this date of service. This time includes time spent by me in the following activities: preparing for the visit, reviewing tests, obtaining and/or reviewing a separately obtained history, performing a medically appropriate examination and/or evaluation, counseling and educating the patient/family/caregiver, ordering medications, tests, or procedures, documenting information in the medical record, and care coordination.     The patient and I have reviewed their new cancer diagnosis and scheduled treatment plan. Needs assessment was completed including genetics, psychosocial needs, barriers to care, VAD evaluation, advanced care planning, and palliative care services. Referrals have been  ordered as appropriate based upon our evaluation and patient desires.     Chemotherapy teaching was also completed today as documented above. Adequate time was given to answer all questions to his satisfaction. Patient and family are aware of their care team members and contact information if they have questions or problems throughout the treatment course. Needs assessments and education has been completed. The patient is adequately prepared to begin treatment as scheduled.     Pain assessment was performed today as a part of patient’s care. For patients with pain related to surgery, gynecologic malignancy or cancer treatment, the plan is as noted in the assessment/plan.  For patients with pain not related to these issues, they are to seek any further needed care from a more appropriate provider, such as PCP.    Letter provided to the patient regarding work restrictions.     Referral placed for  regarding assistance with gas cards and questions regarding disability.     Discussed with Dr. Ramirez's nurse regarding plan for starting radiation. They are planning to start him on Monday with his first chemotherapy treatment. He should be contacted by their office week regarding scheduling.     The patient will continue follow up with dietician regarding tube feeds.     Message send to Mary Jane Moreno to look at medications that can potentially be changed to liquid form due to the need to have them given through his J-tube.     Follow up scheduled with Dr. Reddy 7/8/2024.     We may consider palliative care referral if needed for symptoms management.     Electronically signed by CHUN Lr on 06/26/24 at 10:01 EDT.

## 2024-06-26 NOTE — SIGNIFICANT NOTE
You can view a picture of the j-tube in the phone call from spouse encounter. Spouse was on phone with Dr. Valente's office when I called her and she is going to call me back after she gets off the phone with the office.

## 2024-06-26 NOTE — PROGRESS NOTES
Outpatient Infusion  [address] [place] [address]  [phone]      CHEMOTHERAPY EDUCATION    NAME:  Nelson Garcia      : 1975           DATE: 24    Medication Education Sheets: (select all that apply)  Carboplatin and Paclitaxel    Other Education Sheets: (select all that apply)  CINV, Diarrhea, and Symptom Tracker Sheet and JUAN Information    Chemotherapy Regimen:   OP GE PACLitaxel / CARBOplatin (weekly) + XRT IV weekly for 5 weeks  -paclitaxel 50 mg/m2 IV on Day 1  -carboplatin AUC 2 IV on Day 1    TOPICS EDUCATION PROVIDED COMMENTS   ANEMIA:  role of RBC, cause, s/s, ways to manage, role of transfusion [x] Reviewed the role of RBC and the use of transfusions if hemoglobin decreases too much.  Patient to notify us if he experiences shortness of breath, dizziness, or palpitations.  Also let patient know he could feel more tired than usual and to try to stay active, but rest if he needs to.    THROMBOCYTOPENIA:  role of platelet, cause, s/s, ways to prevent bleeding, things to avoid, when to seek help [x] Reviewed the role of platelets in blood clotting and when to call clinic (bloody nose that bleeds for 5 minutes despite pressure, a cut that won't stop bleeding despite pressure, gums that bleed excessively with brushing or flossing). Recommended using an electric razor, soft bristle toothbrush, and blowing the nose gently.    NEUTROPENIA:  role of WBC, cause, infection precautions, s/s of infection, when to call MD [x] Reviewed the role of WBC, good infection prevention practices, and when to call the clinic (temperature 100.4F, sore throat, burning urination, etc)   NUTRITION & APPETITE CHANGES:  importance of maintaining healthy diet & weight, ways to manage to improve intake, dietary consult, exercise regimen, electrolyte and/or blood glucose abnormalities [x] Metallic Taste: Informed patient of the potential for developing metallic taste and smell.  Recommended flavoring water if it tastes metallic, using plastic utensils instead of metal utensils, and avoiding drinking from a metal can or cup to help mitigate this adverse effect.   DIARRHEA:  causes, s/s of dehydration, ways to manage, dietary changes, when to call MD [x] Chemotherapy : Discussed the risk of diarrhea. Instructed patient on use OTC loperamide with diarrhea onset, but emphasized the importance of calling the clinic if 4-6 episodes in 24 hours not relieved by OTC loperamide.   NAUSEA & VOMITING:  cause, use of antiemetics, dietary changes, when to call MD [x] Emetic risk: Moderate  Premeds: Dexamethasone and Palonosetron  PRN home meds: Ondansetron 4 mg ODT PO q 6 hours PRN for N/V  Pharmacy home meds sent to: [facility] [place]  Patient requested a different nausea medication that can be crushed and added to tube feeds. Patient has a needs assessment appointment with Anat ray on 6/26/24.     Instructed the patient to take a dose of the PRN medication at the first onset of nausea and if it's not working to call us for additional medications.  Also provided non-drug measures to mitigate nausea.   MOUTH SORES:  causes, oral care, ways to manage [x] Mouth sores can be prevented by making a mouth wash mixture of salt, baking soda, and water. The patient was instructed to swish and spit four times daily after meals and before bedtime. Also recommended using a soft bristle toothbrush and avoiding alcohol-containing OTC mouthwashes.    ALOPECIA:  cause, ways to manage, resources [x] Discussed the possibility of hair loss with the patient. Recommended covering the head with a hat and/or protecting the skin on the head with SPF 30 or higher.    NERVOUS SYSTEM CHANGES:  causes, s/s, neuropathies, cognitive changes, ways to manage [x] discussed the adverse effect of peripheral neuropathy and signs/symptoms associated with this adverse effect. Instructed patient to call if symptoms  become more frequent or worsen.    PAIN:  causes, ways to manage [x] Chemo: Discussed muscle and joint aches/pains with chemotherapy, and recommended the use of OTC pain relief with ibuprofen or acetaminophen if needed. and EMLA Cream: Discussed rx for EMLA cream, and the benefit of use 45-60 minutes prior to access of port for lab draws / infusions. Rx sent to [facility] in [place]   SKIN & NAIL CHANGES:  cause, s/s, ways to manage [x] Chemotherapy: Informed the patient of the possibility for dark or white lines on finger and toenails during treatment, and that nails may become brittle and even fall off in extreme cases. This will likely reverse after treatment concludes.    ORGAN TOXICITIES:  cause, s/s, need for diagnostic tests, labs, when to notify MD [x] Discussed potential effects on organ systems, monitoring, diagnostic tests, labs, and when to notify the clinic. Discussed the signs/symptoms of the following: hepatotoxicity and nephrotoxicity.   INFUSION RELATED REACTIONS or INJECTION-SITE REACTION:  Cause, s/s, anaphylaxis, monitoring, etc. [x] Premeds: Dexamethasone, Diphenhydramine, and Famotidine    Discussed the risk of an infusion reaction and symptoms such as: fever, chills, dizziness, itchiness or rash, flushing, trouble breathing, wheezing, sudden back pain, or feeling faint.  Instructed the patient to notify his nurse if he starts feeling weird at any point during his infusion. Reviewed how infusion reactions are managed.   MISCELLANEOUS:  drug interactions, administration, labs, etc. [x] Discussed chemotherapy schedule, lab draws, infusion times, and total expected visit time.   DDIs: No significant DDIs  Lab draws: On or before day 1 of each cycle, no sooner than 3 days early.   INFERTILITY & SEXUALITY:    causes, fertility preservation options, sexuality changes, ways to manage, importance of birth control [x] IV Oncology Therapy: Reviewed safe sex practices and the importance of  minimizing exposure to body fluids for 48 hours after each dose of IV oncology therapy., The patient is not sexually active with a woman of childbearing potential.   HOME CARE:  storing of oral chemo, how to manage bodily fluids [x] IV - Counseled on management of soiled linens and proper flush technique.  Discussed how to manage all the side effects at home and advised when to contact the MD office     Medications:  Prior to Admission medications    Medication Sig Start Date End Date Taking? Authorizing Provider   acetaminophen (TYLENOL) 325 MG tablet Take 2 tablets by mouth Every 6 (Six) Hours As Needed for Mild Pain . 2/23/22   Francesco Bond MD   atorvastatin (LIPITOR) 80 MG tablet Take 1 tablet by mouth Daily.    Angelo Celaya MD   cloNIDine (CATAPRES) 0.2 MG tablet Take 1 tablet by mouth As Needed.    Angelo Celaya MD   gabapentin (NEURONTIN) 600 MG tablet Take 800 mg by mouth 4 (Four) Times a Day.    Angelo Celaya MD   gabapentin (NEURONTIN) 800 MG tablet  6/1/24   Angelo Celaya MD   lidocaine-prilocaine (EMLA) 2.5-2.5 % cream Please apply a small amount to port site about 45 min prior to infusion and cover with Saran Wrap 6/24/24   Aquiles Reddy MD   methocarbamol (ROBAXIN) 500 MG tablet Take 1 tablet by mouth 4 (Four) Times a Day.    Angelo Celaya MD   neomycin-polymyxin-dexamethasone (MAXITROL) 0.1 % ophthalmic suspension Administer 2 drops to both eyes 4 (Four) Times a Day. 6/18/24   Corey Yo MD   nicotine polacrilex (NICORETTE) 4 MG gum  5/3/24   Angelo Celaya MD   Nutritional Supplements (Boost Plus) liquid Take 237 mL by mouth 3 (Three) Times a Day. 5/6/24 5/6/25  Angelo Celaya MD   nystatin (MYCOSTATIN) 491321 UNIT/GM powder Apply to skin folds topically to the appropriate area as directed 2 (Two) Times a Day 6/18/24   Corey Yo MD   ondansetron ODT (ZOFRAN-ODT) 4 MG disintegrating tablet Take 1 tablet by mouth Every 6  (Six) Hours As Needed for Nausea or Vomiting. 6/18/24   Corey Yo MD   oxyCODONE (ROXICODONE) 10 MG tablet Administer 1 tablet per J tube Every 4 (Four) Hours As Needed for Moderate Pain. 6/24/24   Aquiles Reddy MD   pantoprazole (Protonix) 40 MG EC tablet Take 1 tablet by mouth Daily. 6/19/24   Viet Ramirez MD   sildenafil (VIAGRA) 50 MG tablet TAKE ONE TABLET BY MOUTH DAILY AS NEEDED FOR ERECTILE DYSFUNCTION 11/10/23   Brooks Veloz MD   vitamin D3 125 MCG (5000 UT) capsule capsule Take 1 capsule by mouth Daily.    Angelo Celaya MD   aspirin  MG tablet Take 1 tablet by mouth Daily. 2/24/22 6/18/24  Francesco Bond MD   atorvastatin (LIPITOR) 80 MG tablet Take 1 tablet by mouth Every Night. 2/23/22 6/19/24  Francesco Bond MD   clopidogrel (PLAVIX) 75 MG tablet Take 1 tablet by mouth Daily. 2/28/22 6/18/24  Nani Montanez APRN   escitalopram (Lexapro) 10 MG tablet 5 mg daily x 2 weeks then increase to 10 mg daily thereafter  Patient taking differently: 2 tablets. 5 mg daily x 2 weeks then increase to 10 mg daily thereafter 4/28/22 6/14/24  Nani Montanez APRN   gabapentin (NEURONTIN) 300 MG capsule Take 1 capsule by mouth Daily.  6/14/24  ProviderAngelo MD   neomycin-polymyxin-gramicidin (NEOSPORIN) 1.75-54244-.025 ophthalmic solution Administer 2 drops to both eyes Every 4 (Four) Hours. 6/18/24 6/18/24  Corey Yo MD   oxyCODONE (ROXICODONE) 10 MG tablet Administer 1 tablet per J tube Every 4 (Four) Hours As Needed for Moderate Pain for up to 7 days. 6/18/24 6/24/24  Corey Yo MD     Notes: All questions and concerns were addressed. Provided a personalized treatment calendar to patient (includes treatment and lab schedule). Provided patient with contact information for the pharmacist and clinic while instructing them to call if any questions or concerns arise. Informed consent for treatment was obtained. Patient was receptive to information and  expressed understanding.     Mary Jane Moreno, PharmD  Clinic Oncology Pharmacy Specialist  [phone]    6/26/2024  08:25 EDT

## 2024-06-26 NOTE — PROGRESS NOTES
Spoke with patient's wife via phone regarding questions about his feeding tube.  It appears she has discussed this with the NP and RN navigator and [facility].  Recommended the use of Jarred valve to aid with preventing retrograde flow of feeds/GI contents/medications when administering medications.  Jarred valve has been made available for them both.  [facility] in our office as well as [facility] location as per Sloane Avalos RN navigator. also stressed the importance of abstaining from use of external clamps which has the potential to impair integrity of feeding tube.     CHUN Barillas

## 2024-06-26 NOTE — SIGNIFICANT NOTE
Ordered some enfit isabella valves as Shahram suggested. Shahram called spouse to discuss with her and told her that she could  that valve in [place] or in [place]. Was told that spouse was not interested in the enfit isabella valves. I have those on hand in case spouse changes her mind. Spouse has my number for navigational needs.

## 2024-06-27 ENCOUNTER — PATIENT MESSAGE (OUTPATIENT)
Dept: SURGERY | Facility: CLINIC | Age: 49
End: 2024-06-27
Payer: MEDICAID

## 2024-06-27 ENCOUNTER — READMISSION MANAGEMENT (OUTPATIENT)
Dept: CALL CENTER | Facility: HOSPITAL | Age: 49
End: 2024-06-27
Payer: MEDICAID

## 2024-06-27 DIAGNOSIS — C15.9 ESOPHAGEAL ADENOCARCINOMA: Primary | ICD-10-CM

## 2024-06-27 DIAGNOSIS — C15.9 ESOPHAGEAL ADENOCARCINOMA: ICD-10-CM

## 2024-06-27 PROCEDURE — 77300 RADIATION THERAPY DOSE PLAN: CPT | Performed by: RADIOLOGY

## 2024-06-27 PROCEDURE — 77338 DESIGN MLC DEVICE FOR IMRT: CPT | Performed by: RADIOLOGY

## 2024-06-27 PROCEDURE — 77301 RADIOTHERAPY DOSE PLAN IMRT: CPT | Performed by: RADIOLOGY

## 2024-06-27 RX ORDER — OXYCODONE HCL 5 MG/5 ML
10 SOLUTION, ORAL ORAL EVERY 4 HOURS PRN
Qty: 500 ML | Refills: 0 | Status: SHIPPED | OUTPATIENT
Start: 2024-06-27

## 2024-06-27 NOTE — OUTREACH NOTE
General Surgery Week 1 Survey      Flowsheet Row Responses   [facility] patient discharged from? [place]   Does the patient have one of the following disease processes/diagnoses(primary or secondary)? General Surgery   Week 1 attempt successful? Yes   Call start time 1055   Call end time 1105   Discharge diagnosis Anemia due to gastrointestinal blood loss, EGD, RIGHT PORT PLACEMENT, AND JEJUNOSTOMY TUBE INSERTION   Meds reviewed with patient/caregiver? Yes   Is the patient having any side effects they believe may be caused by any medication additions or changes? No   Does the patient have all medications related to this admission filled (includes all antibiotics, pain medications, etc.) Yes   Is the patient taking all medications as directed (includes completed medication regime)? Yes   Does the patient have a follow up appointment scheduled with their surgeon? Yes   Has the patient kept scheduled appointments due by today? Yes   What is the Home health agency?  [facility] home infusion for tube feeds   What DME was ordered? walker   Has all DME been delivered? Yes   Psychosocial issues? No   Did the patient receive a copy of their discharge instructions? Yes   Nursing interventions Reviewed instructions with patient   What is the patient's perception of their health status since discharge? Same   Nursing interventions Nurse provided patient education   Is the patient /caregiver able to teach back basic post-op care? Lifting as instructed by MD in discharge instructions   Is the patient/caregiver able to teach back signs and symptoms of incisional infection? Increased redness, swelling or pain at the incisonal site, Increased drainage or bleeding, Incisional warmth, Pus or odor from incision, Fever   Is the patient/caregiver able to teach back steps to recovery at home? Set small, achievable goals for return to baseline health   If the patient is a current smoker, are they able to teach back resources for  cessation? Not a smoker, Smoking cessation medications   Is the patient/caregiver able to teach back the hierarchy of who to call/visit for symptoms/problems? PCP, Specialist, Home health nurse, Urgent Care, ED, 911 Yes   Week 1 call completed? Yes   Graduated Yes   Wrap up additional comments Wife reports Pt does have drainage at tube site that requires frequent dressing changes. Wife will inform the Dr. She states Pt is working at this time with restrictions. Pt has FU in place and will be starting chemo.   Call end time 1105            PATRICIA CHAWLA - Registered Nurse

## 2024-06-28 RX ORDER — CEPHALEXIN 500 MG/1
500 CAPSULE ORAL 2 TIMES DAILY
Qty: 10 CAPSULE | Refills: 0 | Status: SHIPPED | OUTPATIENT
Start: 2024-06-28 | End: 2024-07-03

## 2024-06-28 NOTE — TELEPHONE ENCOUNTER
From: Nelson Garcia  To: Bhupendra Mills  Sent: 6/27/2024 5:37 PM EDT  Subject: J Tube infection     The j tube site is starting to get infected it has no oder but it does have the light cream yellow/green tint. Can you please send in an antibiotic for him to get started on before Chem starts Monday?

## 2024-07-01 ENCOUNTER — HOSPITAL ENCOUNTER (OUTPATIENT)
Dept: RADIATION ONCOLOGY | Facility: HOSPITAL | Age: 49
Setting detail: RADIATION/ONCOLOGY SERIES
Discharge: HOME | End: 2024-07-01
Payer: MEDICAID

## 2024-07-01 ENCOUNTER — HOSPITAL ENCOUNTER (OUTPATIENT)
Dept: ONCOLOGY | Facility: HOSPITAL | Age: 49
Discharge: HOME | End: 2024-07-01
Admitting: INTERNAL MEDICINE
Payer: MEDICAID

## 2024-07-01 ENCOUNTER — PATIENT OUTREACH (OUTPATIENT)
Dept: ONCOLOGY | Facility: CLINIC | Age: 49
End: 2024-07-01
Payer: MEDICAID

## 2024-07-01 ENCOUNTER — APPOINTMENT (OUTPATIENT)
Dept: ONCOLOGY | Facility: HOSPITAL | Age: 49
End: 2024-07-01
Payer: MEDICAID

## 2024-07-01 ENCOUNTER — DOCUMENTATION (OUTPATIENT)
Dept: OTHER | Facility: HOSPITAL | Age: 49
End: 2024-07-01
Payer: MEDICAID

## 2024-07-01 VITALS
RESPIRATION RATE: 18 BRPM | HEART RATE: 57 BPM | HEIGHT: 72 IN | WEIGHT: 154 LBS | BODY MASS INDEX: 20.86 KG/M2 | TEMPERATURE: 98.9 F | SYSTOLIC BLOOD PRESSURE: 160 MMHG | DIASTOLIC BLOOD PRESSURE: 91 MMHG

## 2024-07-01 DIAGNOSIS — C15.9 ESOPHAGEAL ADENOCARCINOMA: Primary | ICD-10-CM

## 2024-07-01 DIAGNOSIS — Z45.2 ENCOUNTER FOR CARE RELATED TO VASCULAR ACCESS PORT: ICD-10-CM

## 2024-07-01 LAB
ALBUMIN SERPL-MCNC: 3.5 G/DL (ref 3.5–5.2)
ALBUMIN/GLOB SERPL: 1.1 G/DL
ALP SERPL-CCNC: 63 U/L (ref 39–117)
ALT SERPL W P-5'-P-CCNC: 9 U/L (ref 1–41)
ANION GAP SERPL CALCULATED.3IONS-SCNC: 8 MMOL/L (ref 5–15)
AST SERPL-CCNC: 22 U/L (ref 1–40)
BASOPHILS # BLD AUTO: 0.02 10*3/MM3 (ref 0–0.2)
BASOPHILS NFR BLD AUTO: 0.5 % (ref 0–1.5)
BILIRUB SERPL-MCNC: 0.2 MG/DL (ref 0–1.2)
BUN SERPL-MCNC: 25 MG/DL (ref 6–20)
BUN/CREAT SERPL: 19.7 (ref 7–25)
CALCIUM SPEC-SCNC: 9.3 MG/DL (ref 8.6–10.5)
CHLORIDE SERPL-SCNC: 103 MMOL/L (ref 98–107)
CO2 SERPL-SCNC: 29 MMOL/L (ref 22–29)
CREAT SERPL-MCNC: 1.27 MG/DL (ref 0.76–1.27)
DEPRECATED RDW RBC AUTO: 47.7 FL (ref 37–54)
EGFRCR SERPLBLD CKD-EPI 2021: 69.3 ML/MIN/1.73
EOSINOPHIL # BLD AUTO: 0.2 10*3/MM3 (ref 0–0.4)
EOSINOPHIL NFR BLD AUTO: 5 % (ref 0.3–6.2)
ERYTHROCYTE [DISTWIDTH] IN BLOOD BY AUTOMATED COUNT: 15.4 % (ref 12.3–15.4)
GLOBULIN UR ELPH-MCNC: 3.3 GM/DL
GLUCOSE SERPL-MCNC: 110 MG/DL (ref 65–99)
HCT VFR BLD AUTO: 30.5 % (ref 37.5–51)
HGB BLD-MCNC: 9.4 G/DL (ref 13–17.7)
IMM GRANULOCYTES # BLD AUTO: 0.01 10*3/MM3 (ref 0–0.05)
IMM GRANULOCYTES NFR BLD AUTO: 0.3 % (ref 0–0.5)
LYMPHOCYTES # BLD AUTO: 0.77 10*3/MM3 (ref 0.7–3.1)
LYMPHOCYTES NFR BLD AUTO: 19.4 % (ref 19.6–45.3)
MCH RBC QN AUTO: 25.9 PG (ref 26.6–33)
MCHC RBC AUTO-ENTMCNC: 30.8 G/DL (ref 31.5–35.7)
MCV RBC AUTO: 84 FL (ref 79–97)
MONOCYTES # BLD AUTO: 0.29 10*3/MM3 (ref 0.1–0.9)
MONOCYTES NFR BLD AUTO: 7.3 % (ref 5–12)
NEUTROPHILS NFR BLD AUTO: 2.68 10*3/MM3 (ref 1.7–7)
NEUTROPHILS NFR BLD AUTO: 67.5 % (ref 42.7–76)
PLATELET # BLD AUTO: 260 10*3/MM3 (ref 140–450)
PMV BLD AUTO: 10.7 FL (ref 6–12)
POTASSIUM SERPL-SCNC: 4.6 MMOL/L (ref 3.5–5.2)
PROT SERPL-MCNC: 6.8 G/DL (ref 6–8.5)
RBC # BLD AUTO: 3.63 10*6/MM3 (ref 4.14–5.8)
SODIUM SERPL-SCNC: 140 MMOL/L (ref 136–145)
WBC NRBC COR # BLD AUTO: 3.97 10*3/MM3 (ref 3.4–10.8)

## 2024-07-01 PROCEDURE — 25010000002 DEXAMETHASONE SODIUM PHOSPHATE 100 MG/10ML SOLUTION: Performed by: INTERNAL MEDICINE

## 2024-07-01 PROCEDURE — 25810000003 SODIUM CHLORIDE 0.9 % SOLUTION 250 ML FLEX CONT: Performed by: INTERNAL MEDICINE

## 2024-07-01 PROCEDURE — 25010000002 DIPHENHYDRAMINE PER 50 MG: Performed by: INTERNAL MEDICINE

## 2024-07-01 PROCEDURE — 25010000002 HEPARIN LOCK FLUSH PER 10 UNITS: Performed by: INTERNAL MEDICINE

## 2024-07-01 PROCEDURE — 25010000002 PALONOSETRON PER 25 MCG: Performed by: INTERNAL MEDICINE

## 2024-07-01 PROCEDURE — 96413 CHEMO IV INFUSION 1 HR: CPT

## 2024-07-01 PROCEDURE — 80053 COMPREHEN METABOLIC PANEL: CPT | Performed by: INTERNAL MEDICINE

## 2024-07-01 PROCEDURE — 96417 CHEMO IV INFUS EACH ADDL SEQ: CPT

## 2024-07-01 PROCEDURE — 25010000002 CARBOPLATIN PER 50 MG: Performed by: INTERNAL MEDICINE

## 2024-07-01 PROCEDURE — 25810000003 SODIUM CHLORIDE 0.9 % SOLUTION: Performed by: INTERNAL MEDICINE

## 2024-07-01 PROCEDURE — 25010000002 PACLITAXEL PER 1 MG: Performed by: INTERNAL MEDICINE

## 2024-07-01 PROCEDURE — 77386: CPT | Performed by: RADIOLOGY

## 2024-07-01 PROCEDURE — 96375 TX/PRO/DX INJ NEW DRUG ADDON: CPT

## 2024-07-01 PROCEDURE — 85025 COMPLETE CBC W/AUTO DIFF WBC: CPT | Performed by: INTERNAL MEDICINE

## 2024-07-01 RX ORDER — SODIUM CHLORIDE 9 MG/ML
20 INJECTION, SOLUTION INTRAVENOUS ONCE
Status: COMPLETED | OUTPATIENT
Start: 2024-07-01 | End: 2024-07-01

## 2024-07-01 RX ORDER — FAMOTIDINE 10 MG/ML
20 INJECTION, SOLUTION INTRAVENOUS ONCE
Status: COMPLETED | OUTPATIENT
Start: 2024-07-01 | End: 2024-07-01

## 2024-07-01 RX ORDER — ONDANSETRON 8 MG/1
8 TABLET, ORALLY DISINTEGRATING ORAL EVERY 8 HOURS PRN
Qty: 30 TABLET | Refills: 3 | Status: SHIPPED | OUTPATIENT
Start: 2024-07-01

## 2024-07-01 RX ORDER — HEPARIN SODIUM (PORCINE) LOCK FLUSH IV SOLN 100 UNIT/ML 100 UNIT/ML
500 SOLUTION INTRAVENOUS AS NEEDED
Status: DISCONTINUED | OUTPATIENT
Start: 2024-07-01 | End: 2024-07-02 | Stop reason: HOSPADM

## 2024-07-01 RX ORDER — PALONOSETRON 0.05 MG/ML
0.25 INJECTION, SOLUTION INTRAVENOUS ONCE
Status: COMPLETED | OUTPATIENT
Start: 2024-07-01 | End: 2024-07-01

## 2024-07-01 RX ORDER — HEPARIN SODIUM (PORCINE) LOCK FLUSH IV SOLN 100 UNIT/ML 100 UNIT/ML
500 SOLUTION INTRAVENOUS AS NEEDED
Status: CANCELLED | OUTPATIENT
Start: 2024-07-01

## 2024-07-01 RX ADMIN — FAMOTIDINE 20 MG: 10 INJECTION, SOLUTION INTRAVENOUS at 10:05

## 2024-07-01 RX ADMIN — HEPARIN 500 UNITS: 100 SYRINGE at 13:16

## 2024-07-01 RX ADMIN — SODIUM CHLORIDE 20 ML/HR: 9 INJECTION, SOLUTION INTRAVENOUS at 10:01

## 2024-07-01 RX ADMIN — SODIUM CHLORIDE 95 MG: 9 INJECTION, SOLUTION INTRAVENOUS at 11:10

## 2024-07-01 RX ADMIN — CARBOPLATIN 190 MG: 10 INJECTION, SOLUTION INTRAVENOUS at 12:37

## 2024-07-01 RX ADMIN — DIPHENHYDRAMINE HYDROCHLORIDE 50 MG: 50 INJECTION INTRAMUSCULAR; INTRAVENOUS at 10:10

## 2024-07-01 RX ADMIN — DEXAMETHASONE SODIUM PHOSPHATE 12 MG: 10 INJECTION, SOLUTION INTRAMUSCULAR; INTRAVENOUS at 10:25

## 2024-07-01 RX ADMIN — PALONOSETRON HYDROCHLORIDE 0.25 MG: 0.25 INJECTION INTRAVENOUS at 10:07

## 2024-07-01 NOTE — PROGRESS NOTES
DWAIN met with pt and his spouse per request to assist with transportation and food. DWAIN provided pt with $20 in [employer] gas cards, and an [employer] card. Pt and his spouse thanked DWAIN. Pt's spouse explained due to diagnosis, pt is ongoing to be working part time if able. Pt's spouse asked about assistance for electric. They are on a prepaid system, to which DWAIN asked if company could fax over account information. DWAIN provided spouse with fax number to send. DWAIN completed [employer] application on this date and sent to [facility] with account information for electric company. Pt and his spouse thanked DWAIN. SW provided contact information and will be available for ongoing support and assistance.     Interventions:  Financial Needs: financial counselor  Transportation Needs: gas cards (provided $20 in [employer] gas cards)  Physical Needs: dietitian  Practical Needs: Utilities; Food ('s way to assist with electric; [employer] card)       
No

## 2024-07-01 NOTE — PROGRESS NOTES
Infusion RN contacted clinic regarding patients elevated BP, discussed with Dr. Reddy he states patient should contact his PCP for a prescription since he is not currently taking medication. Dr. Reddy will not order anything to be given today since patient is asymptomatic. BRIDGETT Marie in infusion notified.

## 2024-07-02 ENCOUNTER — DOCUMENTATION (OUTPATIENT)
Dept: NUTRITION | Facility: HOSPITAL | Age: 49
End: 2024-07-02
Payer: MEDICAID

## 2024-07-02 ENCOUNTER — TELEPHONE (OUTPATIENT)
Dept: ONCOLOGY | Facility: CLINIC | Age: 49
End: 2024-07-02
Payer: MEDICAID

## 2024-07-02 ENCOUNTER — HOSPITAL ENCOUNTER (OUTPATIENT)
Dept: RADIATION ONCOLOGY | Facility: HOSPITAL | Age: 49
Discharge: HOME | End: 2024-07-02

## 2024-07-02 VITALS — WEIGHT: 161.4 LBS | BODY MASS INDEX: 21.89 KG/M2

## 2024-07-02 PROCEDURE — 77386: CPT | Performed by: RADIOLOGY

## 2024-07-02 NOTE — PROGRESS NOTES
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      Surgery: Consideration for esophageal resection surgery at [facility] following completion of chemoradiation    J-Tube placed 6/14/24     Chemotherapy:  OP GE PACLitaxel / CARBOplatin (weekly) + XRT (start date 7/1/24) C#1 completed     Radiation: Distal esophagus, GE junction and regional lymphatics will receive a dose of approximately 45 Jaramillo delivered over 5 weeks with an additional boost to take the PET positive gross disease to approximately 50.4 Gray over 5 and half weeks - start date 7/1/24 / 2 treatments completed     Weight - 161.4 lbs / weight up 5-6 lbs from last week / chemo with fluids yesterday    Weight Change - ~25# weight loss past 3 months related to dysphagia / 13% weight loss     Nutrition Diagnosis      Problem Severe malnutrition in context of acute illness   Etiology Diagnosis related  Dysphagia   Signs / Symptoms 25 lbs weight loss past 3 months (13% weight loss)  Intake < estimated energy needs         Estimated Nutritional Needs:  Calories - 2640 (35 kcal / kg)  Protein - 94 -110+ grams (1.25 -1.5 grams per kg as tolerated with Stage III CKD)  Water - 80+ ounces     Current Enteral Nutrition Regimen:  Isosource 1.5 @ 80 ml/hour x 12 hours nightly = 1440 calories, 65 grams protein, 730 ml water plus additional 20 ml water flushes per hour / administered via J-Tube     Oral Diet: Full Liquids with one serving Boost Plus ONS on most days    Follow up with patient and his wife during RAD ONC status checks.  Patient is tolerating enteral feedings well; no GI intolerance noted.  He had been experiencing issues with constipation, but that has been resolved with stool softener (Colace). He is able to attain goal rate nightly.    In addition to enteral feedings, he is taking minimal amounts of full liquids, mainly soup and on most days, one Boost  ONS.  He has run out of the VHC samples he was provided with and is currently using Boost Plus.  Provided tips for nutrient and flavor enhancement to boost calorie / protein content of ONS.  Ordered one case of Boost VHC thru [facility]; will be charged to the [employer].    Reviewed nutritional goals for intake, suggesting either increasing enteral feedings or oral intake.  Hopefully with progression of treatment, dysphagia will improve short term to increase oral intake.    Will follow.

## 2024-07-02 NOTE — TELEPHONE ENCOUNTER
Received message from Radiation nurse that spouse had called stating she is having issues getting meds down patient tube. At this time nurse called patient's wife and left vm for her to call nurse back. To go over putting meds in tube.

## 2024-07-03 ENCOUNTER — HOSPITAL ENCOUNTER (OUTPATIENT)
Dept: RADIATION ONCOLOGY | Facility: HOSPITAL | Age: 49
Discharge: HOME | End: 2024-07-03

## 2024-07-03 ENCOUNTER — PATIENT OUTREACH (OUTPATIENT)
Dept: OTHER | Facility: HOSPITAL | Age: 49
End: 2024-07-03
Payer: MEDICAID

## 2024-07-03 PROCEDURE — 77386: CPT | Performed by: RADIOLOGY

## 2024-07-03 RX ORDER — FAMOTIDINE 40 MG/5ML
20 POWDER, FOR SUSPENSION ORAL 2 TIMES DAILY
Qty: 150 ML | Refills: 3 | Status: SHIPPED | OUTPATIENT
Start: 2024-07-03

## 2024-07-03 RX ORDER — ONDANSETRON HYDROCHLORIDE 4 MG/5ML
8 SOLUTION ORAL EVERY 8 HOURS PRN
Qty: 300 ML | Refills: 3 | Status: SHIPPED | OUTPATIENT
Start: 2024-07-03

## 2024-07-03 NOTE — OP NOTE
ESOPHAGOGASTRODUODENOSCOPY  Procedure Report    Patient Name:  Nelson Garcia  YOB: 1975    Date of Surgery:  6/14/2024     Indications: Esophageal cancer, need for feeding access and port for treatment    Pre-op Diagnosis:   Esophageal adenocarcinoma [C15.9]       Post-Op Diagnosis Codes:     * Esophageal adenocarcinoma [C15.9]    Procedure/CPT® Codes:      Procedure(s):  ESOPHAGOGASTRODUODENOSCOPY, RIGHT PORT PLACEMENT, AND JEJUNOSTOMY TUBE INSERTION    Staff:  Surgeon(s):  Jany Valente MD    Assistant: Lety Robert RNFA    Anesthesia: General    Estimated Blood Loss: minimal    Implants:    Implant Name Type Inv. Item Serial No.  Lot No. LRB No. Used Action   KT PRT POWERPORT CLEARVUE MOD/BUMP INTERMD 8F 45CM - [id number] Implant KT PRT POWERPORT CLEARVUE MOD/BUMP INTERMD 8F 45CM  [employer] [id number] 1 Implanted       Specimen:          Specimens       ID Source Type Tests Collected By Collected At Frozen?    A Abdominal Cavity Peritoneal Fluid NON-GYNECOLOGIC CYTOLOGY   Jany Valente MD 6/14/24 2036 No    Description: PEITONEAL LAVAGE FOR CYTOLOGY ONLY              Findings: Evidence of metastatic disease to the gastropathic ligament.    Complications: None apparent    Description of Procedure: Nelson Garcia was identified in the preoperative holding area and again his consent for the procedure was verified.  He was transported to the operating room and placed on the operating room table in supine position.  A general anesthetic was successfully administered and an LMA was placed without difficulty.  A timeout was performed to verify correct patient, correct procedure, and correct administration of IV antibiotics per [facility] protocol.  The patient was prepped and draped in standard sterile fashion.  An ultrasound probe was utilized to access the right IJ vein with an 18-gauge needle and a wire was placed into the vein.   Fluoroscopy was used to confirm placement.  Lidocaine was instilled into the incision site.  An approximately 2 cm incision was made 2 finger breaths below the right clavicle and dissection was carried down to the fascia.  A pocket was constructed using blunt dissection to be big enough for the PowerPort.  A small puncture was made at theIJ insertion site.  The tunneler was used to connect the infraclavicular port site to the right internal jugular puncture site.  The catheter and port were connected and flushed.  The port was secured to the underlying fascia with a silk suture.  The catheter was measured using fluoroscopy.  The introducer trocar was inserted over the wire via Seldinger technique.  The catheter was placed into the trocar under fluoroscopic guidance and the outer sheath of the trocar was removed.  The positioning was confirmed with fluoroscopy and the catheter tip was at the SVC/right atrial junction.  The port was flushed with heparinized saline and the incisions were closed with Vicryl and Monocryl in standard fashion.  Dermabond was applied as dressing and the counts were correct at the end of the case.      An approximately 8 cm midline laparotomy incision was made above the umbilicus.  The abdomen was examined via palpation as well as visually.  There was no carcinomatosis.  There did appear to be an enlarged gastropathic lymph node that was palpably abnormal consistent with disease.  The hiatus was also abnormal.  There is no evidence of liver metastasis.  A lavage was performed of the abdomen and sent for cytology.  The ligament of Treitz was identified and approximately 40 cm from the ligament of Treitz to pursestring sutures were placed in the bowel.  A 16 Bahamian T-tube was placed through the abdominal wall and into the jejunum and secured to the abdominal wall via Ria with interrupted 2-0 silk sutures.  The J-tube was secured to the skin with a silk suture.  The abdomen was closed with 0  PDS suture in running fashion.  The skin was reapproximated with Monocryl and skin glue.        Patient endoscope was introduced in the patient's esophagus and examination was conducted to the duodenum.  The tumor was located from 39 cm to 46 cm at the GE junction and was near circumferential.  The scope passed but not easily.  The stomach was not involved with tumor.  The pylorus was widely patent and the duodenum was within normal limits.  The scope was withdrawn.      The patient tolerated this procedure well, was extubated and transported to the recovery room in stable condition.      Jany Valente MD

## 2024-07-03 NOTE — PROGRESS NOTES
Reviewed chart. Patient receiving concurrent chemoradiation in [place] (scheduled thru 8/8). [place] , nutrition and navigator are following, No f/u appt with Dr. Mills yet to discuss surgery    Called patient, s/w his wife. She states he is tolerating treatment fairly well. The patient is having 5 weeks of chemotherapy with radiation.  She denies any questions, concerns or resource needs. The [place] supportive care team is addressing any needs at the present time.  No follow up scheduled with Dr Mills yet.    I will continue to follow; encouraged patient/wife to call as needed.     Message to Dr. Mills about when patient needs to be scheduled for f/u appt.

## 2024-07-05 ENCOUNTER — HOSPITAL ENCOUNTER (OUTPATIENT)
Dept: RADIATION ONCOLOGY | Facility: HOSPITAL | Age: 49
Discharge: HOME | End: 2024-07-05

## 2024-07-05 PROCEDURE — 77336 RADIATION PHYSICS CONSULT: CPT | Performed by: RADIOLOGY

## 2024-07-05 PROCEDURE — 77386: CPT | Performed by: RADIOLOGY

## 2024-07-07 NOTE — PROGRESS NOTES
THORACIC SURGERY CLINIC FOLLOW  UP NOTE    REASON FOR VISIT: Distal esophageal adenocarcinoma    Subjective   HISTORY OF PRESENTING ILLNESS:   Nelson Garcia is a 49 y.o. male who has significant history of hyperlipidemia, hypertension, GERD, tobacco abuse, CVA who presents today for evaluation of esophageal adenocarcinoma.     He was experiencing worsening dysphagia and weight loss.  Noted the sensation of food getting stuck and regurgitating undigested food.  He was seen at [facility] where he had an EGD completed which showed a fungating mass in the distal esophagus.  Biopsy was consistent with an adenocarcinoma.  PET/CT without evidence of distant disease but did note some local regional lymphadenopathy.  He reported losing weight and unable to consume adequate nutrition.    Past Medical History:   Diagnosis Date    Esophageal cancer     Hypertension     Stroke (cerebrum)        Past Surgical History:   Procedure Laterality Date    BACK SURGERY      ENDOSCOPY N/A 6/4/2024    Procedure: ESOPHAGOGASTRODUODENOSCOPY with biopsies;  Surgeon: Taras Hernandez MD;  Location: [facility] ENDOSCOPY;  Service: Gastroenterology;  Laterality: N/A;  pre- nausea/vomiting   post- nearly obstructing distal esophageal mass, gastritis    ENDOSCOPY N/A 6/14/2024    Procedure: ESOPHAGOGASTRODUODENOSCOPY, RIGHT PORT PLACEMENT, AND JEJUNOSTOMY TUBE INSERTION;  Surgeon: Jany Valente MD;  Location: [facility] MAIN OR;  Service: Gastroenterology;  Laterality: N/A;    KNEE SURGERY      TEETH EXTRACTION      all       Family History   Problem Relation Age of Onset    Diabetes Mother     Stroke Mother     Diabetes Sister     Heart failure Sister     Malig Hyperthermia Neg Hx        Social History     Socioeconomic History    Marital status:    Tobacco Use    Smoking status: Former     Current packs/day: 0.50     Average packs/day: 0.5 packs/day for 35.0 years (17.5 ttl pk-yrs)     Types: Cigarettes    Smokeless  tobacco: Former    Tobacco comments:     Quit smoking 3 years ago   Vaping Use    Vaping status: Never Used   Substance and Sexual Activity    Alcohol use: Not Currently    Drug use: Never    Sexual activity: Defer         Current Outpatient Medications:     acetaminophen (TYLENOL) 325 MG tablet, Take 2 tablets by mouth Every 6 (Six) Hours As Needed for Mild Pain ., Disp: , Rfl:     gabapentin (NEURONTIN) 600 MG tablet, Take 800 mg by mouth 4 (Four) Times a Day., Disp: , Rfl:     methocarbamol (ROBAXIN) 500 MG tablet, Take 1 tablet by mouth 4 (Four) Times a Day., Disp: , Rfl:     sildenafil (VIAGRA) 50 MG tablet, TAKE ONE TABLET BY MOUTH DAILY AS NEEDED FOR ERECTILE DYSFUNCTION, Disp: 30 tablet, Rfl: 3    atorvastatin (LIPITOR) 80 MG tablet, Take 1 tablet by mouth Daily., Disp: , Rfl:     cloNIDine (CATAPRES) 0.2 MG tablet, Take 1 tablet by mouth As Needed., Disp: , Rfl:     docusate (COLACE) 50 MG/5ML liquid, Administer 10ml per j tube twice a day as needed for constipation., Disp: 240 mL, Rfl: 2    docusate sodium (COLACE) 50 mg/5 mL liquid, Administer 10 mL per J tube 2 (Two) Times a Day As Needed for Constipation., Disp: 240 mL, Rfl: 2    famotidine (PEPCID) 40 mg/5 mL suspension, Take 2.5 mL by mouth 2 (Two) Times a Day., Disp: 150 mL, Rfl: 3    gabapentin (NEURONTIN) 800 MG tablet, , Disp: , Rfl:     lidocaine-prilocaine (EMLA) 2.5-2.5 % cream, Please apply a small amount to port site about 45 min prior to infusion and cover with Saran Wrap, Disp: 30 g, Rfl: 2    neomycin-polymyxin-dexamethasone (MAXITROL) 0.1 % ophthalmic suspension, Administer 2 drops to both eyes 4 (Four) Times a Day., Disp: 5 mL, Rfl: 0    nicotine polacrilex (NICORETTE) 4 MG gum, , Disp: , Rfl:     Nutritional Supplements (Boost Plus) liquid, Take 237 mL by mouth 3 (Three) Times a Day., Disp: , Rfl:     nystatin (MYCOSTATIN) 842515 UNIT/GM powder, Apply to skin folds topically to the appropriate area as directed 2 (Two) Times a Day, Disp:  "30 g, Rfl: 0    ondansetron (ZOFRAN) 4 MG/5ML solution, Take 10 mL by mouth Every 8 (Eight) Hours As Needed for Nausea or Vomiting., Disp: 300 mL, Rfl: 3    ondansetron ODT (ZOFRAN-ODT) 8 MG disintegrating tablet, Place 1 tablet on the tongue Every 8 (Eight) Hours As Needed for Nausea or Vomiting., Disp: 30 tablet, Rfl: 3    oxyCODONE (ROXICODONE) 5 MG/5ML solution, Take 10 mL by mouth Every 4 (Four) Hours As Needed for Moderate Pain. Per peg tube, Disp: 500 mL, Rfl: 0    vitamin D3 125 MCG (5000 UT) capsule capsule, Take 1 capsule by mouth Daily., Disp: , Rfl:      No Known Allergies          Objective    OBJECTIVE:     VITAL SIGNS:  /59   Pulse 96   Ht 182.8 cm (71.97\")   Wt 70.9 kg (156 lb 4.8 oz)   SpO2 98%   BMI 21.22 kg/m²     PHYSICAL EXAM:  Normal appearance.   Head is normocephalic.   Nose appears normal.   No obvious deformity of the mouth and throat.  Conjunctivae normal.   Heart rate and rhythm is normal.  Pulmonary effort is normal.   Moving all 4 extremities.  Extremities warm.  No focal deficit present.   He is alert and oriented to person, place, and time.     LAB RESULTS:  I have reviewed all the available laboratory results in the chart.    IMAGING REVIEW:  I have reviewed the patient's all relevant laboratory and imaging findings.           ASSESSMENT & PLAN:  Nelson Garcia is a 49 y.o. male with significant medical conditions as mentioned above presented to my clinic.    Diagnosis: Esophageal adenocarcinoma  Staging: Stage III ([id number])    He has recent diagnosis of esophageal adenocarcinoma.  He has locally advanced disease.  His functional status is not the greatest but he is young and I want to give him the best chance to achieve cure.  He will receive neoadjuvant chemo radiation treatment followed by restaging PET CT scan.  Depending on how he tolerates neoadjuvant treatment and restaging scan, will make the final decision of surgical resection afterwards.    He is failing " to thrive and unable to consume adequate nutrition.  He will need feeding jejunostomy and Mediport for systemic treatment.    I discussed the patients findings and my recommendations with the patient. The patient was given adequate time to ask questions and all questions were answered to patient satisfaction.      Bhupendra Mills MD  Thoracic Surgeon  [facility]        Dictated utilizing Dragon dictation    I spent 40 minutes caring for Nelson on this date of service. This time includes time spent by me in the following activities: preparing for the visit, reviewing tests, obtaining and/or reviewing a separately obtained history, performing a medically appropriate examination and/or evaluation , counseling and educating the patient/family/caregiver, ordering medications, tests, or procedures, referring and communicating with other health care professionals , documenting information in the medical record, independently interpreting results and communicating that information with the patient/family/caregiver, and care coordination and more than half the time was spent in direct face to face evaluation and decision making.

## 2024-07-08 ENCOUNTER — PATIENT OUTREACH (OUTPATIENT)
Dept: OTHER | Facility: HOSPITAL | Age: 49
End: 2024-07-08
Payer: MEDICAID

## 2024-07-08 ENCOUNTER — OFFICE VISIT (OUTPATIENT)
Dept: ONCOLOGY | Facility: CLINIC | Age: 49
End: 2024-07-08
Payer: MEDICAID

## 2024-07-08 ENCOUNTER — DOCUMENTATION (OUTPATIENT)
Dept: OTHER | Facility: HOSPITAL | Age: 49
End: 2024-07-08
Payer: MEDICAID

## 2024-07-08 ENCOUNTER — HOSPITAL ENCOUNTER (OUTPATIENT)
Dept: ONCOLOGY | Facility: HOSPITAL | Age: 49
Discharge: HOME | End: 2024-07-08
Admitting: INTERNAL MEDICINE
Payer: MEDICAID

## 2024-07-08 ENCOUNTER — HOSPITAL ENCOUNTER (OUTPATIENT)
Dept: RADIATION ONCOLOGY | Facility: HOSPITAL | Age: 49
Discharge: HOME | End: 2024-07-08
Payer: MEDICAID

## 2024-07-08 VITALS
BODY MASS INDEX: 20.56 KG/M2 | HEIGHT: 72 IN | TEMPERATURE: 97.2 F | SYSTOLIC BLOOD PRESSURE: 136 MMHG | WEIGHT: 151.8 LBS | HEART RATE: 62 BPM | OXYGEN SATURATION: 98 % | DIASTOLIC BLOOD PRESSURE: 83 MMHG

## 2024-07-08 VITALS — SYSTOLIC BLOOD PRESSURE: 140 MMHG | DIASTOLIC BLOOD PRESSURE: 75 MMHG | HEART RATE: 84 BPM

## 2024-07-08 DIAGNOSIS — C15.9 ESOPHAGEAL ADENOCARCINOMA: Primary | ICD-10-CM

## 2024-07-08 DIAGNOSIS — Z45.2 ENCOUNTER FOR CARE RELATED TO VASCULAR ACCESS PORT: ICD-10-CM

## 2024-07-08 LAB
ALBUMIN SERPL-MCNC: 3.6 G/DL (ref 3.5–5.2)
ALBUMIN/GLOB SERPL: 1.2 G/DL
ALP SERPL-CCNC: 54 U/L (ref 39–117)
ALT SERPL W P-5'-P-CCNC: 11 U/L (ref 1–41)
ANION GAP SERPL CALCULATED.3IONS-SCNC: 7 MMOL/L (ref 5–15)
AST SERPL-CCNC: 16 U/L (ref 1–40)
BASOPHILS # BLD AUTO: 0.02 10*3/MM3 (ref 0–0.2)
BASOPHILS NFR BLD AUTO: 0.4 % (ref 0–1.5)
BILIRUB SERPL-MCNC: 0.3 MG/DL (ref 0–1.2)
BUN SERPL-MCNC: 26 MG/DL (ref 6–20)
BUN/CREAT SERPL: 19.3 (ref 7–25)
CALCIUM SPEC-SCNC: 9.3 MG/DL (ref 8.6–10.5)
CHLORIDE SERPL-SCNC: 102 MMOL/L (ref 98–107)
CO2 SERPL-SCNC: 29 MMOL/L (ref 22–29)
CREAT SERPL-MCNC: 1.35 MG/DL (ref 0.76–1.27)
DEPRECATED RDW RBC AUTO: 48.3 FL (ref 37–54)
EGFRCR SERPLBLD CKD-EPI 2021: 64.4 ML/MIN/1.73
EOSINOPHIL # BLD AUTO: 0.21 10*3/MM3 (ref 0–0.4)
EOSINOPHIL NFR BLD AUTO: 4.4 % (ref 0.3–6.2)
ERYTHROCYTE [DISTWIDTH] IN BLOOD BY AUTOMATED COUNT: 15.4 % (ref 12.3–15.4)
GLOBULIN UR ELPH-MCNC: 2.9 GM/DL
GLUCOSE SERPL-MCNC: 104 MG/DL (ref 65–99)
HCT VFR BLD AUTO: 29.8 % (ref 37.5–51)
HGB BLD-MCNC: 9.2 G/DL (ref 13–17.7)
IMM GRANULOCYTES # BLD AUTO: 0.02 10*3/MM3 (ref 0–0.05)
IMM GRANULOCYTES NFR BLD AUTO: 0.4 % (ref 0–0.5)
LYMPHOCYTES # BLD AUTO: 0.64 10*3/MM3 (ref 0.7–3.1)
LYMPHOCYTES NFR BLD AUTO: 13.5 % (ref 19.6–45.3)
MCH RBC QN AUTO: 26.2 PG (ref 26.6–33)
MCHC RBC AUTO-ENTMCNC: 30.9 G/DL (ref 31.5–35.7)
MCV RBC AUTO: 84.9 FL (ref 79–97)
MONOCYTES # BLD AUTO: 0.25 10*3/MM3 (ref 0.1–0.9)
MONOCYTES NFR BLD AUTO: 5.3 % (ref 5–12)
NEUTROPHILS NFR BLD AUTO: 3.61 10*3/MM3 (ref 1.7–7)
NEUTROPHILS NFR BLD AUTO: 76 % (ref 42.7–76)
PLATELET # BLD AUTO: 252 10*3/MM3 (ref 140–450)
PMV BLD AUTO: 11.4 FL (ref 6–12)
POTASSIUM SERPL-SCNC: 4.5 MMOL/L (ref 3.5–5.2)
PROT SERPL-MCNC: 6.5 G/DL (ref 6–8.5)
RBC # BLD AUTO: 3.51 10*6/MM3 (ref 4.14–5.8)
SODIUM SERPL-SCNC: 138 MMOL/L (ref 136–145)
WBC NRBC COR # BLD AUTO: 4.75 10*3/MM3 (ref 3.4–10.8)

## 2024-07-08 PROCEDURE — 25810000003 SODIUM CHLORIDE 0.9 % SOLUTION 250 ML FLEX CONT: Performed by: INTERNAL MEDICINE

## 2024-07-08 PROCEDURE — 80053 COMPREHEN METABOLIC PANEL: CPT | Performed by: INTERNAL MEDICINE

## 2024-07-08 PROCEDURE — 25010000002 PACLITAXEL PER 1 MG: Performed by: INTERNAL MEDICINE

## 2024-07-08 PROCEDURE — 96417 CHEMO IV INFUS EACH ADDL SEQ: CPT

## 2024-07-08 PROCEDURE — 25010000002 DEXAMETHASONE SODIUM PHOSPHATE 100 MG/10ML SOLUTION: Performed by: INTERNAL MEDICINE

## 2024-07-08 PROCEDURE — 3075F SYST BP GE 130 - 139MM HG: CPT | Performed by: INTERNAL MEDICINE

## 2024-07-08 PROCEDURE — 96375 TX/PRO/DX INJ NEW DRUG ADDON: CPT

## 2024-07-08 PROCEDURE — 25010000002 DIPHENHYDRAMINE PER 50 MG: Performed by: INTERNAL MEDICINE

## 2024-07-08 PROCEDURE — 25010000002 HEPARIN LOCK FLUSH PER 10 UNITS: Performed by: INTERNAL MEDICINE

## 2024-07-08 PROCEDURE — 25810000003 SODIUM CHLORIDE 0.9 % SOLUTION: Performed by: INTERNAL MEDICINE

## 2024-07-08 PROCEDURE — 96413 CHEMO IV INFUSION 1 HR: CPT

## 2024-07-08 PROCEDURE — 1126F AMNT PAIN NOTED NONE PRSNT: CPT | Performed by: INTERNAL MEDICINE

## 2024-07-08 PROCEDURE — 85025 COMPLETE CBC W/AUTO DIFF WBC: CPT | Performed by: INTERNAL MEDICINE

## 2024-07-08 PROCEDURE — 96361 HYDRATE IV INFUSION ADD-ON: CPT

## 2024-07-08 PROCEDURE — 99214 OFFICE O/P EST MOD 30 MIN: CPT | Performed by: INTERNAL MEDICINE

## 2024-07-08 PROCEDURE — 25010000002 PALONOSETRON PER 25 MCG: Performed by: INTERNAL MEDICINE

## 2024-07-08 PROCEDURE — 77386: CPT | Performed by: RADIOLOGY

## 2024-07-08 PROCEDURE — 25010000002 CARBOPLATIN PER 50 MG: Performed by: INTERNAL MEDICINE

## 2024-07-08 PROCEDURE — 3079F DIAST BP 80-89 MM HG: CPT | Performed by: INTERNAL MEDICINE

## 2024-07-08 RX ORDER — PALONOSETRON 0.05 MG/ML
0.25 INJECTION, SOLUTION INTRAVENOUS ONCE
Status: CANCELLED | OUTPATIENT
Start: 2024-07-15

## 2024-07-08 RX ORDER — DIPHENHYDRAMINE HYDROCHLORIDE 50 MG/ML
50 INJECTION INTRAMUSCULAR; INTRAVENOUS AS NEEDED
Status: CANCELLED | OUTPATIENT
Start: 2024-07-15

## 2024-07-08 RX ORDER — SODIUM CHLORIDE 9 MG/ML
1000 INJECTION, SOLUTION INTRAVENOUS ONCE
Status: CANCELLED
Start: 2024-07-08 | End: 2024-07-08

## 2024-07-08 RX ORDER — FAMOTIDINE 10 MG/ML
20 INJECTION, SOLUTION INTRAVENOUS AS NEEDED
Status: CANCELLED | OUTPATIENT
Start: 2024-07-08

## 2024-07-08 RX ORDER — FAMOTIDINE 10 MG/ML
20 INJECTION, SOLUTION INTRAVENOUS ONCE
OUTPATIENT
Start: 2024-07-22

## 2024-07-08 RX ORDER — PALONOSETRON 0.05 MG/ML
0.25 INJECTION, SOLUTION INTRAVENOUS ONCE
Status: COMPLETED | OUTPATIENT
Start: 2024-07-08 | End: 2024-07-08

## 2024-07-08 RX ORDER — PALONOSETRON 0.05 MG/ML
0.25 INJECTION, SOLUTION INTRAVENOUS ONCE
OUTPATIENT
Start: 2024-07-22

## 2024-07-08 RX ORDER — HEPARIN SODIUM (PORCINE) LOCK FLUSH IV SOLN 100 UNIT/ML 100 UNIT/ML
500 SOLUTION INTRAVENOUS AS NEEDED
Status: CANCELLED | OUTPATIENT
Start: 2024-07-08

## 2024-07-08 RX ORDER — SODIUM CHLORIDE 9 MG/ML
1000 INJECTION, SOLUTION INTRAVENOUS ONCE
Status: COMPLETED | OUTPATIENT
Start: 2024-07-08 | End: 2024-07-08

## 2024-07-08 RX ORDER — SODIUM CHLORIDE 9 MG/ML
20 INJECTION, SOLUTION INTRAVENOUS ONCE
Status: CANCELLED | OUTPATIENT
Start: 2024-07-08

## 2024-07-08 RX ORDER — GABAPENTIN 250 MG/5ML
900 SOLUTION ORAL 4 TIMES DAILY
Qty: 470 ML | Refills: 5 | Status: SHIPPED | OUTPATIENT
Start: 2024-07-08

## 2024-07-08 RX ORDER — PALONOSETRON 0.05 MG/ML
0.25 INJECTION, SOLUTION INTRAVENOUS ONCE
Status: CANCELLED | OUTPATIENT
Start: 2024-07-08

## 2024-07-08 RX ORDER — FAMOTIDINE 10 MG/ML
20 INJECTION, SOLUTION INTRAVENOUS AS NEEDED
OUTPATIENT
Start: 2024-07-22

## 2024-07-08 RX ORDER — DIPHENHYDRAMINE HYDROCHLORIDE 50 MG/ML
50 INJECTION INTRAMUSCULAR; INTRAVENOUS AS NEEDED
OUTPATIENT
Start: 2024-07-22

## 2024-07-08 RX ORDER — HEPARIN SODIUM (PORCINE) LOCK FLUSH IV SOLN 100 UNIT/ML 100 UNIT/ML
500 SOLUTION INTRAVENOUS AS NEEDED
Status: DISCONTINUED | OUTPATIENT
Start: 2024-07-08 | End: 2024-07-09 | Stop reason: HOSPADM

## 2024-07-08 RX ORDER — FAMOTIDINE 10 MG/ML
20 INJECTION, SOLUTION INTRAVENOUS ONCE
Status: CANCELLED | OUTPATIENT
Start: 2024-07-08

## 2024-07-08 RX ORDER — SODIUM CHLORIDE 9 MG/ML
20 INJECTION, SOLUTION INTRAVENOUS ONCE
OUTPATIENT
Start: 2024-07-22

## 2024-07-08 RX ORDER — SODIUM CHLORIDE 9 MG/ML
20 INJECTION, SOLUTION INTRAVENOUS ONCE
Status: DISCONTINUED | OUTPATIENT
Start: 2024-07-08 | End: 2024-07-09 | Stop reason: HOSPADM

## 2024-07-08 RX ORDER — FAMOTIDINE 10 MG/ML
20 INJECTION, SOLUTION INTRAVENOUS ONCE
Status: CANCELLED | OUTPATIENT
Start: 2024-07-15

## 2024-07-08 RX ORDER — SODIUM CHLORIDE 9 MG/ML
20 INJECTION, SOLUTION INTRAVENOUS ONCE
Status: CANCELLED | OUTPATIENT
Start: 2024-07-15

## 2024-07-08 RX ORDER — FAMOTIDINE 10 MG/ML
20 INJECTION, SOLUTION INTRAVENOUS AS NEEDED
Status: CANCELLED | OUTPATIENT
Start: 2024-07-15

## 2024-07-08 RX ORDER — DIPHENHYDRAMINE HYDROCHLORIDE 50 MG/ML
50 INJECTION INTRAMUSCULAR; INTRAVENOUS AS NEEDED
Status: CANCELLED | OUTPATIENT
Start: 2024-07-08

## 2024-07-08 RX ORDER — FAMOTIDINE 10 MG/ML
20 INJECTION, SOLUTION INTRAVENOUS ONCE
Status: COMPLETED | OUTPATIENT
Start: 2024-07-08 | End: 2024-07-08

## 2024-07-08 RX ADMIN — PALONOSETRON HYDROCHLORIDE 0.25 MG: 0.25 INJECTION INTRAVENOUS at 09:49

## 2024-07-08 RX ADMIN — SODIUM CHLORIDE 95 MG: 9 INJECTION, SOLUTION INTRAVENOUS at 10:50

## 2024-07-08 RX ADMIN — HEPARIN 500 UNITS: 100 SYRINGE at 12:55

## 2024-07-08 RX ADMIN — DIPHENHYDRAMINE HYDROCHLORIDE 25 MG: 50 INJECTION INTRAMUSCULAR; INTRAVENOUS at 09:36

## 2024-07-08 RX ADMIN — DEXAMETHASONE SODIUM PHOSPHATE 12 MG: 10 INJECTION, SOLUTION INTRAMUSCULAR; INTRAVENOUS at 09:49

## 2024-07-08 RX ADMIN — FAMOTIDINE 20 MG: 10 INJECTION, SOLUTION INTRAVENOUS at 09:34

## 2024-07-08 RX ADMIN — SODIUM CHLORIDE 1000 ML/HR: 900 INJECTION, SOLUTION INTRAVENOUS at 09:10

## 2024-07-08 RX ADMIN — CARBOPLATIN 180 MG: 600 INJECTION, SOLUTION INTRAVENOUS at 12:09

## 2024-07-08 NOTE — SIGNIFICANT NOTE
DWAIN met with pt and his wife in infusion to provide support. SW spoke with pt's wife as pt was asleep. Pt's wife reported pt has had to continually cut back hours at work due to fatigue and nausea from treatment. Pt's wife communicated overall they are doing well, and taking things a day at a time. DWAIN provided $20 in [employer] gas cards per request. Pt's spouse thanked DWAIN and had no other needs at this time.   DWAIN will be available ongoing.       07/08/24 1100   Oncology Interventions   Transportation Needs gas cards  (provided $20 in [employer] gas cards)

## 2024-07-08 NOTE — PROGRESS NOTES
Follow Up Office Visit      Date: 2024     Patient Name: Nelson Garcia  MRN: [id number]  : 1975  Referring Physician: Que Lopez     Chief Complaint: Follow-up for esophageal adenocarcinoma     History of Present Illness: Nelson Garcia is a pleasant 49 y.o. male with a past medical history of hyperlipidemia, hypertension, GERD, tobacco abuse, CVA who presents today for evaluation of esophageal adenocarcinoma. The patient is accompanied by their wife who contributes to the history of their care.  Patient had been experiencing worsening dysphagia and weight loss.  Noted the sensation of food getting stuck and regurgitating undigested food.  Was seen at [facility] where he had an EGD completed which showed a fungating mass.  Biopsy consistent with an adenocarcinoma.  PET/CT without evidence of distant disease but did note some local regional lymphadenopathy.  Given his weight loss and significant obstructing lesion, he had a J-tube placed and is currently on tube feeds and tolerating these well.  He notes some pain and discomfort related to his cancer.  He has been seen by Dr. Ramirez with plans to start concurrent chemo XRT followed by surgical intervention with Dr. Mills in [place]    Interval History:  Presents to clinic for follow-up.  Status post cycle 1 of concurrent carbo/Taxol/radiation.  Tolerating treatment well thus far.  Has some fatigue but denies any significant nausea, vomiting, diarrhea    Oncology History:    Oncology/Hematology History   Esophageal adenocarcinoma   2024 Cancer Staged    Staging form: Esophagus - Adenocarcinoma, AJCC 8th Edition  - Clinical stage from 2024: Stage III (cT3, cN1, cM0, G2) - Signed by Viet Ramirez MD on 2024 Initial Diagnosis    Esophageal adenocarcinoma     2024 -  Chemotherapy    OP GE PACLitaxel / CARBOplatin (weekly) + XRT         Subjective      Review of Systems:   Constitutional:  Negative for fevers, chills, or weight loss  Eyes: Negative for blurred vision or discharge         Ear/Nose/Throat: Negative for difficulty swallowing, sore throat, LAD                                                       Respiratory: Negative for cough, SOA, wheezing                                                                                        Cardiovascular: Negative for chest pain or palpitations                                                                  Gastrointestinal: Negative for nausea, vomiting or diarrhea                                                                     Genitourinary: Negative for dysuria or hematuria                                                                                           Musculoskeletal: Negative for any joint pains or muscle aches                                                                        Neurologic: Negative for any weakness, headaches, dizziness                                                                         Hematologic: Negative for any easy bleeding or bruising                                                                                   Psychiatric: Negative for anxiety or depression                          Past Medical History/Past Surgical History/ Family History/ Social History: Reviewed by me and unchanged from my previous documentation done on June 2024.     Medications:     Current Outpatient Medications:     acetaminophen (TYLENOL) 325 MG tablet, Take 2 tablets by mouth Every 6 (Six) Hours As Needed for Mild Pain ., Disp: , Rfl:     atorvastatin (LIPITOR) 80 MG tablet, Take 1 tablet by mouth Daily., Disp: , Rfl:     cloNIDine (CATAPRES) 0.2 MG tablet, Take 1 tablet by mouth As Needed., Disp: , Rfl:     docusate (COLACE) 50 MG/5ML liquid, Administer 10ml per j tube twice a day as needed for constipation., Disp: 240 mL, Rfl: 2    docusate sodium (COLACE) 50 mg/5 mL liquid, Administer 10 mL per J tube 2 (Two) Times a  Day As Needed for Constipation., Disp: 240 mL, Rfl: 2    famotidine (PEPCID) 40 mg/5 mL suspension, Take 2.5 mL by mouth 2 (Two) Times a Day., Disp: 150 mL, Rfl: 3    lidocaine-prilocaine (EMLA) 2.5-2.5 % cream, Please apply a small amount to port site about 45 min prior to infusion and cover with Saran Wrap, Disp: 30 g, Rfl: 2    methocarbamol (ROBAXIN) 500 MG tablet, Take 1 tablet by mouth 4 (Four) Times a Day., Disp: , Rfl:     neomycin-polymyxin-dexamethasone (MAXITROL) 0.1 % ophthalmic suspension, Administer 2 drops to both eyes 4 (Four) Times a Day., Disp: 5 mL, Rfl: 0    nicotine polacrilex (NICORETTE) 4 MG gum, , Disp: , Rfl:     Nutritional Supplements (Boost Plus) liquid, Take 237 mL by mouth 3 (Three) Times a Day., Disp: , Rfl:     nystatin (MYCOSTATIN) 724279 UNIT/GM powder, Apply to skin folds topically to the appropriate area as directed 2 (Two) Times a Day, Disp: 30 g, Rfl: 0    ondansetron (ZOFRAN) 4 MG/5ML solution, Take 10 mL by mouth Every 8 (Eight) Hours As Needed for Nausea or Vomiting., Disp: 300 mL, Rfl: 3    ondansetron ODT (ZOFRAN-ODT) 8 MG disintegrating tablet, Place 1 tablet on the tongue Every 8 (Eight) Hours As Needed for Nausea or Vomiting., Disp: 30 tablet, Rfl: 3    oxyCODONE (ROXICODONE) 5 MG/5ML solution, Take 10 mL by mouth Every 4 (Four) Hours As Needed for Moderate Pain. Per peg tube, Disp: 500 mL, Rfl: 0    sildenafil (VIAGRA) 50 MG tablet, TAKE ONE TABLET BY MOUTH DAILY AS NEEDED FOR ERECTILE DYSFUNCTION, Disp: 30 tablet, Rfl: 3    vitamin D3 125 MCG (5000 UT) capsule capsule, Take 1 capsule by mouth Daily., Disp: , Rfl:     Atorvastatin Calcium 20 MG/5ML suspension, Take 20 mls by mouth Daily., Disp: 150 mL, Rfl: 5    Gabapentin (NEURONTIN) 50 mg/mL solution solution, Take 18 mL by mouth 4 (Four) Times a Day., Disp: 470 mL, Rfl: 5    Allergies:   No Known Allergies    Objective     Physical Exam:  Vital Signs:   Vitals:    07/08/24 0813   BP: 136/83   Pulse: 62   Temp: 97.2  "°F (36.2 °C)   SpO2: 98%   Weight: 68.9 kg (151 lb 12.8 oz)   Height: 182.9 cm (72\")     There were no vitals filed for this visit.  ECOG Performance Status: 0 - Asymptomatic    Constitutional: NAD, ECOG 0  Eyes: PERRLA, scleral anicteric  ENT: No LAD, no thyromegaly  Respiratory: CTAB, no wheezing, rales, rhonchi  Cardiovascular: RRR, no murmurs, pulses 2+ bilaterally  Abdomen: soft, NT/ND, no HSM  Musculoskeletal: strength 5/5 bilaterally, no c/c/e  Neurologic: A&O x 3, CN II-XII intact grossly    Results Review:   Hospital Outpatient Visit on 07/08/2024   Component Date Value Ref Range Status    Glucose 07/08/2024 104 (H)  65 - 99 mg/dL Final    BUN 07/08/2024 26 (H)  6 - 20 mg/dL Final    Creatinine 07/08/2024 1.35 (H)  0.76 - 1.27 mg/dL Final    Sodium 07/08/2024 138  136 - 145 mmol/L Final    Potassium 07/08/2024 4.5  3.5 - 5.2 mmol/L Final    Slight hemolysis detected by analyzer. Result may be falsely elevated.    Chloride 07/08/2024 102  98 - 107 mmol/L Final    CO2 07/08/2024 29.0  22.0 - 29.0 mmol/L Final    Calcium 07/08/2024 9.3  8.6 - 10.5 mg/dL Final    Total Protein 07/08/2024 6.5  6.0 - 8.5 g/dL Final    Albumin 07/08/2024 3.6  3.5 - 5.2 g/dL Final    ALT (SGPT) 07/08/2024 11  1 - 41 U/L Final    AST (SGOT) 07/08/2024 16  1 - 40 U/L Final    Alkaline Phosphatase 07/08/2024 54  39 - 117 U/L Final    Total Bilirubin 07/08/2024 0.3  0.0 - 1.2 mg/dL Final    Globulin 07/08/2024 2.9  gm/dL Final    Calculated Result    A/G Ratio 07/08/2024 1.2  g/dL Final    BUN/Creatinine Ratio 07/08/2024 19.3  7.0 - 25.0 Final    Anion Gap 07/08/2024 7.0  5.0 - 15.0 mmol/L Final    eGFR 07/08/2024 64.4  >60.0 mL/min/1.73 Final    WBC 07/08/2024 4.75  3.40 - 10.80 10*3/mm3 Final    RBC 07/08/2024 3.51 (L)  4.14 - 5.80 10*6/mm3 Final    Hemoglobin 07/08/2024 9.2 (L)  13.0 - 17.7 g/dL Final    Hematocrit 07/08/2024 29.8 (L)  37.5 - 51.0 % Final    MCV 07/08/2024 84.9  79.0 - 97.0 fL Final    MCH 07/08/2024 26.2 (L)  26.6 " - 33.0 pg Final    MCHC 07/08/2024 30.9 (L)  31.5 - 35.7 g/dL Final    RDW 07/08/2024 15.4  12.3 - 15.4 % Final    RDW-SD 07/08/2024 48.3  37.0 - 54.0 fl Final    MPV 07/08/2024 11.4  6.0 - 12.0 fL Final    Platelets 07/08/2024 252  140 - 450 10*3/mm3 Final    Neutrophil % 07/08/2024 76.0  42.7 - 76.0 % Final    Lymphocyte % 07/08/2024 13.5 (L)  19.6 - 45.3 % Final    Monocyte % 07/08/2024 5.3  5.0 - 12.0 % Final    Eosinophil % 07/08/2024 4.4  0.3 - 6.2 % Final    Basophil % 07/08/2024 0.4  0.0 - 1.5 % Final    Immature Grans % 07/08/2024 0.4  0.0 - 0.5 % Final    Neutrophils, Absolute 07/08/2024 3.61  1.70 - 7.00 10*3/mm3 Final    Lymphocytes, Absolute 07/08/2024 0.64 (L)  0.70 - 3.10 10*3/mm3 Final    Monocytes, Absolute 07/08/2024 0.25  0.10 - 0.90 10*3/mm3 Final    Eosinophils, Absolute 07/08/2024 0.21  0.00 - 0.40 10*3/mm3 Final    Basophils, Absolute 07/08/2024 0.02  0.00 - 0.20 10*3/mm3 Final    Immature Grans, Absolute 07/08/2024 0.02  0.00 - 0.05 10*3/mm3 Final   Hospital Outpatient Visit on 07/01/2024   Component Date Value Ref Range Status    Glucose 07/01/2024 110 (H)  65 - 99 mg/dL Final    BUN 07/01/2024 25 (H)  6 - 20 mg/dL Final    Creatinine 07/01/2024 1.27  0.76 - 1.27 mg/dL Final    Sodium 07/01/2024 140  136 - 145 mmol/L Final    Potassium 07/01/2024 4.6  3.5 - 5.2 mmol/L Final    Chloride 07/01/2024 103  98 - 107 mmol/L Final    CO2 07/01/2024 29.0  22.0 - 29.0 mmol/L Final    Calcium 07/01/2024 9.3  8.6 - 10.5 mg/dL Final    Total Protein 07/01/2024 6.8  6.0 - 8.5 g/dL Final    Albumin 07/01/2024 3.5  3.5 - 5.2 g/dL Final    ALT (SGPT) 07/01/2024 9  1 - 41 U/L Final    AST (SGOT) 07/01/2024 22  1 - 40 U/L Final    Alkaline Phosphatase 07/01/2024 63  39 - 117 U/L Final    Total Bilirubin 07/01/2024 0.2  0.0 - 1.2 mg/dL Final    Globulin 07/01/2024 3.3  gm/dL Final    Calculated Result    A/G Ratio 07/01/2024 1.1  g/dL Final    BUN/Creatinine Ratio 07/01/2024 19.7  7.0 - 25.0 Final    Anion Gap  07/01/2024 8.0  5.0 - 15.0 mmol/L Final    eGFR 07/01/2024 69.3  >60.0 mL/min/1.73 Final    WBC 07/01/2024 3.97  3.40 - 10.80 10*3/mm3 Final    RBC 07/01/2024 3.63 (L)  4.14 - 5.80 10*6/mm3 Final    Hemoglobin 07/01/2024 9.4 (L)  13.0 - 17.7 g/dL Final    Hematocrit 07/01/2024 30.5 (L)  37.5 - 51.0 % Final    MCV 07/01/2024 84.0  79.0 - 97.0 fL Final    MCH 07/01/2024 25.9 (L)  26.6 - 33.0 pg Final    MCHC 07/01/2024 30.8 (L)  31.5 - 35.7 g/dL Final    RDW 07/01/2024 15.4  12.3 - 15.4 % Final    RDW-SD 07/01/2024 47.7  37.0 - 54.0 fl Final    MPV 07/01/2024 10.7  6.0 - 12.0 fL Final    Platelets 07/01/2024 260  140 - 450 10*3/mm3 Final    Neutrophil % 07/01/2024 67.5  42.7 - 76.0 % Final    Lymphocyte % 07/01/2024 19.4 (L)  19.6 - 45.3 % Final    Monocyte % 07/01/2024 7.3  5.0 - 12.0 % Final    Eosinophil % 07/01/2024 5.0  0.3 - 6.2 % Final    Basophil % 07/01/2024 0.5  0.0 - 1.5 % Final    Immature Grans % 07/01/2024 0.3  0.0 - 0.5 % Final    Neutrophils, Absolute 07/01/2024 2.68  1.70 - 7.00 10*3/mm3 Final    Lymphocytes, Absolute 07/01/2024 0.77  0.70 - 3.10 10*3/mm3 Final    Monocytes, Absolute 07/01/2024 0.29  0.10 - 0.90 10*3/mm3 Final    Eosinophils, Absolute 07/01/2024 0.20  0.00 - 0.40 10*3/mm3 Final    Basophils, Absolute 07/01/2024 0.02  0.00 - 0.20 10*3/mm3 Final    Immature Grans, Absolute 07/01/2024 0.01  0.00 - 0.05 10*3/mm3 Final       XR Chest 1 View    Result Date: 6/18/2024  Narrative: XR CHEST 1 VW-  Clinical: Postop port placement  COMPARISON 6/15/2024  FINDINGS: Mediport catheter position stable and satisfactory. No pneumothorax seen. Interval development of right-sided pleural effusion with right base atelectasis. There is a shallow inspiratory effort. The cardiomediastinal silhouette is stable. There could be a small left-sided pleural effusion blunting the costophrenic sulcus. No vascular congestion seen. The remainder is unremarkable.  This report was finalized on 6/18/2024 7:54 AM by   Charles Kruse M.D on Workstation: [id number]      Duplex Carotid Ultrasound CAR    Result Date: 6/15/2024  Narrative:   Right internal carotid artery demonstrates a less than 50% stenosis.   Left internal carotid artery demonstrates a less than 50% stenosis.     XR Chest 1 View    Result Date: 6/15/2024  Narrative: XR CHEST 1 VW-  HISTORY: Male who is 49 years-old, postoperative evaluation  TECHNIQUE: Frontal view of the chest  COMPARISON: 6/14/2024  FINDINGS: Right chest port extends to the superior vena cava. Heart size is borderline. Pulmonary vasculature is unremarkable. Minimal likely atelectasis or scarring at the bases. No focal pulmonary consolidation, pleural effusion, or pneumothorax. No acute osseous process.      Impression: Minimal likely atelectasis or scarring at the bases. Borderline heart size. Follow-up as clinical indications persist.  This report was finalized on 6/15/2024 7:33 AM by Dr. John Sadler M.D on Workstation: [email or web address]      XR Chest 1 View    Result Date: 6/14/2024  Narrative: Patient: MICHAEL PALOMO  Time Out: 23:35 Exam(s): XR CXR 1 VIEW EXAM:   XR Chest, 1 View CLINICAL HISTORY:    Reason for exam: Post Op Lung Surgery. TECHNIQUE:   Frontal view of the chest. COMPARISON:   Chest x-ray 6 13 24 FINDINGS:   Lungs:  Trace linear left lung base atelectasis scarring.   Pleural space:  Unremarkable.  No pneumothorax.   Heart:  Unremarkable.  No cardiomegaly.   Mediastinum:  Unremarkable.  Normal mediastinal contour.   Bones joints:  Unremarkable.  No acute fracture.   Tubes, lines and devices:  Right IJ Port-A-Cath terminates in the SVC. IMPRESSION:     1.  No radiographic evidence of acute cardiopulmonary process. 2.  Right IJ Port-A-Cath now present terminating in the SVC.     Impression: Electronically signed by George Mcleod M.D. on 06-14-24 at 2335    FL C Arm During Surgery    Result Date: 6/14/2024  Narrative: This procedure was auto-finalized with no dictation required.    XR  Chest 1 View    Result Date: 6/13/2024  Narrative: SINGLE VIEW OF THE CHEST  HISTORY: Generalized weakness  COMPARISON: None available.  FINDINGS: There is cardiomegaly. There is no vascular congestion. No pneumothorax is seen. No acute infiltrates are seen. There may be some minimal atelectasis at the left lung base.      Impression: Perhaps minimal atelectasis at the left lung base.  This report was finalized on 6/13/2024 11:39 PM by Dr. Dori Ortiz M.D on Workstation: [id number]      NM PET/CT Skull Base to Mid Thigh    Result Date: 6/11/2024  Narrative: F-18 FDG PET SKULL BASE TO MID THIGH WITH PET CT FUSION.  HISTORY: Recently diagnosed distal esophageal invasive moderately differentiated adenocarcinoma. Initial treatment strategy.  TECHNIQUE: Radiation dose reduction techniques were utilized, including automated exposure control and exposure modulation based on body size. Blood glucose level at time of injection was 92 mg/dL. 6.8 mCi of F-18 FDG were injected and PET was performed from skull base to mid thigh. CT was obtained for localization and attenuation correction. Normalization method: patient weight. Time at injection 145. PET start time 327.  Comparison: CT chest abdomen pelvis 6/5/2024.  FINDINGS:  Head/neck: No suspicious uptake.  Chest: Hypermetabolic circumferential wall thickening throughout the distal esophagus extending to the level of the gastroesophageal junction with max SUV of 19 (series 4/image 202).  Two right middle lobe sub-6 mm solid pulmonary nodules are too small for accurate PET characterization without overt hypermetabolism. Few tiny calcified granulomas. Bibasilar subsegmental atelectasis/scarring.  Multiple subcentimeter posterior mediastinal paraortic lymph nodes are prominent but too small for accurate PET characterization without overt hypermetabolism (series 4/image 206). Reference subcentimeter lymph node with max SUV of 2.4 (series 4/image 189).  Abdomen/pelvis: Two  adjacent 1 cm gastrohepatic lymph nodes, the more posterior with max SUV of 3.9 (series 4/image 221). The more anterior with max SUV of 2.1 (series 4/image 223). Additional 0.7 cm gastrohepatic lymph node is too small for accurate PET characterization without overt hypermetabolism (series 4/image 223).  The 1 cm left adrenal nodule is not hypermetabolic (series 4/image 222) and measured near 10 Hounsfield units on prior noncontrast CT.  Bones: No suspicious uptake.       Impression: 1. Hypermetabolic circumferential distal esophageal wall thickening consistent with known adenocarcinoma. 2. Gastrohepatic lymphadenopathy. At least one of the lymph nodes is definitively hypermetabolic. Findings are concerning for lavell metastatic disease. 3. Multiple subcentimeter paraortic posterior mediastinal lymph nodes are prominent but too small for accurate PET characterization without overt hypermetabolism. Continued attention on follow-up. 4. The 1 cm left adrenal nodule is not hypermetabolic and measured near 10 Hounsfield units on prior noncontrast CT, suggesting adrenal adenoma. 5. Two right middle lobe sub-6 mm solid pulmonary nodules are too small for accurate PET characterization without overt hypermetabolism. Attention on follow-up.   This report was finalized on 6/11/2024 9:31 PM by Dr. Nick Randall M.D on Workstation: [id number]       Assessment / Plan      Assessment/Plan:   1. Esophageal adenocarcinoma (Primary)  -Noted on EGD in June 2024.  Pathology notable for an adenocarcinoma  -CPS 10%, HER2/la negative, MSI stable  -PET/CT without evidence of metastatic disease although local regional lymphadenopathy was noted  -Clinical stage III  -Previously discussed the diagnosis, prognosis, treatment plans with patient and wife   -Status post week 1 of carbo/Taxol/radiation.  Tolerating treatment well.  Clinically appropriate to continue treatment.  Labs pending     2.  Cancer-related pain  -Stable with liquid  oxycodone 10 mg every 4 hours as needed     3.  Severe protein caloric malnutrition  -Currently on tube feeds through J-tube and tolerating well  -Continue follow-up with nutrition         Follow Up:   Follow-up in 2 weeks     Aquiles Reddy MD  Hematology and Oncology     Please note that portions of this note may have been completed with a voice recognition program. Efforts were made to edit the dictations, but occasionally words are mistranscribed.

## 2024-07-09 ENCOUNTER — DOCUMENTATION (OUTPATIENT)
Dept: NUTRITION | Facility: HOSPITAL | Age: 49
End: 2024-07-09
Payer: MEDICAID

## 2024-07-09 ENCOUNTER — HOSPITAL ENCOUNTER (OUTPATIENT)
Dept: RADIATION ONCOLOGY | Facility: HOSPITAL | Age: 49
Discharge: HOME | End: 2024-07-09

## 2024-07-09 VITALS — BODY MASS INDEX: 21.46 KG/M2 | WEIGHT: 158.2 LBS

## 2024-07-09 PROCEDURE — 77386: CPT | Performed by: RADIOLOGY

## 2024-07-09 NOTE — PROGRESS NOTES
ONC Nutrition     Diagnosis: Adenocarcinoma of the lower third of esophagus/gastrointestinal junction, clinical stage III (T3, N1, M0).      Surgery: Consideration for esophageal resection surgery at [facility] following completion of chemoradiation     J-Tube placed 6/14/24     Chemotherapy:  OP GE PACLitaxel / CARBOplatin (weekly) + XRT (start date 7/1/24) C#1 completed     Radiation: Distal esophagus, GE junction and regional lymphatics will receive a dose of approximately 45 Jaramillo delivered over 5 weeks with an additional boost to take the PET positive gross disease to approximately 50.4 Gray over 5 and half weeks - start date 7/1/24 / 2 treatments completed     Weight - 158.3 lbs /  weight decrease 3 lbs from last week     Weight Change - ~25# weight loss past 3 months related to dysphagia / 13% weight loss      Nutrition Diagnosis      Problem Severe malnutrition in context of acute illness   Etiology Diagnosis related  Dysphagia   Signs / Symptoms 25 lbs weight loss past 3 months (13% weight loss)  Intake < estimated energy needs         Estimated Nutritional Needs:  Calories - 2640 (35 kcal / kg)  Protein - 94 -110+ grams (1.25 -1.5 grams per kg as tolerated with Stage III CKD)  Water - 80+ ounces     Current Enteral Nutrition Regimen:  Isosource 1.5 @ 80 ml/hour x 12 hours nightly = 1440 calories, 65 grams protein, 730 ml water plus additional 20 ml water flushes per hour / administered via J-Tube     Oral Diet: Full Liquids with one serving Boost Plus ONS on most days         Follow up with patient during RAD ONC status checks.  Patient with 3 lbs weight loss over past week.  Oral intake continues compromised; trying to drink at least one Boost VHC orally to boost intake.    RECOMMENDATIONS  To meet nutritional goals with nocturnal feedings recommend changing enteral formula  to Nutren 2.0 / 4 cartons to provide 2000 calories, 84 grams protein, 690 ml water.   Recommend maintaining rate @ 80  ml per hour x 12 hours nightly with 20 ml water flushes hourly.  Additional water needs are 1470 ml (approximately 6-8 ounce cups) orally.    Patient goal to continue to consume at least one Boost VHC daily (530 calories & 22 grams protein) via oral intake, along with additional full liquids as he is able to meet nutritional goals.

## 2024-07-10 ENCOUNTER — HOSPITAL ENCOUNTER (OUTPATIENT)
Dept: RADIATION ONCOLOGY | Facility: HOSPITAL | Age: 49
Discharge: HOME | End: 2024-07-10

## 2024-07-10 PROCEDURE — 77386: CPT | Performed by: RADIOLOGY

## 2024-07-11 ENCOUNTER — HOSPITAL ENCOUNTER (OUTPATIENT)
Dept: RADIATION ONCOLOGY | Facility: HOSPITAL | Age: 49
Discharge: HOME | End: 2024-07-11

## 2024-07-11 PROCEDURE — 77336 RADIATION PHYSICS CONSULT: CPT | Performed by: RADIOLOGY

## 2024-07-11 PROCEDURE — 77386: CPT | Performed by: RADIOLOGY

## 2024-07-12 ENCOUNTER — HOSPITAL ENCOUNTER (OUTPATIENT)
Dept: RADIATION ONCOLOGY | Facility: HOSPITAL | Age: 49
Discharge: HOME | End: 2024-07-12

## 2024-07-12 ENCOUNTER — HOSPITAL ENCOUNTER (OUTPATIENT)
Dept: ONCOLOGY | Facility: HOSPITAL | Age: 49
Discharge: HOME | End: 2024-07-12
Payer: MEDICAID

## 2024-07-12 ENCOUNTER — TELEPHONE (OUTPATIENT)
Dept: SURGERY | Facility: CLINIC | Age: 49
End: 2024-07-12
Payer: MEDICAID

## 2024-07-12 VITALS
WEIGHT: 156 LBS | HEIGHT: 72 IN | TEMPERATURE: 97.2 F | BODY MASS INDEX: 21.13 KG/M2 | RESPIRATION RATE: 16 BRPM | HEART RATE: 62 BPM | DIASTOLIC BLOOD PRESSURE: 85 MMHG | SYSTOLIC BLOOD PRESSURE: 153 MMHG

## 2024-07-12 DIAGNOSIS — Z45.2 ENCOUNTER FOR CARE RELATED TO VASCULAR ACCESS PORT: Primary | ICD-10-CM

## 2024-07-12 DIAGNOSIS — C15.9 ESOPHAGEAL ADENOCARCINOMA: Primary | ICD-10-CM

## 2024-07-12 DIAGNOSIS — C15.9 ESOPHAGEAL ADENOCARCINOMA: ICD-10-CM

## 2024-07-12 LAB
ALBUMIN SERPL-MCNC: 3.6 G/DL (ref 3.5–5.2)
ALBUMIN/GLOB SERPL: 1.2 G/DL
ALP SERPL-CCNC: 51 U/L (ref 39–117)
ALT SERPL W P-5'-P-CCNC: 6 U/L (ref 1–41)
ANION GAP SERPL CALCULATED.3IONS-SCNC: 6 MMOL/L (ref 5–15)
AST SERPL-CCNC: 12 U/L (ref 1–40)
BASOPHILS # BLD AUTO: 0.02 10*3/MM3 (ref 0–0.2)
BASOPHILS NFR BLD AUTO: 0.6 % (ref 0–1.5)
BILIRUB SERPL-MCNC: 0.3 MG/DL (ref 0–1.2)
BUN SERPL-MCNC: 28 MG/DL (ref 6–20)
BUN/CREAT SERPL: 26.7 (ref 7–25)
CALCIUM SPEC-SCNC: 8.9 MG/DL (ref 8.6–10.5)
CHLORIDE SERPL-SCNC: 103 MMOL/L (ref 98–107)
CO2 SERPL-SCNC: 31 MMOL/L (ref 22–29)
CREAT SERPL-MCNC: 1.05 MG/DL (ref 0.76–1.27)
DEPRECATED RDW RBC AUTO: 49 FL (ref 37–54)
EGFRCR SERPLBLD CKD-EPI 2021: 87 ML/MIN/1.73
EOSINOPHIL # BLD AUTO: 0.1 10*3/MM3 (ref 0–0.4)
EOSINOPHIL NFR BLD AUTO: 3.2 % (ref 0.3–6.2)
ERYTHROCYTE [DISTWIDTH] IN BLOOD BY AUTOMATED COUNT: 16.5 % (ref 12.3–15.4)
GLOBULIN UR ELPH-MCNC: 2.9 GM/DL
GLUCOSE SERPL-MCNC: 104 MG/DL (ref 65–99)
HCT VFR BLD AUTO: 30.7 % (ref 37.5–51)
HGB BLD-MCNC: 9.6 G/DL (ref 13–17.7)
IMM GRANULOCYTES # BLD AUTO: 0 10*3/MM3 (ref 0–0.05)
IMM GRANULOCYTES NFR BLD AUTO: 0 % (ref 0–0.5)
LYMPHOCYTES # BLD AUTO: 0.3 10*3/MM3 (ref 0.7–3.1)
LYMPHOCYTES NFR BLD AUTO: 9.7 % (ref 19.6–45.3)
MCH RBC QN AUTO: 26.3 PG (ref 26.6–33)
MCHC RBC AUTO-ENTMCNC: 31.3 G/DL (ref 31.5–35.7)
MCV RBC AUTO: 84.1 FL (ref 79–97)
MONOCYTES # BLD AUTO: 0.12 10*3/MM3 (ref 0.1–0.9)
MONOCYTES NFR BLD AUTO: 3.9 % (ref 5–12)
NEUTROPHILS NFR BLD AUTO: 2.56 10*3/MM3 (ref 1.7–7)
NEUTROPHILS NFR BLD AUTO: 82.6 % (ref 42.7–76)
PLATELET # BLD AUTO: 183 10*3/MM3 (ref 140–450)
PMV BLD AUTO: 10.9 FL (ref 6–12)
POTASSIUM SERPL-SCNC: 4.5 MMOL/L (ref 3.5–5.2)
PROT SERPL-MCNC: 6.5 G/DL (ref 6–8.5)
RBC # BLD AUTO: 3.65 10*6/MM3 (ref 4.14–5.8)
SODIUM SERPL-SCNC: 140 MMOL/L (ref 136–145)
WBC NRBC COR # BLD AUTO: 3.1 10*3/MM3 (ref 3.4–10.8)

## 2024-07-12 PROCEDURE — 77386: CPT | Performed by: RADIOLOGY

## 2024-07-12 PROCEDURE — 85025 COMPLETE CBC W/AUTO DIFF WBC: CPT | Performed by: INTERNAL MEDICINE

## 2024-07-12 PROCEDURE — 36591 DRAW BLOOD OFF VENOUS DEVICE: CPT

## 2024-07-12 PROCEDURE — 25010000002 HEPARIN LOCK FLUSH PER 10 UNITS: Performed by: INTERNAL MEDICINE

## 2024-07-12 PROCEDURE — 80053 COMPREHEN METABOLIC PANEL: CPT | Performed by: INTERNAL MEDICINE

## 2024-07-12 RX ORDER — HEPARIN SODIUM (PORCINE) LOCK FLUSH IV SOLN 100 UNIT/ML 100 UNIT/ML
500 SOLUTION INTRAVENOUS AS NEEDED
Status: DISCONTINUED | OUTPATIENT
Start: 2024-07-12 | End: 2024-07-13 | Stop reason: HOSPADM

## 2024-07-12 RX ORDER — HEPARIN SODIUM (PORCINE) LOCK FLUSH IV SOLN 100 UNIT/ML 100 UNIT/ML
500 SOLUTION INTRAVENOUS AS NEEDED
Status: CANCELLED | OUTPATIENT
Start: 2024-07-12

## 2024-07-12 RX ADMIN — HEPARIN 500 UNITS: 100 SYRINGE at 09:56

## 2024-07-12 NOTE — TELEPHONE ENCOUNTER
I notified pt wife of Pet/CT appt on 7/17/2024 at 1:45, and provider appt on 8/15/2024 at 11:00 am. Pt wife was agreeable and satisfied    DB 10:39  7/12/2024

## 2024-07-15 ENCOUNTER — HOSPITAL ENCOUNTER (OUTPATIENT)
Dept: RADIATION ONCOLOGY | Facility: HOSPITAL | Age: 49
Discharge: HOME | End: 2024-07-15
Payer: MEDICAID

## 2024-07-15 ENCOUNTER — HOSPITAL ENCOUNTER (OUTPATIENT)
Dept: ONCOLOGY | Facility: HOSPITAL | Age: 49
Discharge: HOME | End: 2024-07-15
Admitting: INTERNAL MEDICINE
Payer: MEDICAID

## 2024-07-15 VITALS
HEART RATE: 52 BPM | WEIGHT: 154.2 LBS | BODY MASS INDEX: 20.88 KG/M2 | HEIGHT: 72 IN | TEMPERATURE: 98.1 F | SYSTOLIC BLOOD PRESSURE: 153 MMHG | RESPIRATION RATE: 18 BRPM | DIASTOLIC BLOOD PRESSURE: 92 MMHG

## 2024-07-15 VITALS — WEIGHT: 155.4 LBS | BODY MASS INDEX: 21.07 KG/M2

## 2024-07-15 DIAGNOSIS — C15.9 ESOPHAGEAL ADENOCARCINOMA: ICD-10-CM

## 2024-07-15 DIAGNOSIS — Z45.2 ENCOUNTER FOR CARE RELATED TO VASCULAR ACCESS PORT: Primary | ICD-10-CM

## 2024-07-15 PROCEDURE — 25810000003 SODIUM CHLORIDE 0.9 % SOLUTION 250 ML FLEX CONT: Performed by: INTERNAL MEDICINE

## 2024-07-15 PROCEDURE — 25010000002 PALONOSETRON PER 25 MCG: Performed by: INTERNAL MEDICINE

## 2024-07-15 PROCEDURE — 96375 TX/PRO/DX INJ NEW DRUG ADDON: CPT

## 2024-07-15 PROCEDURE — 25010000002 CARBOPLATIN PER 50 MG: Performed by: INTERNAL MEDICINE

## 2024-07-15 PROCEDURE — 25010000002 DEXAMETHASONE SODIUM PHOSPHATE 100 MG/10ML SOLUTION: Performed by: INTERNAL MEDICINE

## 2024-07-15 PROCEDURE — 25010000002 PACLITAXEL PER 1 MG: Performed by: INTERNAL MEDICINE

## 2024-07-15 PROCEDURE — 77386: CPT | Performed by: RADIOLOGY

## 2024-07-15 PROCEDURE — 96413 CHEMO IV INFUSION 1 HR: CPT

## 2024-07-15 PROCEDURE — 25010000002 HEPARIN LOCK FLUSH PER 10 UNITS: Performed by: INTERNAL MEDICINE

## 2024-07-15 PROCEDURE — 25010000002 DIPHENHYDRAMINE PER 50 MG: Performed by: INTERNAL MEDICINE

## 2024-07-15 PROCEDURE — 96417 CHEMO IV INFUS EACH ADDL SEQ: CPT

## 2024-07-15 PROCEDURE — 25810000003 SODIUM CHLORIDE 0.9 % SOLUTION: Performed by: INTERNAL MEDICINE

## 2024-07-15 RX ORDER — SODIUM CHLORIDE 9 MG/ML
20 INJECTION, SOLUTION INTRAVENOUS ONCE
Status: COMPLETED | OUTPATIENT
Start: 2024-07-15 | End: 2024-07-15

## 2024-07-15 RX ORDER — HEPARIN SODIUM (PORCINE) LOCK FLUSH IV SOLN 100 UNIT/ML 100 UNIT/ML
500 SOLUTION INTRAVENOUS AS NEEDED
Status: DISCONTINUED | OUTPATIENT
Start: 2024-07-15 | End: 2024-07-16 | Stop reason: HOSPADM

## 2024-07-15 RX ORDER — PALONOSETRON 0.05 MG/ML
0.25 INJECTION, SOLUTION INTRAVENOUS ONCE
Status: COMPLETED | OUTPATIENT
Start: 2024-07-15 | End: 2024-07-15

## 2024-07-15 RX ORDER — FAMOTIDINE 10 MG/ML
20 INJECTION, SOLUTION INTRAVENOUS ONCE
Status: COMPLETED | OUTPATIENT
Start: 2024-07-15 | End: 2024-07-15

## 2024-07-15 RX ORDER — HEPARIN SODIUM (PORCINE) LOCK FLUSH IV SOLN 100 UNIT/ML 100 UNIT/ML
500 SOLUTION INTRAVENOUS AS NEEDED
Status: CANCELLED | OUTPATIENT
Start: 2024-07-15

## 2024-07-15 RX ADMIN — DIPHENHYDRAMINE HYDROCHLORIDE 25 MG: 50 INJECTION INTRAMUSCULAR; INTRAVENOUS at 08:07

## 2024-07-15 RX ADMIN — FAMOTIDINE 20 MG: 10 INJECTION INTRAVENOUS at 08:00

## 2024-07-15 RX ADMIN — HEPARIN 500 UNITS: 100 SYRINGE at 10:51

## 2024-07-15 RX ADMIN — SODIUM CHLORIDE 95 MG: 9 INJECTION, SOLUTION INTRAVENOUS at 08:56

## 2024-07-15 RX ADMIN — SODIUM CHLORIDE 20 ML/HR: 9 INJECTION, SOLUTION INTRAVENOUS at 08:00

## 2024-07-15 RX ADMIN — DEXAMETHASONE SODIUM PHOSPHATE 12 MG: 10 INJECTION, SOLUTION INTRAMUSCULAR; INTRAVENOUS at 08:07

## 2024-07-15 RX ADMIN — PALONOSETRON HYDROCHLORIDE 0.25 MG: 0.25 INJECTION INTRAVENOUS at 08:05

## 2024-07-15 RX ADMIN — CARBOPLATIN 220 MG: 600 INJECTION, SOLUTION INTRAVENOUS at 10:08

## 2024-07-16 ENCOUNTER — HOSPITAL ENCOUNTER (OUTPATIENT)
Dept: RADIATION ONCOLOGY | Facility: HOSPITAL | Age: 49
Discharge: HOME | End: 2024-07-16

## 2024-07-16 ENCOUNTER — PATIENT OUTREACH (OUTPATIENT)
Dept: OTHER | Facility: HOSPITAL | Age: 49
End: 2024-07-16
Payer: MEDICAID

## 2024-07-16 ENCOUNTER — TELEPHONE (OUTPATIENT)
Dept: ONCOLOGY | Facility: CLINIC | Age: 49
End: 2024-07-16
Payer: MEDICAID

## 2024-07-16 ENCOUNTER — DOCUMENTATION (OUTPATIENT)
Dept: NUTRITION | Facility: HOSPITAL | Age: 49
End: 2024-07-16
Payer: MEDICAID

## 2024-07-16 PROCEDURE — 77386: CPT | Performed by: RADIOLOGY

## 2024-07-16 RX ORDER — PROMETHAZINE HYDROCHLORIDE 25 MG/1
25 SUPPOSITORY RECTAL EVERY 6 HOURS PRN
Qty: 30 SUPPOSITORY | Refills: 2 | Status: SHIPPED | OUTPATIENT
Start: 2024-07-16

## 2024-07-16 RX ORDER — PROCHLORPERAZINE MALEATE 10 MG
10 TABLET ORAL EVERY 6 HOURS PRN
Qty: 60 TABLET | Refills: 3 | OUTPATIENT
Start: 2024-07-16

## 2024-07-16 NOTE — PROGRESS NOTES
ONC Nutrition     Diagnosis: Adenocarcinoma of the lower third of esophagus/gastrointestinal junction, clinical stage III (T3, N1, M0).      Surgery: Consideration for esophageal resection surgery at [facility] following completion of chemoradiation     J-Tube placed 6/14/24     Chemotherapy:  OP GE PACLitaxel / CARBOplatin (weekly) + XRT (start date 7/1/24) C#1 completed     Radiation: Distal esophagus, GE junction and regional lymphatics will receive a dose of approximately 45 Jaramillo delivered over 5 weeks with an additional boost to take the PET positive gross disease to approximately 50.4 Gray over 5 and half weeks - start date 7/1/24 / 2 treatments completed     Weight - 158.3 lbs /  weight decrease 3 lbs from last week     Weight Change - ~25# weight loss past 3 months related to dysphagia / 13% weight loss      Nutrition Diagnosis      Problem Severe malnutrition in context of acute illness   Etiology Diagnosis related  Dysphagia   Signs / Symptoms 25 lbs weight loss past 3 months (13% weight loss)  Intake < estimated energy needs         Estimated Nutritional Needs:  Calories - 2640 (35 kcal / kg)  Protein - 94 -110+ grams (1.25 -1.5 grams per kg as tolerated with Stage III CKD)  Water - 80+ ounces     Current Enteral Nutrition Regimen:  Nutren 2.0 @ 80 ml/hour x 12 hours nightly = 2000 calories, 84 grams protein, 690 ml water plus additional 20 ml water flushes per hour / administered via J-Tube.   Additional water needs are 1470 ml (approximately 6-8 ounce cups) orally.     Oral Diet: Full Liquids with one serving Boost Plus ONS on most days / oral intake is minimal           Weight 155.4 lbs. / 158.2 lbs last week    Brief follow up with patient and wife as he completed radiation treatment today.  Wife states that he is tolerating enteral feedings, but concerned that he is not gaining weight. With both enteral and oral intake, patient is probably not meeting nutritional goals related to the  increased nausea he is experiencing this week. Promethazine ordered today.    Suggested to wife that if patient is able to tolerate, enteral feedings could be increased to 5 cartons per day to promote weight gain. Recommend increasing enteral feed time to 15 hours @ 80 ml.    Will follow.

## 2024-07-16 NOTE — TELEPHONE ENCOUNTER
Caller: JACKELIN HUNG/ [facility]        Best call back number: [phone]      What was the call regarding: PHARMACY STATES THEY CAN NOT FILL THE PROMETHAZINE AND WANTED TO KNOW IF DR SANCHEZ WANTED A DIFFERENT MEDICATION OR IT TO BE SENT SOMEWHERE ELSE

## 2024-07-17 ENCOUNTER — HOSPITAL ENCOUNTER (OUTPATIENT)
Dept: PET IMAGING | Facility: HOSPITAL | Age: 49
Discharge: HOME | End: 2024-07-17
Payer: MEDICAID

## 2024-07-17 ENCOUNTER — HOSPITAL ENCOUNTER (OUTPATIENT)
Dept: RADIATION ONCOLOGY | Facility: HOSPITAL | Age: 49
Discharge: HOME | End: 2024-07-17

## 2024-07-17 DIAGNOSIS — C15.9 ESOPHAGEAL ADENOCARCINOMA: ICD-10-CM

## 2024-07-17 LAB — GLUCOSE BLDC GLUCOMTR-MCNC: 100 MG/DL (ref 70–130)

## 2024-07-17 PROCEDURE — 0 FLUDEOXYGLUCOSE F18 SOLUTION: Performed by: SURGERY

## 2024-07-17 PROCEDURE — A9552 F18 FDG: HCPCS | Performed by: SURGERY

## 2024-07-17 PROCEDURE — 78815 PET IMAGE W/CT SKULL-THIGH: CPT

## 2024-07-17 PROCEDURE — 82948 REAGENT STRIP/BLOOD GLUCOSE: CPT

## 2024-07-17 PROCEDURE — 77386: CPT | Performed by: RADIOLOGY

## 2024-07-17 RX ADMIN — FLUDEOXYGLUCOSE F 18 1 DOSE: 200 INJECTION, SOLUTION INTRAVENOUS at 12:57

## 2024-07-18 ENCOUNTER — HOSPITAL ENCOUNTER (OUTPATIENT)
Dept: RADIATION ONCOLOGY | Facility: HOSPITAL | Age: 49
Discharge: HOME | End: 2024-07-18

## 2024-07-18 PROCEDURE — 77336 RADIATION PHYSICS CONSULT: CPT | Performed by: RADIOLOGY

## 2024-07-18 PROCEDURE — 77386: CPT | Performed by: RADIOLOGY

## 2024-07-19 ENCOUNTER — HOSPITAL ENCOUNTER (OUTPATIENT)
Dept: RADIATION ONCOLOGY | Facility: HOSPITAL | Age: 49
Discharge: HOME | End: 2024-07-19

## 2024-07-19 PROCEDURE — 77386: CPT | Performed by: RADIOLOGY

## 2024-07-22 ENCOUNTER — HOSPITAL ENCOUNTER (OUTPATIENT)
Dept: ONCOLOGY | Facility: HOSPITAL | Age: 49
Discharge: HOME | End: 2024-07-22
Admitting: INTERNAL MEDICINE
Payer: MEDICAID

## 2024-07-22 ENCOUNTER — OFFICE VISIT (OUTPATIENT)
Dept: ONCOLOGY | Facility: CLINIC | Age: 49
End: 2024-07-22
Payer: MEDICAID

## 2024-07-22 ENCOUNTER — APPOINTMENT (OUTPATIENT)
Dept: ONCOLOGY | Facility: HOSPITAL | Age: 49
End: 2024-07-22
Payer: MEDICAID

## 2024-07-22 ENCOUNTER — HOSPITAL ENCOUNTER (OUTPATIENT)
Dept: RADIATION ONCOLOGY | Facility: HOSPITAL | Age: 49
Discharge: HOME | End: 2024-07-22
Payer: MEDICAID

## 2024-07-22 VITALS
WEIGHT: 152 LBS | BODY MASS INDEX: 20.59 KG/M2 | SYSTOLIC BLOOD PRESSURE: 171 MMHG | HEART RATE: 66 BPM | DIASTOLIC BLOOD PRESSURE: 103 MMHG | OXYGEN SATURATION: 100 % | RESPIRATION RATE: 18 BRPM | TEMPERATURE: 97.2 F | HEIGHT: 72 IN

## 2024-07-22 VITALS — HEART RATE: 59 BPM | SYSTOLIC BLOOD PRESSURE: 149 MMHG | DIASTOLIC BLOOD PRESSURE: 83 MMHG

## 2024-07-22 VITALS — BODY MASS INDEX: 20.85 KG/M2 | WEIGHT: 153.8 LBS

## 2024-07-22 DIAGNOSIS — C15.9 ESOPHAGEAL ADENOCARCINOMA: Primary | ICD-10-CM

## 2024-07-22 DIAGNOSIS — Z45.2 ENCOUNTER FOR CARE RELATED TO VASCULAR ACCESS PORT: ICD-10-CM

## 2024-07-22 LAB
ALBUMIN SERPL-MCNC: 3.9 G/DL (ref 3.5–5.2)
ALBUMIN/GLOB SERPL: 1.3 G/DL
ALP SERPL-CCNC: 49 U/L (ref 39–117)
ALT SERPL W P-5'-P-CCNC: 9 U/L (ref 1–41)
ANION GAP SERPL CALCULATED.3IONS-SCNC: 7 MMOL/L (ref 5–15)
AST SERPL-CCNC: 17 U/L (ref 1–40)
BASOPHILS # BLD AUTO: 0.01 10*3/MM3 (ref 0–0.2)
BASOPHILS NFR BLD AUTO: 0.5 % (ref 0–1.5)
BILIRUB SERPL-MCNC: 0.2 MG/DL (ref 0–1.2)
BUN SERPL-MCNC: 31 MG/DL (ref 6–20)
BUN/CREAT SERPL: 30.4 (ref 7–25)
CALCIUM SPEC-SCNC: 9.4 MG/DL (ref 8.6–10.5)
CHLORIDE SERPL-SCNC: 102 MMOL/L (ref 98–107)
CO2 SERPL-SCNC: 32 MMOL/L (ref 22–29)
CREAT SERPL-MCNC: 1.02 MG/DL (ref 0.76–1.27)
DEPRECATED RDW RBC AUTO: 53.9 FL (ref 37–54)
EGFRCR SERPLBLD CKD-EPI 2021: 90.1 ML/MIN/1.73
EOSINOPHIL # BLD AUTO: 0.06 10*3/MM3 (ref 0–0.4)
EOSINOPHIL NFR BLD AUTO: 2.8 % (ref 0.3–6.2)
ERYTHROCYTE [DISTWIDTH] IN BLOOD BY AUTOMATED COUNT: 18.5 % (ref 12.3–15.4)
GLOBULIN UR ELPH-MCNC: 3 GM/DL
GLUCOSE SERPL-MCNC: 99 MG/DL (ref 65–99)
HCT VFR BLD AUTO: 32.2 % (ref 37.5–51)
HGB BLD-MCNC: 10 G/DL (ref 13–17.7)
IMM GRANULOCYTES # BLD AUTO: 0 10*3/MM3 (ref 0–0.05)
IMM GRANULOCYTES NFR BLD AUTO: 0 % (ref 0–0.5)
LYMPHOCYTES # BLD AUTO: 0.21 10*3/MM3 (ref 0.7–3.1)
LYMPHOCYTES NFR BLD AUTO: 9.7 % (ref 19.6–45.3)
MCH RBC QN AUTO: 26.6 PG (ref 26.6–33)
MCHC RBC AUTO-ENTMCNC: 31.1 G/DL (ref 31.5–35.7)
MCV RBC AUTO: 85.6 FL (ref 79–97)
MONOCYTES # BLD AUTO: 0.16 10*3/MM3 (ref 0.1–0.9)
MONOCYTES NFR BLD AUTO: 7.4 % (ref 5–12)
NEUTROPHILS NFR BLD AUTO: 1.72 10*3/MM3 (ref 1.7–7)
NEUTROPHILS NFR BLD AUTO: 79.6 % (ref 42.7–76)
PLATELET # BLD AUTO: 86 10*3/MM3 (ref 140–450)
PMV BLD AUTO: 11.4 FL (ref 6–12)
POTASSIUM SERPL-SCNC: 4.2 MMOL/L (ref 3.5–5.2)
PROT SERPL-MCNC: 6.9 G/DL (ref 6–8.5)
RBC # BLD AUTO: 3.76 10*6/MM3 (ref 4.14–5.8)
SODIUM SERPL-SCNC: 141 MMOL/L (ref 136–145)
WBC NRBC COR # BLD AUTO: 2.16 10*3/MM3 (ref 3.4–10.8)

## 2024-07-22 PROCEDURE — 80053 COMPREHEN METABOLIC PANEL: CPT | Performed by: INTERNAL MEDICINE

## 2024-07-22 PROCEDURE — 25810000003 SODIUM CHLORIDE 0.9 % SOLUTION 250 ML FLEX CONT: Performed by: INTERNAL MEDICINE

## 2024-07-22 PROCEDURE — 96375 TX/PRO/DX INJ NEW DRUG ADDON: CPT

## 2024-07-22 PROCEDURE — 25010000002 HEPARIN LOCK FLUSH PER 10 UNITS: Performed by: INTERNAL MEDICINE

## 2024-07-22 PROCEDURE — 25810000003 SODIUM CHLORIDE 0.9 % SOLUTION: Performed by: INTERNAL MEDICINE

## 2024-07-22 PROCEDURE — 85025 COMPLETE CBC W/AUTO DIFF WBC: CPT | Performed by: INTERNAL MEDICINE

## 2024-07-22 PROCEDURE — 1125F AMNT PAIN NOTED PAIN PRSNT: CPT | Performed by: INTERNAL MEDICINE

## 2024-07-22 PROCEDURE — 96417 CHEMO IV INFUS EACH ADDL SEQ: CPT

## 2024-07-22 PROCEDURE — 25010000002 DEXAMETHASONE SODIUM PHOSPHATE 100 MG/10ML SOLUTION: Performed by: INTERNAL MEDICINE

## 2024-07-22 PROCEDURE — 96413 CHEMO IV INFUSION 1 HR: CPT

## 2024-07-22 PROCEDURE — 3080F DIAST BP >= 90 MM HG: CPT | Performed by: INTERNAL MEDICINE

## 2024-07-22 PROCEDURE — 25010000002 PALONOSETRON PER 25 MCG: Performed by: INTERNAL MEDICINE

## 2024-07-22 PROCEDURE — 77386: CPT | Performed by: RADIOLOGY

## 2024-07-22 PROCEDURE — 99214 OFFICE O/P EST MOD 30 MIN: CPT | Performed by: INTERNAL MEDICINE

## 2024-07-22 PROCEDURE — 25010000002 PACLITAXEL PER 1 MG: Performed by: INTERNAL MEDICINE

## 2024-07-22 PROCEDURE — 25010000002 DIPHENHYDRAMINE PER 50 MG: Performed by: INTERNAL MEDICINE

## 2024-07-22 PROCEDURE — 25010000002 CARBOPLATIN PER 50 MG: Performed by: INTERNAL MEDICINE

## 2024-07-22 PROCEDURE — 3077F SYST BP >= 140 MM HG: CPT | Performed by: INTERNAL MEDICINE

## 2024-07-22 RX ORDER — DIPHENHYDRAMINE HYDROCHLORIDE 50 MG/ML
50 INJECTION INTRAMUSCULAR; INTRAVENOUS AS NEEDED
OUTPATIENT
Start: 2024-08-05

## 2024-07-22 RX ORDER — HEPARIN SODIUM (PORCINE) LOCK FLUSH IV SOLN 100 UNIT/ML 100 UNIT/ML
500 SOLUTION INTRAVENOUS AS NEEDED
Status: DISCONTINUED | OUTPATIENT
Start: 2024-07-22 | End: 2024-07-23 | Stop reason: HOSPADM

## 2024-07-22 RX ORDER — HEPARIN SODIUM (PORCINE) LOCK FLUSH IV SOLN 100 UNIT/ML 100 UNIT/ML
500 SOLUTION INTRAVENOUS AS NEEDED
Status: CANCELLED | OUTPATIENT
Start: 2024-07-22

## 2024-07-22 RX ORDER — PALONOSETRON 0.05 MG/ML
0.25 INJECTION, SOLUTION INTRAVENOUS ONCE
OUTPATIENT
Start: 2024-08-05

## 2024-07-22 RX ORDER — SODIUM CHLORIDE 9 MG/ML
20 INJECTION, SOLUTION INTRAVENOUS ONCE
Status: COMPLETED | OUTPATIENT
Start: 2024-07-22 | End: 2024-07-22

## 2024-07-22 RX ORDER — SODIUM CHLORIDE 9 MG/ML
20 INJECTION, SOLUTION INTRAVENOUS ONCE
OUTPATIENT
Start: 2024-08-05

## 2024-07-22 RX ORDER — PALONOSETRON 0.05 MG/ML
0.25 INJECTION, SOLUTION INTRAVENOUS ONCE
Status: COMPLETED | OUTPATIENT
Start: 2024-07-22 | End: 2024-07-22

## 2024-07-22 RX ORDER — LIDOCAINE HYDROCHLORIDE 20 MG/ML
10 SOLUTION OROPHARYNGEAL AS NEEDED
Qty: 100 ML | Refills: 3 | Status: SHIPPED | OUTPATIENT
Start: 2024-07-22

## 2024-07-22 RX ORDER — FAMOTIDINE 10 MG/ML
20 INJECTION, SOLUTION INTRAVENOUS AS NEEDED
OUTPATIENT
Start: 2024-08-05

## 2024-07-22 RX ORDER — FAMOTIDINE 10 MG/ML
20 INJECTION, SOLUTION INTRAVENOUS ONCE
OUTPATIENT
Start: 2024-08-05

## 2024-07-22 RX ORDER — FAMOTIDINE 10 MG/ML
20 INJECTION, SOLUTION INTRAVENOUS ONCE
Status: COMPLETED | OUTPATIENT
Start: 2024-07-22 | End: 2024-07-22

## 2024-07-22 RX ADMIN — DEXAMETHASONE SODIUM PHOSPHATE 12 MG: 10 INJECTION, SOLUTION INTRAMUSCULAR; INTRAVENOUS at 11:38

## 2024-07-22 RX ADMIN — CARBOPLATIN 220 MG: 600 INJECTION, SOLUTION INTRAVENOUS at 13:29

## 2024-07-22 RX ADMIN — HEPARIN 500 UNITS: 100 SYRINGE at 14:11

## 2024-07-22 RX ADMIN — DIPHENHYDRAMINE HYDROCHLORIDE 25 MG: 50 INJECTION INTRAMUSCULAR; INTRAVENOUS at 11:38

## 2024-07-22 RX ADMIN — PALONOSETRON HYDROCHLORIDE 0.25 MG: 0.25 INJECTION INTRAVENOUS at 11:39

## 2024-07-22 RX ADMIN — FAMOTIDINE 20 MG: 10 INJECTION, SOLUTION INTRAVENOUS at 11:41

## 2024-07-22 RX ADMIN — PACLITAXEL 95 MG: 6 INJECTION, SOLUTION INTRAVENOUS at 12:20

## 2024-07-22 RX ADMIN — SODIUM CHLORIDE 20 ML/HR: 9 INJECTION, SOLUTION INTRAVENOUS at 11:37

## 2024-07-22 NOTE — PROGRESS NOTES
Follow Up Office Visit      Date: 2024     Patient Name: Nelson Garcia  MRN: [id number]  : 1975  Referring Physician: Que Lopez     Chief Complaint: Follow-up for esophageal adenocarcinoma     History of Present Illness: Nelson Garcia is a pleasant 49 y.o. male with a past medical history of hyperlipidemia, hypertension, GERD, tobacco abuse, CVA who presents today for evaluation of esophageal adenocarcinoma. The patient is accompanied by their wife who contributes to the history of their care.  Patient had been experiencing worsening dysphagia and weight loss.  Noted the sensation of food getting stuck and regurgitating undigested food.  Was seen at [facility] where he had an EGD completed which showed a fungating mass.  Biopsy consistent with an adenocarcinoma.  PET/CT without evidence of distant disease but did note some local regional lymphadenopathy.  Given his weight loss and significant obstructing lesion, he had a J-tube placed and is currently on tube feeds and tolerating these well.  He notes some pain and discomfort related to his cancer.  He has been seen by Dr. Ramirez with plans to start concurrent chemo XRT followed by surgical intervention with Dr. Mills in [place]     Interval History:  Presents to clinic for follow-up.  Status post cycle 3 of concurrent carbo/Taxol/radiation. Tolerating treatment ok. Still having nausea and esophageal discomfort    Oncology History:    Oncology/Hematology History   Esophageal adenocarcinoma   2024 Cancer Staged    Staging form: Esophagus - Adenocarcinoma, AJCC 8th Edition  - Clinical stage from 2024: Stage III (cT3, cN1, cM0, G2) - Signed by Viet Ramirez MD on 2024 Initial Diagnosis    Esophageal adenocarcinoma     2024 -  Chemotherapy    OP GE PACLitaxel / CARBOplatin (weekly) + XRT         Subjective      Review of Systems:   Constitutional: Negative for fevers, chills, or weight  loss  Eyes: Negative for blurred vision or discharge         Ear/Nose/Throat: Negative for difficulty swallowing, sore throat, LAD                                                       Respiratory: Negative for cough, SOA, wheezing                                                                                        Cardiovascular: Negative for chest pain or palpitations                                                                  Gastrointestinal: Negative for nausea, vomiting or diarrhea                                                                     Genitourinary: Negative for dysuria or hematuria                                                                                           Musculoskeletal: Negative for any joint pains or muscle aches                                                                        Neurologic: Negative for any weakness, headaches, dizziness                                                                         Hematologic: Negative for any easy bleeding or bruising                                                                                   Psychiatric: Negative for anxiety or depression                          Past Medical History/Past Surgical History/ Family History/ Social History: Reviewed by me and unchanged from my previous documentation done on July 2024.     Medications:     Current Outpatient Medications:     acetaminophen (TYLENOL) 325 MG tablet, Take 2 tablets by mouth Every 6 (Six) Hours As Needed for Mild Pain ., Disp: , Rfl:     atorvastatin (LIPITOR) 80 MG tablet, Take 1 tablet by mouth Daily., Disp: , Rfl:     Atorvastatin Calcium 20 MG/5ML suspension, Take 20 mls by mouth Daily., Disp: 150 mL, Rfl: 5    cloNIDine (CATAPRES) 0.2 MG tablet, Take 1 tablet by mouth As Needed., Disp: , Rfl:     docusate (COLACE) 50 MG/5ML liquid, Administer 10ml per j tube twice a day as needed for constipation., Disp: 240 mL, Rfl: 2    docusate sodium (COLACE) 50  mg/5 mL liquid, Administer 10 mL per J tube 2 (Two) Times a Day As Needed for Constipation., Disp: 240 mL, Rfl: 2    famotidine (PEPCID) 40 mg/5 mL suspension, Take 2.5 mL by mouth 2 (Two) Times a Day., Disp: 150 mL, Rfl: 3    Gabapentin (NEURONTIN) 50 mg/mL solution solution, Take 18 mL by mouth 4 (Four) Times a Day., Disp: 470 mL, Rfl: 5    lidocaine-prilocaine (EMLA) 2.5-2.5 % cream, Please apply a small amount to port site about 45 min prior to infusion and cover with Saran Wrap, Disp: 30 g, Rfl: 2    methocarbamol (ROBAXIN) 500 MG tablet, Take 1 tablet by mouth 4 (Four) Times a Day., Disp: , Rfl:     neomycin-polymyxin-dexamethasone (MAXITROL) 0.1 % ophthalmic suspension, Administer 2 drops to both eyes 4 (Four) Times a Day., Disp: 5 mL, Rfl: 0    nicotine polacrilex (NICORETTE) 4 MG gum, , Disp: , Rfl:     Nutritional Supplements (Boost Plus) liquid, Take 237 mL by mouth 3 (Three) Times a Day., Disp: , Rfl:     nystatin (MYCOSTATIN) 218452 UNIT/GM powder, Apply to skin folds topically to the appropriate area as directed 2 (Two) Times a Day, Disp: 30 g, Rfl: 0    ondansetron (ZOFRAN) 4 MG/5ML solution, Take 10 mL by mouth Every 8 (Eight) Hours As Needed for Nausea or Vomiting., Disp: 300 mL, Rfl: 3    ondansetron ODT (ZOFRAN-ODT) 8 MG disintegrating tablet, Place 1 tablet on the tongue Every 8 (Eight) Hours As Needed for Nausea or Vomiting., Disp: 30 tablet, Rfl: 3    oxyCODONE (ROXICODONE) 5 MG/5ML solution, Take 10 mL by mouth Every 4 (Four) Hours As Needed for Moderate Pain. Per peg tube, Disp: 500 mL, Rfl: 0    promethazine (PHENERGAN) 25 MG suppository, Insert 1 suppository into the rectum Every 6 (Six) Hours As Needed for Nausea or Vomiting., Disp: 30 suppository, Rfl: 2    sildenafil (VIAGRA) 50 MG tablet, TAKE ONE TABLET BY MOUTH DAILY AS NEEDED FOR ERECTILE DYSFUNCTION, Disp: 30 tablet, Rfl: 3    vitamin D3 125 MCG (5000 UT) capsule capsule, Take 1 capsule by mouth Daily., Disp: , Rfl:     Lidocaine  "Viscous HCl (XYLOCAINE) 2 % solution, Take 10 mL by mouth As Needed for Mild Pain., Disp: 100 mL, Rfl: 3    Allergies:   No Known Allergies    Objective     Physical Exam:  Vital Signs:   Vitals:    07/22/24 1022   BP: (!) 171/103  Comment: RUE   Pulse: 66   Resp: 18   Temp: 97.2 °F (36.2 °C)   TempSrc: Temporal   SpO2: 100%  Comment: RA   Weight: 68.9 kg (152 lb)   Height: 182.9 cm (72\")   PainSc: 7  Comment: J-Tube     Pain Score    07/22/24 1022   PainSc: 7  Comment: J-Tube     ECOG Performance Status: 1 - Symptomatic but completely ambulatory    Constitutional: NAD, ECOG 1  Eyes: PERRLA, scleral anicteric  ENT: No LAD, no thyromegaly  Respiratory: CTAB, no wheezing, rales, rhonchi  Cardiovascular: RRR, no murmurs, pulses 2+ bilaterally  Abdomen: soft, NT/ND, no HSM  Musculoskeletal: strength 5/5 bilaterally, no c/c/e  Neurologic: A&O x 3, CN II-XII intact grossly    Results Review:   Hospital Outpatient Visit on 07/22/2024   Component Date Value Ref Range Status    Glucose 07/22/2024 99  65 - 99 mg/dL Final    BUN 07/22/2024 31 (H)  6 - 20 mg/dL Final    Creatinine 07/22/2024 1.02  0.76 - 1.27 mg/dL Final    Sodium 07/22/2024 141  136 - 145 mmol/L Final    Potassium 07/22/2024 4.2  3.5 - 5.2 mmol/L Final    Slight hemolysis detected by analyzer. Result may be falsely elevated.    Chloride 07/22/2024 102  98 - 107 mmol/L Final    CO2 07/22/2024 32.0 (H)  22.0 - 29.0 mmol/L Final    Calcium 07/22/2024 9.4  8.6 - 10.5 mg/dL Final    Total Protein 07/22/2024 6.9  6.0 - 8.5 g/dL Final    Albumin 07/22/2024 3.9  3.5 - 5.2 g/dL Final    ALT (SGPT) 07/22/2024 9  1 - 41 U/L Final    AST (SGOT) 07/22/2024 17  1 - 40 U/L Final    Alkaline Phosphatase 07/22/2024 49  39 - 117 U/L Final    Total Bilirubin 07/22/2024 0.2  0.0 - 1.2 mg/dL Final    Globulin 07/22/2024 3.0  gm/dL Final    Calculated Result    A/G Ratio 07/22/2024 1.3  g/dL Final    BUN/Creatinine Ratio 07/22/2024 30.4 (H)  7.0 - 25.0 Final    Anion Gap 07/22/2024 " 7.0  5.0 - 15.0 mmol/L Final    eGFR 07/22/2024 90.1  >60.0 mL/min/1.73 Final    WBC 07/22/2024 2.16 (L)  3.40 - 10.80 10*3/mm3 Final    RBC 07/22/2024 3.76 (L)  4.14 - 5.80 10*6/mm3 Final    Hemoglobin 07/22/2024 10.0 (L)  13.0 - 17.7 g/dL Final    Hematocrit 07/22/2024 32.2 (L)  37.5 - 51.0 % Final    MCV 07/22/2024 85.6  79.0 - 97.0 fL Final    MCH 07/22/2024 26.6  26.6 - 33.0 pg Final    MCHC 07/22/2024 31.1 (L)  31.5 - 35.7 g/dL Final    RDW 07/22/2024 18.5 (H)  12.3 - 15.4 % Final    RDW-SD 07/22/2024 53.9  37.0 - 54.0 fl Final    MPV 07/22/2024 11.4  6.0 - 12.0 fL Final    Platelets 07/22/2024 86 (L)  140 - 450 10*3/mm3 Final    Neutrophil % 07/22/2024 79.6 (H)  42.7 - 76.0 % Final    Lymphocyte % 07/22/2024 9.7 (L)  19.6 - 45.3 % Final    Monocyte % 07/22/2024 7.4  5.0 - 12.0 % Final    Eosinophil % 07/22/2024 2.8  0.3 - 6.2 % Final    Basophil % 07/22/2024 0.5  0.0 - 1.5 % Final    Immature Grans % 07/22/2024 0.0  0.0 - 0.5 % Final    Neutrophils, Absolute 07/22/2024 1.72  1.70 - 7.00 10*3/mm3 Final    Lymphocytes, Absolute 07/22/2024 0.21 (L)  0.70 - 3.10 10*3/mm3 Final    Monocytes, Absolute 07/22/2024 0.16  0.10 - 0.90 10*3/mm3 Final    Eosinophils, Absolute 07/22/2024 0.06  0.00 - 0.40 10*3/mm3 Final    Basophils, Absolute 07/22/2024 0.01  0.00 - 0.20 10*3/mm3 Final    Immature Grans, Absolute 07/22/2024 0.00  0.00 - 0.05 10*3/mm3 Final   Hospital Outpatient Visit on 07/17/2024   Component Date Value Ref Range Status    Glucose 07/17/2024 100  70 - 130 mg/dL Final   Hospital Outpatient Visit on 07/12/2024   Component Date Value Ref Range Status    Glucose 07/12/2024 104 (H)  65 - 99 mg/dL Final    BUN 07/12/2024 28 (H)  6 - 20 mg/dL Final    Creatinine 07/12/2024 1.05  0.76 - 1.27 mg/dL Final    Sodium 07/12/2024 140  136 - 145 mmol/L Final    Potassium 07/12/2024 4.5  3.5 - 5.2 mmol/L Final    Chloride 07/12/2024 103  98 - 107 mmol/L Final    CO2 07/12/2024 31.0 (H)  22.0 - 29.0 mmol/L Final    Calcium  07/12/2024 8.9  8.6 - 10.5 mg/dL Final    Total Protein 07/12/2024 6.5  6.0 - 8.5 g/dL Final    Albumin 07/12/2024 3.6  3.5 - 5.2 g/dL Final    ALT (SGPT) 07/12/2024 6  1 - 41 U/L Final    AST (SGOT) 07/12/2024 12  1 - 40 U/L Final    Alkaline Phosphatase 07/12/2024 51  39 - 117 U/L Final    Total Bilirubin 07/12/2024 0.3  0.0 - 1.2 mg/dL Final    Globulin 07/12/2024 2.9  gm/dL Final    Calculated Result    A/G Ratio 07/12/2024 1.2  g/dL Final    BUN/Creatinine Ratio 07/12/2024 26.7 (H)  7.0 - 25.0 Final    Anion Gap 07/12/2024 6.0  5.0 - 15.0 mmol/L Final    eGFR 07/12/2024 87.0  >60.0 mL/min/1.73 Final    WBC 07/12/2024 3.10 (L)  3.40 - 10.80 10*3/mm3 Final    RBC 07/12/2024 3.65 (L)  4.14 - 5.80 10*6/mm3 Final    Hemoglobin 07/12/2024 9.6 (L)  13.0 - 17.7 g/dL Final    Hematocrit 07/12/2024 30.7 (L)  37.5 - 51.0 % Final    MCV 07/12/2024 84.1  79.0 - 97.0 fL Final    MCH 07/12/2024 26.3 (L)  26.6 - 33.0 pg Final    MCHC 07/12/2024 31.3 (L)  31.5 - 35.7 g/dL Final    RDW 07/12/2024 16.5 (H)  12.3 - 15.4 % Final    RDW-SD 07/12/2024 49.0  37.0 - 54.0 fl Final    MPV 07/12/2024 10.9  6.0 - 12.0 fL Final    Platelets 07/12/2024 183  140 - 450 10*3/mm3 Final    Neutrophil % 07/12/2024 82.6 (H)  42.7 - 76.0 % Final    Lymphocyte % 07/12/2024 9.7 (L)  19.6 - 45.3 % Final    Monocyte % 07/12/2024 3.9 (L)  5.0 - 12.0 % Final    Eosinophil % 07/12/2024 3.2  0.3 - 6.2 % Final    Basophil % 07/12/2024 0.6  0.0 - 1.5 % Final    Immature Grans % 07/12/2024 0.0  0.0 - 0.5 % Final    Neutrophils, Absolute 07/12/2024 2.56  1.70 - 7.00 10*3/mm3 Final    Lymphocytes, Absolute 07/12/2024 0.30 (L)  0.70 - 3.10 10*3/mm3 Final    Monocytes, Absolute 07/12/2024 0.12  0.10 - 0.90 10*3/mm3 Final    Eosinophils, Absolute 07/12/2024 0.10  0.00 - 0.40 10*3/mm3 Final    Basophils, Absolute 07/12/2024 0.02  0.00 - 0.20 10*3/mm3 Final    Immature Grans, Absolute 07/12/2024 0.00  0.00 - 0.05 10*3/mm3 Final       NM PET/CT Skull Base to Mid  Thigh    Result Date: 7/18/2024  Narrative: F-18 FDG PET SKULL BASE TO MID THIGH WITH PET CT FUSION.  HISTORY: Esophageal adenocarcinoma status post chemoradiation. Subsequent treatment strategy.  TECHNIQUE: Radiation dose reduction techniques were utilized, including automated exposure control and exposure modulation based on body size. Blood glucose level at time of injection was 100 mg/dL. 6.2 mCi of F-18 FDG were injected and PET was performed from skull base to mid thigh. CT was obtained for localization and attenuation correction. Normalization method: patient weight. Time at injection 1257. PET start time 237.  Comparison: FDG PET/CT 6/10/2024. CT chest 6/5/2024.  FINDINGS: Reference blood pool Max SUV: 2.1.  Head/neck: No suspicious uptake.  Chest: Hypermetabolic circumferential wall thickening throughout the distal esophagus extending to the gastroesophageal junction with max SUV of 9.2 (series 4/image 195) previously 19.  A 1.1 cm subcarinal lymph node with max SUV of 2.7 (series 4/image 146) previously 1.1 cm and 2.6. Posterior mediastinal paraortic lymph nodes remain subcentimeter too small for accurate PET characterization without overt hypermetabolism. No hypermetabolic hilar lymph nodes.  Two unchanged subcentimeter right middle lobe solid pulmonary nodules which remain too small for accurate PET characterization without overt hypermetabolism.  Mildly hypermetabolic dependent right lower lobe patchy consolidation, greater than expected for subsegmental atelectasis (series 4/image 192).  Abdomen/pelvis: Gastrohepatic lymph nodes are now subcentimeter with resolution of prior associated hypermetabolism.  Percutaneous jejunostomy tube. Tubular hypermetabolism throughout the proximal colon without focality, likely physiologic. New tubular hypermetabolism throughout the proximal duodenum without focality, physiologic is inflammatory.  Unchanged 1 cm left adrenal nodule measuring less than 10 Hounsfield  units suggesting adrenal adenoma.   Bones: No suspicious uptake.       Impression: 1. Positive response to therapy. Hypermetabolic circumferential wall thickening throughout the distal esophagus extending to the gastroesophageal junction has decreased in associated hypermetabolism. Gastrohepatic lymph nodes are now subcentimeter with resolution of prior associated hypermetabolism. 2. Unchanged 1.1 cm subcarinal lymph node with uptake slightly greater than blood pool, favor reactive/inflammatory but attention on follow-up. Posterior mediastinal paraortic lymph nodes remain subcentimeter. 3. Two unchanged subcentimeter right middle lobe pulmonary nodules which remain too small for accurate PET characterization without overt hypermetabolism. 4. Patchy mildly hypermetabolic consolidation at the dependent right lower lobe, greater than expected for subsegmental atelectasis, likely infectious/inflammatory. Recommend follow-up chest CT in 2 to 3 months to ensure clearance.   This report was finalized on 7/18/2024 11:06 AM by Dr. Nick Randall M.D on Workstation: [id number]       Assessment / Plan      Assessment/Plan:   1. Esophageal adenocarcinoma (Primary)  -Noted on EGD in June 2024.  Pathology notable for an adenocarcinoma  -CPS 10%, HER2/al negative, MSI stable  -PET/CT without evidence of metastatic disease although local regional lymphadenopathy was noted  -Clinical stage III  -Previously discussed the diagnosis, prognosis, treatment plans with patient and wife   --PET/CT in July 2024 reviewed and showing treatment response  -Status post week 3 of carbo/Taxol/radiation.  Tolerating treatment well.  Clinically appropriate to continue treatment.  Labs pending     2.  Cancer-related pain  -Stable with liquid oxycodone 10 mg every 4 hours as needed     3.  Severe protein caloric malnutrition  -Currently on tube feeds through J-tube and tolerating well  -Continue follow-up with nutrition    4. Nausea  -- Conitnue  as needed phenergan and compazine  --Have added viscous lidocaine todaye     Follow Up:   Follow up in 2 weeks     Aquiles Reddy MD  Hematology and Oncology     Please note that portions of this note may have been completed with a voice recognition program. Efforts were made to edit the dictations, but occasionally words are mistranscribed.

## 2024-07-23 ENCOUNTER — HOSPITAL ENCOUNTER (OUTPATIENT)
Dept: RADIATION ONCOLOGY | Facility: HOSPITAL | Age: 49
Discharge: HOME | End: 2024-07-23

## 2024-07-23 PROCEDURE — 77386: CPT | Performed by: RADIOLOGY

## 2024-07-24 ENCOUNTER — DOCUMENTATION (OUTPATIENT)
Dept: OTHER | Facility: HOSPITAL | Age: 49
End: 2024-07-24
Payer: MEDICAID

## 2024-07-24 ENCOUNTER — HOSPITAL ENCOUNTER (OUTPATIENT)
Dept: RADIATION ONCOLOGY | Facility: HOSPITAL | Age: 49
Discharge: HOME | End: 2024-07-24

## 2024-07-24 PROCEDURE — 77386: CPT | Performed by: RADIOLOGY

## 2024-07-24 PROCEDURE — 77336 RADIATION PHYSICS CONSULT: CPT | Performed by: RADIOLOGY

## 2024-07-24 NOTE — SIGNIFICANT NOTE
SW received call from pt's spouse asking to meet for assistance. SW met with pt's spouse whom requesting transportation and food assistance. SW provided $20 in [employer] gas cards, pt has now reached max limit. SW offered to make local food pantry referral as ironcology cards can not be given as frequently. Pt's spouse was agreeable and thanked DWAIN.        07/24/24 0900   Oncology Interventions   Practical Needs Food  (local food pantry referral)   Transportation Needs gas cards  ($20 in [employer] gas cards)

## 2024-07-25 ENCOUNTER — HOSPITAL ENCOUNTER (OUTPATIENT)
Dept: RADIATION ONCOLOGY | Facility: HOSPITAL | Age: 49
Discharge: HOME | End: 2024-07-25

## 2024-07-25 PROCEDURE — 77386: CPT | Performed by: RADIOLOGY

## 2024-07-26 ENCOUNTER — HOSPITAL ENCOUNTER (OUTPATIENT)
Dept: RADIATION ONCOLOGY | Facility: HOSPITAL | Age: 49
Discharge: HOME | End: 2024-07-26

## 2024-07-26 PROCEDURE — 77386: CPT | Performed by: RADIOLOGY

## 2024-07-29 ENCOUNTER — HOSPITAL ENCOUNTER (OUTPATIENT)
Dept: ONCOLOGY | Facility: HOSPITAL | Age: 49
Discharge: HOME | End: 2024-07-29
Admitting: INTERNAL MEDICINE
Payer: MEDICAID

## 2024-07-29 ENCOUNTER — HOSPITAL ENCOUNTER (OUTPATIENT)
Dept: RADIATION ONCOLOGY | Facility: HOSPITAL | Age: 49
Discharge: HOME | End: 2024-07-29
Payer: MEDICAID

## 2024-07-29 ENCOUNTER — TELEPHONE (OUTPATIENT)
Dept: ONCOLOGY | Facility: CLINIC | Age: 49
End: 2024-07-29
Payer: MEDICAID

## 2024-07-29 ENCOUNTER — APPOINTMENT (OUTPATIENT)
Dept: ONCOLOGY | Facility: HOSPITAL | Age: 49
End: 2024-07-29
Payer: MEDICAID

## 2024-07-29 VITALS
HEART RATE: 82 BPM | HEIGHT: 72 IN | TEMPERATURE: 97.6 F | RESPIRATION RATE: 16 BRPM | SYSTOLIC BLOOD PRESSURE: 145 MMHG | BODY MASS INDEX: 20.59 KG/M2 | WEIGHT: 152 LBS | DIASTOLIC BLOOD PRESSURE: 92 MMHG

## 2024-07-29 DIAGNOSIS — C15.9 ESOPHAGEAL ADENOCARCINOMA: ICD-10-CM

## 2024-07-29 DIAGNOSIS — Z45.2 ENCOUNTER FOR CARE RELATED TO VASCULAR ACCESS PORT: Primary | ICD-10-CM

## 2024-07-29 DIAGNOSIS — C15.9 ESOPHAGEAL ADENOCARCINOMA: Primary | ICD-10-CM

## 2024-07-29 LAB
ALBUMIN SERPL-MCNC: 3.7 G/DL (ref 3.5–5.2)
ALBUMIN/GLOB SERPL: 1.2 G/DL
ALP SERPL-CCNC: 64 U/L (ref 39–117)
ALT SERPL W P-5'-P-CCNC: 18 U/L (ref 1–41)
ANION GAP SERPL CALCULATED.3IONS-SCNC: 5 MMOL/L (ref 5–15)
AST SERPL-CCNC: 24 U/L (ref 1–40)
BASOPHILS # BLD AUTO: 0.02 10*3/MM3 (ref 0–0.2)
BASOPHILS NFR BLD AUTO: 1.3 % (ref 0–1.5)
BILIRUB SERPL-MCNC: 0.3 MG/DL (ref 0–1.2)
BUN SERPL-MCNC: 27 MG/DL (ref 6–20)
BUN/CREAT SERPL: 29 (ref 7–25)
CALCIUM SPEC-SCNC: 9.2 MG/DL (ref 8.6–10.5)
CHLORIDE SERPL-SCNC: 104 MMOL/L (ref 98–107)
CO2 SERPL-SCNC: 30 MMOL/L (ref 22–29)
CREAT SERPL-MCNC: 0.93 MG/DL (ref 0.76–1.27)
DEPRECATED RDW RBC AUTO: 55.2 FL (ref 37–54)
EGFRCR SERPLBLD CKD-EPI 2021: 100.7 ML/MIN/1.73
EOSINOPHIL # BLD AUTO: 0.02 10*3/MM3 (ref 0–0.4)
EOSINOPHIL NFR BLD AUTO: 1.3 % (ref 0.3–6.2)
ERYTHROCYTE [DISTWIDTH] IN BLOOD BY AUTOMATED COUNT: 18.5 % (ref 12.3–15.4)
GLOBULIN UR ELPH-MCNC: 3 GM/DL
GLUCOSE SERPL-MCNC: 102 MG/DL (ref 65–99)
HCT VFR BLD AUTO: 27.5 % (ref 37.5–51)
HGB BLD-MCNC: 8.9 G/DL (ref 13–17.7)
IMM GRANULOCYTES # BLD AUTO: 0.01 10*3/MM3 (ref 0–0.05)
IMM GRANULOCYTES NFR BLD AUTO: 0.6 % (ref 0–0.5)
LYMPHOCYTES # BLD AUTO: 0.17 10*3/MM3 (ref 0.7–3.1)
LYMPHOCYTES NFR BLD AUTO: 11 % (ref 19.6–45.3)
MCH RBC QN AUTO: 27 PG (ref 26.6–33)
MCHC RBC AUTO-ENTMCNC: 32.4 G/DL (ref 31.5–35.7)
MCV RBC AUTO: 83.3 FL (ref 79–97)
MONOCYTES # BLD AUTO: 0.07 10*3/MM3 (ref 0.1–0.9)
MONOCYTES NFR BLD AUTO: 4.5 % (ref 5–12)
NEUTROPHILS NFR BLD AUTO: 1.26 10*3/MM3 (ref 1.7–7)
NEUTROPHILS NFR BLD AUTO: 81.3 % (ref 42.7–76)
PLATELET # BLD AUTO: 35 10*3/MM3 (ref 140–450)
POTASSIUM SERPL-SCNC: 4.3 MMOL/L (ref 3.5–5.2)
PROT SERPL-MCNC: 6.7 G/DL (ref 6–8.5)
RBC # BLD AUTO: 3.3 10*6/MM3 (ref 4.14–5.8)
SODIUM SERPL-SCNC: 139 MMOL/L (ref 136–145)
WBC NRBC COR # BLD AUTO: 1.55 10*3/MM3 (ref 3.4–10.8)

## 2024-07-29 PROCEDURE — 80053 COMPREHEN METABOLIC PANEL: CPT | Performed by: INTERNAL MEDICINE

## 2024-07-29 PROCEDURE — 25010000002 HEPARIN LOCK FLUSH PER 10 UNITS: Performed by: INTERNAL MEDICINE

## 2024-07-29 PROCEDURE — 77386: CPT | Performed by: RADIOLOGY

## 2024-07-29 PROCEDURE — 36591 DRAW BLOOD OFF VENOUS DEVICE: CPT

## 2024-07-29 PROCEDURE — 85025 COMPLETE CBC W/AUTO DIFF WBC: CPT | Performed by: INTERNAL MEDICINE

## 2024-07-29 RX ORDER — HEPARIN SODIUM (PORCINE) LOCK FLUSH IV SOLN 100 UNIT/ML 100 UNIT/ML
500 SOLUTION INTRAVENOUS AS NEEDED
Status: CANCELLED | OUTPATIENT
Start: 2024-07-29

## 2024-07-29 RX ORDER — HEPARIN SODIUM (PORCINE) LOCK FLUSH IV SOLN 100 UNIT/ML 100 UNIT/ML
500 SOLUTION INTRAVENOUS AS NEEDED
Status: DISCONTINUED | OUTPATIENT
Start: 2024-07-29 | End: 2024-07-30 | Stop reason: HOSPADM

## 2024-07-29 RX ORDER — OXYCODONE HYDROCHLORIDE 10 MG/1
10 TABLET ORAL EVERY 4 HOURS PRN
Qty: 180 TABLET | Refills: 0 | Status: SHIPPED | OUTPATIENT
Start: 2024-07-29

## 2024-07-29 RX ADMIN — HEPARIN 500 UNITS: 100 SYRINGE at 10:04

## 2024-07-29 NOTE — TELEPHONE ENCOUNTER
Caller: Radha Garcia    Relationship: Emergency Contact    Best call back number: [phone]    What is the best time to reach you: ANYTIME    Who are you requesting to speak with (clinical staff, provider,  specific staff member): CLINICAL      What was the call regarding: PT'S SPOUSE REQUESTING THE OXYCODONE GO BACK TO PILL FORM, WHICH IS WORKING BETTER THAN THE SOLUTION.  PLEASE SEND SCRIPT OVER FOR TABLET FORM

## 2024-07-29 NOTE — PROGRESS NOTES
Call from Taylor Mott RN. We are going to hold treatment today and delay 1 week due to low platelets. ANAYA RN

## 2024-07-30 ENCOUNTER — HOSPITAL ENCOUNTER (OUTPATIENT)
Dept: RADIATION ONCOLOGY | Facility: HOSPITAL | Age: 49
Discharge: HOME | End: 2024-07-30

## 2024-07-30 VITALS — BODY MASS INDEX: 21.04 KG/M2 | WEIGHT: 155.1 LBS

## 2024-07-30 PROCEDURE — 77386: CPT | Performed by: RADIOLOGY

## 2024-07-31 ENCOUNTER — HOSPITAL ENCOUNTER (OUTPATIENT)
Dept: RADIATION ONCOLOGY | Facility: HOSPITAL | Age: 49
Discharge: HOME | End: 2024-07-31

## 2024-07-31 PROCEDURE — 77386: CPT | Performed by: RADIOLOGY

## 2024-07-31 PROCEDURE — 77336 RADIATION PHYSICS CONSULT: CPT | Performed by: RADIOLOGY

## 2024-08-01 ENCOUNTER — HOSPITAL ENCOUNTER (OUTPATIENT)
Dept: RADIATION ONCOLOGY | Facility: HOSPITAL | Age: 49
Setting detail: RADIATION/ONCOLOGY SERIES
End: 2024-08-01
Payer: MEDICAID

## 2024-08-01 ENCOUNTER — HOSPITAL ENCOUNTER (OUTPATIENT)
Dept: RADIATION ONCOLOGY | Facility: HOSPITAL | Age: 49
Discharge: HOME | End: 2024-08-01

## 2024-08-01 PROCEDURE — 77386: CPT | Performed by: RADIOLOGY

## 2024-08-02 ENCOUNTER — HOSPITAL ENCOUNTER (OUTPATIENT)
Dept: RADIATION ONCOLOGY | Facility: HOSPITAL | Age: 49
Discharge: HOME | End: 2024-08-02

## 2024-08-02 PROCEDURE — 77386: CPT | Performed by: RADIOLOGY

## 2024-08-05 ENCOUNTER — HOSPITAL ENCOUNTER (OUTPATIENT)
Dept: RADIATION ONCOLOGY | Facility: HOSPITAL | Age: 49
Discharge: HOME | End: 2024-08-05
Payer: MEDICAID

## 2024-08-05 ENCOUNTER — OFFICE VISIT (OUTPATIENT)
Dept: ONCOLOGY | Facility: CLINIC | Age: 49
End: 2024-08-05
Payer: MEDICAID

## 2024-08-05 ENCOUNTER — HOSPITAL ENCOUNTER (OUTPATIENT)
Dept: ONCOLOGY | Facility: HOSPITAL | Age: 49
Discharge: HOME | End: 2024-08-05
Payer: MEDICAID

## 2024-08-05 ENCOUNTER — TELEPHONE (OUTPATIENT)
Dept: ONCOLOGY | Facility: CLINIC | Age: 49
End: 2024-08-05

## 2024-08-05 VITALS
TEMPERATURE: 97.5 F | DIASTOLIC BLOOD PRESSURE: 94 MMHG | WEIGHT: 156.6 LBS | BODY MASS INDEX: 21.24 KG/M2 | HEART RATE: 73 BPM | SYSTOLIC BLOOD PRESSURE: 170 MMHG | OXYGEN SATURATION: 100 %

## 2024-08-05 DIAGNOSIS — C15.9 ESOPHAGEAL ADENOCARCINOMA: Primary | ICD-10-CM

## 2024-08-05 DIAGNOSIS — Z45.2 ENCOUNTER FOR CARE RELATED TO VASCULAR ACCESS PORT: Primary | ICD-10-CM

## 2024-08-05 LAB
ALBUMIN SERPL-MCNC: 3.6 G/DL (ref 3.5–5.2)
ALBUMIN/GLOB SERPL: 1.2 G/DL
ALP SERPL-CCNC: 72 U/L (ref 39–117)
ALT SERPL W P-5'-P-CCNC: 15 U/L (ref 1–41)
ANION GAP SERPL CALCULATED.3IONS-SCNC: 6 MMOL/L (ref 5–15)
AST SERPL-CCNC: 23 U/L (ref 1–40)
BASOPHILS # BLD AUTO: 0 10*3/MM3 (ref 0–0.2)
BASOPHILS NFR BLD AUTO: 0 % (ref 0–1.5)
BILIRUB SERPL-MCNC: 0.3 MG/DL (ref 0–1.2)
BUN SERPL-MCNC: 22 MG/DL (ref 6–20)
BUN/CREAT SERPL: 21.2 (ref 7–25)
CALCIUM SPEC-SCNC: 9.3 MG/DL (ref 8.6–10.5)
CHLORIDE SERPL-SCNC: 103 MMOL/L (ref 98–107)
CO2 SERPL-SCNC: 30 MMOL/L (ref 22–29)
CREAT SERPL-MCNC: 1.04 MG/DL (ref 0.76–1.27)
DEPRECATED RDW RBC AUTO: 61.6 FL (ref 37–54)
EGFRCR SERPLBLD CKD-EPI 2021: 88 ML/MIN/1.73
EOSINOPHIL # BLD AUTO: 0.03 10*3/MM3 (ref 0–0.4)
EOSINOPHIL NFR BLD AUTO: 4.3 % (ref 0.3–6.2)
ERYTHROCYTE [DISTWIDTH] IN BLOOD BY AUTOMATED COUNT: 21.4 % (ref 12.3–15.4)
GLOBULIN UR ELPH-MCNC: 2.9 GM/DL
GLUCOSE SERPL-MCNC: 122 MG/DL (ref 65–99)
HCT VFR BLD AUTO: 27.4 % (ref 37.5–51)
HGB BLD-MCNC: 8.7 G/DL (ref 13–17.7)
IMM GRANULOCYTES # BLD AUTO: 0 10*3/MM3 (ref 0–0.05)
IMM GRANULOCYTES NFR BLD AUTO: 0 % (ref 0–0.5)
LYMPHOCYTES # BLD AUTO: 0.13 10*3/MM3 (ref 0.7–3.1)
LYMPHOCYTES NFR BLD AUTO: 18.8 % (ref 19.6–45.3)
MCH RBC QN AUTO: 27.6 PG (ref 26.6–33)
MCHC RBC AUTO-ENTMCNC: 31.8 G/DL (ref 31.5–35.7)
MCV RBC AUTO: 87 FL (ref 79–97)
MONOCYTES # BLD AUTO: 0.12 10*3/MM3 (ref 0.1–0.9)
MONOCYTES NFR BLD AUTO: 17.4 % (ref 5–12)
NEUTROPHILS NFR BLD AUTO: 0.41 10*3/MM3 (ref 1.7–7)
NEUTROPHILS NFR BLD AUTO: 59.5 % (ref 42.7–76)
PLATELET # BLD AUTO: 68 10*3/MM3 (ref 140–450)
PMV BLD AUTO: 11.2 FL (ref 6–12)
POTASSIUM SERPL-SCNC: 4.3 MMOL/L (ref 3.5–5.2)
PROT SERPL-MCNC: 6.5 G/DL (ref 6–8.5)
RBC # BLD AUTO: 3.15 10*6/MM3 (ref 4.14–5.8)
SODIUM SERPL-SCNC: 139 MMOL/L (ref 136–145)
WBC NRBC COR # BLD AUTO: 0.69 10*3/MM3 (ref 3.4–10.8)

## 2024-08-05 PROCEDURE — 1126F AMNT PAIN NOTED NONE PRSNT: CPT | Performed by: INTERNAL MEDICINE

## 2024-08-05 PROCEDURE — 80053 COMPREHEN METABOLIC PANEL: CPT | Performed by: INTERNAL MEDICINE

## 2024-08-05 PROCEDURE — 77386: CPT | Performed by: RADIOLOGY

## 2024-08-05 PROCEDURE — 3080F DIAST BP >= 90 MM HG: CPT | Performed by: INTERNAL MEDICINE

## 2024-08-05 PROCEDURE — 85025 COMPLETE CBC W/AUTO DIFF WBC: CPT | Performed by: INTERNAL MEDICINE

## 2024-08-05 PROCEDURE — 25010000002 HEPARIN LOCK FLUSH PER 10 UNITS: Performed by: INTERNAL MEDICINE

## 2024-08-05 PROCEDURE — 3077F SYST BP >= 140 MM HG: CPT | Performed by: INTERNAL MEDICINE

## 2024-08-05 PROCEDURE — 36591 DRAW BLOOD OFF VENOUS DEVICE: CPT

## 2024-08-05 PROCEDURE — 99214 OFFICE O/P EST MOD 30 MIN: CPT | Performed by: INTERNAL MEDICINE

## 2024-08-05 RX ORDER — HEPARIN SODIUM (PORCINE) LOCK FLUSH IV SOLN 100 UNIT/ML 100 UNIT/ML
500 SOLUTION INTRAVENOUS AS NEEDED
OUTPATIENT
Start: 2024-08-05

## 2024-08-05 RX ORDER — HEPARIN SODIUM (PORCINE) LOCK FLUSH IV SOLN 100 UNIT/ML 100 UNIT/ML
500 SOLUTION INTRAVENOUS AS NEEDED
Status: DISCONTINUED | OUTPATIENT
Start: 2024-08-05 | End: 2024-08-06 | Stop reason: HOSPADM

## 2024-08-05 RX ADMIN — HEPARIN 500 UNITS: 100 SYRINGE at 09:09

## 2024-08-05 NOTE — TELEPHONE ENCOUNTER
Name of Physician notified: Aquiles Reddy    Time Physician Notified:       [x]  Orders received    []  Protocol/Standing orders followed    []  No new orders    Hold treatment

## 2024-08-05 NOTE — PROGRESS NOTES
Follow Up Office Visit      Date: 2024     Patient Name: Nelson Garcia  MRN: [id number]  : 1975  Referring Physician: Que Lopez     Chief Complaint: Follow-up for esophageal adenocarcinoma     History of Present Illness: Nelson Garcia is a pleasant 49 y.o. male with a past medical history of hyperlipidemia, hypertension, GERD, tobacco abuse, CVA who presents today for evaluation of esophageal adenocarcinoma. The patient is accompanied by their wife who contributes to the history of their care.  Patient had been experiencing worsening dysphagia and weight loss.  Noted the sensation of food getting stuck and regurgitating undigested food.  Was seen at [facility] where he had an EGD completed which showed a fungating mass.  Biopsy consistent with an adenocarcinoma.  PET/CT without evidence of distant disease but did note some local regional lymphadenopathy.  Given his weight loss and significant obstructing lesion, he had a J-tube placed and is currently on tube feeds and tolerating these well.  He notes some pain and discomfort related to his cancer.  He has been seen by Dr. Ramirez with plans to start concurrent chemo XRT followed by surgical intervention with Dr. Mills in [place]     Interval History:  Presents to clinic for follow-up.  Status post cycle 4 of concurrent carbo/Taxol/radiation.  Treatment held last 2 weeks due to cytopenias.  Was tolerating treatment well.  Denies any significant fatigue or tiredness.  Weight up a couple pounds since his last visit with me    Oncology History:    Oncology/Hematology History   Esophageal adenocarcinoma   2024 Cancer Staged    Staging form: Esophagus - Adenocarcinoma, AJCC 8th Edition  - Clinical stage from 2024: Stage III (cT3, cN1, cM0, G2) - Signed by Viet Ramirez MD on 2024 Initial Diagnosis    Esophageal adenocarcinoma     2024 -  Chemotherapy    OP GE PACLitaxel / CARBOplatin  (weekly) + XRT         Subjective      Review of Systems:   Constitutional: Negative for fevers, chills, or weight loss  Eyes: Negative for blurred vision or discharge         Ear/Nose/Throat: Negative for difficulty swallowing, sore throat, LAD                                                       Respiratory: Negative for cough, SOA, wheezing                                                                                        Cardiovascular: Negative for chest pain or palpitations                                                                  Gastrointestinal: Negative for nausea, vomiting or diarrhea                                                                     Genitourinary: Negative for dysuria or hematuria                                                                                           Musculoskeletal: Negative for any joint pains or muscle aches                                                                        Neurologic: Negative for any weakness, headaches, dizziness                                                                         Hematologic: Negative for any easy bleeding or bruising                                                                                   Psychiatric: Negative for anxiety or depression                          Past Medical History/Past Surgical History/ Family History/ Social History: Reviewed by me and unchanged from my previous documentation done on July 2024.     Medications:     Current Outpatient Medications:     acetaminophen (TYLENOL) 325 MG tablet, Take 2 tablets by mouth Every 6 (Six) Hours As Needed for Mild Pain ., Disp: , Rfl:     atorvastatin (LIPITOR) 80 MG tablet, Take 1 tablet by mouth Daily., Disp: , Rfl:     Atorvastatin Calcium 20 MG/5ML suspension, Take 20 mls by mouth Daily., Disp: 600 mL, Rfl: 5    cloNIDine (CATAPRES) 0.2 MG tablet, Take 1 tablet by mouth As Needed., Disp: , Rfl:     docusate (COLACE) 50 MG/5ML liquid,  Administer 10ml per j tube twice a day as needed for constipation., Disp: 240 mL, Rfl: 2    docusate sodium (COLACE) 50 mg/5 mL liquid, Administer 10 mL per J tube 2 (Two) Times a Day As Needed for Constipation., Disp: 240 mL, Rfl: 2    famotidine (PEPCID) 40 mg/5 mL suspension, Take 2.5 mL by mouth 2 (Two) Times a Day., Disp: 150 mL, Rfl: 3    Gabapentin (NEURONTIN) 50 mg/mL solution solution, Take 18 mL by mouth 4 (Four) Times a Day., Disp: 470 mL, Rfl: 5    Lidocaine Viscous HCl (XYLOCAINE) 2 % solution, Take 10 mL by mouth As Needed for Mild Pain., Disp: 100 mL, Rfl: 3    lidocaine-prilocaine (EMLA) 2.5-2.5 % cream, Please apply a small amount to port site about 45 min prior to infusion and cover with Saran Wrap, Disp: 30 g, Rfl: 2    methocarbamol (ROBAXIN) 500 MG tablet, Take 1 tablet by mouth 4 (Four) Times a Day., Disp: , Rfl:     neomycin-polymyxin-dexamethasone (MAXITROL) 0.1 % ophthalmic suspension, Administer 2 drops to both eyes 4 (Four) Times a Day., Disp: 5 mL, Rfl: 0    nicotine polacrilex (NICORETTE) 4 MG gum, , Disp: , Rfl:     Nutritional Supplements (Boost Plus) liquid, Take 237 mL by mouth 3 (Three) Times a Day., Disp: , Rfl:     nystatin (MYCOSTATIN) 302876 UNIT/GM powder, Apply to skin folds topically to the appropriate area as directed 2 (Two) Times a Day, Disp: 30 g, Rfl: 0    ondansetron (ZOFRAN) 4 MG/5ML solution, Take 10 mL by mouth Every 8 (Eight) Hours As Needed for Nausea or Vomiting., Disp: 300 mL, Rfl: 3    ondansetron ODT (ZOFRAN-ODT) 8 MG disintegrating tablet, Place 1 tablet on the tongue Every 8 (Eight) Hours As Needed for Nausea or Vomiting., Disp: 30 tablet, Rfl: 3    oxyCODONE (ROXICODONE) 10 MG tablet, Administer 1 tablet per J tube Every 4 (Four) Hours As Needed., Disp: 180 tablet, Rfl: 0    promethazine (PHENERGAN) 25 MG suppository, Insert 1 suppository into the rectum Every 6 (Six) Hours As Needed for Nausea or Vomiting., Disp: 30 suppository, Rfl: 2    sildenafil  (VIAGRA) 50 MG tablet, TAKE ONE TABLET BY MOUTH DAILY AS NEEDED FOR ERECTILE DYSFUNCTION, Disp: 30 tablet, Rfl: 3    vitamin D3 125 MCG (5000 UT) capsule capsule, Take 1 capsule by mouth Daily., Disp: , Rfl:   No current facility-administered medications for this visit.    Facility-Administered Medications Ordered in Other Visits:     heparin injection 500 Units, 500 Units, Intravenous, PRN, Aquiles Reddy MD    Allergies:   No Known Allergies    Objective     Physical Exam:  Vital Signs:   Vitals:    08/05/24 0833   BP: 170/94   Pulse: 73   Temp: 97.5 °F (36.4 °C)   TempSrc: Infrared   SpO2: 100%   Weight: 71 kg (156 lb 9.6 oz)   PainSc: 0-No pain     Pain Score    08/05/24 0833   PainSc: 0-No pain     ECOG Performance Status: 1 - Symptomatic but completely ambulatory    Constitutional: NAD, ECOG 1  Eyes: PERRLA, scleral anicteric  ENT: No LAD, no thyromegaly  Respiratory: CTAB, no wheezing, rales, rhonchi  Cardiovascular: RRR, no murmurs, pulses 2+ bilaterally  Abdomen: soft, NT/ND, no HSM  Musculoskeletal: strength 5/5 bilaterally, no c/c/e  Neurologic: A&O x 3, CN II-XII intact grossly    Results Review:   Hospital Outpatient Visit on 08/05/2024   Component Date Value Ref Range Status    WBC 08/05/2024 0.69 (C)  3.40 - 10.80 10*3/mm3 Final    RBC 08/05/2024 3.15 (L)  4.14 - 5.80 10*6/mm3 Final    Hemoglobin 08/05/2024 8.7 (L)  13.0 - 17.7 g/dL Final    Hematocrit 08/05/2024 27.4 (L)  37.5 - 51.0 % Final    MCV 08/05/2024 87.0  79.0 - 97.0 fL Final    MCH 08/05/2024 27.6  26.6 - 33.0 pg Final    MCHC 08/05/2024 31.8  31.5 - 35.7 g/dL Final    RDW 08/05/2024 21.4 (H)  12.3 - 15.4 % Final    RDW-SD 08/05/2024 61.6 (H)  37.0 - 54.0 fl Final    MPV 08/05/2024 11.2  6.0 - 12.0 fL Final    Platelets 08/05/2024 68 (L)  140 - 450 10*3/mm3 Final    Neutrophil % 08/05/2024 59.5  42.7 - 76.0 % Final    Lymphocyte % 08/05/2024 18.8 (L)  19.6 - 45.3 % Final    Monocyte % 08/05/2024 17.4 (H)  5.0 - 12.0 % Final     Eosinophil % 08/05/2024 4.3  0.3 - 6.2 % Final    Basophil % 08/05/2024 0.0  0.0 - 1.5 % Final    Immature Grans % 08/05/2024 0.0  0.0 - 0.5 % Final    Neutrophils, Absolute 08/05/2024 0.41 (L)  1.70 - 7.00 10*3/mm3 Final    Lymphocytes, Absolute 08/05/2024 0.13 (L)  0.70 - 3.10 10*3/mm3 Final    Monocytes, Absolute 08/05/2024 0.12  0.10 - 0.90 10*3/mm3 Final    Eosinophils, Absolute 08/05/2024 0.03  0.00 - 0.40 10*3/mm3 Final    Basophils, Absolute 08/05/2024 0.00  0.00 - 0.20 10*3/mm3 Final    Immature Grans, Absolute 08/05/2024 0.00  0.00 - 0.05 10*3/mm3 Final   Hospital Outpatient Visit on 07/29/2024   Component Date Value Ref Range Status    Glucose 07/29/2024 102 (H)  65 - 99 mg/dL Final    BUN 07/29/2024 27 (H)  6 - 20 mg/dL Final    Creatinine 07/29/2024 0.93  0.76 - 1.27 mg/dL Final    Sodium 07/29/2024 139  136 - 145 mmol/L Final    Potassium 07/29/2024 4.3  3.5 - 5.2 mmol/L Final    Chloride 07/29/2024 104  98 - 107 mmol/L Final    CO2 07/29/2024 30.0 (H)  22.0 - 29.0 mmol/L Final    Calcium 07/29/2024 9.2  8.6 - 10.5 mg/dL Final    Total Protein 07/29/2024 6.7  6.0 - 8.5 g/dL Final    Albumin 07/29/2024 3.7  3.5 - 5.2 g/dL Final    ALT (SGPT) 07/29/2024 18  1 - 41 U/L Final    AST (SGOT) 07/29/2024 24  1 - 40 U/L Final    Alkaline Phosphatase 07/29/2024 64  39 - 117 U/L Final    Total Bilirubin 07/29/2024 0.3  0.0 - 1.2 mg/dL Final    Globulin 07/29/2024 3.0  gm/dL Final    Calculated Result    A/G Ratio 07/29/2024 1.2  g/dL Final    BUN/Creatinine Ratio 07/29/2024 29.0 (H)  7.0 - 25.0 Final    Anion Gap 07/29/2024 5.0  5.0 - 15.0 mmol/L Final    eGFR 07/29/2024 100.7  >60.0 mL/min/1.73 Final    WBC 07/29/2024 1.55 (L)  3.40 - 10.80 10*3/mm3 Final    RBC 07/29/2024 3.30 (L)  4.14 - 5.80 10*6/mm3 Final    Hemoglobin 07/29/2024 8.9 (L)  13.0 - 17.7 g/dL Final    Hematocrit 07/29/2024 27.5 (L)  37.5 - 51.0 % Final    MCV 07/29/2024 83.3  79.0 - 97.0 fL Final    MCH 07/29/2024 27.0  26.6 - 33.0 pg Final     MCHC 07/29/2024 32.4  31.5 - 35.7 g/dL Final    RDW 07/29/2024 18.5 (H)  12.3 - 15.4 % Final    RDW-SD 07/29/2024 55.2 (H)  37.0 - 54.0 fl Final    Platelets 07/29/2024 35 (L)  140 - 450 10*3/mm3 Final    Neutrophil % 07/29/2024 81.3 (H)  42.7 - 76.0 % Final    Lymphocyte % 07/29/2024 11.0 (L)  19.6 - 45.3 % Final    Monocyte % 07/29/2024 4.5 (L)  5.0 - 12.0 % Final    Eosinophil % 07/29/2024 1.3  0.3 - 6.2 % Final    Basophil % 07/29/2024 1.3  0.0 - 1.5 % Final    Immature Grans % 07/29/2024 0.6 (H)  0.0 - 0.5 % Final    Neutrophils, Absolute 07/29/2024 1.26 (L)  1.70 - 7.00 10*3/mm3 Final    Lymphocytes, Absolute 07/29/2024 0.17 (L)  0.70 - 3.10 10*3/mm3 Final    Monocytes, Absolute 07/29/2024 0.07 (L)  0.10 - 0.90 10*3/mm3 Final    Eosinophils, Absolute 07/29/2024 0.02  0.00 - 0.40 10*3/mm3 Final    Basophils, Absolute 07/29/2024 0.02  0.00 - 0.20 10*3/mm3 Final    Immature Grans, Absolute 07/29/2024 0.01  0.00 - 0.05 10*3/mm3 Final       NM PET/CT Skull Base to Mid Thigh    Result Date: 7/18/2024  Narrative: F-18 FDG PET SKULL BASE TO MID THIGH WITH PET CT FUSION.  HISTORY: Esophageal adenocarcinoma status post chemoradiation. Subsequent treatment strategy.  TECHNIQUE: Radiation dose reduction techniques were utilized, including automated exposure control and exposure modulation based on body size. Blood glucose level at time of injection was 100 mg/dL. 6.2 mCi of F-18 FDG were injected and PET was performed from skull base to mid thigh. CT was obtained for localization and attenuation correction. Normalization method: patient weight. Time at injection 1257. PET start time 237.  Comparison: FDG PET/CT 6/10/2024. CT chest 6/5/2024.  FINDINGS: Reference blood pool Max SUV: 2.1.  Head/neck: No suspicious uptake.  Chest: Hypermetabolic circumferential wall thickening throughout the distal esophagus extending to the gastroesophageal junction with max SUV of 9.2 (series 4/image 195) previously 19.  A 1.1 cm subcarinal  lymph node with max SUV of 2.7 (series 4/image 146) previously 1.1 cm and 2.6. Posterior mediastinal paraortic lymph nodes remain subcentimeter too small for accurate PET characterization without overt hypermetabolism. No hypermetabolic hilar lymph nodes.  Two unchanged subcentimeter right middle lobe solid pulmonary nodules which remain too small for accurate PET characterization without overt hypermetabolism.  Mildly hypermetabolic dependent right lower lobe patchy consolidation, greater than expected for subsegmental atelectasis (series 4/image 192).  Abdomen/pelvis: Gastrohepatic lymph nodes are now subcentimeter with resolution of prior associated hypermetabolism.  Percutaneous jejunostomy tube. Tubular hypermetabolism throughout the proximal colon without focality, likely physiologic. New tubular hypermetabolism throughout the proximal duodenum without focality, physiologic is inflammatory.  Unchanged 1 cm left adrenal nodule measuring less than 10 Hounsfield units suggesting adrenal adenoma.   Bones: No suspicious uptake.       Impression: 1. Positive response to therapy. Hypermetabolic circumferential wall thickening throughout the distal esophagus extending to the gastroesophageal junction has decreased in associated hypermetabolism. Gastrohepatic lymph nodes are now subcentimeter with resolution of prior associated hypermetabolism. 2. Unchanged 1.1 cm subcarinal lymph node with uptake slightly greater than blood pool, favor reactive/inflammatory but attention on follow-up. Posterior mediastinal paraortic lymph nodes remain subcentimeter. 3. Two unchanged subcentimeter right middle lobe pulmonary nodules which remain too small for accurate PET characterization without overt hypermetabolism. 4. Patchy mildly hypermetabolic consolidation at the dependent right lower lobe, greater than expected for subsegmental atelectasis, likely infectious/inflammatory. Recommend follow-up chest CT in 2 to 3 months to ensure  clearance.   This report was finalized on 7/18/2024 11:06 AM by Dr. Nick Randall M.D on Workstation: [id number]       Assessment / Plan      Assessment/Plan:   1. Esophageal adenocarcinoma (Primary)  -Noted on EGD in June 2024.  Pathology notable for an adenocarcinoma  -CPS 10%, HER2/al negative, MSI stable  -PET/CT without evidence of metastatic disease although local regional lymphadenopathy was noted  -Clinical stage III  -Previously discussed the diagnosis, prognosis, treatment plans with patient and wife   -PET/CT in July 2024 reviewed and showing treatment response  -Status post week 4 of carbo/Taxol/radiation.  Tolerating treatment well.  Clinically appropriate for treatment however his labs still show significant cytopenias.  Will hold further chemotherapy  -Scheduled to finish radiation this week  -Per patient, Dr. Mills with cardiothoracic surgery at [facility] feels this case is too complicated for him to perform surgery  -Have referred to Dr. Gupta at  today     2.  Cancer-related pain  -Stable with liquid oxycodone 10 mg every 4 hours as needed     3.  Severe protein caloric malnutrition  -Currently on tube feeds through J-tube and tolerating well  -Stable today  -Continue follow-up with nutrition     4. Nausea  -Conitnue as needed phenergan, compazine, and viscous lidocaine         Follow Up:   Follow-up in 2 months    Aquiles Reddy MD  Hematology and Oncology     Please note that portions of this note may have been completed with a voice recognition program. Efforts were made to edit the dictations, but occasionally words are mistranscribed.

## 2024-08-05 NOTE — TELEPHONE ENCOUNTER
Critical Test Results      CARLEY Reddy    Date: 8/5/24     Critical test result:  WBC 0.69, ANC 0.41   Plt 68K    Time results received:  8:38am

## 2024-08-06 ENCOUNTER — HOSPITAL ENCOUNTER (OUTPATIENT)
Dept: RADIATION ONCOLOGY | Facility: HOSPITAL | Age: 49
Discharge: HOME | End: 2024-08-06

## 2024-08-06 VITALS — BODY MASS INDEX: 21.17 KG/M2 | WEIGHT: 156.1 LBS

## 2024-08-06 DIAGNOSIS — C15.9 ESOPHAGEAL ADENOCARCINOMA: Primary | ICD-10-CM

## 2024-08-06 PROCEDURE — 77386: CPT | Performed by: RADIOLOGY

## 2024-08-06 RX ORDER — FENTANYL 25 UG/1
1 PATCH TRANSDERMAL
Qty: 5 PATCH | Refills: 0 | Status: SHIPPED | OUTPATIENT
Start: 2024-08-06

## 2024-08-07 ENCOUNTER — HOSPITAL ENCOUNTER (OUTPATIENT)
Dept: RADIATION ONCOLOGY | Facility: HOSPITAL | Age: 49
Discharge: HOME | End: 2024-08-07

## 2024-08-07 ENCOUNTER — DOCUMENTATION (OUTPATIENT)
Dept: NUTRITION | Facility: HOSPITAL | Age: 49
End: 2024-08-07
Payer: MEDICAID

## 2024-08-07 ENCOUNTER — DOCUMENTATION (OUTPATIENT)
Dept: RADIATION ONCOLOGY | Facility: HOSPITAL | Age: 49
End: 2024-08-07
Payer: MEDICAID

## 2024-08-07 DIAGNOSIS — C15.9 ESOPHAGEAL ADENOCARCINOMA: Primary | ICD-10-CM

## 2024-08-07 PROCEDURE — 77386: CPT | Performed by: RADIOLOGY

## 2024-08-07 NOTE — CODE DOCUMENTATION
Patient requested to be seen in the Clinic.  Patient did see Dr. Ramirez for Status Check yesterday, 8/6/24.  Dr. Ramirez did order Fentanyl Patch, Magic Mouthwash, and OTC Mylanta.  Patient has not been able to pick these medications up yet, but has agreed to do this today.  Dr. Ramirez did see patient today.  Patient expressed concern that he is very upset and angry about his diagnosis and current situation and is agreeable to a consult with Roxie Burks.

## 2024-08-07 NOTE — PROGRESS NOTES
RADIATION ONCOLOGY PROGRESS NOTE  08/07/24    There were no vitals filed for this visit.  Patient requested to be seen in the Clinic today.  Dr. Ramirez saw patient yesterday, 8/6/24 for Status Check, at which time he ordered a Fentanyl Patch (prior auth received), Magic Mouthwash, and OTC Mylanta.  Patient has not been able to pick these medications up yet, but has agreed to pick them up today.  Dr. Ramirez evaluated patient and patient expressed concern regarding anger related to his current diagnosis and managing his home life.  Patient is agreeable to consult with Roxie Burks.  Referral has been placed.

## 2024-08-07 NOTE — PROGRESS NOTES
ONC Nutrition     Diagnosis: Adenocarcinoma of the lower third of esophagus/gastrointestinal junction, clinical stage III (T3, N1, M0).      Surgery: Consideration for esophageal resection surgery at [facility] following completion of chemoradiation     J-Tube placed 6/14/24     Chemotherapy:  OP GE PACLitaxel / CARBOplatin (weekly) + XRT (start date 7/1/24) C#1 completed     Radiation: Distal esophagus, GE junction and regional lymphatics will receive a dose of approximately 45 Jaramillo delivered over 5 weeks with an additional boost to take the PET positive gross disease to approximately 50.4 Gray over 5 and half weeks - start date 7/1/24 / 1 remaining treatment    Weight - 156.1lbs /  5.3 lbs weight loss since beginning treatment      Weight Change - ~25# weight loss past 3 months related to dysphagia / 13% weight loss      Nutrition Diagnosis      Problem Severe malnutrition in context of acute illness   Etiology Diagnosis related  Dysphagia   Signs / Symptoms 25 lbs weight loss past 3 months (13% weight loss)  Intake < estimated energy needs         Estimated Nutritional Needs:  Calories - 2640 (35 kcal / kg)  Protein - 94 -110+ grams (1.25 -1.5 grams per kg as tolerated with Stage III CKD)  Water - 80+ ounces     Current Enteral Nutrition Recommendations:   Nutren 2.0 @ 80 ml/hour x 15 hours nightly = 2500 calories, 105 grams protein, 865 ml water plus additional 20 ml water flushes per hour / administered via J-Tube.   Additional water needs are 1535 ml.    Oral Diet: Full Liquids with one serving Boost Plus ONS on most days           With painful swallowing, oral intake continues to be minimal.  He is managing with pain medications, viscous lidocaine, OTC liquid antiacids, fentanyl patch; MMW added. He is experiencing 1-2 episodes of nausea daily.    GI tolerance of enteral feedings remains good per wife; bowel movements are managed.

## 2024-08-08 ENCOUNTER — HOSPITAL ENCOUNTER (OUTPATIENT)
Dept: RADIATION ONCOLOGY | Facility: HOSPITAL | Age: 49
Discharge: HOME | End: 2024-08-08

## 2024-08-08 PROCEDURE — 77336 RADIATION PHYSICS CONSULT: CPT | Performed by: RADIOLOGY

## 2024-08-08 PROCEDURE — 77386: CPT | Performed by: RADIOLOGY

## 2024-08-13 ENCOUNTER — TELEPHONE (OUTPATIENT)
Dept: SURGERY | Facility: CLINIC | Age: 49
End: 2024-08-13
Payer: MEDICAID

## 2024-08-13 NOTE — TELEPHONE ENCOUNTER
CALLED PT TO CONFIRM APPT, PT NEEDED TO R/S, R/S APPT TO 8/22/24, PT STATED UNDERSTANDING AND WAS AGREEABLE

## 2024-08-19 ENCOUNTER — HOSPITAL ENCOUNTER (OUTPATIENT)
Facility: HOSPITAL | Age: 49
Setting detail: OBSERVATION
Discharge: HOME | End: 2024-08-21
Attending: EMERGENCY MEDICINE | Admitting: INTERNAL MEDICINE
Payer: MEDICAID

## 2024-08-19 ENCOUNTER — APPOINTMENT (OUTPATIENT)
Dept: CT IMAGING | Facility: HOSPITAL | Age: 49
End: 2024-08-19
Payer: MEDICAID

## 2024-08-19 ENCOUNTER — TELEPHONE (OUTPATIENT)
Dept: ONCOLOGY | Facility: CLINIC | Age: 49
End: 2024-08-19

## 2024-08-19 DIAGNOSIS — R10.84 GENERALIZED ABDOMINAL PAIN: Primary | ICD-10-CM

## 2024-08-19 DIAGNOSIS — I10 HYPERTENSION, UNSPECIFIED TYPE: ICD-10-CM

## 2024-08-19 DIAGNOSIS — K29.00 ACUTE GASTRITIS, PRESENCE OF BLEEDING UNSPECIFIED, UNSPECIFIED GASTRITIS TYPE: ICD-10-CM

## 2024-08-19 DIAGNOSIS — K20.90 ESOPHAGITIS: ICD-10-CM

## 2024-08-19 PROBLEM — R10.9 ABDOMINAL PAIN: Status: ACTIVE | Noted: 2024-08-19

## 2024-08-19 LAB
ALBUMIN SERPL-MCNC: 4 G/DL (ref 3.5–5.2)
ALBUMIN/GLOB SERPL: 1.2 G/DL
ALP SERPL-CCNC: 83 U/L (ref 39–117)
ALT SERPL W P-5'-P-CCNC: 21 U/L (ref 1–41)
ANION GAP SERPL CALCULATED.3IONS-SCNC: 12 MMOL/L (ref 5–15)
ANISOCYTOSIS BLD QL: NORMAL
AST SERPL-CCNC: 32 U/L (ref 1–40)
BACTERIA UR QL AUTO: ABNORMAL /HPF
BASOPHILS # BLD AUTO: 0.02 10*3/MM3 (ref 0–0.2)
BASOPHILS NFR BLD AUTO: 0.3 % (ref 0–1.5)
BILIRUB SERPL-MCNC: 1.6 MG/DL (ref 0–1.2)
BILIRUB UR QL STRIP: NEGATIVE
BUN SERPL-MCNC: 15 MG/DL (ref 6–20)
BUN/CREAT SERPL: 15.6 (ref 7–25)
BURR CELLS BLD QL SMEAR: NORMAL
CALCIUM SPEC-SCNC: 9 MG/DL (ref 8.6–10.5)
CHLORIDE SERPL-SCNC: 96 MMOL/L (ref 98–107)
CLARITY UR: CLEAR
CO2 SERPL-SCNC: 28 MMOL/L (ref 22–29)
COLOR UR: YELLOW
CREAT SERPL-MCNC: 0.96 MG/DL (ref 0.76–1.27)
CRP SERPL-MCNC: 12.04 MG/DL (ref 0–0.5)
D-LACTATE SERPL-SCNC: 1.4 MMOL/L (ref 0.5–2)
DEPRECATED RDW RBC AUTO: 73.2 FL (ref 37–54)
EGFRCR SERPLBLD CKD-EPI 2021: 96.9 ML/MIN/1.73
EOSINOPHIL # BLD AUTO: 0 10*3/MM3 (ref 0–0.4)
EOSINOPHIL NFR BLD AUTO: 0 % (ref 0.3–6.2)
ERYTHROCYTE [DISTWIDTH] IN BLOOD BY AUTOMATED COUNT: 23.7 % (ref 12.3–15.4)
GLOBULIN UR ELPH-MCNC: 3.4 GM/DL
GLUCOSE SERPL-MCNC: 109 MG/DL (ref 65–99)
GLUCOSE UR STRIP-MCNC: NEGATIVE MG/DL
HCT VFR BLD AUTO: 38.2 % (ref 37.5–51)
HGB BLD-MCNC: 12.6 G/DL (ref 13–17.7)
HGB UR QL STRIP.AUTO: NEGATIVE
HOLD SPECIMEN: NORMAL
HYALINE CASTS UR QL AUTO: ABNORMAL /LPF
IMM GRANULOCYTES # BLD AUTO: 0.02 10*3/MM3 (ref 0–0.05)
IMM GRANULOCYTES NFR BLD AUTO: 0.3 % (ref 0–0.5)
INR PPP: 1.16 (ref 0.89–1.12)
KETONES UR QL STRIP: ABNORMAL
LEUKOCYTE ESTERASE UR QL STRIP.AUTO: NEGATIVE
LIPASE SERPL-CCNC: 12 U/L (ref 13–60)
LYMPHOCYTES # BLD AUTO: 0.18 10*3/MM3 (ref 0.7–3.1)
LYMPHOCYTES NFR BLD AUTO: 3.1 % (ref 19.6–45.3)
MAGNESIUM SERPL-MCNC: 2.1 MG/DL (ref 1.6–2.6)
MCH RBC QN AUTO: 28.8 PG (ref 26.6–33)
MCHC RBC AUTO-ENTMCNC: 33 G/DL (ref 31.5–35.7)
MCV RBC AUTO: 87.4 FL (ref 79–97)
MONOCYTES # BLD AUTO: 0.81 10*3/MM3 (ref 0.1–0.9)
MONOCYTES NFR BLD AUTO: 13.8 % (ref 5–12)
NEUTROPHILS NFR BLD AUTO: 4.85 10*3/MM3 (ref 1.7–7)
NEUTROPHILS NFR BLD AUTO: 82.5 % (ref 42.7–76)
NITRITE UR QL STRIP: NEGATIVE
NRBC BLD AUTO-RTO: 0 /100 WBC (ref 0–0.2)
OVALOCYTES BLD QL SMEAR: NORMAL
PH UR STRIP.AUTO: 7 [PH] (ref 5–8)
PLAT MORPH BLD: NORMAL
PLATELET # BLD AUTO: 92 10*3/MM3 (ref 140–450)
PMV BLD AUTO: 11.3 FL (ref 6–12)
POTASSIUM SERPL-SCNC: 3.9 MMOL/L (ref 3.5–5.2)
PROCALCITONIN SERPL-MCNC: 0.68 NG/ML (ref 0–0.25)
PROT SERPL-MCNC: 7.4 G/DL (ref 6–8.5)
PROT UR QL STRIP: ABNORMAL
PROTHROMBIN TIME: 14.9 SECONDS (ref 12.2–14.5)
QT INTERVAL: 446 MS
QTC INTERVAL: 508 MS
RBC # BLD AUTO: 4.37 10*6/MM3 (ref 4.14–5.8)
RBC # UR STRIP: ABNORMAL /HPF
REF LAB TEST METHOD: ABNORMAL
SODIUM SERPL-SCNC: 136 MMOL/L (ref 136–145)
SP GR UR STRIP: 1.04 (ref 1–1.03)
SQUAMOUS #/AREA URNS HPF: ABNORMAL /HPF
UROBILINOGEN UR QL STRIP: ABNORMAL
WBC # UR STRIP: ABNORMAL /HPF
WBC MORPH BLD: NORMAL
WBC NRBC COR # BLD AUTO: 5.88 10*3/MM3 (ref 3.4–10.8)
WHOLE BLOOD HOLD COAG: NORMAL
WHOLE BLOOD HOLD SPECIMEN: NORMAL

## 2024-08-19 PROCEDURE — 86140 C-REACTIVE PROTEIN: CPT | Performed by: EMERGENCY MEDICINE

## 2024-08-19 PROCEDURE — 25010000002 HYDRALAZINE PER 20 MG: Performed by: NURSE PRACTITIONER

## 2024-08-19 PROCEDURE — 96376 TX/PRO/DX INJ SAME DRUG ADON: CPT

## 2024-08-19 PROCEDURE — 96361 HYDRATE IV INFUSION ADD-ON: CPT

## 2024-08-19 PROCEDURE — 85025 COMPLETE CBC W/AUTO DIFF WBC: CPT | Performed by: EMERGENCY MEDICINE

## 2024-08-19 PROCEDURE — 83605 ASSAY OF LACTIC ACID: CPT | Performed by: EMERGENCY MEDICINE

## 2024-08-19 PROCEDURE — 25810000003 SODIUM CHLORIDE 0.9 % SOLUTION: Performed by: EMERGENCY MEDICINE

## 2024-08-19 PROCEDURE — 96375 TX/PRO/DX INJ NEW DRUG ADDON: CPT

## 2024-08-19 PROCEDURE — 99223 1ST HOSP IP/OBS HIGH 75: CPT | Performed by: INTERNAL MEDICINE

## 2024-08-19 PROCEDURE — 25010000002 ONDANSETRON PER 1 MG: Performed by: EMERGENCY MEDICINE

## 2024-08-19 PROCEDURE — 83735 ASSAY OF MAGNESIUM: CPT | Performed by: EMERGENCY MEDICINE

## 2024-08-19 PROCEDURE — G0378 HOSPITAL OBSERVATION PER HR: HCPCS

## 2024-08-19 PROCEDURE — 99285 EMERGENCY DEPT VISIT HI MDM: CPT

## 2024-08-19 PROCEDURE — 99215 OFFICE O/P EST HI 40 MIN: CPT | Performed by: INTERNAL MEDICINE

## 2024-08-19 PROCEDURE — 25510000001 IOPAMIDOL 61 % SOLUTION: Performed by: EMERGENCY MEDICINE

## 2024-08-19 PROCEDURE — 80053 COMPREHEN METABOLIC PANEL: CPT | Performed by: EMERGENCY MEDICINE

## 2024-08-19 PROCEDURE — 83690 ASSAY OF LIPASE: CPT | Performed by: EMERGENCY MEDICINE

## 2024-08-19 PROCEDURE — 84145 PROCALCITONIN (PCT): CPT | Performed by: EMERGENCY MEDICINE

## 2024-08-19 PROCEDURE — 81001 URINALYSIS AUTO W/SCOPE: CPT | Performed by: EMERGENCY MEDICINE

## 2024-08-19 PROCEDURE — 96374 THER/PROPH/DIAG INJ IV PUSH: CPT

## 2024-08-19 PROCEDURE — 74177 CT ABD & PELVIS W/CONTRAST: CPT

## 2024-08-19 PROCEDURE — 25810000003 SODIUM CHLORIDE 0.9 % SOLUTION: Performed by: INTERNAL MEDICINE

## 2024-08-19 PROCEDURE — 25010000002 HYDROMORPHONE PER 4 MG: Performed by: INTERNAL MEDICINE

## 2024-08-19 PROCEDURE — 85007 BL SMEAR W/DIFF WBC COUNT: CPT | Performed by: EMERGENCY MEDICINE

## 2024-08-19 PROCEDURE — 25010000002 MORPHINE PER 10 MG: Performed by: EMERGENCY MEDICINE

## 2024-08-19 PROCEDURE — 93005 ELECTROCARDIOGRAM TRACING: CPT | Performed by: NURSE PRACTITIONER

## 2024-08-19 PROCEDURE — 85610 PROTHROMBIN TIME: CPT | Performed by: NURSE PRACTITIONER

## 2024-08-19 RX ORDER — ONDANSETRON 2 MG/ML
4 INJECTION INTRAMUSCULAR; INTRAVENOUS EVERY 6 HOURS PRN
Status: DISCONTINUED | OUTPATIENT
Start: 2024-08-19 | End: 2024-08-21 | Stop reason: HOSPADM

## 2024-08-19 RX ORDER — AMOXICILLIN 250 MG
2 CAPSULE ORAL 2 TIMES DAILY PRN
Status: DISCONTINUED | OUTPATIENT
Start: 2024-08-19 | End: 2024-08-20

## 2024-08-19 RX ORDER — ONDANSETRON 2 MG/ML
4 INJECTION INTRAMUSCULAR; INTRAVENOUS ONCE
Status: COMPLETED | OUTPATIENT
Start: 2024-08-19 | End: 2024-08-19

## 2024-08-19 RX ORDER — HYDRALAZINE HYDROCHLORIDE 20 MG/ML
10 INJECTION INTRAMUSCULAR; INTRAVENOUS ONCE
Status: COMPLETED | OUTPATIENT
Start: 2024-08-19 | End: 2024-08-19

## 2024-08-19 RX ORDER — NYSTATIN 100000/ML
5 SUSPENSION, ORAL (FINAL DOSE FORM) ORAL 4 TIMES DAILY
Status: DISCONTINUED | OUTPATIENT
Start: 2024-08-19 | End: 2024-08-19

## 2024-08-19 RX ORDER — OXYCODONE HYDROCHLORIDE 10 MG/1
10 TABLET ORAL EVERY 4 HOURS PRN
Status: DISCONTINUED | OUTPATIENT
Start: 2024-08-19 | End: 2024-08-20

## 2024-08-19 RX ORDER — BISACODYL 5 MG/1
5 TABLET, DELAYED RELEASE ORAL DAILY PRN
Status: DISCONTINUED | OUTPATIENT
Start: 2024-08-19 | End: 2024-08-20

## 2024-08-19 RX ORDER — SODIUM CHLORIDE 0.9 % (FLUSH) 0.9 %
10 SYRINGE (ML) INJECTION AS NEEDED
Status: DISCONTINUED | OUTPATIENT
Start: 2024-08-19 | End: 2024-08-21 | Stop reason: HOSPADM

## 2024-08-19 RX ORDER — HYDRALAZINE HYDROCHLORIDE 20 MG/ML
20 INJECTION INTRAMUSCULAR; INTRAVENOUS EVERY 6 HOURS PRN
Status: DISCONTINUED | OUTPATIENT
Start: 2024-08-19 | End: 2024-08-21 | Stop reason: HOSPADM

## 2024-08-19 RX ORDER — DIPHENHYDRAMINE HYDROCHLORIDE AND LIDOCAINE HYDROCHLORIDE AND ALUMINUM HYDROXIDE AND MAGNESIUM HYDRO
10 KIT EVERY 4 HOURS
Status: DISCONTINUED | OUTPATIENT
Start: 2024-08-19 | End: 2024-08-19

## 2024-08-19 RX ORDER — SODIUM CHLORIDE 9 MG/ML
75 INJECTION, SOLUTION INTRAVENOUS CONTINUOUS
Status: ACTIVE | OUTPATIENT
Start: 2024-08-19 | End: 2024-08-20

## 2024-08-19 RX ORDER — HYDROMORPHONE HYDROCHLORIDE 1 MG/ML
0.5 INJECTION, SOLUTION INTRAMUSCULAR; INTRAVENOUS; SUBCUTANEOUS
Status: DISCONTINUED | OUTPATIENT
Start: 2024-08-19 | End: 2024-08-21 | Stop reason: HOSPADM

## 2024-08-19 RX ORDER — MORPHINE SULFATE 4 MG/ML
4 INJECTION, SOLUTION INTRAMUSCULAR; INTRAVENOUS ONCE
Status: COMPLETED | OUTPATIENT
Start: 2024-08-19 | End: 2024-08-19

## 2024-08-19 RX ORDER — BISACODYL 10 MG
10 SUPPOSITORY, RECTAL RECTAL DAILY PRN
Status: DISCONTINUED | OUTPATIENT
Start: 2024-08-19 | End: 2024-08-20

## 2024-08-19 RX ORDER — SODIUM CHLORIDE 9 MG/ML
40 INJECTION, SOLUTION INTRAVENOUS AS NEEDED
Status: DISCONTINUED | OUTPATIENT
Start: 2024-08-19 | End: 2024-08-21 | Stop reason: HOSPADM

## 2024-08-19 RX ORDER — LIDOCAINE HYDROCHLORIDE 20 MG/ML
10 SOLUTION OROPHARYNGEAL
Status: DISCONTINUED | OUTPATIENT
Start: 2024-08-19 | End: 2024-08-21 | Stop reason: HOSPADM

## 2024-08-19 RX ORDER — SODIUM CHLORIDE 0.9 % (FLUSH) 0.9 %
10 SYRINGE (ML) INJECTION EVERY 12 HOURS SCHEDULED
Status: DISCONTINUED | OUTPATIENT
Start: 2024-08-19 | End: 2024-08-21 | Stop reason: HOSPADM

## 2024-08-19 RX ORDER — GABAPENTIN 250 MG/5ML
900 SOLUTION ORAL 4 TIMES DAILY
Status: DISCONTINUED | OUTPATIENT
Start: 2024-08-19 | End: 2024-08-20

## 2024-08-19 RX ORDER — POLYETHYLENE GLYCOL 3350 17 G/17G
17 POWDER, FOR SOLUTION ORAL DAILY PRN
Status: DISCONTINUED | OUTPATIENT
Start: 2024-08-19 | End: 2024-08-20

## 2024-08-19 RX ORDER — IOPAMIDOL 612 MG/ML
100 INJECTION, SOLUTION INTRAVASCULAR
Status: COMPLETED | OUTPATIENT
Start: 2024-08-19 | End: 2024-08-19

## 2024-08-19 RX ORDER — ENOXAPARIN SODIUM 100 MG/ML
40 INJECTION SUBCUTANEOUS DAILY
Status: DISCONTINUED | OUTPATIENT
Start: 2024-08-19 | End: 2024-08-21 | Stop reason: HOSPADM

## 2024-08-19 RX ADMIN — SODIUM CHLORIDE 75 ML/HR: 9 INJECTION, SOLUTION INTRAVENOUS at 15:08

## 2024-08-19 RX ADMIN — SODIUM CHLORIDE 1000 ML: 9 INJECTION, SOLUTION INTRAVENOUS at 10:02

## 2024-08-19 RX ADMIN — OXYCODONE HYDROCHLORIDE 10 MG: 10 TABLET ORAL at 21:42

## 2024-08-19 RX ADMIN — MORPHINE SULFATE 4 MG: 4 INJECTION, SOLUTION INTRAMUSCULAR; INTRAVENOUS at 10:02

## 2024-08-19 RX ADMIN — IOPAMIDOL 85 ML: 612 INJECTION, SOLUTION INTRAVENOUS at 10:25

## 2024-08-19 RX ADMIN — HYDRALAZINE HYDROCHLORIDE 10 MG: 20 INJECTION INTRAMUSCULAR; INTRAVENOUS at 11:16

## 2024-08-19 RX ADMIN — GABAPENTIN 900 MG: 250 SOLUTION ORAL at 21:41

## 2024-08-19 RX ADMIN — HYDROMORPHONE HYDROCHLORIDE 0.5 MG: 1 INJECTION, SOLUTION INTRAMUSCULAR; INTRAVENOUS; SUBCUTANEOUS at 16:36

## 2024-08-19 RX ADMIN — HYDRALAZINE HYDROCHLORIDE 10 MG: 20 INJECTION INTRAMUSCULAR; INTRAVENOUS at 13:25

## 2024-08-19 RX ADMIN — ONDANSETRON 4 MG: 2 INJECTION INTRAMUSCULAR; INTRAVENOUS at 10:02

## 2024-08-19 RX ADMIN — HYDROMORPHONE HYDROCHLORIDE 0.5 MG: 1 INJECTION, SOLUTION INTRAMUSCULAR; INTRAVENOUS; SUBCUTANEOUS at 19:43

## 2024-08-19 RX ADMIN — MORPHINE SULFATE 4 MG: 4 INJECTION, SOLUTION INTRAMUSCULAR; INTRAVENOUS at 12:10

## 2024-08-19 RX ADMIN — NALOXEGOL OXALATE 25 MG: 25 TABLET, FILM COATED ORAL at 16:52

## 2024-08-19 RX ADMIN — Medication 10 ML: at 21:41

## 2024-08-19 NOTE — ED PROVIDER NOTES
EMERGENCY DEPARTMENT ENCOUNTER    Pt Name: Nelson Garcia  MRN: [id number]  Pt :   1975  Room Number:    Date of encounter:  2024  PCP: Chelita Scott APRN  ED Provider: CHUN Mcclain    Historian: Patient, spouse      HPI:  Chief Complaint: Abdominal pain, constipation        Context: Nelson Garcia is a 49 y.o. male who presents to the ED c/o abdominal pain constipation since 4 days ago.  Patient reports history of esophageal adenocarcinoma, J-tube, was diagnosed 2 months ago, finished radiation and chemotherapy.  States last bowel movement on Saturday.  They tried mag citrate, stool softeners and enema that usually helps.  This time there was no result.  The patient reports increasing abdominal pain, no vomiting, no fever.  Reports significant discomfort to the abdomen.  Did not take maintenance blood pressure medications today.      PAST MEDICAL HISTORY  Past Medical History:   Diagnosis Date    Esophageal cancer     Hypertension     Stroke (cerebrum)          PAST SURGICAL HISTORY  Past Surgical History:   Procedure Laterality Date    BACK SURGERY      ENDOSCOPY N/A 2024    Procedure: ESOPHAGOGASTRODUODENOSCOPY with biopsies;  Surgeon: Taras Hernandez MD;  Location: [facility] ENDOSCOPY;  Service: Gastroenterology;  Laterality: N/A;  pre- nausea/vomiting   post- nearly obstructing distal esophageal mass, gastritis    ENDOSCOPY N/A 2024    Procedure: ESOPHAGOGASTRODUODENOSCOPY, RIGHT PORT PLACEMENT, AND JEJUNOSTOMY TUBE INSERTION;  Surgeon: Jany Valente MD;  Location: [facility] MAIN OR;  Service: Gastroenterology;  Laterality: N/A;    KNEE SURGERY      TEETH EXTRACTION      all         FAMILY HISTORY  Family History   Problem Relation Age of Onset    Diabetes Mother     Stroke Mother     Diabetes Sister     Heart failure Sister     Malig Hyperthermia Neg Hx          SOCIAL HISTORY  Social History     Socioeconomic History    Marital status:    Tobacco Use     Smoking status: Former     Current packs/day: 0.50     Average packs/day: 0.5 packs/day for 35.0 years (17.5 ttl pk-yrs)     Types: Cigarettes    Smokeless tobacco: Former    Tobacco comments:     Quit smoking 3 years ago   Vaping Use    Vaping status: Never Used   Substance and Sexual Activity    Alcohol use: Not Currently    Drug use: Never    Sexual activity: Defer         ALLERGIES  Patient has no known allergies.        REVIEW OF SYSTEMS  Review of Systems       All systems reviewed and negative except for those discussed in HPI.       PHYSICAL EXAM    I have reviewed the triage vital signs and nursing notes.    ED Triage Vitals [08/19/24 0907]   Temp Heart Rate Resp BP SpO2   97.9 °F (36.6 °C) 92 16 (!) 190/131 96 %      Temp src Heart Rate Source Patient Position BP Location FiO2 (%)   Oral Monitor Sitting Right arm --       Physical Exam  GENERAL:   Appears in no acute distress.   HENT: Nares patent.  EYES: No scleral icterus.  CV: Regular rhythm, regular rate.  RESPIRATORY: Normal effort.  No audible wheezes, rales or rhonchi.  ABDOMEN: Soft, protuberant, bowel sounds hypoactive, voluntary guarding, G-tube present to the left abdominal wall  MUSCULOSKELETAL: No deformities.   NEURO: Alert, moves all extremities, follows commands.  SKIN: Warm, dry, no rash visualized.      LAB RESULTS  Recent Results (from the past 24 hour(s))   Lactic Acid, Plasma    Collection Time: 08/19/24  9:44 AM    Specimen: Blood   Result Value Ref Range    Lactate 1.4 0.5 - 2.0 mmol/L   Lavender Top    Collection Time: 08/19/24  9:44 AM   Result Value Ref Range    Extra Tube hold for add-on    Gray Top    Collection Time: 08/19/24  9:44 AM   Result Value Ref Range    Extra Tube Hold for add-ons.    Light Blue Top    Collection Time: 08/19/24  9:44 AM   Result Value Ref Range    Extra Tube Hold for add-ons.    CBC Auto Differential    Collection Time: 08/19/24  9:44 AM    Specimen: Blood   Result Value Ref Range    WBC 5.88 3.40 -  10.80 10*3/mm3    RBC 4.37 4.14 - 5.80 10*6/mm3    Hemoglobin 12.6 (L) 13.0 - 17.7 g/dL    Hematocrit 38.2 37.5 - 51.0 %    MCV 87.4 79.0 - 97.0 fL    MCH 28.8 26.6 - 33.0 pg    MCHC 33.0 31.5 - 35.7 g/dL    RDW 23.7 (H) 12.3 - 15.4 %    RDW-SD 73.2 (H) 37.0 - 54.0 fl    MPV 11.3 6.0 - 12.0 fL    Platelets 92 (L) 140 - 450 10*3/mm3    Neutrophil % 82.5 (H) 42.7 - 76.0 %    Lymphocyte % 3.1 (L) 19.6 - 45.3 %    Monocyte % 13.8 (H) 5.0 - 12.0 %    Eosinophil % 0.0 (L) 0.3 - 6.2 %    Basophil % 0.3 0.0 - 1.5 %    Immature Grans % 0.3 0.0 - 0.5 %    Neutrophils, Absolute 4.85 1.70 - 7.00 10*3/mm3    Lymphocytes, Absolute 0.18 (L) 0.70 - 3.10 10*3/mm3    Monocytes, Absolute 0.81 0.10 - 0.90 10*3/mm3    Eosinophils, Absolute 0.00 0.00 - 0.40 10*3/mm3    Basophils, Absolute 0.02 0.00 - 0.20 10*3/mm3    Immature Grans, Absolute 0.02 0.00 - 0.05 10*3/mm3    nRBC 0.0 0.0 - 0.2 /100 WBC   Scan Slide    Collection Time: 08/19/24  9:44 AM    Specimen: Blood   Result Value Ref Range    Anisocytosis Mod/2+ None Seen    [place] Cells Slight/1+ None Seen    Ovalocytes Slight/1+ None Seen    WBC Morphology Normal Normal    Platelet Morphology Normal Normal   Protime-INR    Collection Time: 08/19/24  9:44 AM    Specimen: Blood   Result Value Ref Range    Protime 14.9 (H) 12.2 - 14.5 Seconds    INR 1.16 (H) 0.89 - 1.12   ECG 12 Lead QT Measurement    Collection Time: 08/19/24 10:51 AM   Result Value Ref Range    QT Interval 446 ms    QTC Interval 508 ms   Comprehensive Metabolic Panel    Collection Time: 08/19/24 11:15 AM    Specimen: Blood   Result Value Ref Range    Glucose 109 (H) 65 - 99 mg/dL    BUN 15 6 - 20 mg/dL    Creatinine 0.96 0.76 - 1.27 mg/dL    Sodium 136 136 - 145 mmol/L    Potassium 3.9 3.5 - 5.2 mmol/L    Chloride 96 (L) 98 - 107 mmol/L    CO2 28.0 22.0 - 29.0 mmol/L    Calcium 9.0 8.6 - 10.5 mg/dL    Total Protein 7.4 6.0 - 8.5 g/dL    Albumin 4.0 3.5 - 5.2 g/dL    ALT (SGPT) 21 1 - 41 U/L    AST (SGOT) 32 1 - 40 U/L     Alkaline Phosphatase 83 39 - 117 U/L    Total Bilirubin 1.6 (H) 0.0 - 1.2 mg/dL    Globulin 3.4 gm/dL    A/G Ratio 1.2 g/dL    BUN/Creatinine Ratio 15.6 7.0 - 25.0    Anion Gap 12.0 5.0 - 15.0 mmol/L    eGFR 96.9 >60.0 mL/min/1.73   Lipase    Collection Time: 08/19/24 11:15 AM    Specimen: Blood   Result Value Ref Range    Lipase 12 (L) 13 - 60 U/L   C-reactive Protein    Collection Time: 08/19/24 11:15 AM    Specimen: Blood   Result Value Ref Range    C-Reactive Protein 12.04 (H) 0.00 - 0.50 mg/dL   Procalcitonin    Collection Time: 08/19/24 11:15 AM    Specimen: Blood   Result Value Ref Range    Procalcitonin 0.68 (H) 0.00 - 0.25 ng/mL   Magnesium    Collection Time: 08/19/24 11:15 AM    Specimen: Blood   Result Value Ref Range    Magnesium 2.1 1.6 - 2.6 mg/dL   Green Top (Gel)    Collection Time: 08/19/24 11:15 AM   Result Value Ref Range    Extra Tube Hold for add-ons.    Gold Top - SST    Collection Time: 08/19/24 11:15 AM   Result Value Ref Range    Extra Tube Hold for add-ons.        If labs were ordered, I independently reviewed the results and considered them in treating the patient.        RADIOLOGY  CT Abdomen Pelvis With Contrast    Result Date: 8/19/2024  CT ABDOMEN PELVIS W CONTRAST Date of Exam: 8/19/2024 10:20 AM EDT Indication: abdominal pain. Comparison: CT abdomen pelvis 6/5/2024. Technique: Axial CT images were obtained of the abdomen and pelvis following the uneventful intravenous administration of 85 mL Isovue-300. Reconstructed coronal and sagittal images were also obtained. Automated exposure control and iterative construction methods were used. Findings: Lower thorax: Bibasilar scarring/subsegmental atelectasis. No focal consolidation. Liver: Normal morphology. No focal hepatic lesion. Gallbladder and bile ducts: Gallbladder is unremarkable. No biliary ductal dilatation. Pancreas: No pancreatic duct dilation. No surrounding inflammation. Spleen: Normal in size. Adrenal glands: Unchanged 1.6  cm left adrenal nodule, which had density of less than 10 Hounsfield units on prior noncontrast exam, suggestive of an adenoma. No right adrenal nodule. Kidneys: Symmetric in size and enhancement. No hydronephrosis. Urinary bladder: Unremarkable. Reproductive organs: Unremarkable. Stomach and bowel: Percutaneous jejunostomy tube in expected position. Circumferential wall thickening of the lower thoracic esophagus and proximal stomach, new/increased. Fluid throughout the proximal half of the colon. No evidence of bowel obstruction. Lymph nodes: Multiple mildly enlarged gastrohepatic and left retroperitoneal lymph nodes, similar to prior exam. For example, gastrohepatic ligament measures 1.1 cm in short axis, unchanged (series 2 image 40). Vessels: No abdominal aortic aneurysm. Atherosclerosis. Major vasculature is patent. Peritoneum and retroperitoneum: Ill-defined free fluid in the upper abdomen. No organized fluid collection. No free air. Soft tissues: Midline incision. Osseous structures: No acute or suspicious osseous lesions. Lumbosacral degenerative changes.     Impression: Findings of esophagogastritis, suspected to be secondary to posttreatment changes related to underlying esophageal malignancy. Associated surrounding ill-defined fluid fluid without organized fluid collection. Fluid throughout the proximal half of the colon, suggestive of diarrheal illness. Similar upper abdominal lymphadenopathy. Percutaneous jejunostomy tube in expected position. Additional findings as above. Electronically Signed: Jenaro Garcia MD  8/19/2024 10:53 AM EDT  Workstation ID: [id number]     I ordered and independently reviewed the above noted radiographic studies.      PROCEDURES    Procedures    ECG 12 Lead QT Measurement   Preliminary Result   Test Reason : QT Measurement   Blood Pressure :   */*   mmHG   Vent. Rate :  78 BPM     Atrial Rate :  78 BPM      P-R Int : 136 ms          QRS Dur :  96 ms       QT Int : 446 ms        P-R-T Axes :   6 -26  -6 degrees      QTc Int : 508 ms      Normal sinus rhythm   Minimal voltage criteria for LVH, may be normal variant   Septal infarct , age undetermined   Prolonged QT   Abnormal ECG   No previous ECGs available      Referred By: EDMD           Confirmed By:           MEDICATIONS GIVEN IN ER    Medications   sodium chloride 0.9 % flush 10 mL (has no administration in time range)   Morphine sulfate (PF) injection 4 mg (4 mg Intravenous Given 8/19/24 1002)   ondansetron (ZOFRAN) injection 4 mg (4 mg Intravenous Given 8/19/24 1002)   sodium chloride 0.9 % bolus 1,000 mL (1,000 mL Intravenous New Bag 8/19/24 1002)   hydrALAZINE (APRESOLINE) injection 10 mg (10 mg Intravenous Given 8/19/24 1116)   iopamidol (ISOVUE-300) 61 % injection 100 mL (85 mL Intravenous Given 8/19/24 1025)   Morphine sulfate (PF) injection 4 mg (4 mg Intravenous Given 8/19/24 1210)   hydrALAZINE (APRESOLINE) injection 10 mg (10 mg Intravenous Given 8/19/24 1325)         MEDICAL DECISION MAKING, PROGRESS, and CONSULTS    All labs, if obtained, have been independently reviewed by me.  All radiology studies, if obtained, have been reviewed by me and the radiologist dictating the report.  All EKG's, if obtained, have been independently viewed and interpreted by me/my attending physician.      Discussion below represents my analysis of pertinent findings related to patient's condition, differential diagnosis, treatment plan and final disposition.  Patient is 49-year-old male who presents for evaluation of abdominal pain, constipation, on physical exam he was ill-appearing, abdomen was protuberant, tender on palpation lateral quadrants, there was voluntary guarding, no obvious peritoneal signs.  Lab work was significant for elevated CRP 12.04, elevated procalcitonin 0.68, bilirubin 1.6, normal white count of 5.88 and stable hemoglobin hematocrit 12.6 38.2, CT abdomen pelvis was significant for finding of esophagus arthritis with  ill-defined fluid without organized fluid collection.  Furthermore fluid noted through the proximal half of the colon suggestive of diarrheal illness.  Patient's vital signs were notable for elevated blood pressure, patient was not taking home blood pressure medication due to recent weight loss.  Patient received hydralazine 10 mg IV x 2, pain control.  No significant improvement of blood pressure readings.  Patient reports no chest pain, blurry vision, shortness of breath or dizziness.  I spoke with Dr. Reddy oncology, will admit patient for pain control, stool studies, further evaluation.  Dr. Benjamin consulted for admission                       Differential diagnosis:    Constipation, bowel obstruction, colitis, gastritis, electrolyte disbalance, malignancy      Additional sources:    - Discussed/ obtained information from independent historians: Spouse    - External (non-ED) record review: 7/22/2024 Dr. Reddy esophageal adenocarcinoma office visit    - Chronic or social conditions impacting care: Malignancy    - Shared decision making: Patient, family      Orders placed during this visit:  Orders Placed This Encounter   Procedures    Gastrointestinal Panel, PCR - Stool, Per Rectum    CT Abdomen Pelvis With Contrast    Cardinal Draw    Comprehensive Metabolic Panel    Lipase    Urinalysis With Culture If Indicated -    Lactic Acid, Plasma    C-reactive Protein    Procalcitonin    Magnesium    CBC Auto Differential    Scan Slide    Protime-INR    ECG 12 Lead QT Measurement    Insert Peripheral IV    Initiate Observation Status    ED Bed Request    CBC & Differential    Green Top (Gel)    Lavender Top    Gold Top - SST    Gray Top    Light Blue Top         Additional orders considered but not ordered:  CT chest    ED Course:    Consultants:      ED Course as of 08/19/24 1351   Mon Aug 19, 2024   1315 Spoke with Dr. Givens, oncology.  He reviews patient lab work, concern with elevated inflammatory markers.   Esophagogastritis and fluid could be due to chemotherapy, radiation.  Fluid within proximal half of colon, needs stool studies.  Okay to admit for pain control, further evaluation [IR]   1346 WBC: 5.88 [IR]      ED Course User Index  [IR] Oksana Birch, CHUN              Shared Decision Making:  After my consideration of clinical presentation and any laboratory/radiology studies obtained, I discussed the findings with the patient/patient representative who is in agreement with the treatment plan and the final disposition.   Risks and benefits of discharge and/or observation/admission were discussed.       AS OF 13:51 EDT VITALS:    BP - (!) 211/131  HR - 84  TEMP - 97.9 °F (36.6 °C) (Oral)  O2 SATS - 97%                  DIAGNOSIS  Final diagnoses:   Generalized abdominal pain   Esophagitis   Acute gastritis, presence of bleeding unspecified, unspecified gastritis type   Hypertension, unspecified type         DISPOSITION  admit    Please note that portions of this document were completed with voice recognition software.     CHUN Mcclain   08/19/24   13:51 EDT        Oksana Birch APRN  08/19/24 1351

## 2024-08-19 NOTE — CONSULTS
HEMATOLOGY/ONCOLOGY INPATIENT CONSULT    REASON FOR CONSULT: History of esophageal adenocarcinoma    Subjective   HISTORY OF PRESENT ILLNESS;   Mr. Garcia is a 49-year-old gentleman with past medical history of esophageal cancer status post combination chemotherapy/radiation finishing in July 2024, hypertension, history of stroke, hyperlipidemia who presents to [facility] with worsening abdominal pain and discomfort.  Patient has a past 4-5 days, he has been able to tolerate oral intake.  Has previously been predominantly on PEG tube feedings.  He notes that he has been eating more greasy foods during this timeframe.  Has had significant abdominal pain or discomfort over the past 24-48 hours.  Has not had a bowel movement in several days as well.  CT scans on presentation concerning for esophagogastritis as well as fluid within the colon concerning for diarrheal illness.  He denies any fevers or chills.  Notes continued abdominal discomfort today.  Denies any significant nausea or vomiting at this time      Past Medical History:   Diagnosis Date    Esophageal cancer     Hypertension     Stroke (cerebrum)      Past Surgical History:   Procedure Laterality Date    BACK SURGERY      ENDOSCOPY N/A 6/4/2024    Procedure: ESOPHAGOGASTRODUODENOSCOPY with biopsies;  Surgeon: Taras Hernandez MD;  Location: [facility] ENDOSCOPY;  Service: Gastroenterology;  Laterality: N/A;  pre- nausea/vomiting   post- nearly obstructing distal esophageal mass, gastritis    ENDOSCOPY N/A 6/14/2024    Procedure: ESOPHAGOGASTRODUODENOSCOPY, RIGHT PORT PLACEMENT, AND JEJUNOSTOMY TUBE INSERTION;  Surgeon: Jany Valente MD;  Location: [facility] MAIN OR;  Service: Gastroenterology;  Laterality: N/A;    KNEE SURGERY      TEETH EXTRACTION      all       No current facility-administered medications on file prior to encounter.     Current Outpatient Medications on File Prior to Encounter   Medication Sig Dispense Refill    acetaminophen  (TYLENOL) 325 MG tablet Take 2 tablets by mouth Every 6 (Six) Hours As Needed for Mild Pain .      Atorvastatin Calcium 20 MG/5ML suspension Take 20 mls by mouth Daily. 600 mL 5    famotidine (PEPCID) 40 mg/5 mL suspension Take 2.5 mL by mouth 2 (Two) Times a Day. 150 mL 3    Gabapentin (NEURONTIN) 50 mg/mL solution solution Take 18 mL by mouth 4 (Four) Times a Day. 470 mL 5    oxyCODONE (ROXICODONE) 10 MG tablet Administer 1 tablet per J tube Every 4 (Four) Hours As Needed. 180 tablet 0    promethazine (PHENERGAN) 25 MG suppository Insert 1 suppository into the rectum Every 6 (Six) Hours As Needed for Nausea or Vomiting. 30 suppository 2    atorvastatin (LIPITOR) 80 MG tablet Take 1 tablet by mouth Daily.      cloNIDine (CATAPRES) 0.2 MG tablet Take 1 tablet by mouth As Needed.      docusate (COLACE) 50 MG/5ML liquid Administer 10ml per j tube twice a day as needed for constipation. 240 mL 2    docusate sodium (COLACE) 50 mg/5 mL liquid Administer 10 mL per J tube 2 (Two) Times a Day As Needed for Constipation. 240 mL 2    fentaNYL (DURAGESIC) 25 MCG/HR patch Place 1 patch on the skin as directed by provider Every 72 (Seventy-Two) Hours. 5 patch 0    Lidocaine Viscous HCl (XYLOCAINE) 2 % solution Take 10 mL by mouth As Needed for Mild Pain. 100 mL 3    lidocaine-prilocaine (EMLA) 2.5-2.5 % cream Please apply a small amount to port site about 45 min prior to infusion and cover with Saran Wrap 30 g 2    Magic Mouthwash Radonc (nystatin - diphenhydrAMINE HCl - dexamethasone - lidocaine) Swish and swallow 10 mL 4 (Four) Times a Day Before Meals & at Bedtime. 482 mL 3    methocarbamol (ROBAXIN) 500 MG tablet Take 1 tablet by mouth 4 (Four) Times a Day.      neomycin-polymyxin-dexamethasone (MAXITROL) 0.1 % ophthalmic suspension Administer 2 drops to both eyes 4 (Four) Times a Day. 5 mL 0    nicotine polacrilex (NICORETTE) 4 MG gum       Nutritional Supplements (Boost Plus) liquid Take 237 mL by mouth 3 (Three) Times a  "Day.      nystatin (MYCOSTATIN) 196983 UNIT/GM powder Apply to skin folds topically to the appropriate area as directed 2 (Two) Times a Day 30 g 0    ondansetron (ZOFRAN) 4 MG/5ML solution Take 10 mL by mouth Every 8 (Eight) Hours As Needed for Nausea or Vomiting. 300 mL 3    ondansetron ODT (ZOFRAN-ODT) 8 MG disintegrating tablet Place 1 tablet on the tongue Every 8 (Eight) Hours As Needed for Nausea or Vomiting. 30 tablet 3    sildenafil (VIAGRA) 50 MG tablet TAKE ONE TABLET BY MOUTH DAILY AS NEEDED FOR ERECTILE DYSFUNCTION 30 tablet 3    vitamin D3 125 MCG (5000 UT) capsule capsule Take 1 capsule by mouth Daily.         No Known Allergies    Social History     Socioeconomic History    Marital status:    Tobacco Use    Smoking status: Former     Current packs/day: 0.50     Average packs/day: 0.5 packs/day for 35.0 years (17.5 ttl pk-yrs)     Types: Cigarettes    Smokeless tobacco: Former    Tobacco comments:     Quit smoking 3 years ago   Vaping Use    Vaping status: Never Used   Substance and Sexual Activity    Alcohol use: Not Currently    Drug use: Never    Sexual activity: Defer       Family History   Problem Relation Age of Onset    Diabetes Mother     Stroke Mother     Diabetes Sister     Heart failure Sister     Malig Hyperthermia Neg Hx          REVIEW OF SYSTEMS:  A 12 point review of systems was performed and is negative except as noted above.    Objective   PHYSICAL EXAM:    BP (!) 182/115 (BP Location: Left arm, Patient Position: Lying)   Pulse 99   Temp 98.2 °F (36.8 °C) (Oral)   Resp 18   Ht 182.9 cm (72\")   Wt 69.9 kg (154 lb)   SpO2 97%   BMI 20.89 kg/m²       General: Laying in bed in no acute distress  HEENT: sclerae anicteric, oropharynx clear  Lymphatics: no cervical, supraclavicular, inguinal, or axillary adenopathy  Neck: Supple. No thyromegaly.  Cardiovascular: regular rate and rhythm, no murmurs  Lungs: clear to auscultation bilaterally. No respiratory distress  Abdomen: soft, " nontender, nondistended.  No palpable organomegaly  Extremities: no lower extremity edema, cyanosis, or clubbing  Skin: no rashes, lesions, bruising, or petechiae  Neuro: Alert and oriented x3. Moves all extremities.    Results:    Results from last 7 days   Lab Units 08/19/24  0944   WBC 10*3/mm3 5.88   HEMOGLOBIN g/dL 12.6*   PLATELETS 10*3/mm3 92*     Results from last 7 days   Lab Units 08/19/24  1115   SODIUM mmol/L 136   POTASSIUM mmol/L 3.9   CO2 mmol/L 28.0   BUN mg/dL 15   CREATININE mg/dL 0.96   GLUCOSE mg/dL 109*     Results from last 7 days   Lab Units 08/19/24  1115   AST (SGOT) U/L 32   ALT (SGPT) U/L 21   BILIRUBIN mg/dL 1.6*   ALK PHOS U/L 83         CT Abdomen Pelvis With Contrast    Result Date: 8/19/2024  Impression: Findings of esophagogastritis, suspected to be secondary to posttreatment changes related to underlying esophageal malignancy. Associated surrounding ill-defined fluid fluid without organized fluid collection. Fluid throughout the proximal half of the colon, suggestive of diarrheal illness. Similar upper abdominal lymphadenopathy. Percutaneous jejunostomy tube in expected position. Additional findings as above. Electronically Signed: Jenaro Garcia MD  8/19/2024 10:53 AM EDT  Workstation ID: [id number]     Assessment    ASSESSMENT & PLAN:  Esophageal adenocarcinoma  -Completed CarboTaxol/radiation in July 2024  -PET/CT following treatment with a significant response  -Has been seen by Dr. Gupta with plans for surgical intervention next month  -CT scan on presentation personally reviewed and notable for significant esophagogastritis as well as fluid within the colon suggestive of diarrheal illness.  No concerns for malignancy    Abdominal pain  -Likely secondary to inflammation from his chemotherapy/radiation  -Will plan to start Carafate today    Constipation  -Likely related to his chronic opioid use  -Bowel regimen through primary team  -Currently on IV fluids  -GI panel  ordered    Anemia/thrombocytopenia  -CBC on presentation reviewed  -Improving while off therapy.  No indication for PRBC transfusion    Thank you for the consult.  Please do not hesitate to contact with any questions or concerns     Aquiles Reddy MD  Hematology and Oncology    8/19/2024  16:21 EDT

## 2024-08-19 NOTE — CASE MANAGEMENT/SOCIAL WORK
Discharge Planning Assessment  [facility]     Patient Name: Nelson Garcia  MRN: [id number]  Today's Date: 8/19/2024    Admit Date: 8/19/2024    Plan: IDP   Discharge Needs Assessment       Row Name 08/19/24 1343       Living Environment    People in Home child(haresh), dependent;spouse    Current Living Arrangements home    In the past 12 months has the electric, gas, oil, or water company threatened to shut off services in your home? No    Primary Care Provided by self    Provides Primary Care For no one    Family Caregiver if Needed spouse    Quality of Family Relationships helpful;involved    Able to Return to Prior Arrangements yes       Transportation Needs    In the past 12 months, has lack of transportation kept you from medical appointments or from getting medications? no    In the past 12 months, has lack of transportation kept you from meetings, work, or from getting things needed for daily living? No       Food Insecurity    Within the past 12 months, you worried that your food would run out before you got the money to buy more. Never true    Within the past 12 months, the food you bought just didn't last and you didn't have money to get more. Never true       Transition Planning    Patient/Family Anticipates Transition to home with family    Transportation Anticipated family or friend will provide       Discharge Needs Assessment    Readmission Within the Last 30 Days no previous admission in last 30 days    Equipment Currently Used at Home feeding device    Concerns to be Addressed denies needs/concerns at this time                   Discharge Plan       Row Name 08/19/24 1344       Plan    Plan IDP    Plan Comments MSW met with Pt and wife at bedside to complete IDP. Pt lives with wife and children in [facility]. Pt’s wife confirmed demographics and reports PCP is Chelita Scott. Pt has [employer] insurance coverage. Pt independent at baseline; Pt has feeding pump, no HH, and no home O2.  Pt’s preferred pharmacy is [facility]. Pt still drives and family can transport. Pt and wife denied needs or concerns. CM will continue to follow.    Final Discharge Disposition Code 30 - still a patient                  Continued Care and Services - Admitted Since 8/19/2024    No active coordination exists for this encounter.       Selected Continued Care - Prior Encounters Includes continued care and service providers with selected services from prior encounters from 5/21/2024 to 8/19/2024      Discharged on 6/18/2024 Admission date: 6/13/2024 - Discharge disposition: Home or Self Care      Durable Medical Equipment       Service Provider Selected Services Address Phone Fax Patient Preferred    [facility] [address], [place] [address] [phone] --              Dialysis/Infusion       Service Provider Selected Services Address Phone Fax Patient Preferred    [facility] [address], [facility] [address] [phone] --                          Expected Discharge Date and Time       Expected Discharge Date Expected Discharge Time    Aug 22, 2024            Demographic Summary       Row Name 08/19/24 1342       General Information    Arrived From home    Referral Source admission list;emergency department    Reason for Consult discharge planning    Preferred Language English                   Functional Status       Row Name 08/19/24 1342       Functional Status    Usual Activity Tolerance good    Current Activity Tolerance good       Physical Activity    On average, how many days per week do you engage in moderate to strenuous exercise (like a brisk walk)? 0 days    On average, how many minutes do you engage in exercise at this level? 0 min    Number of minutes of exercise per week 0       Functional Status, IADL    Medications independent    Meal Preparation independent     Housekeeping independent    Laundry independent    Shopping independent                               MAMTA Ayers

## 2024-08-19 NOTE — ED NOTES
Nelson Garcia    Nursing Report ED to Floor:  Mental status: A&O x 4   Ambulatory status: up with assist   Oxygen Therapy:  room air  Cardiac Rhythm: normal sinus  Admitted from: ED  Safety Concerns:  none  Social Issues: none  ED Room #:  03    ED Nurse Phone Extension - [phone] or may call [phone].      HPI:   Chief Complaint   Patient presents with    Abdominal Pain    Constipation       Past Medical History:  Past Medical History:   Diagnosis Date    Esophageal cancer     Hypertension     Stroke (cerebrum)         Past Surgical History:  Past Surgical History:   Procedure Laterality Date    BACK SURGERY      ENDOSCOPY N/A 6/4/2024    Procedure: ESOPHAGOGASTRODUODENOSCOPY with biopsies;  Surgeon: Taras Hernandez MD;  Location: [facility] ENDOSCOPY;  Service: Gastroenterology;  Laterality: N/A;  pre- nausea/vomiting   post- nearly obstructing distal esophageal mass, gastritis    ENDOSCOPY N/A 6/14/2024    Procedure: ESOPHAGOGASTRODUODENOSCOPY, RIGHT PORT PLACEMENT, AND JEJUNOSTOMY TUBE INSERTION;  Surgeon: Jany Valente MD;  Location: [facility] MAIN OR;  Service: Gastroenterology;  Laterality: N/A;    KNEE SURGERY      TEETH EXTRACTION      all        Admitting Doctor:   Aden Evangelista MD    Consulting Provider(s):  Consults       No orders found from 7/21/2024 to 8/20/2024.             Admitting Diagnosis:   The primary encounter diagnosis was Generalized abdominal pain. Diagnoses of Esophagitis and Acute gastritis, presence of bleeding unspecified, unspecified gastritis type were also pertinent to this visit.    Most Recent Vitals:   Vitals:    08/19/24 1008 08/19/24 1100 08/19/24 1120 08/19/24 1300   BP:  (!) 209/118 (!) 221/128 (!) 211/131   BP Location:       Patient Position:       Pulse: 79 87 79 84   Resp:       Temp:       TempSrc:       SpO2: 97% 90% 96% 97%   Weight:       Height:           Active LDAs/IV Access:   Lines, Drains & Airways       Active LDAs       Name Placement date Placement time Site Days  "   Peripheral IV 08/19/24 0945 Right Antecubital 08/19/24 0945  Antecubital  less than 1    Gastrostomy/Enterostomy Jejunostomy 1 19 Fr. LLQ 06/14/24 2025  LLQ  65    Single Lumen Implantable Port 06/14/24 Right Chest 06/14/24 1959  Chest  65                    Labs (abnormal labs have a star):   Labs Reviewed   COMPREHENSIVE METABOLIC PANEL - Abnormal; Notable for the following components:       Result Value    Glucose 109 (*)     Chloride 96 (*)     Total Bilirubin 1.6 (*)     All other components within normal limits    Narrative:     GFR Normal >60  Chronic Kidney Disease <60  Kidney Failure <15     LIPASE - Abnormal; Notable for the following components:    Lipase 12 (*)     All other components within normal limits   C-REACTIVE PROTEIN - Abnormal; Notable for the following components:    C-Reactive Protein 12.04 (*)     All other components within normal limits   PROCALCITONIN - Abnormal; Notable for the following components:    Procalcitonin 0.68 (*)     All other components within normal limits    Narrative:     As a Marker for Sepsis (Non-Neonates):    1. <0.5 ng/mL represents a low risk of severe sepsis and/or septic shock.  2. >2 ng/mL represents a high risk of severe sepsis and/or septic shock.    As a Marker for Lower Respiratory Tract Infections that require antibiotic therapy:    PCT on Admission    Antibiotic Therapy       6-12 Hrs later    >0.5                Strongly Recommended  >0.25 - <0.5        Recommended   0.1 - 0.25          Discouraged              Remeasure/reassess PCT  <0.1                Strongly Discouraged     Remeasure/reassess PCT    As 28 day mortality risk marker: \"Change in Procalcitonin Result\" (>80% or <=80%) if Day 0 (or Day 1) and Day 4 values are available. Refer to http://www.[employer]-pct-calculator.com    Change in PCT <=80%  A decrease of PCT levels below or equal to 80% defines a positive change in PCT test result representing a higher risk for 28-day all-cause " mortality of patients diagnosed with severe sepsis for septic shock.    Change in PCT >80%  A decrease of PCT levels of more than 80% defines a negative change in PCT result representing a lower risk for 28-day all-cause mortality of patients diagnosed with severe sepsis or septic shock.      CBC WITH AUTO DIFFERENTIAL - Abnormal; Notable for the following components:    Hemoglobin 12.6 (*)     RDW 23.7 (*)     RDW-SD 73.2 (*)     Platelets 92 (*)     Neutrophil % 82.5 (*)     Lymphocyte % 3.1 (*)     Monocyte % 13.8 (*)     Eosinophil % 0.0 (*)     Lymphocytes, Absolute 0.18 (*)     All other components within normal limits    Narrative:     Appended report. These results have been appended to a previously verified report.   PROTIME-INR - Abnormal; Notable for the following components:    Protime 14.9 (*)     INR 1.16 (*)     All other components within normal limits   LACTIC ACID, PLASMA - Normal   MAGNESIUM - Normal   GASTROINTESTINAL PANEL, PCR (PREFERRED) DOES NOT INCLUDE CDIFF   RAINBOW DRAW    Narrative:     The following orders were created for panel order [place] Draw.  Procedure                               Abnormality         Status                     ---------                               -----------         ------                     Green Top (Gel)[[id number]]                                  Final result               Lavender Top[[id number]]                                     Final result               Gold Top - SST[[id number]]                                   Final result               Jaramillo Top[[id number]]                                         Final result               Light Blue Top[[id number]]                                   Final result                 Please view results for these tests on the individual orders.   SCAN SLIDE   URINALYSIS W/ CULTURE IF INDICATED   CBC AND DIFFERENTIAL    Narrative:     The following orders were created for panel order CBC & Differential.  Procedure                                Abnormality         Status                     ---------                               -----------         ------                     CBC Auto Differential[[id number]]        Abnormal            Final result               Scan Slide[[id number]]                                       Final result                 Please view results for these tests on the individual orders.   GREEN TOP   LAVENDER TOP   GOLD TOP - SST   GRAY TOP   LIGHT BLUE TOP       Meds Given in ED:   Medications   sodium chloride 0.9 % flush 10 mL (has no administration in time range)   Morphine sulfate (PF) injection 4 mg (4 mg Intravenous Given 8/19/24 1002)   ondansetron (ZOFRAN) injection 4 mg (4 mg Intravenous Given 8/19/24 1002)   sodium chloride 0.9 % bolus 1,000 mL (1,000 mL Intravenous New Bag 8/19/24 1002)   hydrALAZINE (APRESOLINE) injection 10 mg (10 mg Intravenous Given 8/19/24 1116)   iopamidol (ISOVUE-300) 61 % injection 100 mL (85 mL Intravenous Given 8/19/24 1025)   Morphine sulfate (PF) injection 4 mg (4 mg Intravenous Given 8/19/24 1210)   hydrALAZINE (APRESOLINE) injection 10 mg (10 mg Intravenous Given 8/19/24 1325)           Last NIH score:                                                          Dysphagia screening results:        [place] Coma Scale:  No data recorded     CIWA:        Restraint Type:            Isolation Status:  No active isolations

## 2024-08-19 NOTE — H&P
[facility] Medicine Services  HISTORY AND PHYSICAL    Patient Name: Nelson Garcia  : 1975  MRN: [id number]  Primary Care Physician: Chelita Scott APRN  Date of admission: 2024      Subjective   Subjective     Chief Complaint: Abdominal pain    HPI:  Nelson Garcia is a 49 y.o. male with history of hypertension, FREDRICK, severe malnutrition, prior CVA, hyperlipidemia, GERD, tobacco abuse, chronic anemia, esophageal adenocarcinoma on chemotherapy and radiation followed by Dr. Reddy who presented to the ED with progressively worsening abdominal pain and constipation. He mentions that he has not had a bowel movement for almost 1 week, despite using mag citrate, suppositories, milk of magnesia and other stool softeners.  He denies any fevers or chills, initially had nausea/vomiting but this is since resolved. On arrival here he is hypertensive, initial workup reveals Pro-Storm 0.68, CRP 12.0, CBC/BMP are wnl.  CT abdomen pelvis shows esophagogastritis, and fluid throughout proximal colon suggestive of diarrheal illness.  Supportive therapy was initiated and hospital medicine was asked to admit.      Personal History     Past Medical History:   Diagnosis Date    Esophageal cancer     Hypertension     Stroke (cerebrum)          Oncology Problem List:  Esophageal adenocarcinoma (2024; Status: Active)    Oncology/Hematology History   Esophageal adenocarcinoma   2024 Cancer Staged    Staging form: Esophagus - Adenocarcinoma, AJCC 8th Edition  - Clinical stage from 2024: Stage III (cT3, cN1, cM0, G2) - Signed by Viet Ramirez MD on 2024 Initial Diagnosis    Esophageal adenocarcinoma     2024 -  Chemotherapy    OP GE PACLitaxel / CARBOplatin (weekly) + XRT     2024 - 2024 Radiation    Radiation OncologyTreatment Course:  Nelson Garcia received 5040 cGy in 28 fractions to esophagus via External Beam Radiation -  EBRT.       Past Surgical History:   Procedure Laterality Date    BACK SURGERY      ENDOSCOPY N/A 6/4/2024    Procedure: ESOPHAGOGASTRODUODENOSCOPY with biopsies;  Surgeon: Taras Hernandez MD;  Location: [facility] ENDOSCOPY;  Service: Gastroenterology;  Laterality: N/A;  pre- nausea/vomiting   post- nearly obstructing distal esophageal mass, gastritis    ENDOSCOPY N/A 6/14/2024    Procedure: ESOPHAGOGASTRODUODENOSCOPY, RIGHT PORT PLACEMENT, AND JEJUNOSTOMY TUBE INSERTION;  Surgeon: Jany Valente MD;  Location: [facility] MAIN OR;  Service: Gastroenterology;  Laterality: N/A;    KNEE SURGERY      TEETH EXTRACTION      all       Family History: family history includes Diabetes in his mother and sister; Heart failure in his sister; Stroke in his mother.     Social History:  reports that he has quit smoking. His smoking use included cigarettes. He has a 17.5 pack-year smoking history. He has quit using smokeless tobacco. He reports that he does not currently use alcohol. He reports that he does not use drugs.  Social History     Social History Narrative    Not on file       Medications:  Available home medication information reviewed.  Atorvastatin Calcium, Boost Plus, Lidocaine Viscous HCl, Magic Mouthwash Radonc (nystatin - diphenhydrAMINE HCl - dexamethasone - lidocaine), acetaminophen, atorvastatin, cloNIDine, docusate, famotidine, fentaNYL, gabapentin, lidocaine-prilocaine, methocarbamol, neomycin-polymyxin-dexamethasone, nicotine polacrilex, nystatin, ondansetron, ondansetron ODT, oxyCODONE, promethazine, sildenafil, and vitamin D3    No Known Allergies    Objective   Objective     Vital Signs:   Temp:  [97.9 °F (36.6 °C)] 97.9 °F (36.6 °C)  Heart Rate:  [79-99] 99  Resp:  [16] 16  BP: (179-221)/() 179/98       Physical Exam   Constitutional: Chronically ill-appearing male awake, alert  Eyes: PERRLA, sclerae anicteric, no conjunctival injection  HENT: NCAT, mucous membranes moist  Neck: Supple, no thyromegaly, no  lymphadenopathy, trachea midline  Respiratory: Clear to auscultation bilaterally, nonlabored respirations   Cardiovascular: RRR, no murmurs, rubs, or gallops, palpable pedal pulses bilaterally  Gastrointestinal: Positive bowel sounds, soft, nontender, nondistended, J tube  Musculoskeletal: No bilateral ankle edema, no clubbing or cyanosis to extremities  Psychiatric: Appropriate affect, cooperative  Neurologic: Oriented x 3, strength symmetric in all extremities, Cranial Nerves grossly intact to confrontation, speech clear  Skin: No rashes     Result Review:  I have personally reviewed the results from the time of this admission to 8/19/2024 14:13 EDT and agree with these findings:  [x]  Laboratory list / accordion  [x]  Microbiology  [x]  Radiology  []  EKG/Telemetry   []  Cardiology/Vascular   []  Pathology  []  Old records  []  Other:  Most notable findings include:       LAB RESULTS:      Lab 08/19/24  1115 08/19/24  0944   WBC  --  5.88   HEMOGLOBIN  --  12.6*   HEMATOCRIT  --  38.2   PLATELETS  --  92*   NEUTROS ABS  --  4.85   IMMATURE GRANS (ABS)  --  0.02   LYMPHS ABS  --  0.18*   MONOS ABS  --  0.81   EOS ABS  --  0.00   MCV  --  87.4   CRP 12.04*  --    PROCALCITONIN 0.68*  --    LACTATE  --  1.4   PROTIME  --  14.9*   INR  --  1.16*         Lab 08/19/24  1115   SODIUM 136   POTASSIUM 3.9   CHLORIDE 96*   CO2 28.0   ANION GAP 12.0   BUN 15   CREATININE 0.96   EGFR 96.9   GLUCOSE 109*   CALCIUM 9.0   MAGNESIUM 2.1         Lab 08/19/24  1115   TOTAL PROTEIN 7.4   ALBUMIN 4.0   GLOBULIN 3.4   ALT (SGPT) 21   AST (SGOT) 32   BILIRUBIN 1.6*   ALK PHOS 83   LIPASE 12*                     UA          6/3/2024    20:48 6/14/2024    01:18   Urinalysis   Specific Gravity, UA 1.007  1.013    Ketones, UA Negative  Negative    Blood, UA Negative  Negative    Leukocytes, UA Negative  Negative    Nitrite, UA Negative  Negative        Microbiology Results (last 10 days)       ** No results found for the last 240 hours.  **            CT Abdomen Pelvis With Contrast    Result Date: 8/19/2024  CT ABDOMEN PELVIS W CONTRAST Date of Exam: 8/19/2024 10:20 AM EDT Indication: abdominal pain. Comparison: CT abdomen pelvis 6/5/2024. Technique: Axial CT images were obtained of the abdomen and pelvis following the uneventful intravenous administration of 85 mL Isovue-300. Reconstructed coronal and sagittal images were also obtained. Automated exposure control and iterative construction methods were used. Findings: Lower thorax: Bibasilar scarring/subsegmental atelectasis. No focal consolidation. Liver: Normal morphology. No focal hepatic lesion. Gallbladder and bile ducts: Gallbladder is unremarkable. No biliary ductal dilatation. Pancreas: No pancreatic duct dilation. No surrounding inflammation. Spleen: Normal in size. Adrenal glands: Unchanged 1.6 cm left adrenal nodule, which had density of less than 10 Hounsfield units on prior noncontrast exam, suggestive of an adenoma. No right adrenal nodule. Kidneys: Symmetric in size and enhancement. No hydronephrosis. Urinary bladder: Unremarkable. Reproductive organs: Unremarkable. Stomach and bowel: Percutaneous jejunostomy tube in expected position. Circumferential wall thickening of the lower thoracic esophagus and proximal stomach, new/increased. Fluid throughout the proximal half of the colon. No evidence of bowel obstruction. Lymph nodes: Multiple mildly enlarged gastrohepatic and left retroperitoneal lymph nodes, similar to prior exam. For example, gastrohepatic ligament measures 1.1 cm in short axis, unchanged (series 2 image 40). Vessels: No abdominal aortic aneurysm. Atherosclerosis. Major vasculature is patent. Peritoneum and retroperitoneum: Ill-defined free fluid in the upper abdomen. No organized fluid collection. No free air. Soft tissues: Midline incision. Osseous structures: No acute or suspicious osseous lesions. Lumbosacral degenerative changes.     Impression: Impression:  Findings of esophagogastritis, suspected to be secondary to posttreatment changes related to underlying esophageal malignancy. Associated surrounding ill-defined fluid fluid without organized fluid collection. Fluid throughout the proximal half of the colon, suggestive of diarrheal illness. Similar upper abdominal lymphadenopathy. Percutaneous jejunostomy tube in expected position. Additional findings as above. Electronically Signed: Jenaro Garcia MD  8/19/2024 10:53 AM EDT  Workstation ID: [id number]     Results for orders placed during the hospital encounter of 02/21/22    Adult transthoracic echo complete    Interpretation Summary  · Saline test results are negative.  · Left ventricular wall thickness is consistent with severe concentric hypertrophy.  · Calculated left ventricular EF = 59.5% Estimated left ventricular EF was in agreement with the calculated left ventricular EF. Left ventricular systolic function is normal.  · Left ventricular diastolic function is consistent with (grade I) impaired relaxation.  · No aortic valve regurgitation or stenosis is present.  · Mild mitral valve regurgitation is present.      Assessment & Plan   Assessment & Plan       Abdominal pain    HTN (hypertension)    Stage 3a chronic kidney disease    Esophageal adenocarcinoma    Severe malnutrition    49-year-old male with history of hypertension, FREDRICK, severe malnutrition, prior CVA, hyperlipidemia, GERD, tobacco abuse chronic anemia, esophageal cancer followed by Dr. Reddy currently on treatment.  Who presented to the ED with 1 week of progressively worsening abdominal pain and constipation.    Abdominal pain  ??Constipation  -CT abdomen pelvis show shows esophogogastritis, suspected to be secondary to be posttreatment changes, however also with fluid levels throughout the colon that suggest diarrheal illness, however patient has not had a BM in more than a week  -Patient currently on pain meds, he has attempted mag citrate,  milk of mag, suppositories without much improvement, suspect constipation could be opioid induced, we we will give a trial of Movantik's  -continue supportive therapy for now, IV fluids, antiemetics, pain control  -If patient is getting diarrhea, will get a GI PCR    Elevated inflammatory markers and procalcitonin  -Suspect secondary to underlying malignancy  -No overt signs of ongoing infectious process  -His counts (WBC, hemoglobin, platelets ) seem to have improved compared to 8/5  -Follow-up GI PCR, UA  -Will hold off on initiating antibiotics at this time    Esophageal adenocarcinoma  Thrombocytopenia  -Follows with Dr. Reddy currently under chemo/radiation, he is consulted.    Hypertensive urgency  -BP significantly elevated in the ED, did receive hydralazine 10 mg x 2 with some improvement  -Patient has previously been on nifedipine, Coreg, hydrochlorothiazide and valsartan, was only on valsartan and nifedipine recently, he stopped this when his Eliquis tube feeds and had some weight loss  -recommend to resume at least nifedipine and valsartan once med rec is complete  -As needed IV hydralazine in place    Cancer-related pain  -Continue oxycodone    Severe protein calorie malnutrition  -Patient is on tube feeds through J-tube  -Nutrition/dietitian consulted, continue t tube feeds    Hyperlipidemia  -Continue statin    VTE Prophylaxis:  Pharmacologic VTE prophylaxis orders are signed & held.        CODE STATUS:    There are no questions and answers to display.     Expected Discharge   Expected Discharge Date: 8/22/2024; Expected Discharge Time:      Aden Evangelista MD  08/19/24

## 2024-08-19 NOTE — NURSING NOTE
Per patient's wife, patient uses the Nutren 2.0 Nestle tube feeding at night infusing at 105 mL/hr. The infusion goes for 12 hours from 6AM to 6PM.

## 2024-08-19 NOTE — Clinical Note
Level of Care: Telemetry [5]   Diagnosis: Abdominal pain [604831]   Admitting Physician: DELON MIMS [[id number]]   Attending Physician: DELON MIMS [[id number]]

## 2024-08-19 NOTE — PLAN OF CARE
Problem: Adult Inpatient Plan of Care  Goal: Plan of Care Review  Outcome: Ongoing, Progressing  Goal: Patient-Specific Goal (Individualized)  Outcome: Ongoing, Progressing  Goal: Absence of Hospital-Acquired Illness or Injury  Outcome: Ongoing, Progressing  Intervention: Identify and Manage Fall Risk  Recent Flowsheet Documentation  Taken 8/19/2024 1600 by Hortencia Minaya RN  Safety Promotion/Fall Prevention: safety round/check completed  Taken 8/19/2024 1440 by Hortencia Minaya RN  Safety Promotion/Fall Prevention:   activity supervised   assistive device/personal items within reach   clutter free environment maintained   lighting adjusted   nonskid shoes/slippers when out of bed  Intervention: Prevent Skin Injury  Recent Flowsheet Documentation  Taken 8/19/2024 1600 by Hortencia Minaya RN  Body Position: position changed independently  Skin Protection:   adhesive use limited   transparent dressing maintained   tubing/devices free from skin contact  Taken 8/19/2024 1440 by Hortencia Minaya RN  Body Position: position changed independently  Skin Protection:   adhesive use limited   transparent dressing maintained   tubing/devices free from skin contact  Intervention: Prevent and Manage VTE (Venous Thromboembolism) Risk  Recent Flowsheet Documentation  Taken 8/19/2024 1600 by Hortencia Minaya RN  Activity Management: activity encouraged  Taken 8/19/2024 1440 by Hortencia Minaya RN  Activity Management: activity encouraged  VTE Prevention/Management: patient refused intervention  Range of Motion: active ROM (range of motion) encouraged  Intervention: Prevent Infection  Recent Flowsheet Documentation  Taken 8/19/2024 1600 by Hortencia Minaya RN  Infection Prevention: single patient room provided  Taken 8/19/2024 1440 by Hortencia Minaya RN  Infection Prevention: single patient room provided  Goal: Optimal Comfort and Wellbeing  Outcome: Ongoing, Progressing  Intervention: Monitor Pain and Promote  Comfort  Recent Flowsheet Documentation  Taken 8/19/2024 1440 by Hortencia Minaya RN  Pain Management Interventions:   pillow support provided   position adjusted   see MAR  Goal: Readiness for Transition of Care  Outcome: Ongoing, Progressing  Intervention: Mutually Develop Transition Plan  Recent Flowsheet Documentation  Taken 8/19/2024 1452 by Hortencia Minaya RN  Transportation Anticipated: family or friend will provide  Patient/Family Anticipated Services at Transition: none  Patient/Family Anticipates Transition to: home with family  Taken 8/19/2024 1449 by Hortencia Minaya RN  Equipment Currently Used at Home: feeding device     Problem: Pain Acute  Goal: Acceptable Pain Control and Functional Ability  Outcome: Ongoing, Progressing  Intervention: Prevent or Manage Pain  Recent Flowsheet Documentation  Taken 8/19/2024 1600 by Hortencia Minaya RN  Medication Review/Management: medications reviewed  Taken 8/19/2024 1440 by Hortencia Minaya RN  Medication Review/Management: medications reviewed  Intervention: Develop Pain Management Plan  Recent Flowsheet Documentation  Taken 8/19/2024 1440 by Hortencia Minaya RN  Pain Management Interventions:   pillow support provided   position adjusted   see MAR     Problem: Aspiration (Enteral Nutrition)  Goal: Absence of Aspiration Signs and Symptoms  Outcome: Ongoing, Progressing  Intervention: Minimize Aspiration Risk  Recent Flowsheet Documentation  Taken 8/19/2024 1600 by Hortencia Minaya RN  Head of Bed (HOB) Positioning: HOB at 30-45 degrees  Taken 8/19/2024 1440 by Hortencia Minaya RN  Head of Bed (HOB) Positioning: HOB at 30-45 degrees     Problem: Device-Related Complication Risk (Enteral Nutrition)  Goal: Safe, Effective Therapy Delivery  Outcome: Ongoing, Progressing     Problem: Feeding Intolerance (Enteral Nutrition)  Goal: Feeding Tolerance  Outcome: Ongoing, Progressing     Problem: Constipation  Goal: Effective Bowel Elimination  Outcome:  Ongoing, Progressing   Goal Outcome Evaluation:   Pt arrived on unit at 1430. BP was elevated. Given dilaudid for abdominal pain rated 8/10, verbalized pain medication was effective. BP lowered. Takes meds through J tube. Diet soft to chew. RA. VSS. NS @ 75mL/hr. No complaints at this time. Call bell within reach.

## 2024-08-20 LAB
ANION GAP SERPL CALCULATED.3IONS-SCNC: 6 MMOL/L (ref 5–15)
BASOPHILS # BLD AUTO: 0.03 10*3/MM3 (ref 0–0.2)
BASOPHILS NFR BLD AUTO: 0.6 % (ref 0–1.5)
BUN SERPL-MCNC: 18 MG/DL (ref 6–20)
BUN/CREAT SERPL: 19.1 (ref 7–25)
CALCIUM SPEC-SCNC: 8.7 MG/DL (ref 8.6–10.5)
CHLORIDE SERPL-SCNC: 104 MMOL/L (ref 98–107)
CO2 SERPL-SCNC: 27 MMOL/L (ref 22–29)
CREAT SERPL-MCNC: 0.94 MG/DL (ref 0.76–1.27)
DEPRECATED RDW RBC AUTO: 72.1 FL (ref 37–54)
EGFRCR SERPLBLD CKD-EPI 2021: 99.4 ML/MIN/1.73
EOSINOPHIL # BLD AUTO: 0.05 10*3/MM3 (ref 0–0.4)
EOSINOPHIL NFR BLD AUTO: 1.1 % (ref 0.3–6.2)
ERYTHROCYTE [DISTWIDTH] IN BLOOD BY AUTOMATED COUNT: 23.7 % (ref 12.3–15.4)
GLUCOSE SERPL-MCNC: 108 MG/DL (ref 65–99)
HCT VFR BLD AUTO: 33.7 % (ref 37.5–51)
HGB BLD-MCNC: 11.3 G/DL (ref 13–17.7)
IMM GRANULOCYTES # BLD AUTO: 0.01 10*3/MM3 (ref 0–0.05)
IMM GRANULOCYTES NFR BLD AUTO: 0.2 % (ref 0–0.5)
LYMPHOCYTES # BLD AUTO: 0.34 10*3/MM3 (ref 0.7–3.1)
LYMPHOCYTES NFR BLD AUTO: 7.2 % (ref 19.6–45.3)
MCH RBC QN AUTO: 28.8 PG (ref 26.6–33)
MCHC RBC AUTO-ENTMCNC: 33.5 G/DL (ref 31.5–35.7)
MCV RBC AUTO: 85.8 FL (ref 79–97)
MONOCYTES # BLD AUTO: 0.91 10*3/MM3 (ref 0.1–0.9)
MONOCYTES NFR BLD AUTO: 19.3 % (ref 5–12)
NEUTROPHILS NFR BLD AUTO: 3.37 10*3/MM3 (ref 1.7–7)
NEUTROPHILS NFR BLD AUTO: 71.6 % (ref 42.7–76)
NRBC BLD AUTO-RTO: 0 /100 WBC (ref 0–0.2)
PLATELET # BLD AUTO: 84 10*3/MM3 (ref 140–450)
PMV BLD AUTO: 11.9 FL (ref 6–12)
POTASSIUM SERPL-SCNC: 4 MMOL/L (ref 3.5–5.2)
RBC # BLD AUTO: 3.93 10*6/MM3 (ref 4.14–5.8)
SODIUM SERPL-SCNC: 137 MMOL/L (ref 136–145)
WBC NRBC COR # BLD AUTO: 4.71 10*3/MM3 (ref 3.4–10.8)

## 2024-08-20 PROCEDURE — 97161 PT EVAL LOW COMPLEX 20 MIN: CPT

## 2024-08-20 PROCEDURE — 85025 COMPLETE CBC W/AUTO DIFF WBC: CPT | Performed by: INTERNAL MEDICINE

## 2024-08-20 PROCEDURE — 99232 SBSQ HOSP IP/OBS MODERATE 35: CPT | Performed by: INTERNAL MEDICINE

## 2024-08-20 PROCEDURE — G0378 HOSPITAL OBSERVATION PER HR: HCPCS

## 2024-08-20 PROCEDURE — 96376 TX/PRO/DX INJ SAME DRUG ADON: CPT

## 2024-08-20 PROCEDURE — 99214 OFFICE O/P EST MOD 30 MIN: CPT | Performed by: INTERNAL MEDICINE

## 2024-08-20 PROCEDURE — 80048 BASIC METABOLIC PNL TOTAL CA: CPT | Performed by: INTERNAL MEDICINE

## 2024-08-20 PROCEDURE — 97165 OT EVAL LOW COMPLEX 30 MIN: CPT

## 2024-08-20 PROCEDURE — 25010000002 HYDRALAZINE PER 20 MG: Performed by: INTERNAL MEDICINE

## 2024-08-20 PROCEDURE — 25010000002 HYDROMORPHONE PER 4 MG: Performed by: INTERNAL MEDICINE

## 2024-08-20 RX ORDER — VALSARTAN 160 MG/1
320 TABLET ORAL
Status: DISCONTINUED | OUTPATIENT
Start: 2024-08-20 | End: 2024-08-20

## 2024-08-20 RX ORDER — NIFEDIPINE 10 MG/1
10 CAPSULE ORAL EVERY 8 HOURS SCHEDULED
Status: DISCONTINUED | OUTPATIENT
Start: 2024-08-20 | End: 2024-08-21 | Stop reason: HOSPADM

## 2024-08-20 RX ORDER — NIFEDIPINE 10 MG/1
10 CAPSULE ORAL EVERY 8 HOURS SCHEDULED
Status: DISCONTINUED | OUTPATIENT
Start: 2024-08-20 | End: 2024-08-20

## 2024-08-20 RX ORDER — AMOXICILLIN 250 MG
2 CAPSULE ORAL 2 TIMES DAILY PRN
Status: DISCONTINUED | OUTPATIENT
Start: 2024-08-20 | End: 2024-08-21 | Stop reason: HOSPADM

## 2024-08-20 RX ORDER — ATORVASTATIN CALCIUM 40 MG/1
80 TABLET, FILM COATED ORAL DAILY
Status: DISCONTINUED | OUTPATIENT
Start: 2024-08-21 | End: 2024-08-21 | Stop reason: HOSPADM

## 2024-08-20 RX ORDER — ATORVASTATIN CALCIUM 40 MG/1
80 TABLET, FILM COATED ORAL DAILY
Status: DISCONTINUED | OUTPATIENT
Start: 2024-08-20 | End: 2024-08-20

## 2024-08-20 RX ORDER — BISACODYL 5 MG/1
5 TABLET, DELAYED RELEASE ORAL DAILY PRN
Status: DISCONTINUED | OUTPATIENT
Start: 2024-08-20 | End: 2024-08-20

## 2024-08-20 RX ORDER — OXYCODONE HYDROCHLORIDE 10 MG/1
10 TABLET ORAL EVERY 4 HOURS PRN
Status: DISCONTINUED | OUTPATIENT
Start: 2024-08-20 | End: 2024-08-21 | Stop reason: HOSPADM

## 2024-08-20 RX ORDER — GABAPENTIN 300 MG/1
900 CAPSULE ORAL 4 TIMES DAILY
Status: DISCONTINUED | OUTPATIENT
Start: 2024-08-20 | End: 2024-08-21 | Stop reason: HOSPADM

## 2024-08-20 RX ORDER — GABAPENTIN 250 MG/5ML
900 SOLUTION ORAL 4 TIMES DAILY
Status: DISCONTINUED | OUTPATIENT
Start: 2024-08-20 | End: 2024-08-20

## 2024-08-20 RX ORDER — BISACODYL 10 MG
10 SUPPOSITORY, RECTAL RECTAL DAILY PRN
Status: DISCONTINUED | OUTPATIENT
Start: 2024-08-20 | End: 2024-08-21 | Stop reason: HOSPADM

## 2024-08-20 RX ORDER — OXYCODONE HYDROCHLORIDE 10 MG/1
10 TABLET ORAL EVERY 4 HOURS PRN
Status: DISCONTINUED | OUTPATIENT
Start: 2024-08-20 | End: 2024-08-20

## 2024-08-20 RX ORDER — VALSARTAN 160 MG/1
320 TABLET ORAL
Status: DISCONTINUED | OUTPATIENT
Start: 2024-08-21 | End: 2024-08-21 | Stop reason: HOSPADM

## 2024-08-20 RX ORDER — POLYETHYLENE GLYCOL 3350 17 G/17G
17 POWDER, FOR SOLUTION ORAL DAILY PRN
Status: DISCONTINUED | OUTPATIENT
Start: 2024-08-20 | End: 2024-08-21 | Stop reason: HOSPADM

## 2024-08-20 RX ADMIN — NALOXEGOL OXALATE 25 MG: 25 TABLET, FILM COATED ORAL at 08:37

## 2024-08-20 RX ADMIN — ATORVASTATIN CALCIUM 80 MG: 40 TABLET, FILM COATED ORAL at 16:54

## 2024-08-20 RX ADMIN — HYDROMORPHONE HYDROCHLORIDE 0.5 MG: 1 INJECTION, SOLUTION INTRAMUSCULAR; INTRAVENOUS; SUBCUTANEOUS at 08:37

## 2024-08-20 RX ADMIN — Medication 10 ML: at 08:39

## 2024-08-20 RX ADMIN — HYDRALAZINE HYDROCHLORIDE 20 MG: 20 INJECTION INTRAMUSCULAR; INTRAVENOUS at 12:39

## 2024-08-20 RX ADMIN — OXYCODONE HYDROCHLORIDE 10 MG: 10 TABLET ORAL at 17:36

## 2024-08-20 RX ADMIN — POLYETHYLENE GLYCOL 3350 17 G: 17 POWDER, FOR SOLUTION ORAL at 12:23

## 2024-08-20 RX ADMIN — GABAPENTIN 900 MG: 250 SOLUTION ORAL at 08:37

## 2024-08-20 RX ADMIN — VALSARTAN 320 MG: 160 TABLET ORAL at 16:54

## 2024-08-20 RX ADMIN — OXYCODONE HYDROCHLORIDE 10 MG: 10 TABLET ORAL at 22:12

## 2024-08-20 RX ADMIN — OXYCODONE HYDROCHLORIDE 10 MG: 10 TABLET ORAL at 12:23

## 2024-08-20 RX ADMIN — GABAPENTIN 900 MG: 300 CAPSULE ORAL at 22:12

## 2024-08-20 RX ADMIN — GABAPENTIN 900 MG: 300 CAPSULE ORAL at 17:08

## 2024-08-20 RX ADMIN — SENNOSIDES AND DOCUSATE SODIUM 2 TABLET: 50; 8.6 TABLET ORAL at 08:50

## 2024-08-20 NOTE — PLAN OF CARE
Goal Outcome Evaluation:  Plan of Care Reviewed With: patient           Outcome Evaluation: patient alert and oriented, ra, nsr on themonitor. Had medicine on his peg and was able to eat 50% of his dinner tray after got some pain medication. Slept most of the night

## 2024-08-20 NOTE — THERAPY EVALUATION
Patient Name: Nelson Garcia  : 1975    MRN: [id number]                              Today's Date: 2024       Admit Date: 2024    Visit Dx:     ICD-10-CM ICD-9-CM   1. Generalized abdominal pain  R10.84 789.07   2. Esophagitis  K20.90 530.10   3. Acute gastritis, presence of bleeding unspecified, unspecified gastritis type  K29.00 535.00   4. Hypertension, unspecified type  I10 401.9     Patient Active Problem List   Diagnosis    Left leg weakness    HTN (hypertension)    Cerebral microhemorrhages    Acute ischemic stroke    Tobacco use    Stage 3a chronic kidney disease    Observed sleep apnea    Snoring    Non-restorative sleep    Erectile dysfunction    Dysphagia    GI bleed    Anemia due to gastrointestinal blood loss    Esophageal mass    History of CVA (cerebrovascular accident)    Esophageal adenocarcinoma    Severe malnutrition    Encounter for care related to vascular access port    Abdominal pain     Past Medical History:   Diagnosis Date    Esophageal cancer     Hypertension     Stroke (cerebrum)      Past Surgical History:   Procedure Laterality Date    BACK SURGERY      ENDOSCOPY N/A 2024    Procedure: ESOPHAGOGASTRODUODENOSCOPY with biopsies;  Surgeon: Taras Hernandez MD;  Location: [facility] ENDOSCOPY;  Service: Gastroenterology;  Laterality: N/A;  pre- nausea/vomiting   post- nearly obstructing distal esophageal mass, gastritis    ENDOSCOPY N/A 2024    Procedure: ESOPHAGOGASTRODUODENOSCOPY, RIGHT PORT PLACEMENT, AND JEJUNOSTOMY TUBE INSERTION;  Surgeon: Jany Valente MD;  Location: [facility] MAIN OR;  Service: Gastroenterology;  Laterality: N/A;    KNEE SURGERY      TEETH EXTRACTION      all      General Information       Row Name 24 0938          Physical Therapy Time and Intention    Document Type evaluation  -     Mode of Treatment physical therapy  -       Row Name 24 0938          General Information    Patient Profile Reviewed yes  -     Prior Level  of Function independent:;all household mobility;community mobility;gait;transfer;bed mobility;ADL's;driving;work  No AD use at baseline. Works as a . Denies falls  -     Existing Precautions/Restrictions fall;other (see comments)  J tube  -     Barriers to Rehab medically complex  -[facility] Name 08/20/24 0938          Living Environment    People in Home spouse;child(haresh), dependent  -[facility] Name 08/20/24 0938          Home Main Entrance    Number of Stairs, Main Entrance one  -[facility] Name 08/20/24 0938          Stairs Within Home, Primary    Number of Stairs, Within Home, Primary none  -[facility] Name 08/20/24 0938          Cognition    Orientation Status (Cognition) oriented x 4  -[facility] Name 08/20/24 0938          Safety Issues, Functional Mobility    Safety Issues Affecting Function (Mobility) awareness of need for assistance;insight into deficits/self-awareness;safety precautions follow-through/compliance;safety precaution awareness  -     Impairments Affecting Function (Mobility) endurance/activity tolerance;balance;pain  -               User Key  (r) = Recorded By, (t) = Taken By, (c) = Cosigned By      Initials Name Provider Type     Anat Tapia, PT Physical Therapist                   Mobility       [facility] Name 08/20/24 0939          Bed Mobility    Bed Mobility supine-sit  -     Supine-Sit [place] (Bed Mobility) modified independence  -     Assistive Device (Bed Mobility) head of bed elevated  -[facility] Name 08/20/24 0939          Transfers    Comment, (Transfers) Denied dizziness w/ mobility  -LH       Row Name 08/20/24 0939          Sit-Stand Transfer    Sit-Stand [place] (Transfers) standby assist  -     Comment, (Sit-Stand Transfer) STS from EOB  -[facility] Name 08/20/24 0939          Gait/Stairs (Locomotion)    [place] Level (Gait) standby assist;verbal cues  -     Assistive Device (Gait) other (see comments)  UE support on IV  pole  -     Distance in Feet (Gait) 80  -     Deviations/Abnormal Patterns (Gait) bilateral deviations;dedra decreased;stride length decreased  -     Bilateral Gait Deviations forward flexed posture  -     Comment, (Gait/Stairs) Pt amb 80 ft in room w/ UE support on IV pole. Demo step through gait pattern w/ decreased dedra. VCs for upright posture. No LOB noted. Distance limited by pain  -               User Key  (r) = Recorded By, (t) = Taken By, (c) = Cosigned By      Initials Name Provider Type     Anat Tapia PT Physical Therapist                   Obj/Interventions       Row Name 08/20/24 0941          Range of Motion Comprehensive    General Range of Motion bilateral lower extremity ROM WFL  -[facility] Name 08/20/24 0941          Strength Comprehensive (MMT)    General Manual Muscle Testing (MMT) Assessment no strength deficits identified  -     Comment, General Manual Muscle Testing (MMT) Assessment BLEs grossly 5/5  -       Row Name 08/20/24 0941          Balance    Balance Assessment sitting static balance;sitting dynamic balance;sit to stand dynamic balance;standing static balance;standing dynamic balance  -     Static Sitting Balance independent  -     Dynamic Sitting Balance independent  -     Position, Sitting Balance unsupported;sitting edge of bed  -     Sit to Stand Dynamic Balance standby assist  -     Static Standing Balance supervision  -     Dynamic Standing Balance standby assist  -     Position/Device Used, Standing Balance supported  -     Balance Interventions sitting;standing;sit to stand;supported;static;dynamic  -     Comment, Balance SBA for safety  -       Row Name 08/20/24 0941          Sensory Assessment (Somatosensory)    Sensory Assessment (Somatosensory) LE sensation intact  -               User Key  (r) = Recorded By, (t) = Taken By, (c) = Cosigned By      Initials Name Provider Type     Anat Tapia PT Physical Therapist                    Goals/Plan       [facility] Name 08/20/24 0946          Transfer Goal 1 (PT)    Activity/Assistive Device (Transfer Goal 1, PT) sit-to-stand/stand-to-sit;bed-to-chair/chair-to-bed  -     [place] Level/Cues Needed (Transfer Goal 1, PT) independent  -     Time Frame (Transfer Goal 1, PT) short term goal (STG);3 days  -Count includes the [facility] Name 08/20/24 0946          Gait Training Goal 1 (PT)    Activity/Assistive Device (Gait Training Goal 1, PT) gait (walking locomotion);assistive device use  -     [place] Level (Gait Training Goal 1, PT) independent  -     Distance (Gait Training Goal 1, PT) 300 ft  -     Time Frame (Gait Training Goal 1, PT) long term goal (LTG);5 days  -Count includes the [facility] Name 08/20/24 0946          Therapy Assessment/Plan (PT)    Planned Therapy Interventions (PT) balance training;bed mobility training;gait training;home exercise program;neuromuscular re-education;patient/family education;postural re-education;ROM (range of motion);stair training;strengthening;stretching;transfer training  -               User Key  (r) = Recorded By, (t) = Taken By, (c) = Cosigned By      Initials Name Provider Type     Anat Tapia, PT Physical Therapist                   Clinical Impression       Row Name 08/20/24 0943          Pain    Pretreatment Pain Rating 3/10  -     Posttreatment Pain Rating 4/10  -     Pain Location generalized  -     Pain Location - abdomen  -     Pre/Posttreatment Pain Comment premedicated  -     Pain Intervention(s) Medication (See MAR);Repositioned;Ambulation/increased activity;Elevated  -LH       Row Name 08/20/24 0943          Plan of Care Review    Plan of Care Reviewed With patient  -     Outcome Evaluation PT eval completed. Pt presents slightly below baseline function d/t pain and mild deficits in strength, balance, and activity tolerance. Pt ambulated 80 ft w/ UE support on IV pole, SBA. Further mobility deferred d/t pain. Pt would benefit  from skilled IP PT. Recommend home at d/c.  -[facility] 08/20/24 0943          Therapy Assessment/Plan (PT)    Patient/Family Therapy Goals Statement (PT) to go home  -     Rehab Potential (PT) good, to achieve stated therapy goals  -     Criteria for Skilled Interventions Met (PT) yes;meets criteria;skilled treatment is necessary  -     Therapy Frequency (PT) daily  -     Predicted Duration of Therapy Intervention (PT) 5 days  -[facility] 08/20/24 0943          Vital Signs    Post Systolic BP Rehab 170  -     Post Treatment Diastolic   -     Post Patient Position Sitting  -[facility] Name 08/20/24 0943          Positioning and Restraints    Pre-Treatment Position in bed  -     Post Treatment Position chair  -     In Chair notified nsg;reclined;sitting;call light within reach;encouraged to call for assist;legs elevated  -               User Key  (r) = Recorded By, (t) = Taken By, (c) = Cosigned By      Initials Name Provider Type     Anat Tapia, SHAGUFTA Physical Therapist                   Outcome Measures       [facility] Name 08/20/24 0946          How much help from another person do you currently need...    Turning from your back to your side while in flat bed without using bedrails? 4  -LH     Moving from lying on back to sitting on the side of a flat bed without bedrails? 4  -LH     Moving to and from a bed to a chair (including a wheelchair)? 4  -LH     Standing up from a chair using your arms (e.g., wheelchair, bedside chair)? 4  -LH     Climbing 3-5 steps with a railing? 3  -LH     To walk in hospital room? 3  -     AM-PAC 6 Clicks Score (PT) 22  -     Highest Level of Mobility Goal 7 --> Walk 25 feet or more  -[facility] Name 08/20/24 0946          Functional Assessment    Outcome Measure Options AM-PAC 6 Clicks Basic Mobility (PT)  -               User Key  (r) = Recorded By, (t) = Taken By, (c) = Cosigned By      Initials Name Provider Type    TANVI Tapia  Anat PT Physical Therapist                                 Physical Therapy Education       Title: PT OT SLP Therapies (In Progress)       Topic: Physical Therapy (In Progress)       Point: Mobility training (In Progress)       Learning Progress Summary             Patient Acceptance, E, NR by  at 8/20/2024 0947                         Point: Home exercise program (Not Started)       Learner Progress:  Not documented in this visit.              Point: Body mechanics (In Progress)       Learning Progress Summary             Patient Acceptance, E, NR by  at 8/20/2024 0947                         Point: Precautions (In Progress)       Learning Progress Summary             Patient Acceptance, E, NR by  at 8/20/2024 0947                                         User Key       Initials Effective Dates Name Provider Type Discipline     09/21/23 -  Anat Tapia PT Physical Therapist PT                  PT Recommendation and Plan  Planned Therapy Interventions (PT): balance training, bed mobility training, gait training, home exercise program, neuromuscular re-education, patient/family education, postural re-education, ROM (range of motion), stair training, strengthening, stretching, transfer training  Plan of Care Reviewed With: patient  Outcome Evaluation: PT eval completed. Pt presents slightly below baseline function d/t pain and mild deficits in strength, balance, and activity tolerance. Pt ambulated 80 ft w/ UE support on IV pole, SBA. Further mobility deferred d/t pain. Pt would benefit from skilled IP PT. Recommend home at d/c.     Time Calculation:   PT Evaluation Complexity  History, PT Evaluation Complexity: 3 or more personal factors and/or comorbidities  Examination of Body Systems (PT Eval Complexity): total of 3 or more elements  Clinical Presentation (PT Evaluation Complexity): stable  Clinical Decision Making (PT Evaluation Complexity): low complexity  Overall Complexity (PT Evaluation  Complexity): low complexity     PT Charges       Row Name 08/20/24 0947             Time Calculation    Start Time 0858  -      PT Received On 08/20/24  -      PT Goal Re-Cert Due Date 08/30/24  -         Untimed Charges    PT Eval/Re-eval Minutes 32  -         Total Minutes    Untimed Charges Total Minutes 32  -       Total Minutes 32  -                User Key  (r) = Recorded By, (t) = Taken By, (c) = Cosigned By      Initials Name Provider Type     Anat Tapia, PT Physical Therapist                  Therapy Charges for Today       Code Description Service Date Service Provider Modifiers Qty    20808926388 HC PT EVAL LOW COMPLEXITY 3 8/20/2024 Anat Tapia, PT GP 1            PT G-Codes  Outcome Measure Options: AM-PAC 6 Clicks Basic Mobility (PT)  AM-PAC 6 Clicks Score (PT): 22  PT Discharge Summary  Anticipated Discharge Disposition (PT): home    Anat Tapia PT  8/20/2024

## 2024-08-20 NOTE — THERAPY DISCHARGE NOTE
Acute Care - Occupational Therapy Discharge  [facility]    Patient Name: Nelson Garcia  : 1975    MRN: [id number]                              Today's Date: 2024       Admit Date: 2024    Visit Dx:     ICD-10-CM ICD-9-CM   1. Generalized abdominal pain  R10.84 789.07   2. Esophagitis  K20.90 530.10   3. Acute gastritis, presence of bleeding unspecified, unspecified gastritis type  K29.00 535.00   4. Hypertension, unspecified type  I10 401.9     Patient Active Problem List   Diagnosis    Left leg weakness    HTN (hypertension)    Cerebral microhemorrhages    Acute ischemic stroke    Tobacco use    Stage 3a chronic kidney disease    Observed sleep apnea    Snoring    Non-restorative sleep    Erectile dysfunction    Dysphagia    GI bleed    Anemia due to gastrointestinal blood loss    Esophageal mass    History of CVA (cerebrovascular accident)    Esophageal adenocarcinoma    Severe malnutrition    Encounter for care related to vascular access port    Abdominal pain     Past Medical History:   Diagnosis Date    Esophageal cancer     Hypertension     Stroke (cerebrum)      Past Surgical History:   Procedure Laterality Date    BACK SURGERY      ENDOSCOPY N/A 2024    Procedure: ESOPHAGOGASTRODUODENOSCOPY with biopsies;  Surgeon: Taras Hernandez MD;  Location: [facility] ENDOSCOPY;  Service: Gastroenterology;  Laterality: N/A;  pre- nausea/vomiting   post- nearly obstructing distal esophageal mass, gastritis    ENDOSCOPY N/A 2024    Procedure: ESOPHAGOGASTRODUODENOSCOPY, RIGHT PORT PLACEMENT, AND JEJUNOSTOMY TUBE INSERTION;  Surgeon: Jany Valente MD;  Location: [facility] MAIN OR;  Service: Gastroenterology;  Laterality: N/A;    KNEE SURGERY      TEETH EXTRACTION      all      General Information       Row Name 24 1059          OT Time and Intention    Document Type discharge evaluation/summary  -     Mode of Treatment occupational therapy  -       Row Name 24 1051           General Information    Patient Profile Reviewed yes  -     Prior Level of Function independent:;all household mobility;transfer;ADL's  -     Existing Precautions/Restrictions fall  J tube  -     Barriers to Rehab medically complex  -       Row Name 08/20/24 1059          Living Environment    People in Home spouse;child(haresh), dependent  -       Row Name 08/20/24 1059          Home Main Entrance    Number of Stairs, Main Entrance one  -       Row Name 08/20/24 1059          Stairs Within Home, Primary    Number of Stairs, Within Home, Primary none  -       Row Name 08/20/24 1059          Cognition    Orientation Status (Cognition) oriented x 4  -       Row Name 08/20/24 1059          Safety Issues, Functional Mobility    Impairments Affecting Function (Mobility) endurance/activity tolerance;pain  -     Comment, Safety Issues/Impairments (Mobility) Demonstrates good safety awareness and sequencing.  -               User Key  (r) = Recorded By, (t) = Taken By, (c) = Cosigned By      Initials Name Provider Type     Laly Ovalle OT Occupational Therapist                   Mobility/ADL's       Row Name 08/20/24 1101          Bed Mobility    Comment, (Bed Mobility) UIC  -       Row Name 08/20/24 1101          Transfers    Transfers sit-stand transfer  -     Comment, (Transfers) Demonstrates good sequencing, denied dizziness  -       Row Name 08/20/24 1101          Sit-Stand Transfer    Sit-Stand Scott (Transfers) independent  -       Row Name 08/20/24 1101          Activities of Daily Living    BADL Assessment/Intervention lower body dressing;toileting  -       Row Name 08/20/24 1101          Lower Body Dressing Assessment/Training    Scott Level (Lower Body Dressing) lower body dressing skills;independent  -       Row Name 08/20/24 1101          Toileting Assessment/Training    Scott Level (Toileting) toileting skills;independent  -               User Key  (r) =  Recorded By, (t) = Taken By, (c) = Cosigned By      Initials Name Provider Type    Laly Khalil OT Occupational Therapist                   Obj/Interventions       Row Name 08/20/24 1102          Sensory Assessment (Somatosensory)    Sensory Assessment (Somatosensory) UE sensation intact  -[facility] Name 08/20/24 1102          Vision Assessment/Intervention    Visual Impairment/Limitations WFL  -       Row Name 08/20/24 1102          Range of Motion Comprehensive    General Range of Motion bilateral upper extremity ROM WFL  -[facility] Name 08/20/24 1102          Strength Comprehensive (MMT)    General Manual Muscle Testing (MMT) Assessment upper extremity strength deficits identified  -[facility] Name 08/20/24 1102          Upper Extremity (Manual Muscle Testing)    Upper Extremity: Manual Muscle Testing (MMT) left UE strength is WNL;right UE strength is WNL  -[facility] Name 08/20/24 1102          Balance    Balance Assessment sitting static balance;sitting dynamic balance;standing static balance;standing dynamic balance  -     Static Sitting Balance independent  -     Dynamic Sitting Balance independent  -LC     Position, Sitting Balance unsupported;sitting edge of bed  -     Static Standing Balance supervision  -     Dynamic Standing Balance supervision  -     Position/Device Used, Standing Balance unsupported  -     Balance Interventions sitting;standing;sit to stand;weight shifting activity  -     Comment, Balance No LOB  -               User Key  (r) = Recorded By, (t) = Taken By, (c) = Cosigned By      Initials Name Provider Type    Laly Khalil OT Occupational Therapist                   Goals/Plan    No documentation.                  Clinical Impression       Row Name 08/20/24 1103          Pain Assessment    Pretreatment Pain Rating 3/10  -LC     Posttreatment Pain Rating 3/10  -LC     Pain Location generalized  -     Pain Location - abdomen  -     Pain  Intervention(s) Repositioned;Ambulation/increased activity  -     Additional Documentation Pain Scale: Word Pre/Post-Treatment (Group)  -       Row Name 08/20/24 1103          Plan of Care Review    Plan of Care Reviewed With patient  -     Progress no change  -     Outcome Evaluation Pt. presents at baseline with ADLs and functional mobility. No further skilled OT services warranted at this time. Recommend home with assist at discharge.  -       Row Name 08/20/24 1103          Therapy Assessment/Plan (OT)    Therapy Frequency (OT) evaluation only  -       Row Name 08/20/24 1103          Therapy Plan Review/Discharge Plan (OT)    Anticipated Discharge Disposition (OT) home with assist  -       Row Name 08/20/24 1103          Vital Signs    Pre Systolic BP Rehab 170  -LC     Pre Treatment Diastolic   -LC     Post Systolic BP Rehab 185  -LC     Post Treatment Diastolic    -LC     Pre Patient Position Sitting  -LC     Post Patient Position Sitting  -       Row Name 08/20/24 1103          Positioning and Restraints    Pre-Treatment Position sitting in chair/recliner  -     Post Treatment Position chair  -LC     In Chair notified nsg;reclined;call light within reach;encouraged to call for assist;legs elevated  Alarm unchanged  -               User Key  (r) = Recorded By, (t) = Taken By, (c) = Cosigned By      Initials Name Provider Type     Laly Ovalle, OT Occupational Therapist                   Outcome Measures       Row Name 08/20/24 1106          How much help from another is currently needed...    Putting on and taking off regular lower body clothing? 4  -LC     Bathing (including washing, rinsing, and drying) 4  -LC     Toileting (which includes using toilet bed pan or urinal) 4  -LC     Putting on and taking off regular upper body clothing 4  -LC     Taking care of personal grooming (such as brushing teeth) 4  -LC     Eating meals 4  -LC     AM-PAC 6 Clicks Score (OT) 24  -LC        Row Name 08/20/24 0946 08/20/24 0800       How much help from another person do you currently need...    Turning from your back to your side while in flat bed without using bedrails? 4  -LH 4  -KM    Moving from lying on back to sitting on the side of a flat bed without bedrails? 4  -LH 4  -KM    Moving to and from a bed to a chair (including a wheelchair)? 4  -LH 4  -KM    Standing up from a chair using your arms (e.g., wheelchair, bedside chair)? 4  - 4  -KM    Climbing 3-5 steps with a railing? 3  -LH 4  -KM    To walk in hospital room? 3  - 4  -KM    AM-PAC 6 Clicks Score (PT) 22  - 24  -KM    Highest Level of Mobility Goal 7 --> Walk 25 feet or more  - 8 --> Walked 250 feet or more  -KM      Row Name 08/20/24 1106 08/20/24 0946       Functional Assessment    Outcome Measure Options AM-PAC 6 Clicks Daily Activity (OT)  - AM-PAC 6 Clicks Basic Mobility (PT)  -              User Key  (r) = Recorded By, (t) = Taken By, (c) = Cosigned By      Initials Name Provider Type     Laly Ovalle, OT Occupational Therapist     Anat Tapia, PT Physical Therapist     Hortencia Minaya, RN Registered Nurse                  Occupational Therapy Education       Title: PT OT SLP Therapies (In Progress)       Topic: Occupational Therapy (In Progress)       Point: ADL training (In Progress)       Description:   Instruct learner(s) on proper safety adaptation and remediation techniques during self care or transfers.   Instruct in proper use of assistive devices.                  Learning Progress Summary             Patient Acceptance, E, NR by  at 8/20/2024 1015                         Point: Home exercise program (Not Started)       Description:   Instruct learner(s) on appropriate technique for monitoring, assisting and/or progressing therapeutic exercises/activities.                  Learner Progress:  Not documented in this visit.              Point: Precautions (In Progress)       Description:    Instruct learner(s) on prescribed precautions during self-care and functional transfers.                  Learning Progress Summary             Patient Acceptance, E, NR by  at 8/20/2024 1015                         Point: Body mechanics (In Progress)       Description:   Instruct learner(s) on proper positioning and spine alignment during self-care, functional mobility activities and/or exercises.                  Learning Progress Summary             Patient Acceptance, E, NR by  at 8/20/2024 1015                                         User Key       Initials Effective Dates Name Provider Type Discipline     06/16/21 -  Laly Ovalle, ZI Occupational Therapist OT                  OT Recommendation and Plan  Therapy Frequency (OT): evaluation only  Plan of Care Review  Plan of Care Reviewed With: patient  Progress: no change  Outcome Evaluation: Pt. presents at baseline with ADLs and functional mobility. No further skilled OT services warranted at this time. Recommend home with assist at discharge.  Plan of Care Reviewed With: patient  Outcome Evaluation: Pt. presents at baseline with ADLs and functional mobility. No further skilled OT services warranted at this time. Recommend home with assist at discharge.     Time Calculation:   Evaluation Complexity (OT)  Review Occupational Profile/Medical/Therapy History Complexity: brief/low complexity  Assessment, Occupational Performance/Identification of Deficit Complexity: 1-3 performance deficits  Clinical Decision Making Complexity (OT): problem focused assessment/low complexity  Overall Complexity of Evaluation (OT): low complexity     Time Calculation- OT       Row Name 08/20/24 1108             Time Calculation- OT    OT Start Time 1015  -      OT Received On 08/20/24  -      OT Goal Re-Cert Due Date 08/30/24  -         Untimed Charges    OT Eval/Re-eval Minutes 46  -         Total Minutes    Untimed Charges Total Minutes 46  -       Total  Minutes 46  -LC                User Key  (r) = Recorded By, (t) = Taken By, (c) = Cosigned By      Initials Name Provider Type    Laly Khalil OT Occupational Therapist                  Therapy Charges for Today       Code Description Service Date Service Provider Modifiers Qty    [id number]  OT EVAL LOW COMPLEXITY 4 8/20/2024 Laly Ovalle OT GO 1               OT Discharge Summary  Anticipated Discharge Disposition (OT): home with assist  Reason for Discharge: At baseline function  Discharge Destination: Home with assist    Laly Ovalle OT  8/20/2024

## 2024-08-20 NOTE — CONSULTS
Clinical Nutrition     Patient Name: Nelson Garcia  YOB: 1975  MRN: [id number]  Date of Encounter: 08/20/24 10:26 EDT  Admission date: 8/19/2024  Reason for Visit: Physician consult, EN    Assessment   Nutrition Assessment   Admission Diagnosis:  Abdominal pain [R10.9]    Problem List:    Abdominal pain    HTN (hypertension)    Stage 3a chronic kidney disease    Esophageal adenocarcinoma    Severe malnutrition      PMH:   He  has a past medical history of Esophageal cancer, Hypertension, and Stroke (cerebrum).    PSH:  He  has a past surgical history that includes Back surgery; Knee surgery; Teeth Extraction; Esophagogastroduodenoscopy (N/A, 6/4/2024); and Esophagogastroduodenoscopy (N/A, 6/14/2024).    Applicable Nutrition History:   6/4/24 Esophageal adenocarcinoma dx  6/15/24 - Severe malnutrition  7/1/24 - Chemotherapy  7/1/24-8/8/24 Radiation  Labs    Labs Reviewed: Yes    Results from last 7 days   Lab Units 08/20/24  0453 08/19/24  1115 08/19/24  0944   GLUCOSE mg/dL 108* 109*  --    BUN mg/dL 18 15  --    CREATININE mg/dL 0.94 0.96  --    SODIUM mmol/L 137 136  --    CHLORIDE mmol/L 104 96*  --    POTASSIUM mmol/L 4.0 3.9  --    MAGNESIUM mg/dL  --  2.1  --    ALT (SGPT) U/L  --  21  --    LACTATE mmol/L  --   --  1.4       Results from last 7 days   Lab Units 08/19/24  1115   ALBUMIN g/dL 4.0   CRP mg/dL 12.04*           Lab Results   Lab Value Date/Time    HGBA1C 5.00 02/22/2022 0641               Medications    Medications Reviewed: yes    Scheduled Meds:enoxaparin, 40 mg, Subcutaneous, Daily  gabapentin, 900 mg, Per J Tube, 4x Daily  Naloxegol Oxalate, 25 mg, Per J Tube, QAM  sodium chloride, 10 mL, Intravenous, Q12H      Continuous Infusions:   PRN Meds:.  senna-docusate sodium **AND** polyethylene glycol **AND** [DISCONTINUED] bisacodyl **AND** bisacodyl    Calcium Replacement - Follow Nurse / BPA Driven Protocol    hydrALAZINE    HYDROmorphone    Lidocaine  "Viscous HCl    Magnesium Standard Dose Replacement - Follow Nurse / BPA Driven Protocol    nicotine polacrilex    ondansetron    oxyCODONE    Phosphorus Replacement - Follow Nurse / BPA Driven Protocol    Potassium Replacement - Follow Nurse / BPA Driven Protocol    sodium chloride    sodium chloride    sodium chloride    Intake/Ouptut 24 hrs (0701 - 0700)   I&O's Reviewed: yes      Anthropometrics     Height: Height: 182.9 cm (72\")  Last Filed Weight: Weight: 69.9 kg (154 lb) (08/19/24 0907)  Method: Weight Method: Stated  BMI: BMI (Calculated): 20.9    UBW: 155# per patient  Weight change: 12# wt loss over the past 2 1/2 months     Weight       Weight (kg) Weight (lbs) Weight Method Visit Report   6/3/2024 75.297 kg  166 lb  Bed scale      75.297 kg  166 lb      6/13/2024 70.897 kg  156 lb 4.8 oz  Stated  --     70.9 kg  156 lb 4.9 oz  Bed scale  --     70.308 kg  155 lb   --     74.3 kg  163 lb 12.8 oz   --    6/19/2024 74.39 kg  164 lb   --    6/24/2024 71.215 kg  157 lb      6/26/2024 68.493 kg  151 lb   --    7/1/2024 69.854 kg  154 lb      7/2/2024 73.211 kg  161 lb 6.4 oz      7/8/2024 68.856 kg  151 lb 12.8 oz   --    7/9/2024 71.759 kg  158 lb 3.2 oz      7/12/2024 70.761 kg  156 lb      7/15/2024 69.945 kg  154 lb 3.2 oz       70.489 kg  155 lb 6.4 oz      7/22/2024 69.763 kg  153 lb 12.8 oz   --     68.947 kg  152 lb   --    7/29/2024 68.947 kg  152 lb      7/30/2024 70.353 kg  155 lb 1.6 oz      8/5/2024 71.033 kg  156 lb 9.6 oz   --    8/6/2024 70.806 kg  156 lb 1.6 oz      8/19/2024 69.854 kg  154 lb  Stated         Nutrition Focused Physical Exam     Date: 8/20    Patient meets criteria for malnutrition diagnosis, see MSA note.     Subjective   Reported/Observed/Food/Nutrition Related History:     Patient in chair at time of visit, family member at bedside helped provide information. Family member states patient has not had nightly TF in 5 days. Pt is able to take in some food PO as tumor has shrunk " "and affects swallowing ability less. Pt reports one day he ate several pieces of toast, and 5 pieces of pizza. Pt does not want to continue full 5 cartons at night since he is experiencing abdominal pain. Pt was drinking Boost VHS but has not been doing that regularly for several months.    Patient current home regimen:   Nutren 2.0 @ 80 ml/hour x 15 hours nightly = 2500 calories, 105 grams protein, 865 ml water plus additional 20 ml water flushes per hour / administered via J-Tube.    Needs Assessment   Date: 8/20    Height used:Height: 182.9 cm (72\")  Weights used: 69.9kg (ABW)    Estimated Calorie needs: ~ 2100 Kcal/day  Method:  (25-35)Kcals/KG = 5057-3222  Method:  MSJ x 1.3 = 1603    Estimated Protein needs: ~  95g PRO/day  Method: 1.2-1.5g/Kg (as tolerated with Stage III CKD) - 84-105g    Estimated Fluid needs: ~ ml/day   Per clinical status    Current Nutrition Prescription     PO: Diet: Gastrointestinal; Low Irritant; Texture: Soft to Chew (NDD 3); Soft to Chew: Chopped Meat; Fluid Consistency: Thin (IDDSI 0)  Oral Nutrition Supplement: N/A  Intake: Insufficient data    Assessment & Plan   Nutrition Diagnosis   Date: 8/20 Updated:   Problem Malnutrition  chronic moderate   Etiology Increased needs in setting of cx treatment   Signs/Symptoms Moderate muscle wasting and moderate fat loss   Status: New      Goal:   Nutrition to support treatment, Increase intake, and Initiate EN      Nutrition Intervention      Follow treatment progress, Care plan reviewed, Advise alternate selection, Advised available snacks, Interview for preferences, Menu provided, Encourage intake, Nutrition support order placed    Recommend Nutren 2.0 @ 50ml/hr x 15 hours nightly. This provides 1500 calories, 63g protein, 519ml water plus 20 ml water flushes per hour/administered via J-tube.    Monitoring/Evaluation:   Per protocol, I&O, PO intake, EN delivery/tolerance, Weight, GI status, Symptoms, POC/GOC, Swallow function    Valerie" JOJO Zacarias eligible  Time Spent: 45 min

## 2024-08-20 NOTE — PLAN OF CARE
Goal Outcome Evaluation:  Plan of Care Reviewed With: patient           Outcome Evaluation: PT eval completed. Pt presents slightly below baseline function d/t pain and mild deficits in strength, balance, and activity tolerance. Pt ambulated 80 ft w/ UE support on IV pole, SBA. Further mobility deferred d/t pain. Pt would benefit from skilled IP PT. Recommend home at d/c.      Anticipated Discharge Disposition (PT): home

## 2024-08-20 NOTE — PROGRESS NOTES
[facility] Medicine Services  PROGRESS NOTE    Patient Name: Nelson Garcia  : 1975  MRN: [id number]    Date of Admission: 2024  Primary Care Physician: Chelita Scott APRN    Subjective   Subjective     CC:  Abdominal pain    HPI:  I have seen the patient multiple times today.  This afternoon patient resting in chair in no acute distress and tells me that he had a bowel movement.  Denies any fever or chills.  No chest pain or palpitation or shortness of breath.  No nausea vomiting.      Objective   Objective     Vital Signs:   Temp:  [98.2 °F (36.8 °C)-99 °F (37.2 °C)] 98.6 °F (37 °C)  Heart Rate:  [] 65  Resp:  [18] 18  BP: (140-196)/() 142/82     Physical Exam:  Constitutional: No acute distress, awake, alert  HENT: NCAT, mucous membranes moist  Respiratory: Clear to auscultation bilaterally, respiratory effort normal   Cardiovascular: RRR, systolic murmurs,no rubs or gallops  Gastrointestinal: Positive bowel sounds, soft, nontender, nondistended, J-tube in place  Musculoskeletal: No bilateral ankle edema, cachexia  Psychiatric: Appropriate affect, cooperative  Neurologic: Oriented x 3, no focality appreciated, speech clear  Skin: No rashes     Results Reviewed:  LAB RESULTS:      Lab 24  0453 24  1115 24  0944   WBC 4.71  --  5.88   HEMOGLOBIN 11.3*  --  12.6*   HEMATOCRIT 33.7*  --  38.2   PLATELETS 84*  --  92*   NEUTROS ABS 3.37  --  4.85   IMMATURE GRANS (ABS) 0.01  --  0.02   LYMPHS ABS 0.34*  --  0.18*   MONOS ABS 0.91*  --  0.81   EOS ABS 0.05  --  0.00   MCV 85.8  --  87.4   CRP  --  12.04*  --    PROCALCITONIN  --  0.68*  --    LACTATE  --   --  1.4   PROTIME  --   --  14.9*         Lab 24  0453 24  1115   SODIUM 137 136   POTASSIUM 4.0 3.9   CHLORIDE 104 96*   CO2 27.0 28.0   ANION GAP 6.0 12.0   BUN 18 15   CREATININE 0.94 0.96   EGFR 99.4 96.9   GLUCOSE 108* 109*   CALCIUM 8.7 9.0   MAGNESIUM  --  2.1          Lab 08/19/24  1115   TOTAL PROTEIN 7.4   ALBUMIN 4.0   GLOBULIN 3.4   ALT (SGPT) 21   AST (SGOT) 32   BILIRUBIN 1.6*   ALK PHOS 83   LIPASE 12*         Lab 08/19/24  0944   PROTIME 14.9*   INR 1.16*                 Brief Urine Lab Results  (Last result in the past 365 days)        Color   Clarity   Blood   Leuk Est   Nitrite   Protein   CREAT   Urine HCG        08/19/24 1445 Yellow   Clear   Negative   Negative   Negative   100 mg/dL (2+)                   Microbiology Results Abnormal       None            CT Abdomen Pelvis With Contrast    Result Date: 8/19/2024  CT ABDOMEN PELVIS W CONTRAST Date of Exam: 8/19/2024 10:20 AM EDT Indication: abdominal pain. Comparison: CT abdomen pelvis 6/5/2024. Technique: Axial CT images were obtained of the abdomen and pelvis following the uneventful intravenous administration of 85 mL Isovue-300. Reconstructed coronal and sagittal images were also obtained. Automated exposure control and iterative construction methods were used. Findings: Lower thorax: Bibasilar scarring/subsegmental atelectasis. No focal consolidation. Liver: Normal morphology. No focal hepatic lesion. Gallbladder and bile ducts: Gallbladder is unremarkable. No biliary ductal dilatation. Pancreas: No pancreatic duct dilation. No surrounding inflammation. Spleen: Normal in size. Adrenal glands: Unchanged 1.6 cm left adrenal nodule, which had density of less than 10 Hounsfield units on prior noncontrast exam, suggestive of an adenoma. No right adrenal nodule. Kidneys: Symmetric in size and enhancement. No hydronephrosis. Urinary bladder: Unremarkable. Reproductive organs: Unremarkable. Stomach and bowel: Percutaneous jejunostomy tube in expected position. Circumferential wall thickening of the lower thoracic esophagus and proximal stomach, new/increased. Fluid throughout the proximal half of the colon. No evidence of bowel obstruction. Lymph nodes: Multiple mildly enlarged gastrohepatic and left  retroperitoneal lymph nodes, similar to prior exam. For example, gastrohepatic ligament measures 1.1 cm in short axis, unchanged (series 2 image 40). Vessels: No abdominal aortic aneurysm. Atherosclerosis. Major vasculature is patent. Peritoneum and retroperitoneum: Ill-defined free fluid in the upper abdomen. No organized fluid collection. No free air. Soft tissues: Midline incision. Osseous structures: No acute or suspicious osseous lesions. Lumbosacral degenerative changes.     Impression: Impression: Findings of esophagogastritis, suspected to be secondary to posttreatment changes related to underlying esophageal malignancy. Associated surrounding ill-defined fluid fluid without organized fluid collection. Fluid throughout the proximal half of the colon, suggestive of diarrheal illness. Similar upper abdominal lymphadenopathy. Percutaneous jejunostomy tube in expected position. Additional findings as above. Electronically Signed: Jenaro Garcia MD  8/19/2024 10:53 AM EDT  Workstation ID: [id number]     Results for orders placed during the hospital encounter of 02/21/22    Adult transthoracic echo complete    Interpretation Summary  · Saline test results are negative.  · Left ventricular wall thickness is consistent with severe concentric hypertrophy.  · Calculated left ventricular EF = 59.5% Estimated left ventricular EF was in agreement with the calculated left ventricular EF. Left ventricular systolic function is normal.  · Left ventricular diastolic function is consistent with (grade I) impaired relaxation.  · No aortic valve regurgitation or stenosis is present.  · Mild mitral valve regurgitation is present.      Current medications:  Scheduled Meds:atorvastatin, 80 mg, Oral, Daily  enoxaparin, 40 mg, Subcutaneous, Daily  gabapentin, 900 mg, Per J Tube, 4x Daily  Naloxegol Oxalate, 25 mg, Per J Tube, QAM  NIFEdipine, 10 mg, Oral, Q8H  sodium chloride, 10 mL, Intravenous, Q12H  valsartan, 320 mg, Oral,  Q24H      Continuous Infusions:   PRN Meds:.  senna-docusate sodium **AND** polyethylene glycol **AND** [DISCONTINUED] bisacodyl **AND** bisacodyl    Calcium Replacement - Follow Nurse / BPA Driven Protocol    hydrALAZINE    HYDROmorphone    Lidocaine Viscous HCl    Magnesium Standard Dose Replacement - Follow Nurse / BPA Driven Protocol    nicotine polacrilex    ondansetron    oxyCODONE    Phosphorus Replacement - Follow Nurse / BPA Driven Protocol    Potassium Replacement - Follow Nurse / BPA Driven Protocol    sodium chloride    sodium chloride    sodium chloride    Assessment & Plan   Assessment & Plan     Active Hospital Problems    Diagnosis  POA    **Abdominal pain [R10.9]  Yes    Severe malnutrition [E43]  Yes    Esophageal adenocarcinoma [C15.9]  Yes    Stage 3a chronic kidney disease [N18.31]  Yes    HTN (hypertension) [I10]  Yes      Resolved Hospital Problems   No resolved problems to display.        Brief Hospital Course to date:  Nelson Garcia is a 49 y.o. male  history of hypertension, FREDRICK, severe malnutrition, prior CVA, hyperlipidemia, GERD, tobacco abuse chronic anemia, esophageal cancer followed by Dr. Reddy currently on treatment.  Who presented to the ED with 1 week of progressively worsening abdominal pain and constipation.     Abdominal pain  ??Constipation  -CT abdomen pelvis show shows esophogogastritis, suspected to be secondary to be posttreatment changes, however also with fluid levels throughout the colon that suggest diarrheal illness, however patient has not had a BM in more than a week  -Evidently patient had bowel movement this afternoon.  Abdominal pain has improved significantly.  He actually wants to go home.     Elevated inflammatory markers and procalcitonin  -Suspect secondary to underlying malignancy  -No overt signs of ongoing infectious process  -His counts (WBC, hemoglobin, platelets ) seem to have improved compared to 8/5  -Hematology oncology has seen and  evaluated the patient, appreciate their input.     Esophageal adenocarcinoma  Thrombocytopenia  -Follows with Dr. Reddy currently under chemo/radiation.     Hypertensive urgency  -BP significantly elevated in the ED, did receive hydralazine 10 mg x 2 with some improvement  -Had a long conversation with the patient about his blood pressure.  Tells me that he used to take nifedipine and valsartan at home.  However, he has stopped taking antihypertensive medication since he was started on tube feeding.  He stopped tube feeding about 6 days ago and he has noticed that the blood pressure has creeped up.  He tells me that 2 days ago he just took 1 valsartan pill nothing more.  -Will continue losartan and also nifedipine that he was on before.  We will monitor and if asymptomatic and blood pressure is reasonably controlled we can discharge tomorrow.     Cancer-related pain  -Continue oxycodone     Severe protein calorie malnutrition  -Patient is on tube feeds through J-tube  -Nutrition/dietitian consulted, continue t tube feeds     Hyperlipidemia  -Continue statin        Expected Discharge Location and Transportation: Home  Expected Discharge soon, possibly tomorrow  Expected Discharge Date: 8/22/2024; Expected Discharge Time:      VTE Prophylaxis:  Pharmacologic VTE prophylaxis orders are present.         AM-PAC 6 Clicks Score (PT): 22 (08/20/24 0617)    CODE STATUS:   Code Status and Medical Interventions: CPR (Attempt to Resuscitate); Full Support   Ordered at: 08/19/24 3940     Level Of Support Discussed With:    Patient     Code Status (Patient has no pulse and is not breathing):    CPR (Attempt to Resuscitate)     Medical Interventions (Patient has pulse or is breathing):    Full Support       Stephon Coley MD  08/20/24

## 2024-08-20 NOTE — PROGRESS NOTES
HEMATOLOGY/ONCOLOGY PROGRESS NOTE    Subjective      CC: History of esophageal adenocarcinoma    SUBJECTIVE:   No acute events overnight.  Still having some intermittent abdominal discomfort.  Is not had a bowel movement as of yet.  Still tolerating oral intake fairly well        Past Medical History, Past Surgical History, Social History, Family History have been reviewed and are without significant changes except as mentioned.      Medications:  The current medication list was reviewed in the EMR    ALLERGIES: No Known Allergies    ROS:  A comprehensive 10 point review of systems was performed and was negative except as mentioned.      Objective      Vitals:    08/19/24 1856 08/19/24 2336 08/20/24 0432 08/20/24 0700   BP: 159/98 140/97 149/96 (!) 148/113   BP Location: Left arm Left arm Left arm Left arm   Patient Position: Lying Lying Lying Lying   Pulse: 104 78 92    Resp: 18 18 18 18   Temp: 99 °F (37.2 °C) 98.2 °F (36.8 °C) 98.3 °F (36.8 °C) 98.8 °F (37.1 °C)   TempSrc: Oral Oral Oral Oral   SpO2: 92% 93% 93%    Weight:       Height:              General: well appearing, in no acute distress  HEENT: sclerae anicteric, oropharynx clear  Lymphatics: no cervical, supraclavicular, inguinal, or axillary adenopathy  Cardiovascular: regular rate and rhythm, no murmurs  Lungs: clear to auscultation bilaterally  Abdomen: soft, nontender, nondistended.  No palpable organomegaly  Extremities: no lower extremity edema  Skin: no rashes, lesions, bruising, or petechiae  Neuro: Alert and oriented x 3. Moves all extremities.    RECENT LABS:    Results from last 7 days   Lab Units 08/20/24  0453 08/19/24  0944   WBC 10*3/mm3 4.71 5.88   HEMOGLOBIN g/dL 11.3* 12.6*   PLATELETS 10*3/mm3 84* 92*     Results from last 7 days   Lab Units 08/20/24  0453 08/19/24  1115   SODIUM mmol/L 137 136   POTASSIUM mmol/L 4.0 3.9   CO2 mmol/L 27.0 28.0   BUN mg/dL 18 15   CREATININE mg/dL 0.94 0.96   GLUCOSE mg/dL 108* 109*     Results from  last 7 days   Lab Units 08/19/24  1115   AST (SGOT) U/L 32   ALT (SGPT) U/L 21   BILIRUBIN mg/dL 1.6*   ALK PHOS U/L 83         CT Abdomen Pelvis With Contrast    Result Date: 8/19/2024  Impression: Findings of esophagogastritis, suspected to be secondary to posttreatment changes related to underlying esophageal malignancy. Associated surrounding ill-defined fluid fluid without organized fluid collection. Fluid throughout the proximal half of the colon, suggestive of diarrheal illness. Similar upper abdominal lymphadenopathy. Percutaneous jejunostomy tube in expected position. Additional findings as above. Electronically Signed: Jenaro Garcia MD  8/19/2024 10:53 AM EDT  Workstation ID: [id number]         Assessment   ASSESSMENT & PLAN:  Esophageal adenocarcinoma  -Completed CarboTaxol/radiation in July 2024  -PET/CT following treatment with a significant response  -Has been seen by Dr. Gupta with plans for surgical intervention next month  -CT scan on presentation personally reviewed and notable for significant esophagogastritis as well as fluid within the colon suggestive of diarrheal illness.  No concerns for malignancy     Abdominal pain  -Likely secondary to inflammation from his chemotherapy/radiation  -Stable today     Constipation  -Likely related to his chronic opioid use  -Bowel regimen through primary team  -Currently on IV fluids  -GI panel ordered  -Did not have a bowel movement yesterday and overnight.  Notes that he is passing flatus     Anemia/thrombocytopenia  -CBC on presentation reviewed  -Stable today.  No indication for PRBC transfusion     Thank you for the consult.  Please do not hesitate to contact with any questions or concerns     Aquiles Reddy MD  Hematology and Oncology    8/20/2024  09:21 EDT

## 2024-08-20 NOTE — THERAPY EVALUATION
Patient Name: Nelson Garcia  : 1975    MRN: [id number]                              Today's Date: 2024       Admit Date: 2024    Visit Dx:     ICD-10-CM ICD-9-CM   1. Generalized abdominal pain  R10.84 789.07   2. Esophagitis  K20.90 530.10   3. Acute gastritis, presence of bleeding unspecified, unspecified gastritis type  K29.00 535.00   4. Hypertension, unspecified type  I10 401.9     Patient Active Problem List   Diagnosis    Left leg weakness    HTN (hypertension)    Cerebral microhemorrhages    Acute ischemic stroke    Tobacco use    Stage 3a chronic kidney disease    Observed sleep apnea    Snoring    Non-restorative sleep    Erectile dysfunction    Dysphagia    GI bleed    Anemia due to gastrointestinal blood loss    Esophageal mass    History of CVA (cerebrovascular accident)    Esophageal adenocarcinoma    Severe malnutrition    Encounter for care related to vascular access port    Abdominal pain     Past Medical History:   Diagnosis Date    Esophageal cancer     Hypertension     Stroke (cerebrum)      Past Surgical History:   Procedure Laterality Date    BACK SURGERY      ENDOSCOPY N/A 2024    Procedure: ESOPHAGOGASTRODUODENOSCOPY with biopsies;  Surgeon: Taras Hernandez MD;  Location: [facility] ENDOSCOPY;  Service: Gastroenterology;  Laterality: N/A;  pre- nausea/vomiting   post- nearly obstructing distal esophageal mass, gastritis    ENDOSCOPY N/A 2024    Procedure: ESOPHAGOGASTRODUODENOSCOPY, RIGHT PORT PLACEMENT, AND JEJUNOSTOMY TUBE INSERTION;  Surgeon: Jany Valente MD;  Location: [facility] MAIN OR;  Service: Gastroenterology;  Laterality: N/A;    KNEE SURGERY      TEETH EXTRACTION      all      General Information       Row Name 24 1059          OT Time and Intention    Document Type discharge evaluation/summary  -LC     Mode of Treatment occupational therapy  -LC       Row Name 24 1056          General Information    Patient Profile Reviewed yes  -LC      Prior Level of Function independent:;all household mobility;transfer;ADL's  -     Existing Precautions/Restrictions fall  J tube  -     Barriers to Rehab medically complex  -       Row Name 08/20/24 1059          Living Environment    People in Home spouse;child(haresh), dependent  -       Row Name 08/20/24 1059          Home Main Entrance    Number of Stairs, Main Entrance one  -       Row Name 08/20/24 1059          Stairs Within Home, Primary    Number of Stairs, Within Home, Primary none  -       Row Name 08/20/24 1059          Cognition    Orientation Status (Cognition) oriented x 4  -       Row Name 08/20/24 1059          Safety Issues, Functional Mobility    Impairments Affecting Function (Mobility) endurance/activity tolerance;pain  -     Comment, Safety Issues/Impairments (Mobility) Demonstrates good safety awareness and sequencing.  -               User Key  (r) = Recorded By, (t) = Taken By, (c) = Cosigned By      Initials Name Provider Type     Laly Ovalle OT Occupational Therapist                     Mobility/ADL's       Row Name 08/20/24 1101          Bed Mobility    Comment, (Bed Mobility) UIC  -       Row Name 08/20/24 1101          Transfers    Transfers sit-stand transfer  -     Comment, (Transfers) Demonstrates good sequencing, denied dizziness  -       Row Name 08/20/24 1101          Sit-Stand Transfer    Sit-Stand [place] (Transfers) independent  -       Row Name 08/20/24 1101          Activities of Daily Living    BADL Assessment/Intervention lower body dressing;toileting  -       Row Name 08/20/24 1101          Lower Body Dressing Assessment/Training    [place] Level (Lower Body Dressing) lower body dressing skills;independent  -       Row Name 08/20/24 1101          Toileting Assessment/Training    [place] Level (Toileting) toileting skills;independent  -               User Key  (r) = Recorded By, (t) = Taken By, (c) = Cosigned By       Initials Name Provider Type    Laly Khalil OT Occupational Therapist                   Obj/Interventions       Row Name 08/20/24 1102          Sensory Assessment (Somatosensory)    Sensory Assessment (Somatosensory) UE sensation intact  -[facility] Name 08/20/24 1102          Vision Assessment/Intervention    Visual Impairment/Limitations WFL  -[facility] Name 08/20/24 1102          Range of Motion Comprehensive    General Range of Motion bilateral upper extremity ROM WFL  -[facility] Name 08/20/24 1102          Strength Comprehensive (MMT)    General Manual Muscle Testing (MMT) Assessment upper extremity strength deficits identified  -[facility] Name 08/20/24 1102          Upper Extremity (Manual Muscle Testing)    Upper Extremity: Manual Muscle Testing (MMT) left UE strength is WNL;right UE strength is WNL  -[facility] Name 08/20/24 1102          Balance    Balance Assessment sitting static balance;sitting dynamic balance;standing static balance;standing dynamic balance  -     Static Sitting Balance independent  -     Dynamic Sitting Balance independent  -     Position, Sitting Balance unsupported;sitting edge of bed  -     Static Standing Balance supervision  -     Dynamic Standing Balance supervision  -     Position/Device Used, Standing Balance unsupported  -     Balance Interventions sitting;standing;sit to stand;weight shifting activity  -     Comment, Balance No LOB  -               User Key  (r) = Recorded By, (t) = Taken By, (c) = Cosigned By      Initials Name Provider Type    Laly Khalil OT Occupational Therapist                   Goals/Plan    No documentation.                  Clinical Impression       Row Name 08/20/24 1103          Pain Assessment    Pretreatment Pain Rating 3/10  -LC     Posttreatment Pain Rating 3/10  -     Pain Location generalized  -     Pain Location - abdomen  -     Pain Intervention(s) Repositioned;Ambulation/increased activity   -     Additional Documentation Pain Scale: Word Pre/Post-Treatment (Group)  -       Row Name 08/20/24 1103          Plan of Care Review    Plan of Care Reviewed With patient  -     Progress no change  -     Outcome Evaluation Pt. presents at baseline with ADLs and functional mobility. No further skilled OT services warranted at this time. Recommend home with assist at discharge.  -       Row Name 08/20/24 1103          Therapy Assessment/Plan (OT)    Therapy Frequency (OT) evaluation only  -       Row Name 08/20/24 1103          Therapy Plan Review/Discharge Plan (OT)    Anticipated Discharge Disposition (OT) home with assist  -       Row Name 08/20/24 1103          Vital Signs    Pre Systolic BP Rehab 170  -LC     Pre Treatment Diastolic   -LC     Post Systolic BP Rehab 185  -LC     Post Treatment Diastolic    -LC     Pre Patient Position Sitting  -     Post Patient Position Sitting  -       Row Name 08/20/24 1103          Positioning and Restraints    Pre-Treatment Position sitting in chair/recliner  -     Post Treatment Position chair  -LC     In Chair notified nsg;reclined;call light within reach;encouraged to call for assist;legs elevated  Alarm unchanged  -               User Key  (r) = Recorded By, (t) = Taken By, (c) = Cosigned By      Initials Name Provider Type     Laly Ovalle, OT Occupational Therapist                   Outcome Measures       Row Name 08/20/24 1106          How much help from another is currently needed...    Putting on and taking off regular lower body clothing? 4  -LC     Bathing (including washing, rinsing, and drying) 4  -LC     Toileting (which includes using toilet bed pan or urinal) 4  -LC     Putting on and taking off regular upper body clothing 4  -LC     Taking care of personal grooming (such as brushing teeth) 4  -LC     Eating meals 4  -LC     AM-PAC 6 Clicks Score (OT) 24  -       Row Name 08/20/24 0946 08/20/24 0800       How much  help from another person do you currently need...    Turning from your back to your side while in flat bed without using bedrails? 4  -LH 4  -KM    Moving from lying on back to sitting on the side of a flat bed without bedrails? 4  -LH 4  -KM    Moving to and from a bed to a chair (including a wheelchair)? 4  -LH 4  -KM    Standing up from a chair using your arms (e.g., wheelchair, bedside chair)? 4  - 4  -KM    Climbing 3-5 steps with a railing? 3  -LH 4  -KM    To walk in hospital room? 3  - 4  -KM    AM-PAC 6 Clicks Score (PT) 22  - 24  -KM    Highest Level of Mobility Goal 7 --> Walk 25 feet or more  - 8 --> Walked 250 feet or more  -KM      Row Name 08/20/24 1106 08/20/24 0946       Functional Assessment    Outcome Measure Options AM-PAC 6 Clicks Daily Activity (OT)  - AM-PAC 6 Clicks Basic Mobility (PT)  -              User Key  (r) = Recorded By, (t) = Taken By, (c) = Cosigned By      Initials Name Provider Type     Laly Ovalle, OT Occupational Therapist     Anat Tapia, PT Physical Therapist     Hortencia Minaya, RN Registered Nurse                    Occupational Therapy Education       Title: PT OT SLP Therapies (In Progress)       Topic: Occupational Therapy (In Progress)       Point: ADL training (In Progress)       Description:   Instruct learner(s) on proper safety adaptation and remediation techniques during self care or transfers.   Instruct in proper use of assistive devices.                  Learning Progress Summary             Patient Acceptance, E, NR by  at 8/20/2024 1015                         Point: Home exercise program (Not Started)       Description:   Instruct learner(s) on appropriate technique for monitoring, assisting and/or progressing therapeutic exercises/activities.                  Learner Progress:  Not documented in this visit.              Point: Precautions (In Progress)       Description:   Instruct learner(s) on prescribed precautions during  self-care and functional transfers.                  Learning Progress Summary             Patient Acceptance, E, NR by  at 8/20/2024 1015                         Point: Body mechanics (In Progress)       Description:   Instruct learner(s) on proper positioning and spine alignment during self-care, functional mobility activities and/or exercises.                  Learning Progress Summary             Patient Acceptance, E, NR by  at 8/20/2024 1015                                         User Key       Initials Effective Dates Name Provider Type [facility] 06/16/21 -  Laly Ovalle OT Occupational Therapist OT                  OT Recommendation and Plan  Therapy Frequency (OT): evaluation only  Plan of Care Review  Plan of Care Reviewed With: patient  Progress: no change  Outcome Evaluation: Pt. presents at baseline with ADLs and functional mobility. No further skilled OT services warranted at this time. Recommend home with assist at discharge.     Time Calculation:   Evaluation Complexity (OT)  Review Occupational Profile/Medical/Therapy History Complexity: brief/low complexity  Assessment, Occupational Performance/Identification of Deficit Complexity: 1-3 performance deficits  Clinical Decision Making Complexity (OT): problem focused assessment/low complexity  Overall Complexity of Evaluation (OT): low complexity     Time Calculation- OT       Row Name 08/20/24 1108             Time Calculation- OT    OT Start Time 1015  -LC      OT Received On 08/20/24  -      OT Goal Re-Cert Due Date 08/30/24  -         Untimed Charges    OT Eval/Re-eval Minutes 46  -LC         Total Minutes    Untimed Charges Total Minutes 46  -LC       Total Minutes 46  -LC                User Key  (r) = Recorded By, (t) = Taken By, (c) = Cosigned By      Initials Name Provider Type     Laly Ovalle OT Occupational Therapist                  Therapy Charges for Today       Code Description Service Date Service Provider  Modifiers Qty    72513872104 HC OT EVAL LOW COMPLEXITY 4 8/20/2024 Laly Ovalle, ZI GO 1                 Laly Ovalle OT  8/20/2024

## 2024-08-20 NOTE — PLAN OF CARE
Goal Outcome Evaluation:  Plan of Care Reviewed With: patient        Progress: no change  Outcome Evaluation: Pt. presents at baseline with ADLs and functional mobility. No further skilled OT services warranted at this time. Recommend home with assist at discharge.      Anticipated Discharge Disposition (OT): home with assist

## 2024-08-20 NOTE — PLAN OF CARE
Problem: Adult Inpatient Plan of Care  Goal: Plan of Care Review  Outcome: Ongoing, Progressing  Goal: Patient-Specific Goal (Individualized)  Outcome: Ongoing, Progressing  Goal: Absence of Hospital-Acquired Illness or Injury  Outcome: Ongoing, Progressing  Intervention: Identify and Manage Fall Risk  Recent Flowsheet Documentation  Taken 8/20/2024 1800 by Hortencia Minaya RN  Safety Promotion/Fall Prevention:   activity supervised   assistive device/personal items within reach   clutter free environment maintained   lighting adjusted   nonskid shoes/slippers when out of bed  Taken 8/20/2024 1600 by Hortencia Minaya RN  Safety Promotion/Fall Prevention:   activity supervised   assistive device/personal items within reach   clutter free environment maintained   lighting adjusted   nonskid shoes/slippers when out of bed  Taken 8/20/2024 1200 by Hortencia Minaya RN  Safety Promotion/Fall Prevention:   activity supervised   assistive device/personal items within reach   clutter free environment maintained   lighting adjusted   nonskid shoes/slippers when out of bed  Taken 8/20/2024 1000 by Hortencia Minaya RN  Safety Promotion/Fall Prevention:   activity supervised   assistive device/personal items within reach   clutter free environment maintained   lighting adjusted   nonskid shoes/slippers when out of bed  Taken 8/20/2024 0800 by Hortencia Minaya RN  Safety Promotion/Fall Prevention:   activity supervised   assistive device/personal items within reach   clutter free environment maintained   lighting adjusted   nonskid shoes/slippers when out of bed  Intervention: Prevent Skin Injury  Recent Flowsheet Documentation  Taken 8/20/2024 1800 by Hortencia Minaya RN  Body Position: position changed independently  Skin Protection:   adhesive use limited   transparent dressing maintained   tubing/devices free from skin contact  Taken 8/20/2024 1600 by Hortencia Minaya RN  Body Position: position changed  independently  Skin Protection:   adhesive use limited   transparent dressing maintained   tubing/devices free from skin contact  Taken 8/20/2024 1200 by Hortencia Minaya RN  Body Position: position changed independently  Skin Protection:   adhesive use limited   transparent dressing maintained  Taken 8/20/2024 1000 by Hortencia Minaya RN  Body Position: position changed independently  Skin Protection:   adhesive use limited   transparent dressing maintained   tubing/devices free from skin contact  Taken 8/20/2024 0800 by Hortencia Minaya RN  Body Position: position changed independently  Skin Protection:   adhesive use limited   transparent dressing maintained   tubing/devices free from skin contact  Intervention: Prevent and Manage VTE (Venous Thromboembolism) Risk  Recent Flowsheet Documentation  Taken 8/20/2024 1800 by Hortencia Minaya RN  Activity Management: activity encouraged  Taken 8/20/2024 1600 by Hortencia Minaya RN  Activity Management: activity minimized  Taken 8/20/2024 1200 by Hortencia Minaya RN  Activity Management: activity encouraged  Taken 8/20/2024 1000 by Hortencia Minaya RN  Activity Management: activity minimized  Taken 8/20/2024 0800 by Hortencia Minaya RN  Activity Management: activity minimized  VTE Prevention/Management: patient refused intervention  Range of Motion: active ROM (range of motion) encouraged  Intervention: Prevent Infection  Recent Flowsheet Documentation  Taken 8/20/2024 1800 by Hortencia Minaya RN  Infection Prevention:   single patient room provided   environmental surveillance performed  Taken 8/20/2024 1600 by Hortencia Minaya RN  Infection Prevention: single patient room provided  Taken 8/20/2024 1200 by Hortencia Minaya RN  Infection Prevention: single patient room provided  Taken 8/20/2024 1000 by Hortencia Minaya RN  Infection Prevention: single patient room provided  Taken 8/20/2024 0800 by Hortencia Minaya RN  Infection Prevention:    environmental surveillance performed   single patient room provided  Goal: Optimal Comfort and Wellbeing  Outcome: Ongoing, Progressing  Intervention: Monitor Pain and Promote Comfort  Recent Flowsheet Documentation  Taken 8/20/2024 0800 by Hortencia Minaya RN  Pain Management Interventions: see MAR  Goal: Readiness for Transition of Care  Outcome: Ongoing, Progressing     Problem: Pain Acute  Goal: Acceptable Pain Control and Functional Ability  Outcome: Ongoing, Progressing  Intervention: Prevent or Manage Pain  Recent Flowsheet Documentation  Taken 8/20/2024 1800 by Hortencia Minaya RN  Medication Review/Management: medications reviewed  Taken 8/20/2024 1600 by Hortencia Minaya RN  Medication Review/Management: medications reviewed  Taken 8/20/2024 1200 by Hortencia Minaya RN  Medication Review/Management: medications reviewed  Taken 8/20/2024 1000 by Hortencia Minaya RN  Medication Review/Management: medications reviewed  Taken 8/20/2024 0800 by Hortencia Minaya RN  Medication Review/Management: medications reviewed  Intervention: Develop Pain Management Plan  Recent Flowsheet Documentation  Taken 8/20/2024 0800 by Hortencia Minaya RN  Pain Management Interventions: see MAR     Problem: Aspiration (Enteral Nutrition)  Goal: Absence of Aspiration Signs and Symptoms  Outcome: Ongoing, Progressing  Intervention: Minimize Aspiration Risk  Recent Flowsheet Documentation  Taken 8/20/2024 1800 by Hortencia Minaya RN  Head of Bed ([facility] [place]) Positioning: HOB at 30-45 degrees  Taken 8/20/2024 1600 by Hortencia Minaya RN  Head of Bed ([facility] [place]) Positioning: HOB at 30-45 degrees  Taken 8/20/2024 1200 by Hortencia Minaya RN  Head of Bed ([facility] [place]) Positioning: HOB at 30-45 degrees  Taken 8/20/2024 1000 by Hortencia Minaya RN  Head of Bed (HOB) Positioning: HOB at 30-45 degrees  Taken 8/20/2024 0800 by Hortencia Minaya RN  Head of Bed ([facility] [place]) Positioning: HOB at 30-45 degrees     Problem: Device-Related Complication  Risk (Enteral Nutrition)  Goal: Safe, Effective Therapy Delivery  Outcome: Ongoing, Progressing     Problem: Feeding Intolerance (Enteral Nutrition)  Goal: Feeding Tolerance  Outcome: Ongoing, Progressing     Problem: Constipation  Goal: Effective Bowel Elimination  Outcome: Ongoing, Progressing   Goal Outcome Evaluation:   Pt had BM. BP running high, given hydralazine and home BP meds restarted. Fluids D/C. Tube feeds restarted through J tube. Dilaudid given once, oxycodone given twice. A&O x4. No complaints at this time. Call bell within reach.

## 2024-08-21 ENCOUNTER — READMISSION MANAGEMENT (OUTPATIENT)
Dept: CALL CENTER | Facility: HOSPITAL | Age: 49
End: 2024-08-21
Payer: MEDICAID

## 2024-08-21 ENCOUNTER — PATIENT OUTREACH (OUTPATIENT)
Dept: OTHER | Facility: HOSPITAL | Age: 49
End: 2024-08-21
Payer: MEDICAID

## 2024-08-21 ENCOUNTER — OFFICE VISIT (OUTPATIENT)
Dept: PSYCHIATRY | Facility: CLINIC | Age: 49
End: 2024-08-21
Payer: MEDICAID

## 2024-08-21 VITALS
BODY MASS INDEX: 20.86 KG/M2 | DIASTOLIC BLOOD PRESSURE: 82 MMHG | SYSTOLIC BLOOD PRESSURE: 138 MMHG | HEIGHT: 72 IN | OXYGEN SATURATION: 97 % | RESPIRATION RATE: 18 BRPM | TEMPERATURE: 97.9 F | HEART RATE: 73 BPM | WEIGHT: 154 LBS

## 2024-08-21 DIAGNOSIS — F43.0 STRESS REACTION: Primary | ICD-10-CM

## 2024-08-21 PROCEDURE — 1159F MED LIST DOCD IN RCRD: CPT | Performed by: NURSE PRACTITIONER

## 2024-08-21 PROCEDURE — 99239 HOSP IP/OBS DSCHRG MGMT >30: CPT | Performed by: INTERNAL MEDICINE

## 2024-08-21 PROCEDURE — 1160F RVW MEDS BY RX/DR IN RCRD: CPT | Performed by: NURSE PRACTITIONER

## 2024-08-21 PROCEDURE — G0378 HOSPITAL OBSERVATION PER HR: HCPCS

## 2024-08-21 PROCEDURE — 90792 PSYCH DIAG EVAL W/MED SRVCS: CPT | Performed by: NURSE PRACTITIONER

## 2024-08-21 RX ORDER — VALSARTAN 320 MG/1
320 TABLET ORAL
Qty: 30 TABLET | Refills: 0 | Status: SHIPPED | OUTPATIENT
Start: 2024-08-21

## 2024-08-21 RX ADMIN — OXYCODONE HYDROCHLORIDE 10 MG: 10 TABLET ORAL at 08:25

## 2024-08-21 RX ADMIN — GABAPENTIN 900 MG: 300 CAPSULE ORAL at 08:25

## 2024-08-21 RX ADMIN — NALOXEGOL OXALATE 25 MG: 25 TABLET, FILM COATED ORAL at 08:25

## 2024-08-21 RX ADMIN — VALSARTAN 320 MG: 160 TABLET, FILM COATED ORAL at 08:25

## 2024-08-21 RX ADMIN — ATORVASTATIN CALCIUM 80 MG: 40 TABLET, FILM COATED ORAL at 08:25

## 2024-08-21 NOTE — PROGRESS NOTES
Reviewed chart.  Patient was IP at [facility] 8/19-8/21 for abdominal pain and constipation. He has been treating in [facility] for his esophageal cancer. The patient is now seeing a surgeon at  and is scheduled for a [facility] Eliel esophagectomy at  on 9/30. He cancelled his appts with Dr. Mills.    Closing case since no longer treating at [facility].

## 2024-08-21 NOTE — PROGRESS NOTES
"        Subjective   Nelson Garcia is a  , employed 49 y.o. male who is here today for initial appointment in person face to face. Patient was referred by: Dr. Ramirez because of patient stressors and c/o's of anger. He lives with his wife and two children in [place], [place] and works for     Oncology history treated by Dr. Reddy and Dr. Ramirez  1. Esophageal adenocarcinoma   -Noted on EGD in June 2024.  Pathology notable for an adenocarcinoma  -CPS 10%, HER2/al negative, MSI stable  -PET/CT without evidence of metastatic disease although local regional lymphadenopathy was noted  -Clinical stage III  -Previously discussed the diagnosis, prognosis, treatment plans with patient and wife   -PET/CT in July 2024 reviewed and showing treatment response  -Status post week 4 of carbo/Taxol/radiation.  Completed  finished radiation   -Per patient, Dr. Mills with cardiothoracic surgery at [facility] feels this case is too complicated for him to perform surgery   Has seen Dr. Gupta at  and will be having surgery end of September    Chief Complaint:  stressed    History of Present Illness Patient reports he was hospitalized over night and discharged this morning. He went to work after discharged. Patient reports he works all the time and worked other than chemotherapy day every work day. 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 \"That's what I did with Covid, stayed home and had all kinds of work from home\". He reports while he was hospitalized his wife said she wants a divorce. He states she has bipolar and her mood can be all over the place and will pick fights with him. \"If she doesn't get what she wants like money she fights with me\". She says I get angry and I do when she picks a fight. Denies any physical aggression. Denies feeling unsafe in home. Denies " "HI/SI or AVH. Reports she says she wants full custody but pt states he'll get a  and no reason he can't share custody of his children. Denies alcohol \"I quit drinking quite awhile ago, used to drink couple beers a night but that would make me sick\". Quit nicotine 2 years ago after he had a CVA. Enjoys his two children and other than that works. Denies panic, denies worry, denies sleep issues \"I sleep really well\". He admits to poor food intake \"I drink those protein drinks\".  Denies illicit drug use. He works with a dietician, has confidence in his medical team. Concerned about surgery \"but I'll get through it\". Feels focused on his kids, and working to pay bills.     The following portions of the patient's history were reviewed and updated as appropriate: allergies, current medications, past family history, past medical history, past social history, past surgical history, and problem list.    Evaluate the six pillars of health: Nutrition, Activity, Sleep, Social Engagement, Stress Management, decreasing toxins ie nicotine, alcohol, illicit drugs   Past Psych History:    Substance Abuse: denies all      PAYAL REVIEWED       Family Psychiatric History:  family history includes Diabetes in his mother and sister; Heart failure in his sister; Stroke in his mother.      Social History: parents . Has a sister and brother not close to.  and has two children, 10 yo daughter and 8 yo son. Wife just asked for . 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 He like his employer \"good people supportive of me\". Patient \"doesn't miss a day of work if I can help it\".       Medical/Surgical History:  Past Medical History:   Diagnosis Date    Esophageal cancer     Hypertension     Stroke (cerebrum)      Past Surgical History:   Procedure Laterality Date    BACK SURGERY      ENDOSCOPY N/A 2024    Procedure: ESOPHAGOGASTRODUODENOSCOPY with biopsies;  Surgeon: Taras Hernandez MD;  " Location: [facility] ENDOSCOPY;  Service: Gastroenterology;  Laterality: N/A;  pre- nausea/vomiting   post- nearly obstructing distal esophageal mass, gastritis    ENDOSCOPY N/A 6/14/2024    Procedure: ESOPHAGOGASTRODUODENOSCOPY, RIGHT PORT PLACEMENT, AND JEJUNOSTOMY TUBE INSERTION;  Surgeon: Jany Valente MD;  Location: [facility] MAIN OR;  Service: Gastroenterology;  Laterality: N/A;    KNEE SURGERY      TEETH EXTRACTION      all       No Known Allergies    Current Medications:   Current Outpatient Medications   Medication Sig Dispense Refill    acetaminophen (TYLENOL) 325 MG tablet Take 2 tablets by mouth Every 6 (Six) Hours As Needed for Mild Pain .      atorvastatin (LIPITOR) 80 MG tablet Take 1 tablet by mouth Daily.      Atorvastatin Calcium 20 MG/5ML suspension Take 20 mls by mouth Daily. 600 mL 5    cloNIDine (CATAPRES) 0.2 MG tablet Take 1 tablet by mouth As Needed.      docusate (COLACE) 50 MG/5ML liquid Administer 10ml per j tube twice a day as needed for constipation. 240 mL 2    docusate sodium (COLACE) 50 mg/5 mL liquid Administer 10 mL per J tube 2 (Two) Times a Day As Needed for Constipation. 240 mL 2    famotidine (PEPCID) 40 mg/5 mL suspension Take 2.5 mL by mouth 2 (Two) Times a Day. 150 mL 3    Gabapentin (NEURONTIN) 50 mg/mL solution solution Take 18 mL by mouth 4 (Four) Times a Day. 470 mL 5    Lidocaine Viscous HCl (XYLOCAINE) 2 % solution Take 10 mL by mouth As Needed for Mild Pain. 100 mL 3    lidocaine-prilocaine (EMLA) 2.5-2.5 % cream Please apply a small amount to port site about 45 min prior to infusion and cover with Saran Wrap 30 g 2    Magic Mouthwash Radonc (nystatin - diphenhydrAMINE HCl - dexamethasone - lidocaine) Swish and swallow 10 mL 4 (Four) Times a Day Before Meals & at Bedtime. 482 mL 3    methocarbamol (ROBAXIN) 500 MG tablet Take 1 tablet by mouth 4 (Four) Times a Day.      Naloxegol Oxalate (MOVANTIK) 25 MG tablet Take 1 tablet by mouth Every Morning. 30 tablet 0     neomycin-polymyxin-dexamethasone (MAXITROL) 0.1 % ophthalmic suspension Administer 2 drops to both eyes 4 (Four) Times a Day. 5 mL 0    nicotine polacrilex (NICORETTE) 4 MG gum       Nutritional Supplements (Boost Plus) liquid Take 237 mL by mouth 3 (Three) Times a Day.      nystatin (MYCOSTATIN) 611372 UNIT/GM powder Apply to skin folds topically to the appropriate area as directed 2 (Two) Times a Day 30 g 0    ondansetron (ZOFRAN) 4 MG/5ML solution Take 10 mL by mouth Every 8 (Eight) Hours As Needed for Nausea or Vomiting. 300 mL 3    ondansetron ODT (ZOFRAN-ODT) 8 MG disintegrating tablet Place 1 tablet on the tongue Every 8 (Eight) Hours As Needed for Nausea or Vomiting. 30 tablet 3    oxyCODONE (ROXICODONE) 10 MG tablet Administer 1 tablet per J tube Every 4 (Four) Hours As Needed. 180 tablet 0    promethazine (PHENERGAN) 25 MG suppository Insert 1 suppository into the rectum Every 6 (Six) Hours As Needed for Nausea or Vomiting. 30 suppository 2    sildenafil (VIAGRA) 50 MG tablet TAKE ONE TABLET BY MOUTH DAILY AS NEEDED FOR ERECTILE DYSFUNCTION 30 tablet 3    valsartan (DIOVAN) 320 MG tablet Take 1 tablet by mouth Daily. 30 tablet 0    vitamin D3 125 MCG (5000 UT) capsule capsule Take 1 capsule by mouth Daily.       No current facility-administered medications for this visit.       Lab Results:  Reviewed in chart       Review of Systems Constitutional: Negative for appetite change, chills, diaphoresis, fatigue, fever and unexpected weight change.   HENT: Negative for hearing loss, sore throat, trouble swallowing and voice change.    Eyes: Negative for photophobia and visual disturbance.   Respiratory: Negative for cough, chest tightness and shortness of breath.    Cardiovascular: Negative for chest pain and palpitations.   Gastrointestinal: Has J Tube for feeding  Endocrine: Negative for cold intolerance and heat intolerance.   Genitourinary: Negative for dysuria and frequency.   Musculoskeletal: Negative for  arthralgia, back pain, joint swelling and neck stiffness.   Skin: Negative for color change and wound.   Allergic/Immunologic: Negative for environmental allergies and immunocompromised state.   Neurological: Negative for dizziness, tremors, seizures, syncope, weakness, light-headedness and headaches.   Hematological: Negative for adenopathy. Does not bruise/bleed easily.    Objective   Physical Exam  There were no vitals taken for this visit.    BRANODN-7:    Over the last two weeks, how often have you been bothered by the following problems?  Feeling nervous, anxious or on edge: Several days  Not being able to stop or control worrying: Not at all  Worrying too much about different things: Not at all  Trouble Relaxing: Not at all  Being so restless that it is hard to sit still: Not at all  Becoming easily annoyed or irritable: Several days  Feeling afraid as if something awful might happen: Not at all  BRANDON 7 Total Score: 2  If you checked any problems, how difficult have these problems made it for you to do your work, take care of things at home, or get along with other people: Not difficult at all  0-4: Minimal anxiety  5-9: Mild anxiety  10-14: Moderate anxiety  15-21: Severe anxiety    PHQ-9:      8/5/2024     8:36 AM   PHQ-2/PHQ-9 Depression Screening   Little Interest or Pleasure in Doing Things 0-->not at all   Feeling Down, Depressed or Hopeless 0-->not at all   PHQ-9: Brief Depression Severity Measure Score 0      5-9: Minimal symptoms  10-14: Major depression mild  15-19: Major depression moderate  Greater then 20: Major depression severe        Mental Status Exam:   Appearance:  thin, work clothes  Hygiene:   fair just got off work  Cooperation:  Cooperative  Eye Contact:  Good  Psychomotor Behavior:  Appropriate  Mood:  within normal limits, very calm compliant  Affect:  Restricted  Hopelessness: Denies  Speech:  Normal  Thought Process:  Linear  Thought Content:  Normal  Suicidal:  None  Homicidal:   None  Hallucinations:  None  Delusion:  None  Memory:  Intact  Orientation:  Person, Place, Time, and Situation  Reliability:  good  Insight:  Good  Judgement:  Good  Impulse Control:  Good  Physical/Medical Issues:  Yes stage III esophageal cancer in active treatment      Short-term goals: Patient will be compliant with clinic appointments.  Patient will be engaged in therapy, medication compliant with minimal side effects. Patient  will report decrease of symptoms and frequency.    Long-term goals: Patient will have minimal symptoms of mental health disorder with continued treatment. Patient will be compliant with treatment and appointments.       Problem list: stress reaction cancer  Strengths: patient appears motivated for treatment          Assessment & Plan   Diagnoses and all orders for this visit:    1. Stress reaction (Primary)        A psychological evaluation was conducted in order to assess past and current level of functioning. Areas assessed included, but were not limited to: perception of social support, perception of ability to face and deal with challenges in life (positive functioning), anxiety symptoms, depressive symptoms, perspective on beliefs/belief system, coping skills for stress, intelligence level,  Therapeutic rapport was established.     Assisted patient in processing above session content; acknowledged and normalized patient’s thoughts, feelings, and concerns.  Applied  positive coping skills and behavior management in session. Allowed patient to freely discuss issues without interruption or judgment. Provided safe, confidential environment to facilitate the development of positive therapeutic relationship and encourage open, honest communication.     Assisted patient in identifying risk factors which would indicate the need for higher level of care including thoughts to harm self or others and/or self-harming behavior and encouraged patient to contact this office, call 911, or present  to the nearest emergency room should any of these events occur. Discussed crisis intervention services and means to access.  Patient adamantly and convincingly denies current suicidal or homicidal ideation or perceptual disturbance.    Discussed diagnosis and recommendations for treatment:    AS NEEDED PROVIDE: Cognitive Behavioral Therapy and Solution Focused Therapy to improve functioning, maintain stability, and avoid decompensation and the need for higher level of care.    MEDICATION MANAGEMENT RECOMMENDATIONS:  PATIENT DOESN'T WANT ANY MEDICATION     To call for questions or concerns and return early if necessary. Crisis plan reviewed including going to the Emergency department.        Return if symptoms worsen or fail to improve.  Patient aware of this service and cont to be available to him.

## 2024-08-21 NOTE — DISCHARGE SUMMARY
[facility] Medicine Services  DISCHARGE SUMMARY    Patient Name: Nelson Garcia  : 1975  MRN: [id number]    Date of Admission: 2024  9:07 AM  Date of Discharge:  24  Primary Care Physician: Chelita Scott APRN    Consults       Date and Time Order Name Status Description    2024  2:43 PM Inpatient Hematology & Oncology Consult Completed             Hospital Course     Presenting Problem: abdominal pain    Active Hospital Problems    Diagnosis  POA    **Abdominal pain [R10.9]  Yes    Severe malnutrition [E43]  Yes    Esophageal adenocarcinoma [C15.9]  Yes    Stage 3a chronic kidney disease [N18.31]  Yes    HTN (hypertension) [I10]  Yes      Resolved Hospital Problems   No resolved problems to display.          Hospital Course:  Nelson Garcia is a 49 y.o. male with past medical history of hypertension, FREDRICK, severe malnutrition, prior CVA, hyperlipidemia, GERD, tobacco abuse chronic anemia, esophageal cancer followed by Dr. Reddy currently on treatment.  Who presented to the ED with 1 week of progressively worsening abdominal pain and constipation.     Abdominal pain  ??Constipation  -CT abdomen pelvis show shows esophogogastritis, suspected to be secondary to be posttreatment changes, however also with fluid levels throughout the colon that suggest diarrheal illness, however patient has not had a BM in more than a week  -Patient had bowel movement yesterday and abdominal pain has resolved.    Elevated inflammatory markers and procalcitonin  -Suspect secondary to underlying malignancy  -No overt signs of ongoing infectious process  -His counts (WBC, hemoglobin, platelets ) seem to have improved compared to   -Hematology oncology has seen and evaluated the patient, appreciate their input.     Esophageal adenocarcinoma  Thrombocytopenia  -Follows with Dr. Reddy currently under chemo/radiation.     Hypertensive urgency  -BP significantly  elevated in the ED, did receive hydralazine 10 mg x 2 with some improvement  -Had a long conversation with the patient about his blood pressure.  Tells me that he used to take nifedipine and valsartan at home.  However, he has stopped taking antihypertensive medication since he was started on tube feeding.  He stopped tube feeding about 6 days ago and he has noticed that the blood pressure has creeped up.  He tells me that 2 days ago he just took 1 valsartan pill nothing more.  -This morning blood pressure is controlled and patient only took valsartan yesterday.  Will continue valsartan and patient will monitor the blood pressure.  Defer to PCP to adjust antihypertensive medication as necessary.    Chronic diastolic dysfunction  -Patient had last echo done on 2/22/2022.  That study showed normal ejection fraction, mild mitral regurg, impaired relaxation.  Patient does not want to stay for getting a new echocardiogram.  -Recommend new cardiogram as an outpatient.  Patient can follow-up with his primary cardiologist or PCP with regard to this.     Cancer-related pain  -Continue oxycodone     Severe protein calorie malnutrition  -Patient is on tube feeds through J-tube  -Nutrition/dietitian consulted, continue t tube feeds     Hyperlipidemia  -Continue statin      Discharge Follow Up Recommendations for outpatient labs/diagnostics:  Recommend echocardiogram as an outpatient    Day of Discharge     HPI:   Resting in bed in no acute distress and feels comfortable.  Denies any abdominal pain.  No fever or chills.  No chest pain or palpitation or shortness of breath.  No nausea vomiting.  Patient is very very eager to go home.    Review of Systems  As above    Vital Signs:   Temp:  [97.9 °F (36.6 °C)-99 °F (37.2 °C)] 97.9 °F (36.6 °C)  Heart Rate:  [65-97] 73  Resp:  [16-18] 18  BP: (101-196)/() 138/82      Physical Exam:  Constitutional: No acute distress, awake, alert  HENT: NCAT, mucous membranes  moist  Respiratory: Clear to auscultation bilaterally, respiratory effort normal   Cardiovascular: RRR, systolic murmurs, no rubs or gallops  Gastrointestinal: Positive bowel sounds, soft, nontender, nondistended, J-tube in place  Musculoskeletal: No bilateral ankle edema, cachexia  Psychiatric: Appropriate affect, cooperative  Neurologic: Oriented x 3, no focality appreciated, speech clear  Skin: No rashes        Pertinent  and/or Most Recent Results     LAB RESULTS:      Lab 08/20/24  0453 08/19/24  1115 08/19/24  0944   WBC 4.71  --  5.88   HEMOGLOBIN 11.3*  --  12.6*   HEMATOCRIT 33.7*  --  38.2   PLATELETS 84*  --  92*   NEUTROS ABS 3.37  --  4.85   IMMATURE GRANS (ABS) 0.01  --  0.02   LYMPHS ABS 0.34*  --  0.18*   MONOS ABS 0.91*  --  0.81   EOS ABS 0.05  --  0.00   MCV 85.8  --  87.4   CRP  --  12.04*  --    PROCALCITONIN  --  0.68*  --    LACTATE  --   --  1.4   PROTIME  --   --  14.9*         Lab 08/20/24  0453 08/19/24  1115   SODIUM 137 136   POTASSIUM 4.0 3.9   CHLORIDE 104 96*   CO2 27.0 28.0   ANION GAP 6.0 12.0   BUN 18 15   CREATININE 0.94 0.96   EGFR 99.4 96.9   GLUCOSE 108* 109*   CALCIUM 8.7 9.0   MAGNESIUM  --  2.1         Lab 08/19/24  1115   TOTAL PROTEIN 7.4   ALBUMIN 4.0   GLOBULIN 3.4   ALT (SGPT) 21   AST (SGOT) 32   BILIRUBIN 1.6*   ALK PHOS 83   LIPASE 12*         Lab 08/19/24  0944   PROTIME 14.9*   INR 1.16*                 Brief Urine Lab Results  (Last result in the past 365 days)        Color   Clarity   Blood   Leuk Est   Nitrite   Protein   CREAT   Urine HCG        08/19/24 1445 Yellow   Clear   Negative   Negative   Negative   100 mg/dL (2+)                 Microbiology Results (last 10 days)       ** No results found for the last 240 hours. **            CT Abdomen Pelvis With Contrast    Result Date: 8/19/2024  CT ABDOMEN PELVIS W CONTRAST Date of Exam: 8/19/2024 10:20 AM EDT Indication: abdominal pain. Comparison: CT abdomen pelvis 6/5/2024. Technique: Axial CT images were  obtained of the abdomen and pelvis following the uneventful intravenous administration of 85 mL Isovue-300. Reconstructed coronal and sagittal images were also obtained. Automated exposure control and iterative construction methods were used. Findings: Lower thorax: Bibasilar scarring/subsegmental atelectasis. No focal consolidation. Liver: Normal morphology. No focal hepatic lesion. Gallbladder and bile ducts: Gallbladder is unremarkable. No biliary ductal dilatation. Pancreas: No pancreatic duct dilation. No surrounding inflammation. Spleen: Normal in size. Adrenal glands: Unchanged 1.6 cm left adrenal nodule, which had density of less than 10 Hounsfield units on prior noncontrast exam, suggestive of an adenoma. No right adrenal nodule. Kidneys: Symmetric in size and enhancement. No hydronephrosis. Urinary bladder: Unremarkable. Reproductive organs: Unremarkable. Stomach and bowel: Percutaneous jejunostomy tube in expected position. Circumferential wall thickening of the lower thoracic esophagus and proximal stomach, new/increased. Fluid throughout the proximal half of the colon. No evidence of bowel obstruction. Lymph nodes: Multiple mildly enlarged gastrohepatic and left retroperitoneal lymph nodes, similar to prior exam. For example, gastrohepatic ligament measures 1.1 cm in short axis, unchanged (series 2 image 40). Vessels: No abdominal aortic aneurysm. Atherosclerosis. Major vasculature is patent. Peritoneum and retroperitoneum: Ill-defined free fluid in the upper abdomen. No organized fluid collection. No free air. Soft tissues: Midline incision. Osseous structures: No acute or suspicious osseous lesions. Lumbosacral degenerative changes.     Impression: Findings of esophagogastritis, suspected to be secondary to posttreatment changes related to underlying esophageal malignancy. Associated surrounding ill-defined fluid fluid without organized fluid collection. Fluid throughout the proximal half of the  colon, suggestive of diarrheal illness. Similar upper abdominal lymphadenopathy. Percutaneous jejunostomy tube in expected position. Additional findings as above. Electronically Signed: Jenaro Garcia MD  8/19/2024 10:53 AM EDT  Workstation ID: [id number]     Results for orders placed during the hospital encounter of 06/13/24    Duplex Carotid Ultrasound CAR    Interpretation Summary    Right internal carotid artery demonstrates a less than 50% stenosis.    Left internal carotid artery demonstrates a less than 50% stenosis.      Results for orders placed during the hospital encounter of 06/13/24    Duplex Carotid Ultrasound CAR    Interpretation Summary    Right internal carotid artery demonstrates a less than 50% stenosis.    Left internal carotid artery demonstrates a less than 50% stenosis.      Results for orders placed during the hospital encounter of 02/21/22    Adult transthoracic echo complete    Interpretation Summary  · Saline test results are negative.  · Left ventricular wall thickness is consistent with severe concentric hypertrophy.  · Calculated left ventricular EF = 59.5% Estimated left ventricular EF was in agreement with the calculated left ventricular EF. Left ventricular systolic function is normal.  · Left ventricular diastolic function is consistent with (grade I) impaired relaxation.  · No aortic valve regurgitation or stenosis is present.  · Mild mitral valve regurgitation is present.      Plan for Follow-up of Pending Labs/Results:     Discharge Details        Discharge Medications        New Medications        Instructions Start Date   Naloxegol Oxalate 25 MG tablet  Commonly known as: MOVANTIK   25 mg, Oral, Every Morning      valsartan 320 MG tablet  Commonly known as: DIOVAN   320 mg, Oral, Every 24 Hours Scheduled             Continue These Medications        Instructions Start Date   acetaminophen 325 MG tablet  Commonly known as: TYLENOL   650 mg, Oral, Every 6 Hours PRN      Atorvaliq  20 MG/5ML suspension  Generic drug: Atorvastatin Calcium   Take 20 mls by mouth Daily.      atorvastatin 80 MG tablet  Commonly known as: LIPITOR   80 mg, Oral, Daily      Boost Plus liquid   237 mL, Oral, 3 Times Daily      cloNIDine 0.2 MG tablet  Commonly known as: CATAPRES   0.2 mg, Oral, As Needed      docusate 50 MG/5ML liquid  Commonly known as: COLACE   Administer 10ml per j tube twice a day as needed for constipation.      docusate 50 MG/5ML liquid  Commonly known as: COLACE   Administer 10 mL per J tube 2 (Two) Times a Day As Needed for Constipation.      famotidine 40 mg/5 mL suspension  Commonly known as: PEPCID   20 mg, Oral, 2 Times Daily      gabapentin 50 mg/mL solution  Commonly known as: NEURONTIN   900 mg, Oral, 4 Times Daily      Lidocaine Viscous HCl 2 % solution  Commonly known as: XYLOCAINE   10 mL, Oral, As Needed      lidocaine-prilocaine 2.5-2.5 % cream  Commonly known as: EMLA   Please apply a small amount to port site about 45 min prior to infusion and cover with Saran Wrap      Magic Mouthwash Radonc (nystatin - diphenhydrAMINE HCl - dexamethasone - lidocaine)   10 mL, Swish & Swallow, 4 Times Daily Before Meals & Nightly      methocarbamol 500 MG tablet  Commonly known as: ROBAXIN   500 mg, Oral, 4 Times Daily      neomycin-polymyxin-dexamethasone 3.5-40409-7.1 ophthalmic suspension  Commonly known as: MAXITROL   2 drops, Both Eyes, 4 Times Daily      nicotine polacrilex 4 MG gum  Commonly known as: NICORETTE       nystatin 416657 UNIT/GM powder  Generic drug: nystatin   Apply to skin folds topically to the appropriate area as directed 2 (Two) Times a Day      ondansetron 4 MG/5ML solution  Commonly known as: ZOFRAN   8 mg, Oral, Every 8 Hours PRN      ondansetron ODT 8 MG disintegrating tablet  Commonly known as: ZOFRAN-ODT   8 mg, Translingual, Every 8 Hours PRN      oxyCODONE 10 MG tablet  Commonly known as: ROXICODONE   10 mg, Per J Tube, Every 4 Hours PRN      Promethegan 25 MG  suppository  Generic drug: promethazine   25 mg, Rectal, Every 6 Hours PRN      sildenafil 50 MG tablet  Commonly known as: VIAGRA   50 mg, Oral, Daily PRN      vitamin D3 125 MCG (5000 UT) capsule capsule   5,000 Units, Oral, Daily             Stop These Medications      fentaNYL 25 MCG/HR patch  Commonly known as: DURAGESIC              No Known Allergies      Discharge Disposition:  Home or Self Care    Diet:  Hospital:  Diet Order   Procedures    Diet: Gastrointestinal; Low Irritant; Texture: Soft to Chew (NDD 3); Soft to Chew: Chopped Meat; Fluid Consistency: Thin (IDDSI 0)       Diet Instructions       Diet: Cardiac Diets; Healthy Heart (2-3 Na+); Thin (IDDSI 0)      Discharge Diet: Cardiac Diets    Cardiac Diet: Healthy Heart (2-3 Na+)    Fluid Consistency: Thin (IDDSI 0)             Activity:  Activity Instructions       Activity as Tolerated              Restrictions or Other Recommendations:         CODE STATUS:    Code Status and Medical Interventions: CPR (Attempt to Resuscitate); Full Support   Ordered at: 08/19/24 1655     Level Of Support Discussed With:    Patient     Code Status (Patient has no pulse and is not breathing):    CPR (Attempt to Resuscitate)     Medical Interventions (Patient has pulse or is breathing):    Full Support       Future Appointments   Date Time Provider Department Center   8/21/2024  4:00 PM Briana Burks APRN [facility]   9/5/2024  8:30 AM Chula Starkey APRN [facility] None   9/5/2024  9:30 AM [address] [facility]   10/7/2024  9:15 AM Aquiles Reddy MD [facility]       Additional Instructions for the Follow-ups that You Need to Schedule       Discharge Follow-up with PCP   As directed       Currently Documented PCP:    Chelita Scott APRN    PCP Phone Number:    [phone]     Follow Up Details: within one week                      Stephon Coley MD  08/21/24      Time Spent on Discharge:  I spent  38  minutes on this  discharge activity which included: face-to-face encounter with the patient, reviewing the data in the system, coordination of the care with the nursing staff as well as consultants, documentation, and entering orders.

## 2024-08-22 NOTE — OUTREACH NOTE
Prep Survey      Flowsheet Row Responses   [facility] patient discharged from? [facility]   Is LACE score < 7 ? No   Eligibility Readm Mgmt   Discharge diagnosis Abdominal pain   Does the patient have one of the following disease processes/diagnoses(primary or secondary)? Other   Does the patient have Home health ordered? No   Is there a DME ordered? No   Prep survey completed? Yes            Bibiana RIVERA - Registered Nurse

## 2024-08-25 DIAGNOSIS — C15.9 ESOPHAGEAL ADENOCARCINOMA: ICD-10-CM

## 2024-08-26 RX ORDER — OXYCODONE HYDROCHLORIDE 10 MG/1
10 TABLET ORAL EVERY 4 HOURS PRN
Qty: 180 TABLET | Refills: 0 | Status: SHIPPED | OUTPATIENT
Start: 2024-08-26

## 2024-08-29 LAB
QT INTERVAL: 446 MS
QTC INTERVAL: 508 MS

## 2024-08-30 ENCOUNTER — READMISSION MANAGEMENT (OUTPATIENT)
Dept: CALL CENTER | Facility: HOSPITAL | Age: 49
End: 2024-08-30
Payer: MEDICAID

## 2024-08-30 LAB
LAB AP CASE REPORT: NORMAL
LAB AP DIAGNOSIS COMMENT: NORMAL
LAB AP INTRADEPARTMENTAL CONSULT: NORMAL
LAB AP SPECIAL STAINS: NORMAL
Lab: NORMAL
PATH REPORT.ADDENDUM SPEC: NORMAL
PATH REPORT.FINAL DX SPEC: NORMAL
PATH REPORT.GROSS SPEC: NORMAL

## 2024-08-30 NOTE — OUTREACH NOTE
Medical Week 2 Survey      Flowsheet Row Responses   [facility] patient discharged from? [facility]   Does the patient have one of the following disease processes/diagnoses(primary or secondary)? Other   Week 2 attempt successful? Yes   Call start time 1441   Discharge diagnosis Abdominal pain   Call end time 1444   Person spoke with today (if not patient) and relationship Radha Garcia---wife   Meds reviewed with patient/caregiver? Yes   Is the patient having any side effects they believe may be caused by any medication additions or changes? No   Does the patient have all medications ordered at discharge? Yes   Is the patient taking all medications as directed (includes completed medication regime)? Yes   Comments regarding appointments Patient is f/u with Oncology   Does the patient have a primary care provider?  Yes   Comments regarding PCP PCP--CHUN Garrett.   Has the patient kept scheduled appointments due by today? Yes   Comments Currently under care of Oncologist, on chemotherapy.   Has home health visited the patient within 72 hours of discharge? N/A   Psychosocial issues? No   Comments Wife states patient is doing better, he is back to work. She reports no abdominal pain or constipation. No concerns or needs at this time.   Did the patient receive a copy of their discharge instructions? Yes   Nursing interventions Reviewed instructions with patient   What is the patient's perception of their health status since discharge? Returned to baseline/stable   Is the patient/caregiver able to teach back signs and symptoms related to disease process for when to call PCP? Yes   Is the patient/caregiver able to teach back signs and symptoms related to disease process for when to call 911? Yes   Is the patient/caregiver able to teach back the hierarchy of who to call/visit for symptoms/problems? PCP, Specialist, Home health nurse, Urgent Care, ED, 911 Yes   If the patient is a current smoker, are they  able to teach back resources for cessation? Not a smoker   Week 2 Call Completed? Yes   Graduated Yes   Is the patient interested in additional calls from an ambulatory ? No   Would this patient benefit from a Referral to [facility] Social Work? No   Call end time [id number]            Stephanie MONTANO - Registered Nurse

## 2024-09-01 DIAGNOSIS — C15.9 ESOPHAGEAL ADENOCARCINOMA: ICD-10-CM

## 2024-09-03 RX ORDER — GABAPENTIN 250 MG/5ML
900 SOLUTION ORAL 4 TIMES DAILY
Qty: 470 ML | Refills: 5 | Status: SHIPPED | OUTPATIENT
Start: 2024-09-03

## 2024-09-05 ENCOUNTER — HOSPITAL ENCOUNTER (OUTPATIENT)
Dept: ONCOLOGY | Facility: HOSPITAL | Age: 49
Discharge: HOME | End: 2024-09-05
Payer: MEDICAID

## 2024-09-05 ENCOUNTER — OFFICE VISIT (OUTPATIENT)
Dept: RADIATION ONCOLOGY | Facility: HOSPITAL | Age: 49
End: 2024-09-05
Payer: MEDICAID

## 2024-09-05 ENCOUNTER — HOSPITAL ENCOUNTER (OUTPATIENT)
Dept: RADIATION ONCOLOGY | Facility: HOSPITAL | Age: 49
Setting detail: RADIATION/ONCOLOGY SERIES
End: 2024-09-05
Payer: MEDICAID

## 2024-09-05 VITALS
SYSTOLIC BLOOD PRESSURE: 162 MMHG | HEART RATE: 69 BPM | OXYGEN SATURATION: 100 % | BODY MASS INDEX: 20.61 KG/M2 | DIASTOLIC BLOOD PRESSURE: 92 MMHG | WEIGHT: 152 LBS | TEMPERATURE: 97.4 F | RESPIRATION RATE: 16 BRPM

## 2024-09-05 VITALS
SYSTOLIC BLOOD PRESSURE: 136 MMHG | BODY MASS INDEX: 20.45 KG/M2 | HEART RATE: 67 BPM | RESPIRATION RATE: 16 BRPM | HEIGHT: 72 IN | WEIGHT: 151 LBS | DIASTOLIC BLOOD PRESSURE: 85 MMHG | TEMPERATURE: 97.3 F

## 2024-09-05 DIAGNOSIS — C15.9 ESOPHAGEAL ADENOCARCINOMA: Primary | ICD-10-CM

## 2024-09-05 DIAGNOSIS — Z45.2 ENCOUNTER FOR CARE RELATED TO VASCULAR ACCESS PORT: Primary | ICD-10-CM

## 2024-09-05 PROCEDURE — 96523 IRRIG DRUG DELIVERY DEVICE: CPT

## 2024-09-05 PROCEDURE — G0463 HOSPITAL OUTPT CLINIC VISIT: HCPCS

## 2024-09-05 PROCEDURE — 25010000002 HEPARIN LOCK FLUSH PER 10 UNITS: Performed by: INTERNAL MEDICINE

## 2024-09-05 RX ORDER — HEPARIN SODIUM (PORCINE) LOCK FLUSH IV SOLN 100 UNIT/ML 100 UNIT/ML
500 SOLUTION INTRAVENOUS AS NEEDED
Status: DISCONTINUED | OUTPATIENT
Start: 2024-09-05 | End: 2024-09-06 | Stop reason: HOSPADM

## 2024-09-05 RX ORDER — HEPARIN SODIUM (PORCINE) LOCK FLUSH IV SOLN 100 UNIT/ML 100 UNIT/ML
500 SOLUTION INTRAVENOUS AS NEEDED
OUTPATIENT
Start: 2024-09-05

## 2024-09-05 RX ADMIN — HEPARIN 500 UNITS: 100 SYRINGE at 09:12

## 2024-09-05 NOTE — PROGRESS NOTES
FOLLOW UP NOTE    PATIENT:                                                      Nelson Garcia  MEDICAL RECORD #:                        [id number]  :                                                          1975  COMPLETION DATE:   2024  DIAGNOSIS:     Esophageal adenocarcinoma  - Stage III (cT3, cN1, cM0, G2)      BRIEF HISTORY:    Initial follow-up visit for locally advanced distal esophageal adenocarcinoma involving the gastroesophageal junction.  He underwent a course of neoadjuvant concurrent chemoradiation therapy.  The distal esophagus tumor and regional lymphatics in the lower chest and upper abdomen initially received a dose of 45 Gray delivered in 25 daily fractions utilizing IMRT technique.  The PET positive distal esophageal tumor and lymph nodes received an additional boost of 5.4 Gray in 3 fractions with similar technique to a total dose of 50.4 Jaramillo, completing 2024.  The patient experienced typical side effects for treatment site including esophagitis, reflux, local pain and situational stress.  He was able to maintain his weight with dietitian/nutritional support, medication and feeding tube.  He ultimately completed 4 of 5 planned cycles of carbo/Taxol which was discontinued early due to cytopenias.  Repeat PET/CT performed 2024 demonstrated a significant response to treatment.  Shortly following completion of treatment, he was admitted to [facility] for abdominal pain and constipation.  CT abdomen pelvis performed while inpatient demonstrated esophagogastritis as well as fluid within the colon suggestive of diarrheal illness.  At this point, he reports resolution of abdominal pain and notes bowels have resumed moving once daily.  He notes occasional nausea managed with antiemetics as needed.  No longer using MMW for management of odynophagia.    He denies dysphagia and notes good tolerance of p.o. and is able to tolerate all food consistencies and textures without issue.  He  reports use of J-tube only for administration of liquid gabapentin.  He denies dyspnea or chest pain.  He did not develop significant dermatitis within the treatment field.  He continued to remain active and work during treatment, though notes fatigue is an ongoing issue still.  He has been evaluated by Dr. Gupta at  with plans for a minimally invasive Frantz Eliel esophagectomy later this month.            MEDICATIONS: Medication reconciliation for the patient was reviewed and confirmed in the electronic medical record.    Review of Systems   Constitutional:  Positive for fatigue.   Respiratory:  Positive for cough and shortness of breath.    Gastrointestinal:  Positive for nausea.   Musculoskeletal:  Positive for back pain and neck pain.   Neurological:  Positive for dizziness, light-headedness and numbness.           KPS 80%      Physical Exam  Vitals and nursing note reviewed.   Constitutional:       General: He is not in acute distress.     Appearance: Normal appearance. He is well-developed.   HENT:      Head: Normocephalic and atraumatic.      Mouth/Throat:      Comments: Jejunostomy tube left lower quadrant   Eyes:      Conjunctiva/sclera: Conjunctivae normal.      Pupils: Pupils are equal, round, and reactive to light.   Cardiovascular:      Rate and Rhythm: Normal rate and regular rhythm.      Heart sounds: No murmur heard.     No friction rub.   Pulmonary:      Effort: Pulmonary effort is normal.      Breath sounds: Normal breath sounds. No wheezing.   Abdominal:      General: Bowel sounds are normal. There is no distension.      Palpations: Abdomen is soft. There is no mass.      Tenderness: There is no abdominal tenderness.   Musculoskeletal:         General: Normal range of motion.      Cervical back: Normal range of motion and neck supple.   Lymphadenopathy:      Cervical: No cervical adenopathy.   Skin:     General: Skin is warm and dry.   Neurological:      Mental Status: He is alert and oriented  to person, place, and time.   Psychiatric:         Behavior: Behavior normal.         Thought Content: Thought content normal.         Judgment: Judgment normal.           VITAL SIGNS:   Vitals:    09/05/24 0830   BP: 162/92   Pulse: 69   Resp: 16   Temp: 97.4 °F (36.3 °C)   TempSrc: Temporal   SpO2: 100%   Weight: 68.9 kg (152 lb)   PainSc: 0-No pain           IMAGING:  Narrative & Impression   F-18 FDG PET SKULL BASE TO MID THIGH WITH PET CT FUSION.     HISTORY: Esophageal adenocarcinoma status post chemoradiation.  Subsequent treatment strategy.     TECHNIQUE: Radiation dose reduction techniques were utilized, including  automated exposure control and exposure modulation based on body size.   Blood glucose level at time of injection was 100 mg/dL. 6.2 mCi of F-18  FDG were injected and PET was performed from skull base to mid thigh. CT  was obtained for localization and attenuation correction. Normalization  method: patient weight. Time at injection 1257. PET start time 237.     Comparison: FDG PET/CT 6/10/2024. CT chest 6/5/2024.     FINDINGS: Reference blood pool Max SUV: 2.1.     Head/neck: No suspicious uptake.     Chest: Hypermetabolic circumferential wall thickening throughout the  distal esophagus extending to the gastroesophageal junction with max SUV  of 9.2 (series 4/image 195) previously 19.     A 1.1 cm subcarinal lymph node with max SUV of 2.7 (series 4/image 146)  previously 1.1 cm and 2.6. Posterior mediastinal paraortic lymph nodes  remain subcentimeter too small for accurate PET characterization without  overt hypermetabolism. No hypermetabolic hilar lymph nodes.     Two unchanged subcentimeter right middle lobe solid pulmonary nodules  which remain too small for accurate PET characterization without overt  hypermetabolism.     Mildly hypermetabolic dependent right lower lobe patchy consolidation,  greater than expected for subsegmental atelectasis (series 4/image 192).     Abdomen/pelvis:  Gastrohepatic lymph nodes are now subcentimeter with  resolution of prior associated hypermetabolism.     Percutaneous jejunostomy tube. Tubular hypermetabolism throughout the  proximal colon without focality, likely physiologic. New tubular  hypermetabolism throughout the proximal duodenum without focality,  physiologic is inflammatory.     Unchanged 1 cm left adrenal nodule measuring less than 10 Hounsfield  units suggesting adrenal adenoma.        Bones: No suspicious uptake.        IMPRESSION:  1. Positive response to therapy. Hypermetabolic circumferential wall  thickening throughout the distal esophagus extending to the  gastroesophageal junction has decreased in associated hypermetabolism.  Gastrohepatic lymph nodes are now subcentimeter with resolution of prior  associated hypermetabolism.  2. Unchanged 1.1 cm subcarinal lymph node with uptake slightly greater  than blood pool, favor reactive/inflammatory but attention on follow-up.  Posterior mediastinal paraortic lymph nodes remain subcentimeter.  3. Two unchanged subcentimeter right middle lobe pulmonary nodules which  remain too small for accurate PET characterization without overt  hypermetabolism.  4. Patchy mildly hypermetabolic consolidation at the dependent right  lower lobe, greater than expected for subsegmental atelectasis, likely  infectious/inflammatory. Recommend follow-up chest CT in 2 to 3 months  to ensure clearance.        This report was finalized on 7/18/2024 11:06 AM by Dr. Nick Randall M.D on Workstation: [id number]            Narrative & Impression   CT ABDOMEN PELVIS W CONTRAST     Date of Exam: 8/19/2024 10:20 AM EDT     Indication: abdominal pain.     Comparison: CT abdomen pelvis 6/5/2024.     Technique: Axial CT images were obtained of the abdomen and pelvis following the uneventful intravenous administration of 85 mL Isovue-300. Reconstructed coronal and sagittal images were also obtained. Automated exposure control and  iterative   construction methods were used.        Findings:     Lower thorax: Bibasilar scarring/subsegmental atelectasis. No focal consolidation.      Liver: Normal morphology. No focal hepatic lesion.     Gallbladder and bile ducts: Gallbladder is unremarkable. No biliary ductal dilatation.      Pancreas: No pancreatic duct dilation. No surrounding inflammation.      Spleen: Normal in size.      Adrenal glands: Unchanged 1.6 cm left adrenal nodule, which had density of less than 10 Hounsfield units on prior noncontrast exam, suggestive of an adenoma. No right adrenal nodule.     Kidneys: Symmetric in size and enhancement. No hydronephrosis.      Urinary bladder: Unremarkable.     Reproductive organs: Unremarkable.     Stomach and bowel: Percutaneous jejunostomy tube in expected position. Circumferential wall thickening of the lower thoracic esophagus and proximal stomach, new/increased. Fluid throughout the proximal half of the colon. No evidence of bowel obstruction.     Lymph nodes: Multiple mildly enlarged gastrohepatic and left retroperitoneal lymph nodes, similar to prior exam. For example, gastrohepatic ligament measures 1.1 cm in short axis, unchanged (series 2 image 40).     Vessels: No abdominal aortic aneurysm. Atherosclerosis. Major vasculature is patent.      Peritoneum and retroperitoneum: Ill-defined free fluid in the upper abdomen. No organized fluid collection. No free air.      Soft tissues: Midline incision.     Osseous structures: No acute or suspicious osseous lesions. Lumbosacral degenerative changes.     IMPRESSION:  Impression:     Findings of esophagogastritis, suspected to be secondary to posttreatment changes related to underlying esophageal malignancy. Associated surrounding ill-defined fluid fluid without organized fluid collection.     Fluid throughout the proximal half of the colon, suggestive of diarrheal illness.     Similar upper abdominal lymphadenopathy.     Percutaneous  jejunostomy tube in expected position.     Additional findings as above.        Electronically Signed: Jenaro Garcia MD    8/19/2024 10:53 AM EDT    Workstation ID: [id number]         The following portions of the patient's history were reviewed and updated as appropriate: allergies, current medications, past family history, past medical history, past social history, past surgical history and problem list.         Diagnoses and all orders for this visit:    1. Esophageal adenocarcinoma (Primary)         IMPRESSION:  Adenocarcinoma of the lower third of esophagus/gastrointestinal junction, clinical stage III (T3, N1, M0), CPS 10%, HER2/al negative, MSI stable.  1 month status post neoadjuvant concurrent chemoradiotherapy as his definitive treatment.  Aside from some ongoing but manageable fatigue, typical acute radiation related toxicities which would be considered grade 1-2 continue to clemencia.  He appears to have had a favorable response to therapy on imaging.   He is scheduled to undergo minimally invasive Valentine Eliel esophagectomy with Dr. Gupta planned for 9/30/2024.  In the interim, we discussed focusing on hydration, nutrition, high-protein diet including ONS shake supplementation prior to his surgery.  He has been able to transition back to p.o. and is relying less on his J-tube.  We will see the patient back in 3 months or sooner as needed.    RECOMMENDATIONS: Continues routine oncologic surveillance under the care of Dr. Reddy.  Upcoming surgery with Dr. Gupta at .      Return in about 3 months (around 12/5/2024), or if symptoms worsen or fail to improve, for Office Visit.        CHUN Seymour      I spent a total of 40 minutes on today's visit, with more than 20 minutes in direct face to face communication, and the remainder of the time spent in reviewing the relevant history, records, available imaging, and for documentation.

## 2024-09-24 DIAGNOSIS — C15.9 ESOPHAGEAL ADENOCARCINOMA: ICD-10-CM

## 2024-09-24 RX ORDER — OXYCODONE HYDROCHLORIDE 10 MG/1
10 TABLET ORAL EVERY 4 HOURS PRN
Qty: 180 TABLET | Refills: 0 | Status: SHIPPED | OUTPATIENT
Start: 2024-09-24

## 2024-10-25 DIAGNOSIS — C15.9 ESOPHAGEAL ADENOCARCINOMA: ICD-10-CM

## 2024-10-25 RX ORDER — OXYCODONE HYDROCHLORIDE 10 MG/1
10 TABLET ORAL EVERY 4 HOURS PRN
Qty: 180 TABLET | Refills: 0 | Status: SHIPPED | OUTPATIENT
Start: 2024-10-25

## 2024-11-22 DIAGNOSIS — C15.9 ESOPHAGEAL ADENOCARCINOMA: ICD-10-CM

## 2024-11-25 RX ORDER — OXYCODONE HYDROCHLORIDE 10 MG/1
10 TABLET ORAL EVERY 4 HOURS PRN
Qty: 180 TABLET | Refills: 0 | Status: SHIPPED | OUTPATIENT
Start: 2024-11-25

## 2024-12-09 ENCOUNTER — OFFICE VISIT (OUTPATIENT)
Dept: RADIATION ONCOLOGY | Facility: HOSPITAL | Age: 49
End: 2024-12-09
Payer: MEDICAID

## 2024-12-09 ENCOUNTER — HOSPITAL ENCOUNTER (OUTPATIENT)
Dept: RADIATION ONCOLOGY | Facility: HOSPITAL | Age: 49
Setting detail: RADIATION/ONCOLOGY SERIES
Discharge: HOME | End: 2024-12-09
Payer: MEDICAID

## 2024-12-09 VITALS
RESPIRATION RATE: 16 BRPM | HEART RATE: 79 BPM | SYSTOLIC BLOOD PRESSURE: 143 MMHG | OXYGEN SATURATION: 96 % | DIASTOLIC BLOOD PRESSURE: 90 MMHG | WEIGHT: 147.3 LBS | TEMPERATURE: 98.9 F | BODY MASS INDEX: 19.98 KG/M2

## 2024-12-09 DIAGNOSIS — C15.9 ESOPHAGEAL ADENOCARCINOMA: Primary | ICD-10-CM

## 2024-12-09 PROCEDURE — G0463 HOSPITAL OUTPT CLINIC VISIT: HCPCS

## 2024-12-09 NOTE — PROGRESS NOTES
FOLLOW UP NOTE    PATIENT:                                                      Nelson Garcia  MEDICAL RECORD #:                        [id number]  :                                                          1975  COMPLETION DATE:   2024  DIAGNOSIS:     Esophageal adenocarcinoma  - pre-operative: Stage III (cT3, cN1, cM0, G2)  - post-operative: ypT2 ypN1      BRIEF HISTORY:    Routine follow-up visit for locally advanced distal esophageal adenocarcinoma involving the gastroesophageal junction.  He underwent a course of neoadjuvant concurrent chemoradiation therapy to a cumulative dose of 50.4 Jaramillo, completing 2024.  He ultimately completed 4 of 5 planned cycles of carbo/Taxol which was discontinued early due to cytopenias.  Restaging PET/CT showed significant response to treatment and no evidence of disease progression.  The patient was taken for a minimally invasive Frantz Eliel esophagectomy on 2024 with Dr. Gupta at the [facility].  Final pathology was notable for 0.9 cm residual adenocarcinoma primary tumor invading the muscularis propria with 1/10 lymph nodes positive for involvement. ypT2 ypN1.  The patient reports an uneventful postoperative recovery and returned to work 2 weeks later.  He has had removal of his J-tube.  From a symptomatic standpoint, he denies dysphagia or odynophagia and is able to tolerate p.o. without issue.    He denies any issues with textures or consistencies but does notice that certain foods like tomatoes and carbonated beverages cause reflux symptoms which are new for him.  He has taken Pepto-Bismol and Pepcid with improvement and generally tries to avoid food/drink that incite reflux symptoms.  He is eating small, frequent meals throughout the day and does note some early satiety.  He is maintaining his weight.   Overall he feels well.         MEDICATIONS: Medication reconciliation for the patient was reviewed and confirmed in the electronic  medical record.    Review of Systems   Gastrointestinal:  Positive for nausea.        Occasional reflux with certain foods  + early satiety   Musculoskeletal:  Positive for back pain and neck pain.   All other systems reviewed and are negative.    KPS 90%         Physical Exam  Vitals and nursing note reviewed.   Constitutional:       General: He is not in acute distress.     Appearance: Normal appearance. He is well-developed.   HENT:      Head: Normocephalic and atraumatic.   Eyes:      Conjunctiva/sclera: Conjunctivae normal.      Pupils: Pupils are equal, round, and reactive to light.   Cardiovascular:      Rate and Rhythm: Normal rate and regular rhythm.   Pulmonary:      Effort: Pulmonary effort is normal. No respiratory distress.      Breath sounds: Normal breath sounds.   Abdominal:      General: There is no distension.      Palpations: Abdomen is soft. There is no mass.      Tenderness: There is no abdominal tenderness.      Comments: Abdominal surgical incisions appear CDI and well-healed.   Musculoskeletal:         General: Normal range of motion.      Cervical back: Normal range of motion and neck supple.   Lymphadenopathy:      Cervical: No cervical adenopathy.   Skin:     General: Skin is warm and dry.   Neurological:      Mental Status: He is alert and oriented to person, place, and time.   Psychiatric:         Behavior: Behavior normal.         Thought Content: Thought content normal.         Judgment: Judgment normal.         VITAL SIGNS:   Vitals:    12/09/24 0902   BP: 143/90   Pulse: 79   Resp: 16   Temp: 98.9 °F (37.2 °C)   TempSrc: Temporal   SpO2: 96%   Weight: 66.8 kg (147 lb 4.8 oz)   PainSc: 0-No pain               The following portions of the patient's history were reviewed and updated as appropriate: allergies, current medications, past family history, past medical history, past social history, past surgical history and problem list.         Diagnoses and all orders for this visit:    1.  Esophageal adenocarcinoma (Primary)         IMPRESSION:  Adenocarcinoma of the lower third of esophagus/gastrointestinal junction, clinical stage III (T3, N1, M0), CPS 10%, HER2/al negative, MSI stable.  4 months status post neoadjuvant concurrent chemoradiotherapy.  He tolerated treatment well.  He had a favorable response to therapy on repeat PET/CT scan, and is now post minimally invasive Frantz Eliel esophagectomy on 9/30/2024 with Dr. Gupta.  Final pathology consistent with ypT2 ypN1 disease.  He is doing very well clinically and appears to be recovering appropriately.    He has been able to have his J-tube removed and is tolerating a normal diet by mouth without issues other than some occasional reflux symptoms with certain spicy foods or carbonated drinks.  We discussed use of PPI/antacids and ongoing diet recommendations.  He is scheduled to see Dr. Reddy in clinic on 12/10/2024 regarding adjuvant immunotherapy recommendations.  He is scheduled for repeat CT scans of the chest, abdomen pelvis prior to his appointment with Dr. Gupta on 2/13/2024.  The patient and I again reviewed follow-up intervals, surveillance to include serial imaging, and continued expectations for recovery from treatment and treatment-related side effects.      RECOMMENDATIONS:  Continue appropriate follow-up as scheduled with Dr. Reddy and Dr. Gupta.  RTC in 6 months (telehealth if appropriate, patient preference).      Return in about 6 months (around 6/9/2025) for Office Visit.    CHUN Seymour spent a total of 30 minutes on today's visit, with more than 12 minutes in direct face to face communication, and the remainder of the time spent in reviewing the relevant history, records, available imaging, and for documentation.

## 2024-12-10 ENCOUNTER — OFFICE VISIT (OUTPATIENT)
Dept: ONCOLOGY | Facility: CLINIC | Age: 49
End: 2024-12-10
Payer: MEDICAID

## 2024-12-10 ENCOUNTER — HOSPITAL ENCOUNTER (OUTPATIENT)
Dept: ONCOLOGY | Facility: HOSPITAL | Age: 49
Discharge: HOME | End: 2024-12-10
Admitting: INTERNAL MEDICINE
Payer: MEDICAID

## 2024-12-10 VITALS
SYSTOLIC BLOOD PRESSURE: 184 MMHG | BODY MASS INDEX: 20.59 KG/M2 | HEIGHT: 72 IN | HEART RATE: 88 BPM | RESPIRATION RATE: 16 BRPM | DIASTOLIC BLOOD PRESSURE: 116 MMHG | OXYGEN SATURATION: 98 % | TEMPERATURE: 99.7 F | WEIGHT: 152 LBS

## 2024-12-10 DIAGNOSIS — C15.9 ESOPHAGEAL ADENOCARCINOMA: Primary | ICD-10-CM

## 2024-12-10 DIAGNOSIS — Z45.2 ENCOUNTER FOR CARE RELATED TO VASCULAR ACCESS PORT: Primary | ICD-10-CM

## 2024-12-10 DIAGNOSIS — C15.9 ESOPHAGEAL ADENOCARCINOMA: ICD-10-CM

## 2024-12-10 LAB
ALBUMIN SERPL-MCNC: 3.6 G/DL (ref 3.5–5.2)
ALBUMIN/GLOB SERPL: 1.3 G/DL
ALP SERPL-CCNC: 78 U/L (ref 39–117)
ALT SERPL W P-5'-P-CCNC: 7 U/L (ref 1–41)
ANION GAP SERPL CALCULATED.3IONS-SCNC: 8 MMOL/L (ref 5–15)
AST SERPL-CCNC: 14 U/L (ref 1–40)
BASOPHILS # BLD AUTO: 0.02 10*3/MM3 (ref 0–0.2)
BASOPHILS NFR BLD AUTO: 0.3 % (ref 0–1.5)
BILIRUB SERPL-MCNC: 0.2 MG/DL (ref 0–1.2)
BUN SERPL-MCNC: 16 MG/DL (ref 6–20)
BUN/CREAT SERPL: 14.2 (ref 7–25)
CALCIUM SPEC-SCNC: 9.1 MG/DL (ref 8.6–10.5)
CHLORIDE SERPL-SCNC: 105 MMOL/L (ref 98–107)
CO2 SERPL-SCNC: 27 MMOL/L (ref 22–29)
CREAT SERPL-MCNC: 1.13 MG/DL (ref 0.76–1.27)
DEPRECATED RDW RBC AUTO: 43.2 FL (ref 37–54)
EGFRCR SERPLBLD CKD-EPI 2021: 79.7 ML/MIN/1.73
EOSINOPHIL # BLD AUTO: 0.09 10*3/MM3 (ref 0–0.4)
EOSINOPHIL NFR BLD AUTO: 1.5 % (ref 0.3–6.2)
ERYTHROCYTE [DISTWIDTH] IN BLOOD BY AUTOMATED COUNT: 13.9 % (ref 12.3–15.4)
GLOBULIN UR ELPH-MCNC: 2.7 GM/DL
GLUCOSE SERPL-MCNC: 92 MG/DL (ref 65–99)
HCT VFR BLD AUTO: 33.6 % (ref 37.5–51)
HGB BLD-MCNC: 10.8 G/DL (ref 13–17.7)
IMM GRANULOCYTES # BLD AUTO: 0 10*3/MM3 (ref 0–0.05)
IMM GRANULOCYTES NFR BLD AUTO: 0 % (ref 0–0.5)
LYMPHOCYTES # BLD AUTO: 0.49 10*3/MM3 (ref 0.7–3.1)
LYMPHOCYTES NFR BLD AUTO: 7.9 % (ref 19.6–45.3)
MCH RBC QN AUTO: 27.3 PG (ref 26.6–33)
MCHC RBC AUTO-ENTMCNC: 32.1 G/DL (ref 31.5–35.7)
MCV RBC AUTO: 85.1 FL (ref 79–97)
MONOCYTES # BLD AUTO: 0.29 10*3/MM3 (ref 0.1–0.9)
MONOCYTES NFR BLD AUTO: 4.7 % (ref 5–12)
NEUTROPHILS NFR BLD AUTO: 5.3 10*3/MM3 (ref 1.7–7)
NEUTROPHILS NFR BLD AUTO: 85.6 % (ref 42.7–76)
PLATELET # BLD AUTO: 149 10*3/MM3 (ref 140–450)
PMV BLD AUTO: 10.6 FL (ref 6–12)
POTASSIUM SERPL-SCNC: 3.9 MMOL/L (ref 3.5–5.2)
PROT SERPL-MCNC: 6.3 G/DL (ref 6–8.5)
RBC # BLD AUTO: 3.95 10*6/MM3 (ref 4.14–5.8)
SODIUM SERPL-SCNC: 140 MMOL/L (ref 136–145)
T4 FREE SERPL-MCNC: 1.21 NG/DL (ref 0.92–1.68)
TSH SERPL DL<=0.05 MIU/L-ACNC: 0.63 UIU/ML (ref 0.27–4.2)
WBC NRBC COR # BLD AUTO: 6.19 10*3/MM3 (ref 3.4–10.8)

## 2024-12-10 PROCEDURE — 3080F DIAST BP >= 90 MM HG: CPT | Performed by: INTERNAL MEDICINE

## 2024-12-10 PROCEDURE — 84443 ASSAY THYROID STIM HORMONE: CPT | Performed by: INTERNAL MEDICINE

## 2024-12-10 PROCEDURE — 1126F AMNT PAIN NOTED NONE PRSNT: CPT | Performed by: INTERNAL MEDICINE

## 2024-12-10 PROCEDURE — 84439 ASSAY OF FREE THYROXINE: CPT | Performed by: INTERNAL MEDICINE

## 2024-12-10 PROCEDURE — 80053 COMPREHEN METABOLIC PANEL: CPT | Performed by: INTERNAL MEDICINE

## 2024-12-10 PROCEDURE — 99214 OFFICE O/P EST MOD 30 MIN: CPT | Performed by: INTERNAL MEDICINE

## 2024-12-10 PROCEDURE — 3077F SYST BP >= 140 MM HG: CPT | Performed by: INTERNAL MEDICINE

## 2024-12-10 PROCEDURE — 25010000002 HEPARIN LOCK FLUSH PER 10 UNITS: Performed by: INTERNAL MEDICINE

## 2024-12-10 PROCEDURE — 85025 COMPLETE CBC W/AUTO DIFF WBC: CPT | Performed by: INTERNAL MEDICINE

## 2024-12-10 PROCEDURE — 36591 DRAW BLOOD OFF VENOUS DEVICE: CPT

## 2024-12-10 RX ORDER — AMLODIPINE 1 MG/ML
0.1 SUSPENSION ORAL DAILY
COMMUNITY
Start: 2024-10-09

## 2024-12-10 RX ORDER — HEPARIN SODIUM (PORCINE) LOCK FLUSH IV SOLN 100 UNIT/ML 100 UNIT/ML
500 SOLUTION INTRAVENOUS AS NEEDED
Status: DISCONTINUED | OUTPATIENT
Start: 2024-12-10 | End: 2024-12-11 | Stop reason: HOSPADM

## 2024-12-10 RX ORDER — SODIUM CHLORIDE 9 MG/ML
20 INJECTION, SOLUTION INTRAVENOUS ONCE
OUTPATIENT
Start: 2024-12-17

## 2024-12-10 RX ORDER — HEPARIN SODIUM (PORCINE) LOCK FLUSH IV SOLN 100 UNIT/ML 100 UNIT/ML
500 SOLUTION INTRAVENOUS AS NEEDED
OUTPATIENT
Start: 2024-12-10

## 2024-12-10 RX ORDER — SODIUM CHLORIDE 9 MG/ML
20 INJECTION, SOLUTION INTRAVENOUS ONCE
OUTPATIENT
Start: 2024-12-31

## 2024-12-10 RX ADMIN — HEPARIN 500 UNITS: 100 SYRINGE at 08:39

## 2024-12-10 NOTE — PROGRESS NOTES
Follow Up Office Visit      Date: 12/10/2024     Patient Name: Nelson Garcia  MRN: [id number]  : 1975  Referring Physician: Que Lopez     Chief Complaint: Follow-up for esophageal adenocarcinoma     History of Present Illness: Nelson Garcia is a pleasant 49 y.o. male with a past medical history of hyperlipidemia, hypertension, GERD, tobacco abuse, CVA who presents today for evaluation of esophageal adenocarcinoma. The patient is accompanied by their wife who contributes to the history of their care.  Patient had been experiencing worsening dysphagia and weight loss.  Noted the sensation of food getting stuck and regurgitating undigested food.  Was seen at [facility] where he had an EGD completed which showed a fungating mass.  Biopsy consistent with an adenocarcinoma.  PET/CT without evidence of distant disease but did note some local regional lymphadenopathy.  Given his weight loss and significant obstructing lesion, he had a J-tube placed and is currently on tube feeds and tolerating these well.  He notes some pain and discomfort related to his cancer.  He has been seen by Dr. Ramirez with plans to start concurrent chemo XRT followed by surgical intervention with Dr. Mills in [place]     Interval History:  Presents to clinic for follow-up.  Status post cycle 4 of concurrent carbo/Taxol/radiation finishing in 2024.  Status post [place] [facility] esophagectomy with Dr. Gupta on 2024.  Has recovered well from this.  Weight back to his baseline in 2024.  Denies any new pain or discomfort    Oncology History:    Oncology/Hematology History   Esophageal adenocarcinoma   2024 Cancer Staged    Staging form: Esophagus - Adenocarcinoma, AJCC 8th Edition  - Clinical stage from 2024: Stage III (cT3, cN1, cM0, G2) - Signed by Viet Ramirez MD on 2024 Initial Diagnosis    Esophageal adenocarcinoma     2024 - 2024 Chemotherapy    OP  GE PACLitaxel / CARBOplatin (weekly) + XRT     7/1/2024 - 8/8/2024 Radiation    Radiation OncologyTreatment Course:  Nelson Garcia received 5040 cGy in 28 fractions to esophagus via External Beam Radiation - EBRT.     12/17/2024 -  Chemotherapy    OP GE Nivolumab         Subjective      Review of Systems:   Constitutional: Negative for fevers, chills, or weight loss  Eyes: Negative for blurred vision or discharge         Ear/Nose/Throat: Negative for difficulty swallowing, sore throat, LAD                                                       Respiratory: Negative for cough, SOA, wheezing                                                                                        Cardiovascular: Negative for chest pain or palpitations                                                                  Gastrointestinal: Negative for nausea, vomiting or diarrhea                                                                     Genitourinary: Negative for dysuria or hematuria                                                                                           Musculoskeletal: Negative for any joint pains or muscle aches                                                                        Neurologic: Negative for any weakness, headaches, dizziness                                                                         Hematologic: Negative for any easy bleeding or bruising                                                                                   Psychiatric: Negative for anxiety or depression                          Past Medical History/Past Surgical History/ Family History/ Social History: Reviewed by me and unchanged from my previous documentation done on August 2024.     Medications:     Current Outpatient Medications:     acetaminophen (TYLENOL) 325 MG tablet, Take 2 tablets by mouth Every 6 (Six) Hours As Needed for Mild Pain ., Disp: , Rfl:     atorvastatin (LIPITOR) 80 MG tablet, Take 1 tablet by  mouth Daily., Disp: , Rfl:     Atorvastatin Calcium 20 MG/5ML suspension, Take 20 mls by mouth Daily., Disp: 600 mL, Rfl: 5    cloNIDine (CATAPRES) 0.2 MG tablet, Take 1 tablet by mouth As Needed., Disp: , Rfl:     docusate (COLACE) 50 MG/5ML liquid, Administer 10ml per j tube twice a day as needed for constipation., Disp: 240 mL, Rfl: 2    docusate sodium (COLACE) 50 mg/5 mL liquid, Administer 10 mL per J tube 2 (Two) Times a Day As Needed for Constipation., Disp: 240 mL, Rfl: 2    famotidine (PEPCID) 40 mg/5 mL suspension, Take 2.5 mL by mouth 2 (Two) Times a Day., Disp: 150 mL, Rfl: 3    Gabapentin (NEURONTIN) 50 mg/mL solution solution, Take 18 mL by mouth 4 (Four) Times a Day., Disp: 470 mL, Rfl: 5    Katerzia 1 MG/ML suspension, Take 0.1 mg/kg by mouth Daily., Disp: , Rfl:     Lidocaine Viscous HCl (XYLOCAINE) 2 % solution, Take 10 mL by mouth As Needed for Mild Pain., Disp: 100 mL, Rfl: 3    lidocaine-prilocaine (EMLA) 2.5-2.5 % cream, Please apply a small amount to port site about 45 min prior to infusion and cover with Saran Wrap, Disp: 30 g, Rfl: 2    Magic Mouthwash Radonc (nystatin - diphenhydrAMINE HCl - dexamethasone - lidocaine), Swish and swallow 10 mL 4 (Four) Times a Day Before Meals & at Bedtime., Disp: 482 mL, Rfl: 3    methocarbamol (ROBAXIN) 500 MG tablet, Take 1 tablet by mouth 4 (Four) Times a Day., Disp: , Rfl:     Naloxegol Oxalate (MOVANTIK) 25 MG tablet, Take 1 tablet by mouth Every Morning., Disp: 30 tablet, Rfl: 0    neomycin-polymyxin-dexamethasone (MAXITROL) 0.1 % ophthalmic suspension, Administer 2 drops to both eyes 4 (Four) Times a Day., Disp: 5 mL, Rfl: 0    nicotine polacrilex (NICORETTE) 4 MG gum, , Disp: , Rfl:     Nutritional Supplements (Boost Plus) liquid, Take 237 mL by mouth 3 (Three) Times a Day., Disp: , Rfl:     nystatin (MYCOSTATIN) 000362 UNIT/GM powder, Apply to skin folds topically to the appropriate area as directed 2 (Two) Times a Day, Disp: 30 g, Rfl: 0     "ondansetron (ZOFRAN) 4 MG/5ML solution, Take 10 mL by mouth Every 8 (Eight) Hours As Needed for Nausea or Vomiting., Disp: 300 mL, Rfl: 3    ondansetron ODT (ZOFRAN-ODT) 8 MG disintegrating tablet, Place 1 tablet on the tongue Every 8 (Eight) Hours As Needed for Nausea or Vomiting., Disp: 30 tablet, Rfl: 3    oxyCODONE (ROXICODONE) 10 MG tablet, Administer 1 tablet per J tube Every 4 (Four) Hours As Needed for Moderate Pain., Disp: 180 tablet, Rfl: 0    promethazine (PHENERGAN) 25 MG suppository, Insert 1 suppository into the rectum Every 6 (Six) Hours As Needed for Nausea or Vomiting., Disp: 30 suppository, Rfl: 2    sildenafil (VIAGRA) 50 MG tablet, TAKE ONE TABLET BY MOUTH DAILY AS NEEDED FOR ERECTILE DYSFUNCTION, Disp: 30 tablet, Rfl: 3    valsartan (DIOVAN) 320 MG tablet, Take 1 tablet by mouth Daily., Disp: 30 tablet, Rfl: 0    vitamin D3 125 MCG (5000 UT) capsule capsule, Take 1 capsule by mouth Daily., Disp: , Rfl:     Allergies:   No Known Allergies    Objective     Physical Exam:  Vital Signs:   Vitals:    12/10/24 0745   BP: (!) 184/116  Comment: PT HAS NOT TAKEN BP MED THIS AM   Pulse: 88   Resp: 16   Temp: 99.7 °F (37.6 °C)   SpO2: 98%   Weight: 68.9 kg (152 lb)   Height: 182.9 cm (72\")   PainSc: 0-No pain     Pain Score    12/10/24 0745   PainSc: 0-No pain     ECOG Performance Status: 0 - Asymptomatic    Constitutional: NAD, ECOG 0  Eyes: PERRLA, scleral anicteric  ENT: No LAD, no thyromegaly  Respiratory: CTAB, no wheezing, rales, rhonchi  Cardiovascular: RRR, no murmurs, pulses 2+ bilaterally  Abdomen: soft, NT/ND, no HSM  Musculoskeletal: strength 5/5 bilaterally, no c/c/e  Neurologic: A&O x 3, CN II-XII intact grossly    Results Review:   No visits with results within 2 Week(s) from this visit.   Latest known visit with results is:   Admission on 08/19/2024, Discharged on 08/21/2024   Component Date Value Ref Range Status    Glucose 08/19/2024 109 (H)  65 - 99 mg/dL Final    BUN 08/19/2024 15  6 - " 20 mg/dL Final    Creatinine 08/19/2024 0.96  0.76 - 1.27 mg/dL Final    Sodium 08/19/2024 136  136 - 145 mmol/L Final    Potassium 08/19/2024 3.9  3.5 - 5.2 mmol/L Final    Chloride 08/19/2024 96 (L)  98 - 107 mmol/L Final    CO2 08/19/2024 28.0  22.0 - 29.0 mmol/L Final    Calcium 08/19/2024 9.0  8.6 - 10.5 mg/dL Final    Total Protein 08/19/2024 7.4  6.0 - 8.5 g/dL Final    Albumin 08/19/2024 4.0  3.5 - 5.2 g/dL Final    ALT (SGPT) 08/19/2024 21  1 - 41 U/L Final    AST (SGOT) 08/19/2024 32  1 - 40 U/L Final    Alkaline Phosphatase 08/19/2024 83  39 - 117 U/L Final    Total Bilirubin 08/19/2024 1.6 (H)  0.0 - 1.2 mg/dL Final    Globulin 08/19/2024 3.4  gm/dL Final    Calculated Result    A/G Ratio 08/19/2024 1.2  g/dL Final    BUN/Creatinine Ratio 08/19/2024 15.6  7.0 - 25.0 Final    Anion Gap 08/19/2024 12.0  5.0 - 15.0 mmol/L Final    eGFR 08/19/2024 96.9  >60.0 mL/min/1.73 Final    Lipase 08/19/2024 12 (L)  13 - 60 U/L Final    Color, UA 08/19/2024 Yellow  Yellow, Straw Final    Appearance, UA 08/19/2024 Clear  Clear Final    pH, UA 08/19/2024 7.0  5.0 - 8.0 Final    Specific Gravity, UA 08/19/2024 1.045 (H)  1.001 - 1.030 Final    Glucose, UA 08/19/2024 Negative  Negative Final    Ketones, UA 08/19/2024 15 mg/dL (1+) (A)  Negative Final    Bilirubin, UA 08/19/2024 Negative  Negative Final    Blood, UA 08/19/2024 Negative  Negative Final    Protein, UA 08/19/2024 100 mg/dL (2+) (A)  Negative Final    Leuk Esterase, UA 08/19/2024 Negative  Negative Final    Nitrite, UA 08/19/2024 Negative  Negative Final    Urobilinogen, UA 08/19/2024 1.0 E.U./dL  0.2 - 1.0 E.U./dL Final    Lactate 08/19/2024 1.4  0.5 - 2.0 mmol/L Final    Falsely depressed results may occur on samples drawn from patients receiving N-Acetylcysteine (NAC) or Metamizole.    C-Reactive Protein 08/19/2024 12.04 (H)  0.00 - 0.50 mg/dL Final    Procalcitonin 08/19/2024 0.68 (H)  0.00 - 0.25 ng/mL Final    Magnesium 08/19/2024 2.1  1.6 - 2.6 mg/dL  Final    Extra Tube 08/19/2024 Hold for add-ons.   Final    Auto resulted.    Extra Tube 08/19/2024 hold for add-on   Final    Auto resulted    Extra Tube 08/19/2024 Hold for add-ons.   Final    Auto resulted.    Extra Tube 08/19/2024 Hold for add-ons.   Final    Auto resulted.    Extra Tube 08/19/2024 Hold for add-ons.   Final    Auto resulted    WBC 08/19/2024 5.88  3.40 - 10.80 10*3/mm3 Final    RBC 08/19/2024 4.37  4.14 - 5.80 10*6/mm3 Final    Hemoglobin 08/19/2024 12.6 (L)  13.0 - 17.7 g/dL Final    Hematocrit 08/19/2024 38.2  37.5 - 51.0 % Final    MCV 08/19/2024 87.4  79.0 - 97.0 fL Final    MCH 08/19/2024 28.8  26.6 - 33.0 pg Final    MCHC 08/19/2024 33.0  31.5 - 35.7 g/dL Final    RDW 08/19/2024 23.7 (H)  12.3 - 15.4 % Final    RDW-SD 08/19/2024 73.2 (H)  37.0 - 54.0 fl Final    MPV 08/19/2024 11.3  6.0 - 12.0 fL Final    Platelets 08/19/2024 92 (L)  140 - 450 10*3/mm3 Final    Neutrophil % 08/19/2024 82.5 (H)  42.7 - 76.0 % Final    Lymphocyte % 08/19/2024 3.1 (L)  19.6 - 45.3 % Final    Monocyte % 08/19/2024 13.8 (H)  5.0 - 12.0 % Final    Eosinophil % 08/19/2024 0.0 (L)  0.3 - 6.2 % Final    Basophil % 08/19/2024 0.3  0.0 - 1.5 % Final    Immature Grans % 08/19/2024 0.3  0.0 - 0.5 % Final    Neutrophils, Absolute 08/19/2024 4.85  1.70 - 7.00 10*3/mm3 Final    Lymphocytes, Absolute 08/19/2024 0.18 (L)  0.70 - 3.10 10*3/mm3 Final    Monocytes, Absolute 08/19/2024 0.81  0.10 - 0.90 10*3/mm3 Final    Eosinophils, Absolute 08/19/2024 0.00  0.00 - 0.40 10*3/mm3 Final    Basophils, Absolute 08/19/2024 0.02  0.00 - 0.20 10*3/mm3 Final    Immature Grans, Absolute 08/19/2024 0.02  0.00 - 0.05 10*3/mm3 Final    nRBC 08/19/2024 0.0  0.0 - 0.2 /100 WBC Final    Anisocytosis 08/19/2024 Mod/2+  None Seen Final    Martin Cells 08/19/2024 Slight/1+  None Seen Final    Ovalocytes 08/19/2024 Slight/1+  None Seen Final    WBC Morphology 08/19/2024 Normal  Normal Final    Platelet Morphology 08/19/2024 Normal  Normal Final     Protime 08/19/2024 14.9 (H)  12.2 - 14.5 Seconds Final    INR 08/19/2024 1.16 (H)  0.89 - 1.12 Final    QT Interval 08/19/2024 446  ms Final    QTC Interval 08/19/2024 508  ms Final    RBC, UA 08/19/2024 6-10 (A)  None Seen, 0-2 /HPF Final    WBC, UA 08/19/2024 0-2  None Seen, 0-2 /HPF Final    Urine culture not indicated.    Bacteria, UA 08/19/2024 None Seen  None Seen, Trace /HPF Final    Squamous Epithelial Cells, UA 08/19/2024 0-2  None Seen, 0-2 /HPF Final    Hyaline Casts, UA 08/19/2024 0-6  0 - 6 /LPF Final    Methodology 08/19/2024 Automated Microscopy   Final    Glucose 08/20/2024 108 (H)  65 - 99 mg/dL Final    BUN 08/20/2024 18  6 - 20 mg/dL Final    Creatinine 08/20/2024 0.94  0.76 - 1.27 mg/dL Final    Sodium 08/20/2024 137  136 - 145 mmol/L Final    Potassium 08/20/2024 4.0  3.5 - 5.2 mmol/L Final    Chloride 08/20/2024 104  98 - 107 mmol/L Final    CO2 08/20/2024 27.0  22.0 - 29.0 mmol/L Final    Calcium 08/20/2024 8.7  8.6 - 10.5 mg/dL Final    BUN/Creatinine Ratio 08/20/2024 19.1  7.0 - 25.0 Final    Anion Gap 08/20/2024 6.0  5.0 - 15.0 mmol/L Final    eGFR 08/20/2024 99.4  >60.0 mL/min/1.73 Final    WBC 08/20/2024 4.71  3.40 - 10.80 10*3/mm3 Final    RBC 08/20/2024 3.93 (L)  4.14 - 5.80 10*6/mm3 Final    Hemoglobin 08/20/2024 11.3 (L)  13.0 - 17.7 g/dL Final    Hematocrit 08/20/2024 33.7 (L)  37.5 - 51.0 % Final    MCV 08/20/2024 85.8  79.0 - 97.0 fL Final    MCH 08/20/2024 28.8  26.6 - 33.0 pg Final    MCHC 08/20/2024 33.5  31.5 - 35.7 g/dL Final    RDW 08/20/2024 23.7 (H)  12.3 - 15.4 % Final    RDW-SD 08/20/2024 72.1 (H)  37.0 - 54.0 fl Final    MPV 08/20/2024 11.9  6.0 - 12.0 fL Final    Platelets 08/20/2024 84 (L)  140 - 450 10*3/mm3 Final    Neutrophil % 08/20/2024 71.6  42.7 - 76.0 % Final    Lymphocyte % 08/20/2024 7.2 (L)  19.6 - 45.3 % Final    Monocyte % 08/20/2024 19.3 (H)  5.0 - 12.0 % Final    Eosinophil % 08/20/2024 1.1  0.3 - 6.2 % Final    Basophil % 08/20/2024 0.6  0.0 - 1.5 %  Final    Immature Grans % 08/20/2024 0.2  0.0 - 0.5 % Final    Neutrophils, Absolute 08/20/2024 3.37  1.70 - 7.00 10*3/mm3 Final    Lymphocytes, Absolute 08/20/2024 0.34 (L)  0.70 - 3.10 10*3/mm3 Final    Monocytes, Absolute 08/20/2024 0.91 (H)  0.10 - 0.90 10*3/mm3 Final    Eosinophils, Absolute 08/20/2024 0.05  0.00 - 0.40 10*3/mm3 Final    Basophils, Absolute 08/20/2024 0.03  0.00 - 0.20 10*3/mm3 Final    Immature Grans, Absolute 08/20/2024 0.01  0.00 - 0.05 10*3/mm3 Final    nRBC 08/20/2024 0.0  0.0 - 0.2 /100 WBC Final       No results found.    Assessment / Plan      Assessment/Plan:   1. Esophageal adenocarcinoma (Primary)  -Noted on EGD in June 2024.  Pathology notable for an adenocarcinoma  -CPS 10%, HER2/al negative, MSI stable  -PET/CT without evidence of metastatic disease although local regional lymphadenopathy was noted  -Clinical stage III  -Previously discussed the diagnosis, prognosis, treatment plans with patient and wife   -PET/CT in July 2024 reviewed and showing treatment response  -Status post week 4 of carbo/Taxol/radiation finishing in July 2024  -Status post [place] esophagectomy with Dr. Gupta on 9/30/2024  -Pathology notable for scant residual distal third esophageal adenocarcinoma invading the muscle.  1/8 lymph nodes positive for disease.  Surgical margins negative  -Given his residual disease, will plan for adjuvant nivolumab beginning next week  -Will get restaging CT scans within the next 3 weeks     2.  Cancer-related pain  -Stable with current narcotic regimen     3.  Severe protein caloric malnutrition  -Significantly improved.  Tolerating oral intake     4. Nausea  -Stable with needed phenergan, compazine, and viscous lidocaine         Follow Up:   Follow-up in 3 weeks     Aquiles Reddy MD  Hematology and Oncology     Please note that portions of this note may have been completed with a voice recognition program. Efforts were made to edit the dictations, but occasionally  words are mistranscribed.

## 2024-12-11 RX ORDER — PANTOPRAZOLE SODIUM 40 MG/1
40 TABLET, DELAYED RELEASE ORAL DAILY
Qty: 90 TABLET | OUTPATIENT
Start: 2024-12-11

## 2024-12-16 ENCOUNTER — HOSPITAL ENCOUNTER (OUTPATIENT)
Dept: ONCOLOGY | Facility: HOSPITAL | Age: 49
Discharge: HOME | End: 2024-12-16
Payer: MEDICAID

## 2024-12-16 ENCOUNTER — HOSPITAL ENCOUNTER (OUTPATIENT)
Dept: CT IMAGING | Facility: HOSPITAL | Age: 49
Discharge: HOME | End: 2024-12-16
Payer: MEDICAID

## 2024-12-16 ENCOUNTER — DOCUMENTATION (OUTPATIENT)
Dept: OTHER | Facility: HOSPITAL | Age: 49
End: 2024-12-16
Payer: MEDICAID

## 2024-12-16 ENCOUNTER — SPECIALTY PHARMACY (OUTPATIENT)
Dept: ONCOLOGY | Facility: HOSPITAL | Age: 49
End: 2024-12-16
Payer: MEDICAID

## 2024-12-16 DIAGNOSIS — C15.9 ESOPHAGEAL ADENOCARCINOMA: ICD-10-CM

## 2024-12-16 DIAGNOSIS — C15.9 ESOPHAGEAL ADENOCARCINOMA: Primary | ICD-10-CM

## 2024-12-16 PROCEDURE — 74177 CT ABD & PELVIS W/CONTRAST: CPT

## 2024-12-16 PROCEDURE — 25510000001 IOPAMIDOL 61 % SOLUTION: Performed by: INTERNAL MEDICINE

## 2024-12-16 PROCEDURE — 71260 CT THORAX DX C+: CPT

## 2024-12-16 RX ORDER — IOPAMIDOL 612 MG/ML
100 INJECTION, SOLUTION INTRAVASCULAR
Status: COMPLETED | OUTPATIENT
Start: 2024-12-16 | End: 2024-12-16

## 2024-12-16 RX ORDER — ONDANSETRON 8 MG/1
8 TABLET, FILM COATED ORAL 3 TIMES DAILY PRN
Qty: 30 TABLET | Refills: 5 | Status: SHIPPED | OUTPATIENT
Start: 2024-12-16

## 2024-12-16 RX ADMIN — IOPAMIDOL 85 ML: 612 INJECTION, SOLUTION INTRAVENOUS at 10:02

## 2024-12-16 NOTE — PROGRESS NOTES
DWAIN met with pt in infusion lobby per request to assist with psychosocial needs. Pt explained he is now having to do one year of immunotherapy, starting every two weeks. With a change in circumstances in his home life, he is needing assistance with gas and food. DWAIN provided pt with [employer] voucher written for $35 dated for today. DWAIN also [facility] pt [facility]. Pt expressed appreciation for the assistance and support. Pt had no other needs at this time. DWAIN encouraged pt to reach out ongoing, to which he was agreeable.

## 2024-12-16 NOTE — PROGRESS NOTES
Outpatient Infusion  [address] [place] [address]  [phone]      CHEMOTHERAPY EDUCATION    NAME:  Nelson Garcia      : 1975           DATE: 24    Medication Education Sheets: (select all that apply)  Nivolumab    Other Education Sheets: (select all that apply)  CINV, Diarrhea, and Symptom Tracker Sheet and JUAN Information    Chemotherapy Regimen:   OP GE Nivolumab IV every 28 days  -nivolumab (Opdivo) 240 mg IV on days 1 and 15 of Cycles 1-4, then 480 mg IV on day 1 only starting Cycle 5    TOPICS EDUCATION PROVIDED COMMENTS   NUTRITION & APPETITE CHANGES:  importance of maintaining healthy diet & weight, ways to manage to improve intake, dietary consult, exercise regimen, electrolyte and/or blood glucose abnormalities [x] Increased Blood Sugar: This patient's oncology therapy can increase blood sugar.  Recommended that the patient monitor blood sugar more closely and contact their primary care provider for management recommendations if blood glucose values start trending upward., Electrolyte Abnormalities:  Explained that the oncology therapy may lead to abnormalities in electrolytes, specifically: low sodium, high lipase.   DIARRHEA:  causes, s/s of dehydration, ways to manage, dietary changes, when to call MD [x] Immunotherapy: Discussed the risk of diarrhea and immune related colitis. Instructed patient to call the clinic if 4-6 episodes in 24 hours, and discussed role of high-dose steroids for the treatment of immune related colitis.    NAUSEA & VOMITING:  cause, use of antiemetics, dietary changes, when to call MD [x] Emetic risk: Minimal  PRN home meds: Ondansetron 8 mg PO q 8 hours PRN for N/V  Pharmacy home meds sent to: [facility] in [place]    Instructed the patient to take a dose of the PRN medication at the first onset of nausea and if it's not working to call us for additional medications.  Also provided non-drug measures to mitigate  nausea.   PAIN:  causes, ways to manage [x] Discussed muscle and joint aches/pains with chemotherapy, and recommended the use of OTC pain relief with ibuprofen or acetaminophen if needed.   SKIN & NAIL CHANGES:  cause, s/s, ways to manage [x] Generalized Rash: Discussed the potential for a rash or itchy skin, offered nonpharmacologic prevention strategies, and instructed the patient to call if a rash develops and worsens.   ORGAN TOXICITIES:  cause, s/s, need for diagnostic tests, labs, when to notify MD [x] Discussed potential effects on organ systems, monitoring, diagnostic tests, labs, and when to notify the clinic. Discussed the signs/symptoms of the following: cardiotoxicity, central neurotoxicity (confusion, vision changes, stiff neck, seizure), eye changes (  blurry vision, watery eyes, photophobia), hepatotoxicity, hormone gland changes (most commonly the thyroid), lung changes, nephrotoxicity, and skin changes.   INFUSION RELATED REACTIONS or INJECTION-SITE REACTION:  Cause, s/s, anaphylaxis, monitoring, etc. [x] Premeds: None    Discussed the risk of an infusion reaction and symptoms such as: fever, chills, dizziness, itchiness or rash, flushing, trouble breathing, wheezing, sudden back pain, or feeling faint.  Instructed the patient to notify his nurse if he starts feeling weird at any point during his infusion. Reviewed how infusion reactions are managed.   MISCELLANEOUS:  drug interactions, administration, labs, etc. [x] Discussed chemotherapy schedule, lab draws, infusion times, and total expected visit time.   DDIs: No significant DDIs  Lab draws: On days 1 and 15 of cycles 1-4, then starting with cycle 5, labs on day 1 of each cycle thereafter  Discussed a dry, non-productive cough is a possible side effect of nivolumab   INFERTILITY & SEXUALITY:    causes, fertility preservation options, sexuality changes, ways to manage, importance of birth control [x] IV Oncology Therapy: Reviewed safe sex  practices and the importance of minimizing exposure to body fluids for 48 hours after each dose of IV oncology therapy., The patient is not sexually active with a woman of childbearing potential.   HOME CARE:  storing of oral chemo, how to manage bodily fluids [x] IV - Counseled on management of soiled linens and proper flush technique.  Discussed how to manage all the side effects at home and advised when to contact the MD office     Medications:  Prior to Admission medications    Medication Sig Start Date End Date Taking? Authorizing Provider   acetaminophen (TYLENOL) 325 MG tablet Take 2 tablets by mouth Every 6 (Six) Hours As Needed for Mild Pain . 2/23/22   Francesco Bond MD   atorvastatin (LIPITOR) 80 MG tablet Take 1 tablet by mouth Daily.    ProviderAngelo MD   Atorvastatin Calcium 20 MG/5ML suspension Take 20 mls by mouth Daily. 7/8/24   Aquiles Reddy MD   cloNIDine (CATAPRES) 0.2 MG tablet Take 1 tablet by mouth As Needed.    Angelo Celaya MD   docusate (COLACE) 50 MG/5ML liquid Administer 10ml per j tube twice a day as needed for constipation. 7/1/24   Aquiles Reddy MD   docusate sodium (COLACE) 50 mg/5 mL liquid Administer 10 mL per J tube 2 (Two) Times a Day As Needed for Constipation. 7/3/24   Aquiles Reddy MD   famotidine (PEPCID) 40 mg/5 mL suspension Take 2.5 mL by mouth 2 (Two) Times a Day. 7/3/24   Aquiles Reddy MD   Gabapentin (NEURONTIN) 50 mg/mL solution solution Take 18 mL by mouth 4 (Four) Times a Day. 9/3/24   Aquiles Reddy MD   Katerzia 1 MG/ML suspension Take 0.1 mg/kg by mouth Daily. 10/9/24   Angelo Celaya MD   Lidocaine Viscous HCl (XYLOCAINE) 2 % solution Take 10 mL by mouth As Needed for Mild Pain. 7/22/24   Aquiles Reddy MD   lidocaine-prilocaine (EMLA) 2.5-2.5 % cream Please apply a small amount to port site about 45 min prior to infusion and cover with Saran Wrap 6/24/24   Aquiles Reddy MD   Magic Mouthwash Radonc  (nystatin - diphenhydrAMINE HCl - dexamethasone - lidocaine) Swish and swallow 10 mL 4 (Four) Times a Day Before Meals & at Bedtime. 8/6/24   Viet Ramirez MD   methocarbamol (ROBAXIN) 500 MG tablet Take 1 tablet by mouth 4 (Four) Times a Day.    Angelo Celaya MD   Naloxegol Oxalate (MOVANTIK) 25 MG tablet Take 1 tablet by mouth Every Morning. 8/21/24   Stephon Coley MD   neomycin-polymyxin-dexamethasone (MAXITROL) 0.1 % ophthalmic suspension Administer 2 drops to both eyes 4 (Four) Times a Day. 6/18/24   Corey Yo MD   nicotine polacrilex (NICORETTE) 4 MG gum  5/3/24   Angelo Celaya MD   Nutritional Supplements (Boost Plus) liquid Take 237 mL by mouth 3 (Three) Times a Day. 5/6/24 5/6/25  Angelo Celaya MD   nystatin (MYCOSTATIN) 022684 UNIT/GM powder Apply to skin folds topically to the appropriate area as directed 2 (Two) Times a Day 6/18/24   Corey Yo MD   ondansetron (ZOFRAN) 4 MG/5ML solution Take 10 mL by mouth Every 8 (Eight) Hours As Needed for Nausea or Vomiting. 7/3/24   Aquiles Reddy MD   ondansetron (ZOFRAN) 8 MG tablet Take 1 tablet by mouth 3 (Three) Times a Day As Needed for Nausea or Vomiting. 12/16/24   Aquiles Reddy MD   ondansetron ODT (ZOFRAN-ODT) 8 MG disintegrating tablet Place 1 tablet on the tongue Every 8 (Eight) Hours As Needed for Nausea or Vomiting. 7/1/24   Aquiles Reddy MD   oxyCODONE (ROXICODONE) 10 MG tablet Administer 1 tablet per J tube Every 4 (Four) Hours As Needed for Moderate Pain. 11/25/24   Aquiles Reddy MD   promethazine (PHENERGAN) 25 MG suppository Insert 1 suppository into the rectum Every 6 (Six) Hours As Needed for Nausea or Vomiting. 7/16/24   Aquiles Reddy MD   sildenafil (VIAGRA) 50 MG tablet TAKE ONE TABLET BY MOUTH DAILY AS NEEDED FOR ERECTILE DYSFUNCTION 11/10/23   Brooks Veloz MD   valsartan (DIOVAN) 320 MG tablet Take 1 tablet by mouth Daily. 8/21/24   Stephon Coley MD   vitamin D3 125  MCG (5000 UT) capsule capsule Take 1 capsule by mouth Daily.    Provider, MD Angelo     Notes: All questions and concerns were addressed. Provided a personalized treatment calendar to patient (includes treatment and lab schedule). Provided patient with contact information for the pharmacist and clinic while instructing them to call if any questions or concerns arise. Informed consent for treatment was obtained. Patient was receptive to information and expressed understanding.     Mary Jane Moreno, PharmKELLY  [facility] Specialist  [phone]    12/16/2024  11:06 EST

## 2024-12-17 ENCOUNTER — HOSPITAL ENCOUNTER (OUTPATIENT)
Dept: ONCOLOGY | Facility: HOSPITAL | Age: 49
Discharge: HOME | End: 2024-12-17
Admitting: INTERNAL MEDICINE
Payer: MEDICAID

## 2024-12-17 ENCOUNTER — DOCUMENTATION (OUTPATIENT)
Dept: NUTRITION | Facility: HOSPITAL | Age: 49
End: 2024-12-17
Payer: MEDICAID

## 2024-12-17 VITALS
BODY MASS INDEX: 20.32 KG/M2 | HEART RATE: 78 BPM | RESPIRATION RATE: 16 BRPM | WEIGHT: 150 LBS | SYSTOLIC BLOOD PRESSURE: 137 MMHG | TEMPERATURE: 98.4 F | DIASTOLIC BLOOD PRESSURE: 83 MMHG | HEIGHT: 72 IN

## 2024-12-17 DIAGNOSIS — C15.9 ESOPHAGEAL ADENOCARCINOMA: Primary | ICD-10-CM

## 2024-12-17 DIAGNOSIS — Z45.2 ENCOUNTER FOR CARE RELATED TO VASCULAR ACCESS PORT: ICD-10-CM

## 2024-12-17 PROCEDURE — 25010000002 NIVOLUMAB 240 MG/24ML SOLUTION 24 ML VIAL: Performed by: INTERNAL MEDICINE

## 2024-12-17 PROCEDURE — 96413 CHEMO IV INFUSION 1 HR: CPT

## 2024-12-17 PROCEDURE — 25010000002 HEPARIN LOCK FLUSH PER 10 UNITS: Performed by: INTERNAL MEDICINE

## 2024-12-17 RX ORDER — HEPARIN SODIUM (PORCINE) LOCK FLUSH IV SOLN 100 UNIT/ML 100 UNIT/ML
500 SOLUTION INTRAVENOUS AS NEEDED
OUTPATIENT
Start: 2024-12-17

## 2024-12-17 RX ORDER — HEPARIN SODIUM (PORCINE) LOCK FLUSH IV SOLN 100 UNIT/ML 100 UNIT/ML
500 SOLUTION INTRAVENOUS AS NEEDED
Status: DISCONTINUED | OUTPATIENT
Start: 2024-12-17 | End: 2024-12-18 | Stop reason: HOSPADM

## 2024-12-17 RX ADMIN — SODIUM CHLORIDE 240 MG: 9 INJECTION, SOLUTION INTRAVENOUS at 11:42

## 2024-12-17 RX ADMIN — HEPARIN 500 UNITS: 100 SYRINGE at 12:27

## 2024-12-17 NOTE — PROGRESS NOTES
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       Neoadjuvant Chemotherapy:  OP GE PACLitaxel / CARBOplatin (weekly) + XRT - 4/4/ cycles completed 8/2/24     Radiation:  5040 cGy in 28 fractions to esophagus via External Beam Radiation - completed 8/8/24    Surgery: Frantz Eliel esophagectomy - Dr. Gupta @  on 9/30/2024     Adjuvant Immunotherapy: OP GE Nivolumab(Opdivo) 240 mg IV - D1 & D15 every 28 days x 4 cycles, then 480 mg IV - day 1 every 28 days      Weight - 150# / fairly stable weight range between 147 - 158# over the past ~5 months     Follow up with patient during his infusion appointment.  Note patient is starting his adjuvant immunotherapy treatments today.  Familiar with patient from previous treatment.  Patient reports he has a decreased appetite and early satiety symptoms, but denies other significant nutritional complaints at this time.  He states he is not drinking Boost ONS at this time but states he has a couple of cases at home.  He also reports he is tolerating a general oral diet and denies issues with chewing / swallowing.    Advised him to be eating smaller portions at one time but to eat more often throughout the day.  Suggested he \"watch the clock\" to eat every 2-3 hours during his waking hours to aid with oral intake and early satiety symptom management.  Recommended he resume drinking Boost ONS on a regular basis to aid with calorie / protein intake and weight maintenance / gain.  Offered several high protein snack ideas he may find more appealing at this time.  Encouraged him to sip on hydrating fluids throughout the day.    Answered his questions and he voiced understanding of information discussed.  Encouraged to call RD with questions.  Will monitor as needed during his treatment course.    Maryjane Lange RD  12/17/24        "

## 2024-12-23 DIAGNOSIS — C15.9 ESOPHAGEAL ADENOCARCINOMA: ICD-10-CM

## 2024-12-23 RX ORDER — OXYCODONE HYDROCHLORIDE 10 MG/1
10 TABLET ORAL EVERY 4 HOURS PRN
Qty: 180 TABLET | Refills: 0 | Status: SHIPPED | OUTPATIENT
Start: 2024-12-23

## 2024-12-31 ENCOUNTER — HOSPITAL ENCOUNTER (OUTPATIENT)
Dept: ONCOLOGY | Facility: HOSPITAL | Age: 49
Discharge: HOME | End: 2024-12-31
Admitting: INTERNAL MEDICINE
Payer: MEDICAID

## 2024-12-31 ENCOUNTER — APPOINTMENT (OUTPATIENT)
Dept: ONCOLOGY | Facility: HOSPITAL | Age: 49
End: 2024-12-31
Payer: MEDICAID

## 2024-12-31 ENCOUNTER — OFFICE VISIT (OUTPATIENT)
Dept: ONCOLOGY | Facility: CLINIC | Age: 49
End: 2024-12-31
Payer: MEDICAID

## 2024-12-31 VITALS
HEIGHT: 72 IN | DIASTOLIC BLOOD PRESSURE: 94 MMHG | RESPIRATION RATE: 16 BRPM | BODY MASS INDEX: 20.26 KG/M2 | OXYGEN SATURATION: 98 % | SYSTOLIC BLOOD PRESSURE: 160 MMHG | WEIGHT: 149.6 LBS | TEMPERATURE: 97.8 F | HEART RATE: 72 BPM

## 2024-12-31 DIAGNOSIS — C15.9 ESOPHAGEAL ADENOCARCINOMA: Primary | ICD-10-CM

## 2024-12-31 DIAGNOSIS — Z45.2 ENCOUNTER FOR CARE RELATED TO VASCULAR ACCESS PORT: ICD-10-CM

## 2024-12-31 LAB
ALBUMIN SERPL-MCNC: 3.9 G/DL (ref 3.5–5.2)
ALBUMIN/GLOB SERPL: 1.4 G/DL
ALP SERPL-CCNC: 79 U/L (ref 39–117)
ALT SERPL W P-5'-P-CCNC: 7 U/L (ref 1–41)
ANION GAP SERPL CALCULATED.3IONS-SCNC: 12 MMOL/L (ref 5–15)
AST SERPL-CCNC: 16 U/L (ref 1–40)
BASOPHILS # BLD AUTO: 0.02 10*3/MM3 (ref 0–0.2)
BASOPHILS NFR BLD AUTO: 0.3 % (ref 0–1.5)
BILIRUB SERPL-MCNC: 0.5 MG/DL (ref 0–1.2)
BUN SERPL-MCNC: 15 MG/DL (ref 6–20)
BUN/CREAT SERPL: 12.6 (ref 7–25)
CALCIUM SPEC-SCNC: 9.1 MG/DL (ref 8.6–10.5)
CHLORIDE SERPL-SCNC: 104 MMOL/L (ref 98–107)
CO2 SERPL-SCNC: 24 MMOL/L (ref 22–29)
CREAT SERPL-MCNC: 1.19 MG/DL (ref 0.76–1.27)
DEPRECATED RDW RBC AUTO: 48.3 FL (ref 37–54)
EGFRCR SERPLBLD CKD-EPI 2021: 74.9 ML/MIN/1.73
EOSINOPHIL # BLD AUTO: 0.17 10*3/MM3 (ref 0–0.4)
EOSINOPHIL NFR BLD AUTO: 2.7 % (ref 0.3–6.2)
ERYTHROCYTE [DISTWIDTH] IN BLOOD BY AUTOMATED COUNT: 15.3 % (ref 12.3–15.4)
GLOBULIN UR ELPH-MCNC: 2.8 GM/DL
GLUCOSE SERPL-MCNC: 167 MG/DL (ref 65–99)
HCT VFR BLD AUTO: 35 % (ref 37.5–51)
HGB BLD-MCNC: 11.1 G/DL (ref 13–17.7)
IMM GRANULOCYTES # BLD AUTO: 0.01 10*3/MM3 (ref 0–0.05)
IMM GRANULOCYTES NFR BLD AUTO: 0.2 % (ref 0–0.5)
LYMPHOCYTES # BLD AUTO: 0.64 10*3/MM3 (ref 0.7–3.1)
LYMPHOCYTES NFR BLD AUTO: 10 % (ref 19.6–45.3)
MCH RBC QN AUTO: 27.3 PG (ref 26.6–33)
MCHC RBC AUTO-ENTMCNC: 31.7 G/DL (ref 31.5–35.7)
MCV RBC AUTO: 86.2 FL (ref 79–97)
MONOCYTES # BLD AUTO: 0.16 10*3/MM3 (ref 0.1–0.9)
MONOCYTES NFR BLD AUTO: 2.5 % (ref 5–12)
NEUTROPHILS NFR BLD AUTO: 5.41 10*3/MM3 (ref 1.7–7)
NEUTROPHILS NFR BLD AUTO: 84.3 % (ref 42.7–76)
PLATELET # BLD AUTO: 173 10*3/MM3 (ref 140–450)
PMV BLD AUTO: 10.3 FL (ref 6–12)
POTASSIUM SERPL-SCNC: 3.7 MMOL/L (ref 3.5–5.2)
PROT SERPL-MCNC: 6.7 G/DL (ref 6–8.5)
RBC # BLD AUTO: 4.06 10*6/MM3 (ref 4.14–5.8)
SODIUM SERPL-SCNC: 140 MMOL/L (ref 136–145)
WBC NRBC COR # BLD AUTO: 6.41 10*3/MM3 (ref 3.4–10.8)

## 2024-12-31 PROCEDURE — 1126F AMNT PAIN NOTED NONE PRSNT: CPT | Performed by: INTERNAL MEDICINE

## 2024-12-31 PROCEDURE — 80053 COMPREHEN METABOLIC PANEL: CPT | Performed by: INTERNAL MEDICINE

## 2024-12-31 PROCEDURE — 99214 OFFICE O/P EST MOD 30 MIN: CPT | Performed by: INTERNAL MEDICINE

## 2024-12-31 PROCEDURE — 25010000002 HEPARIN LOCK FLUSH PER 10 UNITS: Performed by: INTERNAL MEDICINE

## 2024-12-31 PROCEDURE — 85025 COMPLETE CBC W/AUTO DIFF WBC: CPT | Performed by: INTERNAL MEDICINE

## 2024-12-31 PROCEDURE — 25010000002 NIVOLUMAB 240 MG/24ML SOLUTION 24 ML VIAL: Performed by: INTERNAL MEDICINE

## 2024-12-31 PROCEDURE — 96413 CHEMO IV INFUSION 1 HR: CPT

## 2024-12-31 PROCEDURE — 3077F SYST BP >= 140 MM HG: CPT | Performed by: INTERNAL MEDICINE

## 2024-12-31 PROCEDURE — 3080F DIAST BP >= 90 MM HG: CPT | Performed by: INTERNAL MEDICINE

## 2024-12-31 RX ORDER — SODIUM CHLORIDE 9 MG/ML
20 INJECTION, SOLUTION INTRAVENOUS ONCE
OUTPATIENT
Start: 2025-01-28

## 2024-12-31 RX ORDER — SODIUM CHLORIDE 9 MG/ML
20 INJECTION, SOLUTION INTRAVENOUS ONCE
Status: DISCONTINUED | OUTPATIENT
Start: 2024-12-31 | End: 2025-01-01 | Stop reason: HOSPADM

## 2024-12-31 RX ORDER — SODIUM CHLORIDE 9 MG/ML
20 INJECTION, SOLUTION INTRAVENOUS ONCE
OUTPATIENT
Start: 2025-01-14

## 2024-12-31 RX ORDER — HEPARIN SODIUM (PORCINE) LOCK FLUSH IV SOLN 100 UNIT/ML 100 UNIT/ML
500 SOLUTION INTRAVENOUS AS NEEDED
OUTPATIENT
Start: 2024-12-31

## 2024-12-31 RX ORDER — VALSARTAN 80 MG/1
80 TABLET ORAL DAILY
COMMUNITY
Start: 2024-12-14

## 2024-12-31 RX ORDER — GABAPENTIN 600 MG/1
600 TABLET ORAL DAILY
COMMUNITY
Start: 2024-12-17

## 2024-12-31 RX ORDER — HEPARIN SODIUM (PORCINE) LOCK FLUSH IV SOLN 100 UNIT/ML 100 UNIT/ML
500 SOLUTION INTRAVENOUS AS NEEDED
Status: DISCONTINUED | OUTPATIENT
Start: 2024-12-31 | End: 2025-01-01 | Stop reason: HOSPADM

## 2024-12-31 RX ADMIN — SODIUM CHLORIDE 240 MG: 9 INJECTION, SOLUTION INTRAVENOUS at 11:36

## 2024-12-31 RX ADMIN — HEPARIN 500 UNITS: 100 SYRINGE at 12:15

## 2024-12-31 NOTE — PROGRESS NOTES
Follow Up Office Visit      Date: 2024     Patient Name: Nelson Garcia  MRN: [id number]  : 1975  Referring Physician: Que Lopez     Chief Complaint: Follow-up for esophageal adenocarcinoma     History of Present Illness: Nelson Garcia is a pleasant 49 y.o. male with a past medical history of hyperlipidemia, hypertension, GERD, tobacco abuse, CVA who presents today for evaluation of esophageal adenocarcinoma. The patient is accompanied by their wife who contributes to the history of their care.  Patient had been experiencing worsening dysphagia and weight loss.  Noted the sensation of food getting stuck and regurgitating undigested food.  Was seen at [facility] where he had an EGD completed which showed a fungating mass.  Biopsy consistent with an adenocarcinoma.  PET/CT without evidence of distant disease but did note some local regional lymphadenopathy.  Given his weight loss and significant obstructing lesion, he had a J-tube placed and is currently on tube feeds and tolerating these well.  He notes some pain and discomfort related to his cancer.  He has been seen by Dr. Ramirez with plans to start concurrent chemo XRT followed by surgical intervention with Dr. Mills in [place]     Interval History:  Presents to clinic for follow-up.  Status post cycle 4 of concurrent carbo/Taxol/radiation finishing in 2024.  Status post [place] [facility] esophagectomy with Dr. Gupta on 2024.  Started adjuvant nivolumab in 2024.  Tolerating well outside of an episode of a nosebleed.  Weight overall stable today    Oncology History:    Oncology/Hematology History   Esophageal adenocarcinoma   2024 Cancer Staged    Staging form: Esophagus - Adenocarcinoma, AJCC 8th Edition  - Clinical stage from 2024: Stage III (cT3, cN1, cM0, G2) - Signed by Viet Ramirez MD on 2024 Initial Diagnosis    Esophageal adenocarcinoma     2024 - 2024  Chemotherapy    OP GE PACLitaxel / CARBOplatin (weekly) + XRT     7/1/2024 - 8/8/2024 Radiation    Radiation OncologyTreatment Course:  Nelson Garcia received 5040 cGy in 28 fractions to esophagus via External Beam Radiation - EBRT.     12/17/2024 -  Chemotherapy    OP GE Nivolumab         Subjective      Review of Systems:   Constitutional: Negative for fevers, chills, or weight loss  Eyes: Negative for blurred vision or discharge         Ear/Nose/Throat: Negative for difficulty swallowing, sore throat, LAD                                                       Respiratory: Negative for cough, SOA, wheezing                                                                                        Cardiovascular: Negative for chest pain or palpitations                                                                  Gastrointestinal: Negative for nausea, vomiting or diarrhea                                                                     Genitourinary: Negative for dysuria or hematuria                                                                                           Musculoskeletal: Negative for any joint pains or muscle aches                                                                        Neurologic: Negative for any weakness, headaches, dizziness                                                                         Hematologic: Negative for any easy bleeding or bruising                                                                                   Psychiatric: Negative for anxiety or depression                          Past Medical History/Past Surgical History/ Family History/ Social History: Reviewed by me and unchanged from my previous documentation done on December 2024.     Medications:     Current Outpatient Medications:     acetaminophen (TYLENOL) 325 MG tablet, Take 2 tablets by mouth Every 6 (Six) Hours As Needed for Mild Pain ., Disp: , Rfl:     atorvastatin (LIPITOR) 80 MG  tablet, Take 1 tablet by mouth Daily., Disp: , Rfl:     cloNIDine (CATAPRES) 0.2 MG tablet, Take 1 tablet by mouth As Needed., Disp: , Rfl:     docusate (COLACE) 50 MG/5ML liquid, Administer 10ml per j tube twice a day as needed for constipation., Disp: 240 mL, Rfl: 2    docusate sodium (COLACE) 50 mg/5 mL liquid, Administer 10 mL per J tube 2 (Two) Times a Day As Needed for Constipation., Disp: 240 mL, Rfl: 2    famotidine (PEPCID) 40 mg/5 mL suspension, Take 2.5 mL by mouth 2 (Two) Times a Day., Disp: 150 mL, Rfl: 3    gabapentin (NEURONTIN) 600 MG tablet, Take 1 tablet by mouth Daily., Disp: , Rfl:     Katerzia 1 MG/ML suspension, Take 0.1 mg/kg by mouth Daily., Disp: , Rfl:     Lidocaine Viscous HCl (XYLOCAINE) 2 % solution, Take 10 mL by mouth As Needed for Mild Pain., Disp: 100 mL, Rfl: 3    lidocaine-prilocaine (EMLA) 2.5-2.5 % cream, Please apply a small amount to port site about 45 min prior to infusion and cover with Saran Wrap, Disp: 30 g, Rfl: 2    Magic Mouthwash Radonc (nystatin - diphenhydrAMINE HCl - dexamethasone - lidocaine), Swish and swallow 10 mL 4 (Four) Times a Day Before Meals & at Bedtime., Disp: 482 mL, Rfl: 3    methocarbamol (ROBAXIN) 500 MG tablet, Take 1 tablet by mouth 4 (Four) Times a Day., Disp: , Rfl:     Naloxegol Oxalate (MOVANTIK) 25 MG tablet, Take 1 tablet by mouth Every Morning., Disp: 30 tablet, Rfl: 0    neomycin-polymyxin-dexamethasone (MAXITROL) 0.1 % ophthalmic suspension, Administer 2 drops to both eyes 4 (Four) Times a Day., Disp: 5 mL, Rfl: 0    nicotine polacrilex (NICORETTE) 4 MG gum, , Disp: , Rfl:     Nutritional Supplements (Boost Plus) liquid, Take 237 mL by mouth 3 (Three) Times a Day., Disp: , Rfl:     nystatin (MYCOSTATIN) 621373 UNIT/GM powder, Apply to skin folds topically to the appropriate area as directed 2 (Two) Times a Day, Disp: 30 g, Rfl: 0    ondansetron (ZOFRAN) 4 MG/5ML solution, Take 10 mL by mouth Every 8 (Eight) Hours As Needed for Nausea or  Vomiting., Disp: 300 mL, Rfl: 3    ondansetron (ZOFRAN) 8 MG tablet, Take 1 tablet by mouth 3 (Three) Times a Day As Needed for Nausea or Vomiting., Disp: 30 tablet, Rfl: 5    ondansetron ODT (ZOFRAN-ODT) 8 MG disintegrating tablet, Place 1 tablet on the tongue Every 8 (Eight) Hours As Needed for Nausea or Vomiting., Disp: 30 tablet, Rfl: 3    oxyCODONE (ROXICODONE) 10 MG tablet, Administer 1 tablet per J tube Every 4 (Four) Hours As Needed for Moderate Pain., Disp: 180 tablet, Rfl: 0    promethazine (PHENERGAN) 25 MG suppository, Insert 1 suppository into the rectum Every 6 (Six) Hours As Needed for Nausea or Vomiting., Disp: 30 suppository, Rfl: 2    sildenafil (VIAGRA) 50 MG tablet, TAKE ONE TABLET BY MOUTH DAILY AS NEEDED FOR ERECTILE DYSFUNCTION, Disp: 30 tablet, Rfl: 3    valsartan (DIOVAN) 320 MG tablet, Take 1 tablet by mouth Daily., Disp: 30 tablet, Rfl: 0    valsartan (DIOVAN) 80 MG tablet, Take 1 tablet by mouth Daily., Disp: , Rfl:     vitamin D3 125 MCG (5000 UT) capsule capsule, Take 1 capsule by mouth Daily., Disp: , Rfl:     Atorvastatin Calcium 20 MG/5ML suspension, Take 20 mls by mouth Daily. (Patient not taking: Reported on 12/31/2024), Disp: 600 mL, Rfl: 5    Gabapentin (NEURONTIN) 50 mg/mL solution solution, Take 18 mL by mouth 4 (Four) Times a Day. (Patient not taking: Reported on 12/31/2024), Disp: 470 mL, Rfl: 5  No current facility-administered medications for this visit.    Facility-Administered Medications Ordered in Other Visits:     heparin injection 500 Units, 500 Units, Intravenous, PRN, Aquiles Reddy MD    Nivolumab (OPDIVO) 240 mg in sodium chloride 0.9 % 124 mL chemo IVPB, 240 mg, Intravenous, Once, Aquiles Reddy MD    sodium chloride 0.9 % infusion, 20 mL/hr, Intravenous, Once, Aqulies Reddy MD    Allergies:   No Known Allergies    Objective     Physical Exam:  Vital Signs:   Vitals:    12/31/24 1007   BP: 160/94   Pulse: 72   Resp: 16   Temp: 97.8 °F (36.6 °C)  "  TempSrc: Temporal   SpO2: 98%   Weight: 67.9 kg (149 lb 9.6 oz)   Height: 182.9 cm (72.01\")   PainSc: 0-No pain     Pain Score    12/31/24 1007   PainSc: 0-No pain     ECOG Performance Status: 0 - Asymptomatic    Constitutional: NAD, ECOG 0  Eyes: PERRLA, scleral anicteric  ENT: No LAD, no thyromegaly  Respiratory: CTAB, no wheezing, rales, rhonchi  Cardiovascular: RRR, no murmurs, pulses 2+ bilaterally  Abdomen: soft, NT/ND, no HSM  Musculoskeletal: strength 5/5 bilaterally, no c/c/e  Neurologic: A&O x 3, CN II-XII intact grossly    Results Review:   Hospital Outpatient Visit on 12/31/2024   Component Date Value Ref Range Status    Glucose 12/31/2024 167 (H)  65 - 99 mg/dL Final    BUN 12/31/2024 15  6 - 20 mg/dL Final    Creatinine 12/31/2024 1.19  0.76 - 1.27 mg/dL Final    Sodium 12/31/2024 140  136 - 145 mmol/L Final    Potassium 12/31/2024 3.7  3.5 - 5.2 mmol/L Final    Chloride 12/31/2024 104  98 - 107 mmol/L Final    CO2 12/31/2024 24.0  22.0 - 29.0 mmol/L Final    Calcium 12/31/2024 9.1  8.6 - 10.5 mg/dL Final    Total Protein 12/31/2024 6.7  6.0 - 8.5 g/dL Final    Albumin 12/31/2024 3.9  3.5 - 5.2 g/dL Final    ALT (SGPT) 12/31/2024 7  1 - 41 U/L Final    AST (SGOT) 12/31/2024 16  1 - 40 U/L Final    Alkaline Phosphatase 12/31/2024 79  39 - 117 U/L Final    Total Bilirubin 12/31/2024 0.5  0.0 - 1.2 mg/dL Final    Globulin 12/31/2024 2.8  gm/dL Final    Calculated Result    A/G Ratio 12/31/2024 1.4  g/dL Final    BUN/Creatinine Ratio 12/31/2024 12.6  7.0 - 25.0 Final    Anion Gap 12/31/2024 12.0  5.0 - 15.0 mmol/L Final    eGFR 12/31/2024 74.9  >60.0 mL/min/1.73 Final    WBC 12/31/2024 6.41  3.40 - 10.80 10*3/mm3 Final    RBC 12/31/2024 4.06 (L)  4.14 - 5.80 10*6/mm3 Final    Hemoglobin 12/31/2024 11.1 (L)  13.0 - 17.7 g/dL Final    Hematocrit 12/31/2024 35.0 (L)  37.5 - 51.0 % Final    MCV 12/31/2024 86.2  79.0 - 97.0 fL Final    MCH 12/31/2024 27.3  26.6 - 33.0 pg Final    MCHC 12/31/2024 31.7  31.5 - " 35.7 g/dL Final    RDW 12/31/2024 15.3  12.3 - 15.4 % Final    RDW-SD 12/31/2024 48.3  37.0 - 54.0 fl Final    MPV 12/31/2024 10.3  6.0 - 12.0 fL Final    Platelets 12/31/2024 173  140 - 450 10*3/mm3 Final    Neutrophil % 12/31/2024 84.3 (H)  42.7 - 76.0 % Final    Lymphocyte % 12/31/2024 10.0 (L)  19.6 - 45.3 % Final    Monocyte % 12/31/2024 2.5 (L)  5.0 - 12.0 % Final    Eosinophil % 12/31/2024 2.7  0.3 - 6.2 % Final    Basophil % 12/31/2024 0.3  0.0 - 1.5 % Final    Immature Grans % 12/31/2024 0.2  0.0 - 0.5 % Final    Neutrophils, Absolute 12/31/2024 5.41  1.70 - 7.00 10*3/mm3 Final    Lymphocytes, Absolute 12/31/2024 0.64 (L)  0.70 - 3.10 10*3/mm3 Final    Monocytes, Absolute 12/31/2024 0.16  0.10 - 0.90 10*3/mm3 Final    Eosinophils, Absolute 12/31/2024 0.17  0.00 - 0.40 10*3/mm3 Final    Basophils, Absolute 12/31/2024 0.02  0.00 - 0.20 10*3/mm3 Final    Immature Grans, Absolute 12/31/2024 0.01  0.00 - 0.05 10*3/mm3 Final       CT Abdomen Pelvis With Contrast    Result Date: 12/19/2024  Narrative: CT CHEST W CONTRAST DIAGNOSTIC, CT ABDOMEN PELVIS W CONTRAST Date of Exam: 12/16/2024 9:46 AM EST Indication: esophageal ca. Comparison: 7/17/2024 whole-body PET scan Technique: Axial CT images were obtained of the chest, abdomen and pelvis after the uneventful intravenous administration of 85 mL Isovue 300.  Reconstructed coronal and sagittal images were also obtained. Automated exposure control and iterative construction methods were used. Findings: Previous 7/17/2024 whole-body PET scan by report showed positive response to therapy but decreasing activity and decreasing thickness of the distal esophagus, resolution of hyper metabolic gastrohepatic lymph nodes. Stable small right lower lobe pulmonary nodules and stable 1.1 cm subcarinal lymph node. CT SCAN OF THE CHEST WITH IV CONTRAST: Mild bibasilar discoid atelectasis is new but essentially linear and non-mass-like. There is a more irregular pleural-based area  of scarring/nodularity in the medial right lower lobe which is new, images 85 through 76. With this appears most discrete, image 81 and measures approximately 2.0 x 1.2 cm and in its location adjacent the patient's gastric pull-up, may represent post irradiation scarring. A couple of micronodules are seen elsewhere. Lungs elsewhere appear  clear. Images of the mediastinum show no evidence of significant adenopathy and no pericardial effusion. Bony structures appear to be intact Pression: 1. New very mild benign-appearing bibasilar discoid atelectasis. 2. Probable small area of right paramediastinal post irradiation scarring. This area should be followed for stability on subsequent scans however. 3. No new or progressive chest pathology elsewhere. CT SCAN OF THE ABDOMEN PELVIS WITH IV CONTRAST: There is diffuse fatty liver change. No hepatic lesions are identified. No significant abnormalities are appreciated of the spleen, gallbladder, pancreas, right adrenal gland, or kidneys. Low density round 1.5 cm left adrenal nodule was non-hypermetabolic on the previous PET scan and is stable in size at least to the 6/4/2024 abdominal CT scan. Appearance of the patient's gastric pull-up is stable and no perigastric adenopathy or other upper abdominal adenopathy is seen. No inflammatory change or evidence of peritoneal disease is identified. Bowel loops are normal in caliber. Regarding the lower abdomen/pelvis, no mass or adenopathy is seen. The bladder appears diffusely thick-walled, although this may at least in part be due to nondistention. There are no visible surrounding inflammatory changes. Prostate and seminal vesicles do not appear significantly enlarged. No intrapelvic mass, adenopathy or inflammatory change is seen. Large and small bowel loops appear grossly normal. Shotty right inguinal lymph nodes appear unchanged back to 8/19/2024. Delayed venous phase images show no evidence of obstructive uropathy. Bony  structures appear to be intact.     Impression: Impression: 1. No evidence of malignancy/metastasis in the abdomen or pelvis. 2. Stable small left adrenal nodule. 3. Uniformly thick-walled appearance of the bladder, perhaps in part due to nondistention. In particular if patient has ongoing symptoms of dysuria, consider correlation with urinalysis. Electronically Signed: Ronnie Harmon MD  12/19/2024 8:46 PM EST  Workstation ID: [id number]    CT Chest With Contrast Diagnostic    Result Date: 12/19/2024  Narrative: CT CHEST W CONTRAST DIAGNOSTIC, CT ABDOMEN PELVIS W CONTRAST Date of Exam: 12/16/2024 9:46 AM EST Indication: esophageal ca. Comparison: 7/17/2024 whole-body PET scan Technique: Axial CT images were obtained of the chest, abdomen and pelvis after the uneventful intravenous administration of 85 mL Isovue 300.  Reconstructed coronal and sagittal images were also obtained. Automated exposure control and iterative construction methods were used. Findings: Previous 7/17/2024 whole-body PET scan by report showed positive response to therapy but decreasing activity and decreasing thickness of the distal esophagus, resolution of hyper metabolic gastrohepatic lymph nodes. Stable small right lower lobe pulmonary nodules and stable 1.1 cm subcarinal lymph node. CT SCAN OF THE CHEST WITH IV CONTRAST: Mild bibasilar discoid atelectasis is new but essentially linear and non-mass-like. There is a more irregular pleural-based area of scarring/nodularity in the medial right lower lobe which is new, images 85 through 76. With this appears most discrete, image 81 and measures approximately 2.0 x 1.2 cm and in its location adjacent the patient's gastric pull-up, may represent post irradiation scarring. A couple of micronodules are seen elsewhere. Lungs elsewhere appear  clear. Images of the mediastinum show no evidence of significant adenopathy and no pericardial effusion. Bony structures appear to be intact Pression: 1. New very  mild benign-appearing bibasilar discoid atelectasis. 2. Probable small area of right paramediastinal post irradiation scarring. This area should be followed for stability on subsequent scans however. 3. No new or progressive chest pathology elsewhere. CT SCAN OF THE ABDOMEN PELVIS WITH IV CONTRAST: There is diffuse fatty liver change. No hepatic lesions are identified. No significant abnormalities are appreciated of the spleen, gallbladder, pancreas, right adrenal gland, or kidneys. Low density round 1.5 cm left adrenal nodule was non-hypermetabolic on the previous PET scan and is stable in size at least to the 6/4/2024 abdominal CT scan. Appearance of the patient's gastric pull-up is stable and no perigastric adenopathy or other upper abdominal adenopathy is seen. No inflammatory change or evidence of peritoneal disease is identified. Bowel loops are normal in caliber. Regarding the lower abdomen/pelvis, no mass or adenopathy is seen. The bladder appears diffusely thick-walled, although this may at least in part be due to nondistention. There are no visible surrounding inflammatory changes. Prostate and seminal vesicles do not appear significantly enlarged. No intrapelvic mass, adenopathy or inflammatory change is seen. Large and small bowel loops appear grossly normal. Shotty right inguinal lymph nodes appear unchanged back to 8/19/2024. Delayed venous phase images show no evidence of obstructive uropathy. Bony structures appear to be intact.     Impression: Impression: 1. No evidence of malignancy/metastasis in the abdomen or pelvis. 2. Stable small left adrenal nodule. 3. Uniformly thick-walled appearance of the bladder, perhaps in part due to nondistention. In particular if patient has ongoing symptoms of dysuria, consider correlation with urinalysis. Electronically Signed: Ronnie Harmon MD  12/19/2024 8:46 PM EST  Workstation ID: [id number]     Assessment / Plan      Assessment/Plan:   1. Esophageal adenocarcinoma  (Primary)  -Noted on EGD in June 2024.  Pathology notable for an adenocarcinoma  -CPS 10%, HER2/al negative, MSI stable  -PET/CT without evidence of metastatic disease although local regional lymphadenopathy was noted  -Clinical stage III  -Previously discussed the diagnosis, prognosis, treatment plans with patient and wife   -PET/CT in July 2024 reviewed and showing treatment response  -Status post week 4 of carbo/Taxol/radiation finishing in July 2024  -Status post [place] [facility] esophagectomy with Dr. Gupta on 9/30/2024  -Pathology notable for scant residual distal third esophageal adenocarcinoma invading the muscle.  1/8 lymph nodes positive for disease.  Surgical margins negative  -CT C/A/P in December 2024 reviewed without evidence of recurrent or metastatic disease  -Started adjuvant nivolumab in December 2024.  Currently cycle 1-day 15.  Clinically appropriate for treatment.  Labs reviewed and appropriate for treatment     2.  Cancer-related pain  -Stable with current narcotic regimen     3.  Severe protein caloric malnutrition  -Significantly improved.  Tolerating oral intake     4. Nausea  -Stable with needed phenergan, compazine, and viscous lidocaine         Follow Up:   Follow up in 2 weeks     Aquiles Reddy MD  Hematology and Oncology     Please note that portions of this note may have been completed with a voice recognition program. Efforts were made to edit the dictations, but occasionally words are mistranscribed.

## 2025-01-14 ENCOUNTER — HOSPITAL ENCOUNTER (OUTPATIENT)
Dept: ONCOLOGY | Facility: HOSPITAL | Age: 50
Discharge: HOME | End: 2025-01-14
Payer: MEDICAID

## 2025-01-14 ENCOUNTER — TELEPHONE (OUTPATIENT)
Dept: ONCOLOGY | Facility: CLINIC | Age: 50
End: 2025-01-14

## 2025-01-14 ENCOUNTER — OFFICE VISIT (OUTPATIENT)
Dept: ONCOLOGY | Facility: CLINIC | Age: 50
End: 2025-01-14
Payer: MEDICAID

## 2025-01-14 VITALS
TEMPERATURE: 97.3 F | SYSTOLIC BLOOD PRESSURE: 153 MMHG | HEART RATE: 76 BPM | HEIGHT: 72 IN | DIASTOLIC BLOOD PRESSURE: 92 MMHG | RESPIRATION RATE: 16 BRPM | BODY MASS INDEX: 20.17 KG/M2 | WEIGHT: 148.9 LBS | OXYGEN SATURATION: 97 %

## 2025-01-14 DIAGNOSIS — C15.9 ESOPHAGEAL ADENOCARCINOMA: Primary | ICD-10-CM

## 2025-01-14 DIAGNOSIS — Z45.2 ENCOUNTER FOR CARE RELATED TO VASCULAR ACCESS PORT: ICD-10-CM

## 2025-01-14 LAB
ALBUMIN SERPL-MCNC: 3.9 G/DL (ref 3.5–5.2)
ALBUMIN/GLOB SERPL: 1.4 G/DL
ALP SERPL-CCNC: 100 U/L (ref 39–117)
ALT SERPL W P-5'-P-CCNC: 8 U/L (ref 1–41)
ANION GAP SERPL CALCULATED.3IONS-SCNC: 6 MMOL/L (ref 5–15)
AST SERPL-CCNC: 15 U/L (ref 1–40)
BASOPHILS # BLD AUTO: 0.02 10*3/MM3 (ref 0–0.2)
BASOPHILS NFR BLD AUTO: 0.4 % (ref 0–1.5)
BILIRUB SERPL-MCNC: 0.4 MG/DL (ref 0–1.2)
BUN SERPL-MCNC: 15 MG/DL (ref 6–20)
BUN/CREAT SERPL: 13.8 (ref 7–25)
CALCIUM SPEC-SCNC: 9.6 MG/DL (ref 8.6–10.5)
CHLORIDE SERPL-SCNC: 99 MMOL/L (ref 98–107)
CO2 SERPL-SCNC: 29 MMOL/L (ref 22–29)
CREAT SERPL-MCNC: 1.09 MG/DL (ref 0.76–1.27)
DEPRECATED RDW RBC AUTO: 49.7 FL (ref 37–54)
EGFRCR SERPLBLD CKD-EPI 2021: 83.2 ML/MIN/1.73
EOSINOPHIL # BLD AUTO: 0.12 10*3/MM3 (ref 0–0.4)
EOSINOPHIL NFR BLD AUTO: 2.3 % (ref 0.3–6.2)
ERYTHROCYTE [DISTWIDTH] IN BLOOD BY AUTOMATED COUNT: 15.7 % (ref 12.3–15.4)
FERRITIN SERPL-MCNC: 19.05 NG/ML (ref 30–400)
GLOBULIN UR ELPH-MCNC: 2.8 GM/DL
GLUCOSE SERPL-MCNC: 99 MG/DL (ref 65–99)
HCT VFR BLD AUTO: 35.8 % (ref 37.5–51)
HGB BLD-MCNC: 11.6 G/DL (ref 13–17.7)
IMM GRANULOCYTES # BLD AUTO: 0 10*3/MM3 (ref 0–0.05)
IMM GRANULOCYTES NFR BLD AUTO: 0 % (ref 0–0.5)
IRON 24H UR-MRATE: 25 MCG/DL (ref 59–158)
IRON SATN MFR SERPL: 7 % (ref 20–50)
LYMPHOCYTES # BLD AUTO: 0.4 10*3/MM3 (ref 0.7–3.1)
LYMPHOCYTES NFR BLD AUTO: 7.7 % (ref 19.6–45.3)
MCH RBC QN AUTO: 27.4 PG (ref 26.6–33)
MCHC RBC AUTO-ENTMCNC: 32.4 G/DL (ref 31.5–35.7)
MCV RBC AUTO: 84.6 FL (ref 79–97)
MONOCYTES # BLD AUTO: 0.25 10*3/MM3 (ref 0.1–0.9)
MONOCYTES NFR BLD AUTO: 4.8 % (ref 5–12)
NEUTROPHILS NFR BLD AUTO: 4.38 10*3/MM3 (ref 1.7–7)
NEUTROPHILS NFR BLD AUTO: 84.8 % (ref 42.7–76)
PLATELET # BLD AUTO: 171 10*3/MM3 (ref 140–450)
PMV BLD AUTO: 10.5 FL (ref 6–12)
POTASSIUM SERPL-SCNC: 3.8 MMOL/L (ref 3.5–5.2)
PROT SERPL-MCNC: 6.7 G/DL (ref 6–8.5)
RBC # BLD AUTO: 4.23 10*6/MM3 (ref 4.14–5.8)
SODIUM SERPL-SCNC: 134 MMOL/L (ref 136–145)
T4 FREE SERPL-MCNC: 1.34 NG/DL (ref 0.92–1.68)
TIBC SERPL-MCNC: 381 MCG/DL (ref 298–536)
TRANSFERRIN SERPL-MCNC: 256 MG/DL (ref 200–360)
TSH SERPL DL<=0.05 MIU/L-ACNC: 0.62 UIU/ML (ref 0.27–4.2)
WBC NRBC COR # BLD AUTO: 5.17 10*3/MM3 (ref 3.4–10.8)

## 2025-01-14 PROCEDURE — 1126F AMNT PAIN NOTED NONE PRSNT: CPT | Performed by: INTERNAL MEDICINE

## 2025-01-14 PROCEDURE — 85025 COMPLETE CBC W/AUTO DIFF WBC: CPT | Performed by: INTERNAL MEDICINE

## 2025-01-14 PROCEDURE — 84439 ASSAY OF FREE THYROXINE: CPT | Performed by: INTERNAL MEDICINE

## 2025-01-14 PROCEDURE — 80053 COMPREHEN METABOLIC PANEL: CPT | Performed by: INTERNAL MEDICINE

## 2025-01-14 PROCEDURE — 82728 ASSAY OF FERRITIN: CPT | Performed by: INTERNAL MEDICINE

## 2025-01-14 PROCEDURE — 3080F DIAST BP >= 90 MM HG: CPT | Performed by: INTERNAL MEDICINE

## 2025-01-14 PROCEDURE — 84466 ASSAY OF TRANSFERRIN: CPT | Performed by: INTERNAL MEDICINE

## 2025-01-14 PROCEDURE — 83540 ASSAY OF IRON: CPT | Performed by: INTERNAL MEDICINE

## 2025-01-14 PROCEDURE — 84443 ASSAY THYROID STIM HORMONE: CPT | Performed by: INTERNAL MEDICINE

## 2025-01-14 PROCEDURE — 96413 CHEMO IV INFUSION 1 HR: CPT

## 2025-01-14 PROCEDURE — 25010000002 HEPARIN LOCK FLUSH PER 10 UNITS: Performed by: INTERNAL MEDICINE

## 2025-01-14 PROCEDURE — 99214 OFFICE O/P EST MOD 30 MIN: CPT | Performed by: INTERNAL MEDICINE

## 2025-01-14 PROCEDURE — 25010000002 NIVOLUMAB 240 MG/24ML SOLUTION 24 ML VIAL: Performed by: INTERNAL MEDICINE

## 2025-01-14 PROCEDURE — 3077F SYST BP >= 140 MM HG: CPT | Performed by: INTERNAL MEDICINE

## 2025-01-14 RX ORDER — HEPARIN SODIUM (PORCINE) LOCK FLUSH IV SOLN 100 UNIT/ML 100 UNIT/ML
500 SOLUTION INTRAVENOUS AS NEEDED
OUTPATIENT
Start: 2025-01-14

## 2025-01-14 RX ORDER — SODIUM CHLORIDE 9 MG/ML
20 INJECTION, SOLUTION INTRAVENOUS ONCE
OUTPATIENT
Start: 2025-02-11

## 2025-01-14 RX ORDER — SODIUM CHLORIDE 9 MG/ML
20 INJECTION, SOLUTION INTRAVENOUS ONCE
Status: DISCONTINUED | OUTPATIENT
Start: 2025-01-14 | End: 2025-01-15 | Stop reason: HOSPADM

## 2025-01-14 RX ORDER — SODIUM CHLORIDE 9 MG/ML
20 INJECTION, SOLUTION INTRAVENOUS ONCE
OUTPATIENT
Start: 2025-02-25

## 2025-01-14 RX ORDER — LATANOPROST 50 UG/ML
1 SOLUTION/ DROPS OPHTHALMIC DAILY
COMMUNITY
Start: 2025-01-02

## 2025-01-14 RX ORDER — HEPARIN SODIUM (PORCINE) LOCK FLUSH IV SOLN 100 UNIT/ML 100 UNIT/ML
500 SOLUTION INTRAVENOUS AS NEEDED
Status: DISCONTINUED | OUTPATIENT
Start: 2025-01-14 | End: 2025-01-15 | Stop reason: HOSPADM

## 2025-01-14 RX ORDER — FERROUS SULFATE 325(65) MG
325 TABLET ORAL
Qty: 90 TABLET | Refills: 0 | Status: SHIPPED | OUTPATIENT
Start: 2025-01-14

## 2025-01-14 RX ADMIN — SODIUM CHLORIDE 240 MG: 9 INJECTION, SOLUTION INTRAVENOUS at 09:21

## 2025-01-14 RX ADMIN — HEPARIN 500 UNITS: 100 SYRINGE at 10:03

## 2025-01-14 NOTE — PROGRESS NOTES
Follow Up Office Visit      Date: 2025     Patient Name: Nelson Garcia  MRN: [id number]  : 1975  Referring Physician: Que Lopez     Chief Complaint: Follow-up for esophageal adenocarcinoma     History of Present Illness: Nelson Garcia is a pleasant 49 y.o. male with a past medical history of hyperlipidemia, hypertension, GERD, tobacco abuse, CVA who presents today for evaluation of esophageal adenocarcinoma. The patient is accompanied by their wife who contributes to the history of their care.  Patient had been experiencing worsening dysphagia and weight loss.  Noted the sensation of food getting stuck and regurgitating undigested food.  Was seen at [facility] where he had an EGD completed which showed a fungating mass.  Biopsy consistent with an adenocarcinoma.  PET/CT without evidence of distant disease but did note some local regional lymphadenopathy.  Given his weight loss and significant obstructing lesion, he had a J-tube placed and is currently on tube feeds and tolerating these well.  He notes some pain and discomfort related to his cancer.  He has been seen by Dr. Ramirez with plans to start concurrent chemo XRT followed by surgical intervention with Dr. Mills in [place]     Interval History:  Presents to clinic for follow-up.  Status post cycle 4 of concurrent carbo/Taxol/radiation finishing in 2024.  Status post Elizabeth Eliel esophagectomy with Dr. Gupta on 2024.  Started adjuvant nivolumab in 2024.  Continues to tolerate treatment well outside of fatigue for few days with each cycle.  Denied any nosebleeds or bleeding episodes this cycle    Oncology History:    Oncology/Hematology History   Esophageal adenocarcinoma   2024 Cancer Staged    Staging form: Esophagus - Adenocarcinoma, AJCC 8th Edition  - Clinical stage from 2024: Stage III (cT3, cN1, cM0, G2) - Signed by Viet Ramirez MD on 2024 Initial  Diagnosis    Esophageal adenocarcinoma     7/1/2024 - 8/2/2024 Chemotherapy    OP GE PACLitaxel / CARBOplatin (weekly) + XRT     7/1/2024 - 8/8/2024 Radiation    Radiation OncologyTreatment Course:  Nelson Garcia received 5040 cGy in 28 fractions to esophagus via External Beam Radiation - EBRT.     12/17/2024 -  Chemotherapy    OP GE Nivolumab         Subjective      Review of Systems:   Constitutional: Negative for fevers, chills, or weight loss  Eyes: Negative for blurred vision or discharge         Ear/Nose/Throat: Negative for difficulty swallowing, sore throat, LAD                                                       Respiratory: Negative for cough, SOA, wheezing                                                                                        Cardiovascular: Negative for chest pain or palpitations                                                                  Gastrointestinal: Negative for nausea, vomiting or diarrhea                                                                     Genitourinary: Negative for dysuria or hematuria                                                                                           Musculoskeletal: Negative for any joint pains or muscle aches                                                                        Neurologic: Negative for any weakness, headaches, dizziness                                                                         Hematologic: Negative for any easy bleeding or bruising                                                                                   Psychiatric: Negative for anxiety or depression                          Past Medical History/Past Surgical History/ Family History/ Social History: Reviewed by me and unchanged from my previous documentation done on December 2024.     Medications:     Current Outpatient Medications:     acetaminophen (TYLENOL) 325 MG tablet, Take 2 tablets by mouth Every 6 (Six) Hours As Needed for  Mild Pain ., Disp: , Rfl:     atorvastatin (LIPITOR) 80 MG tablet, Take 1 tablet by mouth Daily., Disp: , Rfl:     cloNIDine (CATAPRES) 0.2 MG tablet, Take 1 tablet by mouth As Needed., Disp: , Rfl:     docusate (COLACE) 50 MG/5ML liquid, Administer 10ml per j tube twice a day as needed for constipation., Disp: 240 mL, Rfl: 2    docusate sodium (COLACE) 50 mg/5 mL liquid, Administer 10 mL per J tube 2 (Two) Times a Day As Needed for Constipation., Disp: 240 mL, Rfl: 2    famotidine (PEPCID) 40 mg/5 mL suspension, Take 2.5 mL by mouth 2 (Two) Times a Day., Disp: 150 mL, Rfl: 3    gabapentin (NEURONTIN) 600 MG tablet, Take 1 tablet by mouth Daily., Disp: , Rfl:     Katerzia 1 MG/ML suspension, Take 0.1 mg/kg by mouth Daily., Disp: , Rfl:     latanoprost (XALATAN) 0.005 % ophthalmic solution, Administer 1 drop to both eyes Daily., Disp: , Rfl:     Lidocaine Viscous HCl (XYLOCAINE) 2 % solution, Take 10 mL by mouth As Needed for Mild Pain., Disp: 100 mL, Rfl: 3    lidocaine-prilocaine (EMLA) 2.5-2.5 % cream, Please apply a small amount to port site about 45 min prior to infusion and cover with Saran Wrap, Disp: 30 g, Rfl: 2    Magic Mouthwash Radonc (nystatin - diphenhydrAMINE HCl - dexamethasone - lidocaine), Swish and swallow 10 mL 4 (Four) Times a Day Before Meals & at Bedtime., Disp: 482 mL, Rfl: 3    methocarbamol (ROBAXIN) 500 MG tablet, Take 1 tablet by mouth 4 (Four) Times a Day., Disp: , Rfl:     Naloxegol Oxalate (MOVANTIK) 25 MG tablet, Take 1 tablet by mouth Every Morning., Disp: 30 tablet, Rfl: 0    neomycin-polymyxin-dexamethasone (MAXITROL) 0.1 % ophthalmic suspension, Administer 2 drops to both eyes 4 (Four) Times a Day., Disp: 5 mL, Rfl: 0    nicotine polacrilex (NICORETTE) 4 MG gum, , Disp: , Rfl:     Nutritional Supplements (Boost Plus) liquid, Take 237 mL by mouth 3 (Three) Times a Day., Disp: , Rfl:     nystatin (MYCOSTATIN) 659078 UNIT/GM powder, Apply to skin folds topically to the appropriate  "area as directed 2 (Two) Times a Day, Disp: 30 g, Rfl: 0    ondansetron (ZOFRAN) 4 MG/5ML solution, Take 10 mL by mouth Every 8 (Eight) Hours As Needed for Nausea or Vomiting., Disp: 300 mL, Rfl: 3    ondansetron (ZOFRAN) 8 MG tablet, Take 1 tablet by mouth 3 (Three) Times a Day As Needed for Nausea or Vomiting., Disp: 30 tablet, Rfl: 5    ondansetron ODT (ZOFRAN-ODT) 8 MG disintegrating tablet, Place 1 tablet on the tongue Every 8 (Eight) Hours As Needed for Nausea or Vomiting., Disp: 30 tablet, Rfl: 3    oxyCODONE (ROXICODONE) 10 MG tablet, Administer 1 tablet per J tube Every 4 (Four) Hours As Needed for Moderate Pain., Disp: 180 tablet, Rfl: 0    promethazine (PHENERGAN) 25 MG suppository, Insert 1 suppository into the rectum Every 6 (Six) Hours As Needed for Nausea or Vomiting., Disp: 30 suppository, Rfl: 2    sildenafil (VIAGRA) 50 MG tablet, TAKE ONE TABLET BY MOUTH DAILY AS NEEDED FOR ERECTILE DYSFUNCTION, Disp: 30 tablet, Rfl: 3    valsartan (DIOVAN) 320 MG tablet, Take 1 tablet by mouth Daily., Disp: 30 tablet, Rfl: 0    valsartan (DIOVAN) 80 MG tablet, Take 1 tablet by mouth Daily., Disp: , Rfl:     vitamin D3 125 MCG (5000 UT) capsule capsule, Take 1 capsule by mouth Daily., Disp: , Rfl:     Atorvastatin Calcium 20 MG/5ML suspension, Take 20 mls by mouth Daily. (Patient not taking: Reported on 1/14/2025), Disp: 600 mL, Rfl: 5    Gabapentin (NEURONTIN) 50 mg/mL solution solution, Take 18 mL by mouth 4 (Four) Times a Day. (Patient not taking: Reported on 1/14/2025), Disp: 470 mL, Rfl: 5    Allergies:   No Known Allergies    Objective     Physical Exam:  Vital Signs:   Vitals:    01/14/25 0832   BP: 153/92   Pulse: 76   Resp: 16   Temp: 97.3 °F (36.3 °C)   TempSrc: Temporal   SpO2: 97%   Weight: 67.5 kg (148 lb 14.4 oz)   Height: 182.9 cm (72.01\")   PainSc: 0-No pain     Pain Score    01/14/25 0832   PainSc: 0-No pain     ECOG Performance Status: 0 - Asymptomatic    Constitutional: NAD, ECOG 0  Eyes: " PERRLA, scleral anicteric  ENT: No LAD, no thyromegaly  Respiratory: CTAB, no wheezing, rales, rhonchi  Cardiovascular: RRR, no murmurs, pulses 2+ bilaterally  Abdomen: soft, NT/ND, no HSM  Musculoskeletal: strength 5/5 bilaterally, no c/c/e  Neurologic: A&O x 3, CN II-XII intact grossly    Results Review:   Hospital Outpatient Visit on 01/14/2025   Component Date Value Ref Range Status    Glucose 01/14/2025 99  65 - 99 mg/dL Final    BUN 01/14/2025 15  6 - 20 mg/dL Final    Creatinine 01/14/2025 1.09  0.76 - 1.27 mg/dL Final    Sodium 01/14/2025 134 (L)  136 - 145 mmol/L Final    Potassium 01/14/2025 3.8  3.5 - 5.2 mmol/L Final    Chloride 01/14/2025 99  98 - 107 mmol/L Final    CO2 01/14/2025 29.0  22.0 - 29.0 mmol/L Final    Calcium 01/14/2025 9.6  8.6 - 10.5 mg/dL Final    Total Protein 01/14/2025 6.7  6.0 - 8.5 g/dL Final    Albumin 01/14/2025 3.9  3.5 - 5.2 g/dL Final    ALT (SGPT) 01/14/2025 8  1 - 41 U/L Final    AST (SGOT) 01/14/2025 15  1 - 40 U/L Final    Alkaline Phosphatase 01/14/2025 100  39 - 117 U/L Final    Total Bilirubin 01/14/2025 0.4  0.0 - 1.2 mg/dL Final    Globulin 01/14/2025 2.8  gm/dL Final    Calculated Result    A/G Ratio 01/14/2025 1.4  g/dL Final    BUN/Creatinine Ratio 01/14/2025 13.8  7.0 - 25.0 Final    Anion Gap 01/14/2025 6.0  5.0 - 15.0 mmol/L Final    eGFR 01/14/2025 83.2  >60.0 mL/min/1.73 Final    WBC 01/14/2025 5.17  3.40 - 10.80 10*3/mm3 Final    RBC 01/14/2025 4.23  4.14 - 5.80 10*6/mm3 Final    Hemoglobin 01/14/2025 11.6 (L)  13.0 - 17.7 g/dL Final    Hematocrit 01/14/2025 35.8 (L)  37.5 - 51.0 % Final    MCV 01/14/2025 84.6  79.0 - 97.0 fL Final    MCH 01/14/2025 27.4  26.6 - 33.0 pg Final    MCHC 01/14/2025 32.4  31.5 - 35.7 g/dL Final    RDW 01/14/2025 15.7 (H)  12.3 - 15.4 % Final    RDW-SD 01/14/2025 49.7  37.0 - 54.0 fl Final    MPV 01/14/2025 10.5  6.0 - 12.0 fL Final    Platelets 01/14/2025 171  140 - 450 10*3/mm3 Final    Neutrophil % 01/14/2025 84.8 (H)  42.7 -  76.0 % Final    Lymphocyte % 01/14/2025 7.7 (L)  19.6 - 45.3 % Final    Monocyte % 01/14/2025 4.8 (L)  5.0 - 12.0 % Final    Eosinophil % 01/14/2025 2.3  0.3 - 6.2 % Final    Basophil % 01/14/2025 0.4  0.0 - 1.5 % Final    Immature Grans % 01/14/2025 0.0  0.0 - 0.5 % Final    Neutrophils, Absolute 01/14/2025 4.38  1.70 - 7.00 10*3/mm3 Final    Lymphocytes, Absolute 01/14/2025 0.40 (L)  0.70 - 3.10 10*3/mm3 Final    Monocytes, Absolute 01/14/2025 0.25  0.10 - 0.90 10*3/mm3 Final    Eosinophils, Absolute 01/14/2025 0.12  0.00 - 0.40 10*3/mm3 Final    Basophils, Absolute 01/14/2025 0.02  0.00 - 0.20 10*3/mm3 Final    Immature Grans, Absolute 01/14/2025 0.00  0.00 - 0.05 10*3/mm3 Final       CT Abdomen Pelvis With Contrast    Result Date: 12/19/2024  Narrative: CT CHEST W CONTRAST DIAGNOSTIC, CT ABDOMEN PELVIS W CONTRAST Date of Exam: 12/16/2024 9:46 AM EST Indication: esophageal ca. Comparison: 7/17/2024 whole-body PET scan Technique: Axial CT images were obtained of the chest, abdomen and pelvis after the uneventful intravenous administration of 85 mL Isovue 300.  Reconstructed coronal and sagittal images were also obtained. Automated exposure control and iterative construction methods were used. Findings: Previous 7/17/2024 whole-body PET scan by report showed positive response to therapy but decreasing activity and decreasing thickness of the distal esophagus, resolution of hyper metabolic gastrohepatic lymph nodes. Stable small right lower lobe pulmonary nodules and stable 1.1 cm subcarinal lymph node. CT SCAN OF THE CHEST WITH IV CONTRAST: Mild bibasilar discoid atelectasis is new but essentially linear and non-mass-like. There is a more irregular pleural-based area of scarring/nodularity in the medial right lower lobe which is new, images 85 through 76. With this appears most discrete, image 81 and measures approximately 2.0 x 1.2 cm and in its location adjacent the patient's gastric pull-up, may represent post  irradiation scarring. A couple of micronodules are seen elsewhere. Lungs elsewhere appear  clear. Images of the mediastinum show no evidence of significant adenopathy and no pericardial effusion. Bony structures appear to be intact Pression: 1. New very mild benign-appearing bibasilar discoid atelectasis. 2. Probable small area of right paramediastinal post irradiation scarring. This area should be followed for stability on subsequent scans however. 3. No new or progressive chest pathology elsewhere. CT SCAN OF THE ABDOMEN PELVIS WITH IV CONTRAST: There is diffuse fatty liver change. No hepatic lesions are identified. No significant abnormalities are appreciated of the spleen, gallbladder, pancreas, right adrenal gland, or kidneys. Low density round 1.5 cm left adrenal nodule was non-hypermetabolic on the previous PET scan and is stable in size at least to the 6/4/2024 abdominal CT scan. Appearance of the patient's gastric pull-up is stable and no perigastric adenopathy or other upper abdominal adenopathy is seen. No inflammatory change or evidence of peritoneal disease is identified. Bowel loops are normal in caliber. Regarding the lower abdomen/pelvis, no mass or adenopathy is seen. The bladder appears diffusely thick-walled, although this may at least in part be due to nondistention. There are no visible surrounding inflammatory changes. Prostate and seminal vesicles do not appear significantly enlarged. No intrapelvic mass, adenopathy or inflammatory change is seen. Large and small bowel loops appear grossly normal. Shotty right inguinal lymph nodes appear unchanged back to 8/19/2024. Delayed venous phase images show no evidence of obstructive uropathy. Bony structures appear to be intact.     Impression: Impression: 1. No evidence of malignancy/metastasis in the abdomen or pelvis. 2. Stable small left adrenal nodule. 3. Uniformly thick-walled appearance of the bladder, perhaps in part due to nondistention. In  particular if patient has ongoing symptoms of dysuria, consider correlation with urinalysis. Electronically Signed: Ronnie Harmon MD  12/19/2024 8:46 PM EST  Workstation ID: [id number]    CT Chest With Contrast Diagnostic    Result Date: 12/19/2024  Narrative: CT CHEST W CONTRAST DIAGNOSTIC, CT ABDOMEN PELVIS W CONTRAST Date of Exam: 12/16/2024 9:46 AM EST Indication: esophageal ca. Comparison: 7/17/2024 whole-body PET scan Technique: Axial CT images were obtained of the chest, abdomen and pelvis after the uneventful intravenous administration of 85 mL Isovue 300.  Reconstructed coronal and sagittal images were also obtained. Automated exposure control and iterative construction methods were used. Findings: Previous 7/17/2024 whole-body PET scan by report showed positive response to therapy but decreasing activity and decreasing thickness of the distal esophagus, resolution of hyper metabolic gastrohepatic lymph nodes. Stable small right lower lobe pulmonary nodules and stable 1.1 cm subcarinal lymph node. CT SCAN OF THE CHEST WITH IV CONTRAST: Mild bibasilar discoid atelectasis is new but essentially linear and non-mass-like. There is a more irregular pleural-based area of scarring/nodularity in the medial right lower lobe which is new, images 85 through 76. With this appears most discrete, image 81 and measures approximately 2.0 x 1.2 cm and in its location adjacent the patient's gastric pull-up, may represent post irradiation scarring. A couple of micronodules are seen elsewhere. Lungs elsewhere appear  clear. Images of the mediastinum show no evidence of significant adenopathy and no pericardial effusion. Bony structures appear to be intact Pression: 1. New very mild benign-appearing bibasilar discoid atelectasis. 2. Probable small area of right paramediastinal post irradiation scarring. This area should be followed for stability on subsequent scans however. 3. No new or progressive chest pathology elsewhere. CT SCAN  OF THE ABDOMEN PELVIS WITH IV CONTRAST: There is diffuse fatty liver change. No hepatic lesions are identified. No significant abnormalities are appreciated of the spleen, gallbladder, pancreas, right adrenal gland, or kidneys. Low density round 1.5 cm left adrenal nodule was non-hypermetabolic on the previous PET scan and is stable in size at least to the 6/4/2024 abdominal CT scan. Appearance of the patient's gastric pull-up is stable and no perigastric adenopathy or other upper abdominal adenopathy is seen. No inflammatory change or evidence of peritoneal disease is identified. Bowel loops are normal in caliber. Regarding the lower abdomen/pelvis, no mass or adenopathy is seen. The bladder appears diffusely thick-walled, although this may at least in part be due to nondistention. There are no visible surrounding inflammatory changes. Prostate and seminal vesicles do not appear significantly enlarged. No intrapelvic mass, adenopathy or inflammatory change is seen. Large and small bowel loops appear grossly normal. Shotty right inguinal lymph nodes appear unchanged back to 8/19/2024. Delayed venous phase images show no evidence of obstructive uropathy. Bony structures appear to be intact.     Impression: Impression: 1. No evidence of malignancy/metastasis in the abdomen or pelvis. 2. Stable small left adrenal nodule. 3. Uniformly thick-walled appearance of the bladder, perhaps in part due to nondistention. In particular if patient has ongoing symptoms of dysuria, consider correlation with urinalysis. Electronically Signed: Ronnie Harmon MD  12/19/2024 8:46 PM EST  Workstation ID: [id number]     Assessment / Plan      Assessment/Plan:   1. Esophageal adenocarcinoma (Primary)  -Noted on EGD in June 2024.  Pathology notable for an adenocarcinoma  -CPS 10%, HER2/al negative, MSI stable  -PET/CT without evidence of metastatic disease although local regional lymphadenopathy was noted  -Clinical stage III  -Previously  discussed the diagnosis, prognosis, treatment plans with patient and wife   -PET/CT in July 2024 reviewed and showing treatment response  -Status post week 4 of carbo/Taxol/radiation finishing in July 2024  -Status post Frantz Eliel esophagectomy with Dr. Gupta on 9/30/2024  -Pathology notable for scant residual distal third esophageal adenocarcinoma invading the muscle.  1/8 lymph nodes positive for disease.  Surgical margins negative  -CT C/A/P in December 2024 reviewed without evidence of recurrent or metastatic disease  -Started adjuvant nivolumab in December 2024.  Currently cycle 2-day 1.  Clinically appropriate for treatment.  Labs reviewed and appropriate for treatment     2.  Cancer-related pain  -Stable with current narcotic regimen     3.  Severe protein caloric malnutrition  -Significantly improved.  Tolerating oral intake     4. Nausea  -Stable with needed phenergan, compazine, and viscous lidocaine    5. Fatigue  -Patient slightly anemic on labs today  -Will check iron studies         Follow Up:   Follow-up in 1 month     Aquiles Reddy MD  Hematology and Oncology     Please note that portions of this note may have been completed with a voice recognition program. Efforts were made to edit the dictations, but occasionally words are mistranscribed.

## 2025-01-14 NOTE — TELEPHONE ENCOUNTER
Call to Nelson to notify that iron studies were a little low.  Dr Reddy wants to start Nelson on Ferrous sulfate daily.  Provided with instructions to take with food, increase fluids and note that stools will be darker.  Will notify our office if unable to tolerate

## 2025-01-22 DIAGNOSIS — C15.9 ESOPHAGEAL ADENOCARCINOMA: ICD-10-CM

## 2025-01-22 RX ORDER — OXYCODONE HYDROCHLORIDE 10 MG/1
10 TABLET ORAL EVERY 4 HOURS PRN
Qty: 180 TABLET | Refills: 0 | Status: SHIPPED | OUTPATIENT
Start: 2025-01-22

## 2025-01-28 ENCOUNTER — HOSPITAL ENCOUNTER (OUTPATIENT)
Dept: ONCOLOGY | Facility: HOSPITAL | Age: 50
End: 2025-01-28
Payer: MEDICAID

## 2025-01-28 ENCOUNTER — HOSPITAL ENCOUNTER (OUTPATIENT)
Dept: ONCOLOGY | Facility: HOSPITAL | Age: 50
Discharge: HOME | End: 2025-01-28
Admitting: INTERNAL MEDICINE
Payer: MEDICAID

## 2025-01-28 VITALS
TEMPERATURE: 99 F | SYSTOLIC BLOOD PRESSURE: 160 MMHG | WEIGHT: 150 LBS | DIASTOLIC BLOOD PRESSURE: 90 MMHG | HEIGHT: 72 IN | BODY MASS INDEX: 20.32 KG/M2 | HEART RATE: 80 BPM

## 2025-01-28 DIAGNOSIS — Z45.2 ENCOUNTER FOR CARE RELATED TO VASCULAR ACCESS PORT: ICD-10-CM

## 2025-01-28 DIAGNOSIS — C15.9 ESOPHAGEAL ADENOCARCINOMA: Primary | ICD-10-CM

## 2025-01-28 LAB
ALBUMIN SERPL-MCNC: 3.8 G/DL (ref 3.5–5.2)
ALBUMIN/GLOB SERPL: 1.3 G/DL
ALP SERPL-CCNC: 108 U/L (ref 39–117)
ALT SERPL W P-5'-P-CCNC: 9 U/L (ref 1–41)
ANION GAP SERPL CALCULATED.3IONS-SCNC: 7 MMOL/L (ref 5–15)
AST SERPL-CCNC: 18 U/L (ref 1–40)
BASOPHILS # BLD AUTO: 0.02 10*3/MM3 (ref 0–0.2)
BASOPHILS NFR BLD AUTO: 0.3 % (ref 0–1.5)
BILIRUB SERPL-MCNC: 0.4 MG/DL (ref 0–1.2)
BUN SERPL-MCNC: 19 MG/DL (ref 6–20)
BUN/CREAT SERPL: 17.3 (ref 7–25)
CALCIUM SPEC-SCNC: 9.6 MG/DL (ref 8.6–10.5)
CHLORIDE SERPL-SCNC: 102 MMOL/L (ref 98–107)
CO2 SERPL-SCNC: 29 MMOL/L (ref 22–29)
CREAT SERPL-MCNC: 1.1 MG/DL (ref 0.76–1.27)
DEPRECATED RDW RBC AUTO: 47.6 FL (ref 37–54)
EGFRCR SERPLBLD CKD-EPI 2021: 82.3 ML/MIN/1.73
EOSINOPHIL # BLD AUTO: 0.14 10*3/MM3 (ref 0–0.4)
EOSINOPHIL NFR BLD AUTO: 2 % (ref 0.3–6.2)
ERYTHROCYTE [DISTWIDTH] IN BLOOD BY AUTOMATED COUNT: 15.3 % (ref 12.3–15.4)
GLOBULIN UR ELPH-MCNC: 2.9 GM/DL
GLUCOSE SERPL-MCNC: 95 MG/DL (ref 65–99)
HCT VFR BLD AUTO: 35.2 % (ref 37.5–51)
HGB BLD-MCNC: 11.2 G/DL (ref 13–17.7)
IMM GRANULOCYTES # BLD AUTO: 0.01 10*3/MM3 (ref 0–0.05)
IMM GRANULOCYTES NFR BLD AUTO: 0.1 % (ref 0–0.5)
LYMPHOCYTES # BLD AUTO: 0.44 10*3/MM3 (ref 0.7–3.1)
LYMPHOCYTES NFR BLD AUTO: 6.3 % (ref 19.6–45.3)
MCH RBC QN AUTO: 26.9 PG (ref 26.6–33)
MCHC RBC AUTO-ENTMCNC: 31.8 G/DL (ref 31.5–35.7)
MCV RBC AUTO: 84.4 FL (ref 79–97)
MONOCYTES # BLD AUTO: 0.34 10*3/MM3 (ref 0.1–0.9)
MONOCYTES NFR BLD AUTO: 4.9 % (ref 5–12)
NEUTROPHILS NFR BLD AUTO: 6.01 10*3/MM3 (ref 1.7–7)
NEUTROPHILS NFR BLD AUTO: 86.4 % (ref 42.7–76)
PLATELET # BLD AUTO: 188 10*3/MM3 (ref 140–450)
PMV BLD AUTO: 10.2 FL (ref 6–12)
POTASSIUM SERPL-SCNC: 3.7 MMOL/L (ref 3.5–5.2)
PROT SERPL-MCNC: 6.7 G/DL (ref 6–8.5)
RBC # BLD AUTO: 4.17 10*6/MM3 (ref 4.14–5.8)
SODIUM SERPL-SCNC: 138 MMOL/L (ref 136–145)
WBC NRBC COR # BLD AUTO: 6.96 10*3/MM3 (ref 3.4–10.8)

## 2025-01-28 PROCEDURE — 96413 CHEMO IV INFUSION 1 HR: CPT

## 2025-01-28 PROCEDURE — 85025 COMPLETE CBC W/AUTO DIFF WBC: CPT | Performed by: INTERNAL MEDICINE

## 2025-01-28 PROCEDURE — 25010000002 HEPARIN LOCK FLUSH PER 10 UNITS: Performed by: INTERNAL MEDICINE

## 2025-01-28 PROCEDURE — 25010000002 NIVOLUMAB 240 MG/24ML SOLUTION 24 ML VIAL: Performed by: INTERNAL MEDICINE

## 2025-01-28 PROCEDURE — 80053 COMPREHEN METABOLIC PANEL: CPT | Performed by: INTERNAL MEDICINE

## 2025-01-28 RX ORDER — HEPARIN SODIUM (PORCINE) LOCK FLUSH IV SOLN 100 UNIT/ML 100 UNIT/ML
500 SOLUTION INTRAVENOUS AS NEEDED
Status: DISCONTINUED | OUTPATIENT
Start: 2025-01-28 | End: 2025-01-29 | Stop reason: HOSPADM

## 2025-01-28 RX ORDER — HEPARIN SODIUM (PORCINE) LOCK FLUSH IV SOLN 100 UNIT/ML 100 UNIT/ML
500 SOLUTION INTRAVENOUS AS NEEDED
OUTPATIENT
Start: 2025-01-28

## 2025-01-28 RX ADMIN — SODIUM CHLORIDE 240 MG: 9 INJECTION, SOLUTION INTRAVENOUS at 09:38

## 2025-01-28 RX ADMIN — HEPARIN 500 UNITS: 100 SYRINGE at 10:20

## (undated) DEVICE — TUBING, SUCTION, 1/4" X 10', STRAIGHT: Brand: MEDLINE

## (undated) DEVICE — APPL CHLORAPREP HI/LITE 26ML ORNG

## (undated) DEVICE — BLCK/BITE BLOX W/DENTL/RIM W/STRAP 54F

## (undated) DEVICE — ELECTRD BLD EZ CLN MOD 6.5IN

## (undated) DEVICE — SUT GUT CHRM 3/0 SH 36IN G172H

## (undated) DEVICE — ADHS SKIN PREMIERPRO EXOFIN TOPICAL HI/VISC .5ML

## (undated) DEVICE — POOLE SUCTION HANDLE: Brand: CARDINAL HEALTH

## (undated) DEVICE — STPLR SKIN VISISTAT WD 35CT

## (undated) DEVICE — MSK ENDO PORT O2 POM ELITE CURAPLEX A/

## (undated) DEVICE — EXTENSION SET, MALE LUER LOCK ADAPTER WITH RETRACTABLE COLLAR

## (undated) DEVICE — PENCL ES MEGADINE EZ/CLEAN BUTN W/HOLSTR 10FT

## (undated) DEVICE — LN SMPL CO2 SHTRM SD STREAM W/M LUER

## (undated) DEVICE — SUT SILK 3/0 SH CR8 18IN C013D

## (undated) DEVICE — SPNG LAP 18X18IN LF STRL PK/5

## (undated) DEVICE — CVR PROB 96IN LF STRL

## (undated) DEVICE — SUT PDS 0 CT1 36IN Z346H

## (undated) DEVICE — SENSR O2 OXIMAX FNGR A/ 18IN NONSTR

## (undated) DEVICE — KT ORCA ORCAPOD DISP STRL

## (undated) DEVICE — SUT SILK 0 PSL 18IN 580H

## (undated) DEVICE — 3M™ IOBAN™ 2 ANTIMICROBIAL INCISE DRAPE 6648EZ: Brand: IOBAN™ 2

## (undated) DEVICE — PLUG,CATHETER,DRAINAGE PROTECTOR,TUBE: Brand: MEDLINE

## (undated) DEVICE — ELECTRD BLD EZ CLN MOD XLNG 2.75IN

## (undated) DEVICE — ADAPT FEED PEG ENDOVIVE Y/PRT ENFIT SI 20F

## (undated) DEVICE — CANN O2 ETCO2 FITS ALL CONN CO2 SMPL A/ 7IN DISP LF

## (undated) DEVICE — FRCP BX RADJAW4 NDL 2.8 240CM LG OG BX40

## (undated) DEVICE — 3M™ IOBAN™ 2 ANTIMICROBIAL INCISE DRAPE 6650EZ: Brand: IOBAN™ 2

## (undated) DEVICE — SPNG GZ WOVN 4X4IN 12PLY 10/BX STRL

## (undated) DEVICE — T-TUBE 19FR SILICONE: Brand: CARDINAL HEALTH

## (undated) DEVICE — SUT MNCRYL PLS ANTIB UD 4/0 PS2 18IN

## (undated) DEVICE — ADAPT CLN BIOGUARD AIR/H2O DISP

## (undated) DEVICE — LOU MINOR PROCEDURE: Brand: MEDLINE INDUSTRIES, INC.

## (undated) DEVICE — GLV SURG BIOGEL LTX PF 6

## (undated) DEVICE — ANTIBACTERIAL UNDYED BRAIDED (POLYGLACTIN 910), SYNTHETIC ABSORBABLE SUTURE: Brand: COATED VICRYL